# Patient Record
Sex: MALE | Race: WHITE
[De-identification: names, ages, dates, MRNs, and addresses within clinical notes are randomized per-mention and may not be internally consistent; named-entity substitution may affect disease eponyms.]

---

## 2017-09-16 ENCOUNTER — HOSPITAL ENCOUNTER (INPATIENT)
Dept: HOSPITAL 61 - ER | Age: 82
LOS: 5 days | Discharge: NURSING, REHAB OR LONG-TERM CARE | DRG: 380 | End: 2017-09-21
Attending: INTERNAL MEDICINE | Admitting: INTERNAL MEDICINE
Payer: MEDICARE

## 2017-09-16 VITALS — DIASTOLIC BLOOD PRESSURE: 72 MMHG | SYSTOLIC BLOOD PRESSURE: 110 MMHG

## 2017-09-16 VITALS — SYSTOLIC BLOOD PRESSURE: 116 MMHG | DIASTOLIC BLOOD PRESSURE: 66 MMHG

## 2017-09-16 VITALS — BODY MASS INDEX: 17.7 KG/M2 | HEIGHT: 73 IN | WEIGHT: 133.56 LBS

## 2017-09-16 VITALS — DIASTOLIC BLOOD PRESSURE: 62 MMHG | SYSTOLIC BLOOD PRESSURE: 114 MMHG

## 2017-09-16 DIAGNOSIS — I25.10: ICD-10-CM

## 2017-09-16 DIAGNOSIS — Z82.49: ICD-10-CM

## 2017-09-16 DIAGNOSIS — K21.0: ICD-10-CM

## 2017-09-16 DIAGNOSIS — Z95.1: ICD-10-CM

## 2017-09-16 DIAGNOSIS — N18.3: ICD-10-CM

## 2017-09-16 DIAGNOSIS — K22.4: ICD-10-CM

## 2017-09-16 DIAGNOSIS — Z87.891: ICD-10-CM

## 2017-09-16 DIAGNOSIS — E78.5: ICD-10-CM

## 2017-09-16 DIAGNOSIS — Z87.11: ICD-10-CM

## 2017-09-16 DIAGNOSIS — N17.0: ICD-10-CM

## 2017-09-16 DIAGNOSIS — Z90.49: ICD-10-CM

## 2017-09-16 DIAGNOSIS — I12.9: ICD-10-CM

## 2017-09-16 DIAGNOSIS — K31.5: ICD-10-CM

## 2017-09-16 DIAGNOSIS — K26.9: ICD-10-CM

## 2017-09-16 DIAGNOSIS — E43: ICD-10-CM

## 2017-09-16 DIAGNOSIS — K22.10: Primary | ICD-10-CM

## 2017-09-16 DIAGNOSIS — E11.22: ICD-10-CM

## 2017-09-16 DIAGNOSIS — I25.2: ICD-10-CM

## 2017-09-16 DIAGNOSIS — Z87.19: ICD-10-CM

## 2017-09-16 LAB
ALBUMIN SERPL-MCNC: 2.8 G/DL (ref 3.4–5)
ALBUMIN/GLOB SERPL: 0.8 {RATIO} (ref 1–1.7)
ALP SERPL-CCNC: 59 U/L (ref 46–116)
ALT SERPL-CCNC: 37 U/L (ref 16–63)
ANION GAP SERPL CALC-SCNC: 7 MMOL/L (ref 6–14)
AST SERPL-CCNC: 31 U/L (ref 15–37)
BASOPHILS # BLD AUTO: 0 X10^3/UL (ref 0–0.2)
BASOPHILS NFR BLD: 1 % (ref 0–3)
BILIRUB SERPL-MCNC: 0.3 MG/DL (ref 0.2–1)
BUN SERPL-MCNC: 42 MG/DL (ref 8–26)
BUN/CREAT SERPL: 18 (ref 6–20)
CALCIUM SERPL-MCNC: 8.8 MG/DL (ref 8.5–10.1)
CHLORIDE SERPL-SCNC: 101 MMOL/L (ref 98–107)
CO2 SERPL-SCNC: 29 MMOL/L (ref 21–32)
CREAT SERPL-MCNC: 2.3 MG/DL (ref 0.7–1.3)
EOSINOPHIL NFR BLD: 1 % (ref 0–3)
ERYTHROCYTE [DISTWIDTH] IN BLOOD BY AUTOMATED COUNT: 13.9 % (ref 11.5–14.5)
GFR SERPLBLD BASED ON 1.73 SQ M-ARVRAT: 27.2 ML/MIN
GLOBULIN SER-MCNC: 3.7 G/DL (ref 2.2–3.8)
GLUCOSE SERPL-MCNC: 145 MG/DL (ref 70–99)
HCT VFR BLD CALC: 27.7 % (ref 39–53)
HGB BLD-MCNC: 9.3 G/DL (ref 13–17.5)
LYMPHOCYTES # BLD: 0.8 X10^3/UL (ref 1–4.8)
LYMPHOCYTES NFR BLD AUTO: 21 % (ref 24–48)
MCH RBC QN AUTO: 31 PG (ref 25–35)
MCHC RBC AUTO-ENTMCNC: 34 G/DL (ref 31–37)
MCV RBC AUTO: 92 FL (ref 79–100)
MONOCYTES NFR BLD: 8 % (ref 0–9)
NEUTROPHILS NFR BLD AUTO: 69 % (ref 31–73)
PLATELET # BLD AUTO: 159 X10^3/UL (ref 140–400)
POTASSIUM SERPL-SCNC: 4.3 MMOL/L (ref 3.5–5.1)
PROT SERPL-MCNC: 6.5 G/DL (ref 6.4–8.2)
RBC # BLD AUTO: 3.01 X10^6/UL (ref 4.3–5.7)
SODIUM SERPL-SCNC: 137 MMOL/L (ref 136–145)
WBC # BLD AUTO: 4 X10^3/UL (ref 4–11)

## 2017-09-16 PROCEDURE — 83690 ASSAY OF LIPASE: CPT

## 2017-09-16 PROCEDURE — 83735 ASSAY OF MAGNESIUM: CPT

## 2017-09-16 PROCEDURE — 02HV33Z INSERTION OF INFUSION DEVICE INTO SUPERIOR VENA CAVA, PERCUTANEOUS APPROACH: ICD-10-PCS | Performed by: INTERNAL MEDICINE

## 2017-09-16 PROCEDURE — 85025 COMPLETE CBC W/AUTO DIFF WBC: CPT

## 2017-09-16 PROCEDURE — 85610 PROTHROMBIN TIME: CPT

## 2017-09-16 PROCEDURE — 36415 COLL VENOUS BLD VENIPUNCTURE: CPT

## 2017-09-16 PROCEDURE — 80048 BASIC METABOLIC PNL TOTAL CA: CPT

## 2017-09-16 PROCEDURE — 36569 INSJ PICC 5 YR+ W/O IMAGING: CPT

## 2017-09-16 PROCEDURE — 71010: CPT

## 2017-09-16 PROCEDURE — S0028 INJECTION, FAMOTIDINE, 20 MG: HCPCS

## 2017-09-16 PROCEDURE — 80053 COMPREHEN METABOLIC PANEL: CPT

## 2017-09-16 PROCEDURE — 82962 GLUCOSE BLOOD TEST: CPT

## 2017-09-16 PROCEDURE — 84100 ASSAY OF PHOSPHORUS: CPT

## 2017-09-16 NOTE — PHYS DOC
Past Medical History


Past Medical History:  Diabetes-Type II, MI, Other


Additional Past Medical Histor:  ULCERS


Past Surgical History:  Appendectomy, Cholecystectomy, Coronary Bypass Surgery, 

Other


Additional Past Surgical Histo:  STOMACH MESH


Alcohol Use:  None


Drug Use:  None





Adult General


Chief Complaint


Chief Complaint:  DIFFICULTY SWALLOWING





HPI


HPI





Patient is a 85 year old male with history of dysphagia with recent 

hospitalization for abdominal pain nausea vomiting. Patient was underwent EGD 

and offered PEG tube placement during the hospital stay. Patient reportedly 

declined PEG tube placement in rehabilitation. Since returning home, patient 

has had progressive difficulty swallowing per patient family members. He has 

not been able to take his pills for the past 2 days has had very little L 

distal oral intake. Patient reports feeling weak dizzy and lightheaded and 

regurgitation of food. Family and patient said they're willing to consider all 

therapeutic options available cluing PEG tube placement. []





Review of Systems


Review of Systems


Review symptoms as per history of present illness.





Current Medications


Current Medications





Current Medications








 Medications


  (Trade)  Dose


 Ordered  Sig/Leonidas  Start Time


 Stop Time Status Last Admin


Dose Admin


 


 Dextrose/Sodium


 Chloride  1,000 ml @ 


 125 mls/hr  1X  ONCE  9/16/17 14:00


 9/16/17 21:59  9/16/17 14:32


125 MLS/HR


 


 Famotidine


  (Pepcid)  20 mg  1X  ONCE  9/16/17 14:00


 9/16/17 14:02 DC 9/16/17 14:31


20 MG











Allergies


Allergies





Allergies








Coded Allergies Type Severity Reaction Last Updated Verified


 


  No Known Drug Allergies    9/8/17 No











Physical Exam


Physical Exam





Constitutional: Well developed, well nourished, no acute distress, non-toxic 

appearance. []


HENT: Normocephalic, atraumatic, bilateral external ears normal, oropharynx 

moist, no oral exudates, nose normal. []


Eyes: PERRLA, EOMI, conjunctiva normal, no discharge. [] 


Neck: Normal range of motion, no tenderness, supple, no stridor. [] 


Cardiovascular:Heart rate regular rhythm, no murmur []


Lungs & Thorax:  Bilateral breath sounds clear to auscultation []


Abdomen: Bowel sounds normal, soft, gastric pain, tenderness, no masses, no 

pulsatile masses. [] 


Skin: Warm, dry, no erythema, no rash. [] 


Back: No tenderness, no CVA tenderness. [] 


Extremities: No tenderness, no cyanosis, no clubbing, ROM intact, no edema. [] 


Neurologic: Alert and oriented X 3, normal motor function, normal sensory 

function, no focal deficits noted. []


Psychologic: Affect normal, judgement normal, mood normal. []





Current Patient Data


Vital Signs





 Vital Signs








  Date Time  Temp Pulse Resp B/P (MAP) Pulse Ox O2 Delivery O2 Flow Rate FiO2


 


9/16/17 14:00  64 18 110/55 (73) 100 Room Air  


 


9/16/17 12:32 98.4       





 98.4       








Lab Values





 Laboratory Tests








Test


  9/16/17


14:00


 


White Blood Count


  4.0 x10^3/uL


(4.0-11.0)


 


Red Blood Count


  3.01 x10^6/uL


(4.30-5.70)  L


 


Hemoglobin


  9.3 g/dL


(13.0-17.5)  L


 


Hematocrit


  27.7 %


(39.0-53.0)  L


 


Mean Corpuscular Volume


  92 fL ()


 


 


Mean Corpuscular Hemoglobin 31 pg (25-35)  


 


Mean Corpuscular Hemoglobin


Concent 34 g/dL


(31-37)


 


Red Cell Distribution Width


  13.9 %


(11.5-14.5)


 


Platelet Count


  159 x10^3/uL


(140-400)


 


Neutrophils (%) (Auto) 69 % (31-73)  


 


Lymphocytes (%) (Auto) 21 % (24-48)  L


 


Monocytes (%) (Auto) 8 % (0-9)  


 


Eosinophils (%) (Auto) 1 % (0-3)  


 


Basophils (%) (Auto) 1 % (0-3)  


 


Neutrophils # (Auto)


  2.8 x10^3uL


(1.8-7.7)


 


Lymphocytes # (Auto)


  0.8 x10^3/uL


(1.0-4.8)  L


 


Monocytes # (Auto)


  0.3 x10^3/uL


(0.0-1.1)


 


Eosinophils # (Auto)


  0.0 x10^3/uL


(0.0-0.7)


 


Basophils # (Auto)


  0.0 x10^3/uL


(0.0-0.2)


 


Sodium Level


  137 mmol/L


(136-145)


 


Potassium Level


  4.3 mmol/L


(3.5-5.1)


 


Chloride Level


  101 mmol/L


()


 


Carbon Dioxide Level


  29 mmol/L


(21-32)


 


Anion Gap 7 (6-14)  


 


Blood Urea Nitrogen


  42 mg/dL


(8-26)  H


 


Creatinine


  2.3 mg/dL


(0.7-1.3)  H


 


Estimated GFR


(Cockcroft-Gault) 27.2  


 


 


BUN/Creatinine Ratio 18 (6-20)  


 


Glucose Level


  145 mg/dL


(70-99)  H


 


Calcium Level


  8.8 mg/dL


(8.5-10.1)


 


Total Bilirubin


  0.3 mg/dL


(0.2-1.0)


 


Aspartate Amino Transferase


(AST) 31 U/L (15-37)


 


 


Alanine Aminotransferase (ALT)


  37 U/L (16-63)


 


 


Alkaline Phosphatase


  59 U/L


()


 


Total Protein


  6.5 g/dL


(6.4-8.2)


 


Albumin


  2.8 g/dL


(3.4-5.0)  L


 


Albumin/Globulin Ratio


  0.8 (1.0-1.7)


L


 


Lipase


  42 U/L


()  L





 Laboratory Tests


9/16/17 14:00








 Laboratory Tests


9/16/17 14:00














EKG


EKG


[]





Radiology/Procedures


Radiology/Procedures


[]





Course & Med Decision Making


Course & Med Decision Making


Pertinent Labs and Imaging studies reviewed. (See chart for details)





[Patient unable to tolerate fluid or food intake. IV fluids started. Dr. Foreman 

to admit]





Dragon Disclaimer


Dragon Disclaimer


This electronic medical record was generated, in whole or in part, using a 

voice recognition dictation system.





Departure


Departure


Disposition:  09 ADMITTED AS INPATIENT


Admitting Physician:  Chava Foreman


Condition:  STABLE


Referrals:  


DANITA BARNHART MD (PCP)











YEVGENIY FLOYD DO Sep 16, 2017 15:05

## 2017-09-17 VITALS — SYSTOLIC BLOOD PRESSURE: 131 MMHG | DIASTOLIC BLOOD PRESSURE: 71 MMHG

## 2017-09-17 VITALS — SYSTOLIC BLOOD PRESSURE: 126 MMHG | DIASTOLIC BLOOD PRESSURE: 68 MMHG

## 2017-09-17 VITALS — SYSTOLIC BLOOD PRESSURE: 116 MMHG | DIASTOLIC BLOOD PRESSURE: 55 MMHG

## 2017-09-17 VITALS — SYSTOLIC BLOOD PRESSURE: 125 MMHG | DIASTOLIC BLOOD PRESSURE: 64 MMHG

## 2017-09-17 VITALS — SYSTOLIC BLOOD PRESSURE: 133 MMHG | DIASTOLIC BLOOD PRESSURE: 74 MMHG

## 2017-09-17 LAB
ANION GAP SERPL CALC-SCNC: 4 MMOL/L (ref 6–14)
BASOPHILS # BLD AUTO: 0 X10^3/UL (ref 0–0.2)
BASOPHILS NFR BLD: 1 % (ref 0–3)
BUN SERPL-MCNC: 29 MG/DL (ref 8–26)
CALCIUM SERPL-MCNC: 8.2 MG/DL (ref 8.5–10.1)
CHLORIDE SERPL-SCNC: 104 MMOL/L (ref 98–107)
CO2 SERPL-SCNC: 30 MMOL/L (ref 21–32)
CREAT SERPL-MCNC: 1.7 MG/DL (ref 0.7–1.3)
EOSINOPHIL NFR BLD: 1 % (ref 0–3)
ERYTHROCYTE [DISTWIDTH] IN BLOOD BY AUTOMATED COUNT: 13.8 % (ref 11.5–14.5)
GFR SERPLBLD BASED ON 1.73 SQ M-ARVRAT: 38.5 ML/MIN
GLUCOSE SERPL-MCNC: 149 MG/DL (ref 70–99)
HCT VFR BLD CALC: 24.6 % (ref 39–53)
HGB BLD-MCNC: 8.6 G/DL (ref 13–17.5)
LYMPHOCYTES # BLD: 0.7 X10^3/UL (ref 1–4.8)
LYMPHOCYTES NFR BLD AUTO: 27 % (ref 24–48)
MCH RBC QN AUTO: 31 PG (ref 25–35)
MCHC RBC AUTO-ENTMCNC: 35 G/DL (ref 31–37)
MCV RBC AUTO: 90 FL (ref 79–100)
MONOCYTES NFR BLD: 9 % (ref 0–9)
NEUTROPHILS NFR BLD AUTO: 63 % (ref 31–73)
PLATELET # BLD AUTO: 147 X10^3/UL (ref 140–400)
POTASSIUM SERPL-SCNC: 3.8 MMOL/L (ref 3.5–5.1)
RBC # BLD AUTO: 2.75 X10^6/UL (ref 4.3–5.7)
SODIUM SERPL-SCNC: 138 MMOL/L (ref 136–145)
WBC # BLD AUTO: 2.7 X10^3/UL (ref 4–11)

## 2017-09-17 RX ADMIN — LIDOCAINE HYDROCHLORIDE PRN ML: 20 SOLUTION ORAL; TOPICAL at 13:26

## 2017-09-17 RX ADMIN — SUCRALFATE SCH GM: 1 SUSPENSION ORAL at 17:36

## 2017-09-17 RX ADMIN — GLYCERIN, ISOLEUCINE, LEUCINE, LYSINE, METHIONINE, PHENYLALANINE, THREONINE, TRYPTOPHAN, VALINE, ALANINE, GLYCINE, ARGININE, HISTIDINE, PROLINE, SERINE, CYSTEINE, SODIUM ACETATE, MAGNESIUM ACETATE, CALCIUM ACETATE, SODIUM CHLORIDE, POTASSIUM CHLORIDE, PHOSPHORIC ACID, AND POTASSIUM METABISULFITE SCH MLS/HR
3; .21; .27; .22; .16; .17; .12; .046; .2; .21; .42; .29; .085; .34; .18; .014; .2; .054; .026; .12; .15; .041 INJECTION INTRAVENOUS at 13:26

## 2017-09-17 RX ADMIN — LIDOCAINE HYDROCHLORIDE PRN ML: 20 SOLUTION ORAL; TOPICAL at 21:31

## 2017-09-17 RX ADMIN — SUCRALFATE SCH GM: 1 SUSPENSION ORAL at 21:31

## 2017-09-17 RX ADMIN — INSULIN ASPART SCH UNITS: 100 INJECTION, SOLUTION INTRAVENOUS; SUBCUTANEOUS at 17:00

## 2017-09-17 RX ADMIN — FAMOTIDINE SCH MG: 10 INJECTION, SOLUTION INTRAVENOUS at 13:25

## 2017-09-17 NOTE — HP
ADMIT DATE:  09/16/2017



DATE OF SERVICE:  09/16/2017



CHIEF COMPLAINT:  Odynophagia.



HISTORY OF PRESENT ILLNESS:  The patient is an 85-year-old  gentleman

with recent hospitalization for the same who presents with persistent symptoms. 

During his last hospitalization just a few days ago, he actually had undergone

extensive GI workup with swallow study as well as EGD showing an esophageal

ulcer, probably the culprit of his symptoms.  The swallow study suggested he

should do well with full liquids.  However, the patient had deteriorated and

could not tolerate fluids, but had declined a PEG tube during previous

hospitalization.  As he continued to deteriorate at home over the past couple of

days, he decided to come in the hospital and is now amenable to PEG placement.



PAST MEDICAL HISTORY:  Esophageal ulcer as above.  Distant history of perforated

viscus status post repair in 2011, history of CAD status post CABG, hypertension
,

hyperlipidemia, diabetes mellitus.



FAMILY HISTORY:  Positive for CAD.



SOCIAL HISTORY:  Currently lives with his daughter and son-in-law.  No toxic

habits.



ALLERGIES:  No known drug allergies.



HOME MEDICATIONS:  Reviewed.



REVIEW OF SYSTEMS:  Positive as per HPI.  He denies any nausea, any vomiting,

any abdominal pain, per se.  Denies any cough or palpitations.  Rest of organ

system review was negative.



PHYSICAL EXAMINATION:

VITAL SIGNS:  From today show blood pressure of 110/72, heart rate of 69,

respiratory rate at 18.  He is afebrile.

GENERAL:  This is a malnourished appearing 85-year-old gentleman, alert and

oriented, in no acute distress.

HEENT:  Shows no scleral icterus.  Oral mucosa is dry.

NECK:  Supple, without any lymphadenopathy.

HEART:  Regular rate and rhythm.

LUNGS:  Clear to auscultation bilaterally.

ABDOMEN:  Has positive bowel sounds, soft.  There is thickening of the midline

incision due to scar formation.

EXTREMITIES:  Show no edema.

SKIN:  Warm, soft and dry without any rash.



LABORATORY DATA:  CBC with a WBC of 4.0, hemoglobin 9.3, platelets of 159. 

Chemistries with a BUN and creatinine of 42 and 2.3, above his previous levels

of 28 and 1.5 just 4 days ago.  Electrolytes within normal limits.  Glucose at

145, albumin at 2.8.  LFTs within normal.



ASSESSMENT AND PLAN:  The patient is an 85-year-old  gentleman with

severe odynophagia secondary to esophageal ulcer.  He is now amenable to PEG

placement.  We will obtain GI consult.  Dr. Chiu saw him during his previous

hospitalization.



In the meantime, we will start him on TPN for nutrition.  He has received IV

fluids in the ER already.  For his symptoms, he will receive IV PPI/H2 blockers,

Carafate and viscous lidocaine p.r.n.



As most of his medications are p.o. and will have to be held at least for the

time being, I will start him on insulin sliding scale for his diabetes mellitus

as well as hydralazine for hypertension.  Once enteral access is regained, we

will restart all his home meds.



Prophylaxis will be mechanical until decision about surgical interventions is

made.

 



______________________________

YANNA JUAREZ MD



DR:  UR/nts  JOB#:  [id number] / [id number]

DD:  09/17/2017 13:11  DT:  09/17/2017 14:48



DANITA Cuevas MD

MTDD

## 2017-09-17 NOTE — PDOC
PROGRESS NOTES


Chief Complaint


Chief Complaint


Dysphagia








ASSESSMENT AND PLAN:


1.  Dysphagia:  recent admit for same with GI W/U, incl swallow study, EGD.  pt/

family had declined PEG at the time, now willing to go ahead.  GI consult


2.  GERD/PUD:  hx perf.ed ulcer in 2011;  gastritis, esophageal ulcer on recent 

EGD;  H.pylori neg, malignancy neg.  PPI IV for now, carafate, trial viscous 

lidocaine


3.  Nutrition:  start PPN for now


4.  DM2:  well controlled.  add ISS


5.  CAD:  no acute issues.  oral meds currently on hold.  


6.  HTN:  hydralazine PRN


7.  CKD3:  stable.  monitor


8.  Prophylaxis: mechanical for now in anticipation of intervention





History of Present Illness


History of Present Illness


feels ok, but can't even swallow Boost, odynophagia is that bad.





Vitals


Vitals





Vital Signs








  Date Time  Temp Pulse Resp B/P (MAP) Pulse Ox O2 Delivery O2 Flow Rate FiO2


 


9/17/17 07:00 97.9 89 22 116/55 (75) 98 Room Air  





 97.9       











Physical Exam


General:  Alert, Oriented X3, Cooperative, No acute distress


Heart:  Regular rate


Lungs:  Clear


Abdomen:  Normal bowel sounds, No tenderness, Other (thick scar gron gastrectomy

)


Extremities:  No edema


Skin:  No rashes





Labs


LABS





Laboratory Tests








Test


  9/16/17


14:00


 


White Blood Count


  4.0 x10^3/uL


(4.0-11.0)


 


Red Blood Count


  3.01 x10^6/uL


(4.30-5.70)


 


Hemoglobin


  9.3 g/dL


(13.0-17.5)


 


Hematocrit


  27.7 %


(39.0-53.0)


 


Mean Corpuscular Volume 92 fL () 


 


Mean Corpuscular Hemoglobin 31 pg (25-35) 


 


Mean Corpuscular Hemoglobin


Concent 34 g/dL


(31-37)


 


Red Cell Distribution Width


  13.9 %


(11.5-14.5)


 


Platelet Count


  159 x10^3/uL


(140-400)


 


Neutrophils (%) (Auto) 69 % (31-73) 


 


Lymphocytes (%) (Auto) 21 % (24-48) 


 


Monocytes (%) (Auto) 8 % (0-9) 


 


Eosinophils (%) (Auto) 1 % (0-3) 


 


Basophils (%) (Auto) 1 % (0-3) 


 


Neutrophils # (Auto)


  2.8 x10^3uL


(1.8-7.7)


 


Lymphocytes # (Auto)


  0.8 x10^3/uL


(1.0-4.8)


 


Monocytes # (Auto)


  0.3 x10^3/uL


(0.0-1.1)


 


Eosinophils # (Auto)


  0.0 x10^3/uL


(0.0-0.7)


 


Basophils # (Auto)


  0.0 x10^3/uL


(0.0-0.2)


 


Sodium Level


  137 mmol/L


(136-145)


 


Potassium Level


  4.3 mmol/L


(3.5-5.1)


 


Chloride Level


  101 mmol/L


()


 


Carbon Dioxide Level


  29 mmol/L


(21-32)


 


Anion Gap 7 (6-14) 


 


Blood Urea Nitrogen


  42 mg/dL


(8-26)


 


Creatinine


  2.3 mg/dL


(0.7-1.3)


 


Estimated GFR


(Cockcroft-Gault) 27.2 


 


 


BUN/Creatinine Ratio 18 (6-20) 


 


Glucose Level


  145 mg/dL


(70-99)


 


Calcium Level


  8.8 mg/dL


(8.5-10.1)


 


Total Bilirubin


  0.3 mg/dL


(0.2-1.0)


 


Aspartate Amino Transf


(AST/SGOT) 31 U/L (15-37) 


 


 


Alanine Aminotransferase


(ALT/SGPT) 37 U/L (16-63) 


 


 


Alkaline Phosphatase


  59 U/L


()


 


Total Protein


  6.5 g/dL


(6.4-8.2)


 


Albumin


  2.8 g/dL


(3.4-5.0)


 


Albumin/Globulin Ratio 0.8 (1.0-1.7) 


 


Lipase


  42 U/L


()

















YANNA JUAREZ MD Sep 17, 2017 11:48

## 2017-09-17 NOTE — PDOC2
GI CONSULT


Reason For Consult:


"Dysphagia"





HPI:


HPI:


84 y/o male just discharged 9/12 after admission for difficulty with 

deglutition.  Then and now does not describe true dysphagia, but feels like 

food stops in the hypopharynx.  Evaluation last week included SLP evaluation 

including videoswallow exam; no demonstrable problems with swallow.  Had barium 

swallow suggesting possible esophageal stricture.  At EGD, severe esophagitis 

throughout the esophagus felt from reflux and penetrating duodenal ulcer, 

H.pylori negative, but no structure.  Seemed to improve with PPI, though never 

liked eating much above pudding consistency.  Says taking his PPI daily except 

yesterday.  





Apparently had operative repair of perforated ulcer in the past.  S/p regine.  

No liver or pancreatic history.  Former smoker.  No alcohol use.  No clear 

NSAID use.  No D, C, overt bleeding or melena.  No documented colonoscopy 

though believes he had one sometime.  When seen in one of our offices years ago

, one was recommended along with an EGD, but not scheduled.  No true nausea or 

vomiting, though spits out what he feels he can't swallow.  Has had progressive 

weight loss.





PMH:


PMH:


ASHD, HTN, CKD, DM.  S/p CABG, regine, appy, repair ulcer perf.





FH:


Family History:  No pertinent hx





Social History:


Smoke:  Quit


ALCOHOL:  none


Drugs:  None





ROS:





GEN: Denies fevers, chills, sweats


HEENT: Denies blurred vision, sore throat


CV: Denies chest pain


RESP: Denies shortness of air, cough


GI: Per HPI


: Denies hematuria, dysuria


ENDO: Denies weight changes


NEURO: Denies confusion, dizziness


MSK: Denies weakness, joint pain/swelling


SKIN: Denies jaundice, pruritus





Vitals:


Vitals:





 Vital Signs








  Date Time  Temp Pulse Resp B/P (MAP) Pulse Ox O2 Delivery O2 Flow Rate FiO2


 


9/17/17 11:00 97.8 69 22 116/55 (75) 96 Room Air  





 97.8       











Labs:


Labs:





Laboratory Tests








Test


  9/16/17


14:00


 


White Blood Count


  4.0 x10^3/uL


(4.0-11.0)


 


Red Blood Count


  3.01 x10^6/uL


(4.30-5.70)


 


Hemoglobin


  9.3 g/dL


(13.0-17.5)


 


Hematocrit


  27.7 %


(39.0-53.0)


 


Mean Corpuscular Volume 92 fL () 


 


Mean Corpuscular Hemoglobin 31 pg (25-35) 


 


Mean Corpuscular Hemoglobin


Concent 34 g/dL


(31-37)


 


Red Cell Distribution Width


  13.9 %


(11.5-14.5)


 


Platelet Count


  159 x10^3/uL


(140-400)


 


Neutrophils (%) (Auto) 69 % (31-73) 


 


Lymphocytes (%) (Auto) 21 % (24-48) 


 


Monocytes (%) (Auto) 8 % (0-9) 


 


Eosinophils (%) (Auto) 1 % (0-3) 


 


Basophils (%) (Auto) 1 % (0-3) 


 


Neutrophils # (Auto)


  2.8 x10^3uL


(1.8-7.7)


 


Lymphocytes # (Auto)


  0.8 x10^3/uL


(1.0-4.8)


 


Monocytes # (Auto)


  0.3 x10^3/uL


(0.0-1.1)


 


Eosinophils # (Auto)


  0.0 x10^3/uL


(0.0-0.7)


 


Basophils # (Auto)


  0.0 x10^3/uL


(0.0-0.2)


 


Sodium Level


  137 mmol/L


(136-145)


 


Potassium Level


  4.3 mmol/L


(3.5-5.1)


 


Chloride Level


  101 mmol/L


()


 


Carbon Dioxide Level


  29 mmol/L


(21-32)


 


Anion Gap 7 (6-14) 


 


Blood Urea Nitrogen


  42 mg/dL


(8-26)


 


Creatinine


  2.3 mg/dL


(0.7-1.3)


 


Estimated GFR


(Cockcroft-Gault) 27.2 


 


 


BUN/Creatinine Ratio 18 (6-20) 


 


Glucose Level


  145 mg/dL


(70-99)


 


Calcium Level


  8.8 mg/dL


(8.5-10.1)


 


Total Bilirubin


  0.3 mg/dL


(0.2-1.0)


 


Aspartate Amino Transf


(AST/SGOT) 31 U/L (15-37) 


 


 


Alanine Aminotransferase


(ALT/SGPT) 37 U/L (16-63) 


 


 


Alkaline Phosphatase


  59 U/L


()


 


Total Protein


  6.5 g/dL


(6.4-8.2)


 


Albumin


  2.8 g/dL


(3.4-5.0)


 


Albumin/Globulin Ratio 0.8 (1.0-1.7) 


 


Lipase


  42 U/L


()





No major changes since discharge.





Allergies:


Coded Allergies:  


     No Known Drug Allergies (Unverified , 9/8/17)





Medications:





Current Medications








 Medications


  (Trade)  Dose


 Ordered  Sig/Leonidas


 Route


 PRN Reason  Start Time


 Stop Time Status Last Admin


Dose Admin


 


 Famotidine


  (Pepcid)  20 mg  1X  ONCE


 IVP


   9/16/17 14:00


 9/16/17 14:02 DC 9/16/17 14:31


 


 


 Dextrose/Sodium


 Chloride  1,000 ml @ 


 125 mls/hr  1X  ONCE


 IV


   9/16/17 14:00


 9/16/17 21:59 DC 9/16/17 14:32


 











PE:





GEN: NAD, thin


HEENT: Atraumatic, PERRLA, multiple missing teeth.


LUNGS: CTAB


HEART: RRR, no murmurs


ABD: NABS, S/ND/NT, no masses


EXTREMITY: No edema


SKIN: No rashes, no jaundice


NEURO/PSYCH: A & O 3





A/P:


A/P:


IMP: Abnormal deglutition.  Reasons for this unclear--fear of swallowing?  psych

?  By all measurements, no physical issue at cause.


        Severe reflux esophagitis.


         DU


         s/p regine.


          Unclear prior colonoscopy.





REC: At some point last admission, PEG may have been discussed.  I cannot find 

note to say so, though covering physician mentions GJ tube.  I think that may 

have been due to concerns re: possible duodenal stricture from his ulcer.  Will 

leave decision re: this to Dr. Chiu upon his return this evening.


         Agree with continuing anti-secretory.


         If he wishes to try eating, I think this is acceptable; last admission 

liked BOOST pudding a lot.





--other pending.  Thanks.











TAE BUSTOS MD Sep 17, 2017 13:25

## 2017-09-18 VITALS — DIASTOLIC BLOOD PRESSURE: 69 MMHG | SYSTOLIC BLOOD PRESSURE: 105 MMHG

## 2017-09-18 VITALS — SYSTOLIC BLOOD PRESSURE: 118 MMHG | DIASTOLIC BLOOD PRESSURE: 65 MMHG

## 2017-09-18 VITALS — DIASTOLIC BLOOD PRESSURE: 57 MMHG | SYSTOLIC BLOOD PRESSURE: 120 MMHG

## 2017-09-18 VITALS — SYSTOLIC BLOOD PRESSURE: 125 MMHG | DIASTOLIC BLOOD PRESSURE: 60 MMHG

## 2017-09-18 VITALS — DIASTOLIC BLOOD PRESSURE: 38 MMHG | SYSTOLIC BLOOD PRESSURE: 124 MMHG

## 2017-09-18 RX ADMIN — GLYCERIN, ISOLEUCINE, LEUCINE, LYSINE, METHIONINE, PHENYLALANINE, THREONINE, TRYPTOPHAN, VALINE, ALANINE, GLYCINE, ARGININE, HISTIDINE, PROLINE, SERINE, CYSTEINE, SODIUM ACETATE, MAGNESIUM ACETATE, CALCIUM ACETATE, SODIUM CHLORIDE, POTASSIUM CHLORIDE, PHOSPHORIC ACID, AND POTASSIUM METABISULFITE SCH MLS/HR
3; .21; .27; .22; .16; .17; .12; .046; .2; .21; .42; .29; .085; .34; .18; .014; .2; .054; .026; .12; .15; .041 INJECTION INTRAVENOUS at 13:31

## 2017-09-18 RX ADMIN — INSULIN ASPART SCH UNITS: 100 INJECTION, SOLUTION INTRAVENOUS; SUBCUTANEOUS at 08:00

## 2017-09-18 RX ADMIN — SUCRALFATE SCH GM: 1 SUSPENSION ORAL at 08:45

## 2017-09-18 RX ADMIN — SUCRALFATE SCH GM: 1 SUSPENSION ORAL at 16:30

## 2017-09-18 RX ADMIN — INSULIN ASPART SCH UNITS: 100 INJECTION, SOLUTION INTRAVENOUS; SUBCUTANEOUS at 16:41

## 2017-09-18 RX ADMIN — FAMOTIDINE SCH MG: 10 INJECTION, SOLUTION INTRAVENOUS at 08:46

## 2017-09-18 RX ADMIN — SUCRALFATE SCH GM: 1 SUSPENSION ORAL at 21:00

## 2017-09-18 RX ADMIN — INSULIN ASPART SCH UNITS: 100 INJECTION, SOLUTION INTRAVENOUS; SUBCUTANEOUS at 12:00

## 2017-09-18 RX ADMIN — FAMOTIDINE SCH MG: 10 INJECTION, SOLUTION INTRAVENOUS at 13:00

## 2017-09-18 RX ADMIN — SUCRALFATE SCH GM: 1 SUSPENSION ORAL at 11:30

## 2017-09-18 RX ADMIN — GLYCERIN, ISOLEUCINE, LEUCINE, LYSINE, METHIONINE, PHENYLALANINE, THREONINE, TRYPTOPHAN, VALINE, ALANINE, GLYCINE, ARGININE, HISTIDINE, PROLINE, SERINE, CYSTEINE, SODIUM ACETATE, MAGNESIUM ACETATE, CALCIUM ACETATE, SODIUM CHLORIDE, POTASSIUM CHLORIDE, PHOSPHORIC ACID, AND POTASSIUM METABISULFITE SCH MLS/HR
3; .21; .27; .22; .16; .17; .12; .046; .2; .21; .42; .29; .085; .34; .18; .014; .2; .054; .026; .12; .15; .041 INJECTION INTRAVENOUS at 01:29

## 2017-09-18 NOTE — PDOC
Subjective:


Subjective:


Wants to try eating.


Can't really tell me why he can't/won't swallow.  Denies pain w/ swallowing.  

"Just can't swallow."


Wife present - also unable to contribute much to history.


Son describes coughing w/ eating.  Also says "lidocaine helped."





Objective:


Objective:


D/w RN - has been NPO.


Swallowed Carafate susp in my presence w/o issue - no coughing.


Vital Signs:





 Vital Signs








  Date Time  Temp Pulse Resp B/P (MAP) Pulse Ox O2 Delivery O2 Flow Rate FiO2


 


9/18/17 07:00 98.2 57 18 124/38 (66) 98 Room Air  





 98.2       








Labs:





Laboratory Tests








Test


  9/17/17


13:15 9/17/17


13:25 9/17/17


17:35 9/18/17


07:23


 


White Blood Count 2.7 x10^3/uL    


 


Red Blood Count 2.75 x10^6/uL    


 


Hemoglobin 8.6 g/dL    


 


Hematocrit 24.6 %    


 


Mean Corpuscular Volume 90 fL    


 


Mean Corpuscular Hemoglobin 31 pg    


 


Mean Corpuscular Hemoglobin


Concent 35 g/dL 


  


  


  


 


 


Red Cell Distribution Width 13.8 %    


 


Platelet Count 147 x10^3/uL    


 


Neutrophils (%) (Auto) 63 %    


 


Lymphocytes (%) (Auto) 27 %    


 


Monocytes (%) (Auto) 9 %    


 


Eosinophils (%) (Auto) 1 %    


 


Basophils (%) (Auto) 1 %    


 


Neutrophils # (Auto) 1.7 x10^3uL    


 


Lymphocytes # (Auto) 0.7 x10^3/uL    


 


Monocytes # (Auto) 0.2 x10^3/uL    


 


Eosinophils # (Auto) 0.0 x10^3/uL    


 


Basophils # (Auto) 0.0 x10^3/uL    


 


Sodium Level 138 mmol/L    


 


Potassium Level 3.8 mmol/L    


 


Chloride Level 104 mmol/L    


 


Carbon Dioxide Level 30 mmol/L    


 


Anion Gap 4    


 


Blood Urea Nitrogen 29 mg/dL    


 


Creatinine 1.7 mg/dL    


 


Estimated GFR


(Cockcroft-Gault) 38.5 


  


  


  


 


 


Glucose Level 149 mg/dL    


 


Calcium Level 8.2 mg/dL    


 


Glucose (Fingerstick)  135 mg/dL  101 mg/dL  125 mg/dL 











PE:





GEN: NAD


LUNGS: clear


HEART: distant


ABD: BS+, vague mild discomfort


NEURO/PSYCH: A & O 3, [place]





A/P:


Abnormal deglutition


-recent workup:


   SLP eval and videoswallow w/o problems


   barium swallow w/ possible stricture


   EGD w/ severe esophagitis and penetrating duodenal ulcer (w/o stricture)





--


History difficult, as was last admission.


No issues with swallowing this morning (at least w/ liquid Carafate).


Will try diet, ask for nutrition consult - he likes Boost pudding.  Has PPN.


We did talk about a feeding tube briefly but pt/family want to try PO intake 

now.


Restart PPI BID.


Note palliative care has been asked to see.





***


UPDATE - since I have seen this morning, RN called and said unable to tolerate 

liquids, "coughed back up," apparently changed back to NPO and now requesting 

feeding tube.  Will review this w/ Dr. Chiu.











PREM HERNANDEZ Sep 18, 2017 09:15

## 2017-09-18 NOTE — PDOC
PROGRESS NOTES


Chief Complaint


Chief Complaint


Dysphagia








ASSESSMENT AND PLAN:


1.  Dysphagia:  recent admit for same with GI W/U, incl swallow study, EGD.  pt/

family had declined PEG at the time, now willing to go ahead.  Dr Chiu aware


2.  GERD/PUD:  hx perf.ed ulcer in 2011;  gastritis, esophageal ulcer on recent 

EGD;  H.pylori neg, malignancy neg.  PPI IV for now, carafate, trial viscous 

lidocaine


3.  Nutrition:  start PPN for now


4.  DM2:  well controlled.  add ISS


5.  CAD:  no acute issues.  oral meds currently on hold.  


6.  HTN:  hydralazine PRN


7.  CKD3:  stable.  monitor


8.  Prophylaxis: mechanical for now in anticipation of intervention





History of Present Illness


History of Present Illness


"not good".  convinced himself that the ulcer is blocking his esophagus almost 

completely. hence cant even swallow his own spit





Vitals


Vitals





Vital Signs








  Date Time  Temp Pulse Resp B/P (MAP) Pulse Ox O2 Delivery O2 Flow Rate FiO2


 


9/18/17 11:00 98.3 60 18 120/57 (78) 97 Room Air  





 98.3       











Physical Exam


General:  Alert, Oriented X3, Cooperative, No acute distress


Heart:  Regular rate


Lungs:  Clear


Abdomen:  Normal bowel sounds, No tenderness, Other (thick scar gron gastrectomy

)


Extremities:  No edema


Skin:  No rashes





Labs


LABS





Laboratory Tests








Test


  9/17/17


17:35 9/18/17


07:23 9/18/17


13:03


 


Glucose (Fingerstick)


  101 mg/dL


(70-99) 125 mg/dL


(70-99) 120 mg/dL


(70-99)











Nutrition Consultation


Dietary Evaluation:


Recommendations by RD:  Increase Calorie Intake, Protein supplementation, PPN/

TPN


Comments:  


continue ppn at this time





pt to reveive a PEG placement





REC: Diabetisource AC @ 20 ml/hr increase by 10 ml q 8 hr to a 





goal rate of 50 ml/hr with 120 cc q 6 hr flush





Bolua: Diabetisource AC or equiv 250 ml ( 1 carton) 5 x day, 100 cc water  


fluse after each bolus.


Expected Outcomes/Goals:  


Tolerate TF @ goal rate





Malnutrition Findings:


Food and Nutrition Intake (Sev:  <50% est energy req 5days


Body Fat Depletion (Non Severe:  Mild Depletion


Weight Status:  Underweight











YANNA JUAREZ MD Sep 18, 2017 14:38

## 2017-09-19 VITALS — DIASTOLIC BLOOD PRESSURE: 66 MMHG | SYSTOLIC BLOOD PRESSURE: 131 MMHG

## 2017-09-19 VITALS — DIASTOLIC BLOOD PRESSURE: 59 MMHG | SYSTOLIC BLOOD PRESSURE: 96 MMHG

## 2017-09-19 VITALS — SYSTOLIC BLOOD PRESSURE: 125 MMHG | DIASTOLIC BLOOD PRESSURE: 66 MMHG

## 2017-09-19 VITALS — SYSTOLIC BLOOD PRESSURE: 94 MMHG | DIASTOLIC BLOOD PRESSURE: 60 MMHG

## 2017-09-19 VITALS — DIASTOLIC BLOOD PRESSURE: 76 MMHG | SYSTOLIC BLOOD PRESSURE: 121 MMHG

## 2017-09-19 VITALS — SYSTOLIC BLOOD PRESSURE: 118 MMHG | DIASTOLIC BLOOD PRESSURE: 73 MMHG

## 2017-09-19 VITALS — SYSTOLIC BLOOD PRESSURE: 110 MMHG | DIASTOLIC BLOOD PRESSURE: 66 MMHG

## 2017-09-19 VITALS — SYSTOLIC BLOOD PRESSURE: 136 MMHG | DIASTOLIC BLOOD PRESSURE: 71 MMHG

## 2017-09-19 VITALS — SYSTOLIC BLOOD PRESSURE: 126 MMHG | DIASTOLIC BLOOD PRESSURE: 78 MMHG

## 2017-09-19 VITALS — DIASTOLIC BLOOD PRESSURE: 67 MMHG | SYSTOLIC BLOOD PRESSURE: 126 MMHG

## 2017-09-19 VITALS — DIASTOLIC BLOOD PRESSURE: 61 MMHG | SYSTOLIC BLOOD PRESSURE: 115 MMHG

## 2017-09-19 LAB
ANION GAP SERPL CALC-SCNC: 5 MMOL/L (ref 6–14)
BASOPHILS # BLD AUTO: 0 X10^3/UL (ref 0–0.2)
BASOPHILS NFR BLD: 1 % (ref 0–3)
BUN SERPL-MCNC: 26 MG/DL (ref 8–26)
CALCIUM SERPL-MCNC: 8.3 MG/DL (ref 8.5–10.1)
CHLORIDE SERPL-SCNC: 101 MMOL/L (ref 98–107)
CO2 SERPL-SCNC: 30 MMOL/L (ref 21–32)
CREAT SERPL-MCNC: 1.4 MG/DL (ref 0.7–1.3)
EOSINOPHIL NFR BLD: 2 % (ref 0–3)
ERYTHROCYTE [DISTWIDTH] IN BLOOD BY AUTOMATED COUNT: 13.9 % (ref 11.5–14.5)
GFR SERPLBLD BASED ON 1.73 SQ M-ARVRAT: 48.2 ML/MIN
GLUCOSE SERPL-MCNC: 129 MG/DL (ref 70–99)
HCT VFR BLD CALC: 25.4 % (ref 39–53)
HGB BLD-MCNC: 8.7 G/DL (ref 13–17.5)
INR PPP: 1.1 (ref 0.8–1.1)
LYMPHOCYTES # BLD: 0.7 X10^3/UL (ref 1–4.8)
LYMPHOCYTES NFR BLD AUTO: 28 % (ref 24–48)
MCH RBC QN AUTO: 31 PG (ref 25–35)
MCHC RBC AUTO-ENTMCNC: 34 G/DL (ref 31–37)
MCV RBC AUTO: 90 FL (ref 79–100)
MONOCYTES NFR BLD: 10 % (ref 0–9)
NEUTROPHILS NFR BLD AUTO: 60 % (ref 31–73)
PLATELET # BLD AUTO: 129 X10^3/UL (ref 140–400)
POTASSIUM SERPL-SCNC: 4.4 MMOL/L (ref 3.5–5.1)
PROTHROMBIN TIME: 13.4 SEC (ref 11.7–14)
RBC # BLD AUTO: 2.83 X10^6/UL (ref 4.3–5.7)
SODIUM SERPL-SCNC: 136 MMOL/L (ref 136–145)
WBC # BLD AUTO: 2.6 X10^3/UL (ref 4–11)

## 2017-09-19 PROCEDURE — 0DJ08ZZ INSPECTION OF UPPER INTESTINAL TRACT, VIA NATURAL OR ARTIFICIAL OPENING ENDOSCOPIC: ICD-10-PCS | Performed by: INTERNAL MEDICINE

## 2017-09-19 RX ADMIN — INSULIN ASPART SCH UNITS: 100 INJECTION, SOLUTION INTRAVENOUS; SUBCUTANEOUS at 08:00

## 2017-09-19 RX ADMIN — SUCRALFATE SCH GM: 1 SUSPENSION ORAL at 07:30

## 2017-09-19 RX ADMIN — LIDOCAINE HYDROCHLORIDE PRN ML: 20 SOLUTION ORAL; TOPICAL at 10:44

## 2017-09-19 RX ADMIN — SUCRALFATE SCH GM: 1 SUSPENSION ORAL at 16:30

## 2017-09-19 RX ADMIN — GLYCERIN, ISOLEUCINE, LEUCINE, LYSINE, METHIONINE, PHENYLALANINE, THREONINE, TRYPTOPHAN, VALINE, ALANINE, GLYCINE, ARGININE, HISTIDINE, PROLINE, SERINE, CYSTEINE, SODIUM ACETATE, MAGNESIUM ACETATE, CALCIUM ACETATE, SODIUM CHLORIDE, POTASSIUM CHLORIDE, PHOSPHORIC ACID, AND POTASSIUM METABISULFITE SCH MLS/HR
3; .21; .27; .22; .16; .17; .12; .046; .2; .21; .42; .29; .085; .34; .18; .014; .2; .054; .026; .12; .15; .041 INJECTION INTRAVENOUS at 01:53

## 2017-09-19 RX ADMIN — INSULIN ASPART SCH UNITS: 100 INJECTION, SOLUTION INTRAVENOUS; SUBCUTANEOUS at 16:36

## 2017-09-19 RX ADMIN — SUCRALFATE SCH GM: 1 SUSPENSION ORAL at 10:44

## 2017-09-19 RX ADMIN — SUCRALFATE SCH GM: 1 SUSPENSION ORAL at 21:04

## 2017-09-19 RX ADMIN — GLYCERIN, ISOLEUCINE, LEUCINE, LYSINE, METHIONINE, PHENYLALANINE, THREONINE, TRYPTOPHAN, VALINE, ALANINE, GLYCINE, ARGININE, HISTIDINE, PROLINE, SERINE, CYSTEINE, SODIUM ACETATE, MAGNESIUM ACETATE, CALCIUM ACETATE, SODIUM CHLORIDE, POTASSIUM CHLORIDE, PHOSPHORIC ACID, AND POTASSIUM METABISULFITE SCH MLS/HR
3; .21; .27; .22; .16; .17; .12; .046; .2; .21; .42; .29; .085; .34; .18; .014; .2; .054; .026; .12; .15; .041 INJECTION INTRAVENOUS at 14:48

## 2017-09-19 RX ADMIN — FAMOTIDINE SCH MG: 10 INJECTION, SOLUTION INTRAVENOUS at 08:50

## 2017-09-19 RX ADMIN — INSULIN ASPART SCH UNITS: 100 INJECTION, SOLUTION INTRAVENOUS; SUBCUTANEOUS at 12:00

## 2017-09-19 RX ADMIN — SODIUM CHLORIDE, SODIUM LACTATE, POTASSIUM CHLORIDE, AND CALCIUM CHLORIDE SCH MLS/HR: .6; .31; .03; .02 INJECTION, SOLUTION INTRAVENOUS at 15:29

## 2017-09-19 NOTE — PDOC
Subjective:


Subjective:


Denies odynophagia.


Maintain own secretions.


Doesn't want to try eating.


Wants PEG.





Objective:


Vital Signs:





 Vital Signs








  Date Time  Temp Pulse Resp B/P (MAP) Pulse Ox O2 Delivery O2 Flow Rate FiO2


 


9/19/17 08:00      Room Air  


 


9/19/17 07:00 98.2 59 22 110/66 (81) 97   





 98.2       








Labs:





Laboratory Tests








Test


  9/18/17


13:03 9/18/17


19:02 9/19/17


06:07


 


Glucose (Fingerstick)


  120 mg/dL


(70-99) 125 mg/dL


(70-99) 133 mg/dL


(70-99)











PE:





GEN: NAD


LUNGS: CTAB


HEART: RRR


ABD: NABS, S/ND/NT


NEURO/PSYCH: A & O 3





A/P:


Abnormal deglutition, esophageal dysmotility


-recent workup:


   SLP eval and videoswallow w/o problems


   barium swallow w/ possible stricture, + dysmotility ("mildly disordered 

swallowing mechanism")


   EGD w/ severe esophagitis and penetrating duodenal ulcer (w/o stricture)





--


PC has not seen.


D/w Dr. Jones.


D/w pt and wife - PEG procedure/risks, also that PEG placement might not 

resolved all swallow issues long-term considering dysmotility.  They would like 

to proceed.  No children present this morning.


D/w Dr. Chiu - remain NPO for PEG tonight. D/w RN.  Will also check INR.


On PPN and IV H2 blocker - continue for now.











PREM HERNANDEZ Sep 19, 2017 10:47

## 2017-09-19 NOTE — PDOC
PROGRESS NOTES


Chief Complaint


Chief Complaint


Dysphagia


 


1.  Dysphagia:  recent admit for same with GI W/U, incl swallow study, EGD.  pt/

family had declined PEG at the time .  Dr Chiu aware, pt NPO today


2.  GERD/PUD:  hx perf.ed ulcer in 2011;  gastritis, esophageal ulcer on recent 

EGD;  H.pylori neg, malignancy neg.  PPI IV for now, carafate, trial viscous 

lidocaine


3.  Nutrition:  start PPN for now


4.  DM2:  well controlled.  add ISS


5.  CAD:  no acute issues.  oral meds currently on hold.  


6.  HTN:  hydralazine PRN


7.  CKD3:  stable.  monitor


8.  Prophylaxis: mechanical for now in anticipation of intervention


9,. full code status,  from prior discussions





History of Present Illness


History of Present Illness


could not eat yesterday]


is NPO today with hopes of getting PEG


has lost 10 lbs past few weeks


pain, ulcer is blocking his esophagus





Vitals


Vitals





Vital Signs








  Date Time  Temp Pulse Resp B/P (MAP) Pulse Ox O2 Delivery O2 Flow Rate FiO2


 


9/19/17 08:00      Room Air  


 


9/19/17 07:00 98.2 59 22 110/66 (81) 97   





 98.2       











Physical Exam


General:  Alert, Oriented X3, Cooperative, No acute distress


Heart:  Regular rate


Lungs:  Clear


Abdomen:  Normal bowel sounds, No tenderness, Other (thick scar gron gastrectomy

)


Extremities:  No edema


Skin:  No rashes





Labs


LABS





Laboratory Tests








Test


  9/18/17


13:03 9/18/17


19:02 9/19/17


03:32 9/19/17


03:52


 


Glucose (Fingerstick)


  120 mg/dL


(70-99) 125 mg/dL


(70-99) 


  


 


 


Sodium Level


  


  


  136 mmol/L


(136-145) 


 


 


Potassium Level


  


  


  4.4 mmol/L


(3.5-5.1) 


 


 


Chloride Level


  


  


  101 mmol/L


() 


 


 


Carbon Dioxide Level


  


  


  30 mmol/L


(21-32) 


 


 


Anion Gap   5 (6-14)  


 


Blood Urea Nitrogen


  


  


  26 mg/dL


(8-26) 


 


 


Creatinine


  


  


  1.4 mg/dL


(0.7-1.3) 


 


 


Estimated GFR


(Cockcroft-Gault) 


  


  48.2 


  


 


 


Glucose Level


  


  


  129 mg/dL


(70-99) 


 


 


Calcium Level


  


  


  8.3 mg/dL


(8.5-10.1) 


 


 


White Blood Count


  


  


  


  2.6 x10^3/uL


(4.0-11.0)


 


Red Blood Count


  


  


  


  2.83 x10^6/uL


(4.30-5.70)


 


Hemoglobin


  


  


  


  8.7 g/dL


(13.0-17.5)


 


Hematocrit


  


  


  


  25.4 %


(39.0-53.0)


 


Mean Corpuscular Volume    90 fL () 


 


Mean Corpuscular Hemoglobin    31 pg (25-35) 


 


Mean Corpuscular Hemoglobin


Concent 


  


  


  34 g/dL


(31-37)


 


Red Cell Distribution Width


  


  


  


  13.9 %


(11.5-14.5)


 


Platelet Count


  


  


  


  129 x10^3/uL


(140-400)


 


Neutrophils (%) (Auto)    60 % (31-73) 


 


Lymphocytes (%) (Auto)    28 % (24-48) 


 


Monocytes (%) (Auto)    10 % (0-9) 


 


Eosinophils (%) (Auto)    2 % (0-3) 


 


Basophils (%) (Auto)    1 % (0-3) 


 


Neutrophils # (Auto)


  


  


  


  1.5 x10^3uL


(1.8-7.7)


 


Lymphocytes # (Auto)


  


  


  


  0.7 x10^3/uL


(1.0-4.8)


 


Monocytes # (Auto)


  


  


  


  0.3 x10^3/uL


(0.0-1.1)


 


Eosinophils # (Auto)


  


  


  


  0.0 x10^3/uL


(0.0-0.7)


 


Basophils # (Auto)


  


  


  


  0.0 x10^3/uL


(0.0-0.2)


 


Test


  9/19/17


06:07 


  


  


 


 


Glucose (Fingerstick)


  133 mg/dL


(70-99) 


  


  


 











Review of Systems


Review of Systems


unable to swallow


no n/v.d





Comment


Review of Relevant


I have reviewed the following items milana (where applicable) has been applied.


Labs





Laboratory Tests








Test


  9/17/17


13:15 9/17/17


13:25 9/17/17


17:35 9/18/17


07:23


 


White Blood Count


  2.7 x10^3/uL


(4.0-11.0) 


  


  


 


 


Red Blood Count


  2.75 x10^6/uL


(4.30-5.70) 


  


  


 


 


Hemoglobin


  8.6 g/dL


(13.0-17.5) 


  


  


 


 


Hematocrit


  24.6 %


(39.0-53.0) 


  


  


 


 


Mean Corpuscular Volume 90 fL ()    


 


Mean Corpuscular Hemoglobin 31 pg (25-35)    


 


Mean Corpuscular Hemoglobin


Concent 35 g/dL


(31-37) 


  


  


 


 


Red Cell Distribution Width


  13.8 %


(11.5-14.5) 


  


  


 


 


Platelet Count


  147 x10^3/uL


(140-400) 


  


  


 


 


Neutrophils (%) (Auto) 63 % (31-73)    


 


Lymphocytes (%) (Auto) 27 % (24-48)    


 


Monocytes (%) (Auto) 9 % (0-9)    


 


Eosinophils (%) (Auto) 1 % (0-3)    


 


Basophils (%) (Auto) 1 % (0-3)    


 


Neutrophils # (Auto)


  1.7 x10^3uL


(1.8-7.7) 


  


  


 


 


Lymphocytes # (Auto)


  0.7 x10^3/uL


(1.0-4.8) 


  


  


 


 


Monocytes # (Auto)


  0.2 x10^3/uL


(0.0-1.1) 


  


  


 


 


Eosinophils # (Auto)


  0.0 x10^3/uL


(0.0-0.7) 


  


  


 


 


Basophils # (Auto)


  0.0 x10^3/uL


(0.0-0.2) 


  


  


 


 


Sodium Level


  138 mmol/L


(136-145) 


  


  


 


 


Potassium Level


  3.8 mmol/L


(3.5-5.1) 


  


  


 


 


Chloride Level


  104 mmol/L


() 


  


  


 


 


Carbon Dioxide Level


  30 mmol/L


(21-32) 


  


  


 


 


Anion Gap 4 (6-14)    


 


Blood Urea Nitrogen


  29 mg/dL


(8-26) 


  


  


 


 


Creatinine


  1.7 mg/dL


(0.7-1.3) 


  


  


 


 


Estimated GFR


(Cockcroft-Gault) 38.5 


  


  


  


 


 


Glucose Level


  149 mg/dL


(70-99) 


  


  


 


 


Calcium Level


  8.2 mg/dL


(8.5-10.1) 


  


  


 


 


Glucose (Fingerstick)


  


  135 mg/dL


(70-99) 101 mg/dL


(70-99) 125 mg/dL


(70-99)


 


Test


  9/18/17


13:03 9/18/17


19:02 9/19/17


03:32 9/19/17


03:52


 


Glucose (Fingerstick)


  120 mg/dL


(70-99) 125 mg/dL


(70-99) 


  


 


 


Sodium Level


  


  


  136 mmol/L


(136-145) 


 


 


Potassium Level


  


  


  4.4 mmol/L


(3.5-5.1) 


 


 


Chloride Level


  


  


  101 mmol/L


() 


 


 


Carbon Dioxide Level


  


  


  30 mmol/L


(21-32) 


 


 


Anion Gap   5 (6-14)  


 


Blood Urea Nitrogen


  


  


  26 mg/dL


(8-26) 


 


 


Creatinine


  


  


  1.4 mg/dL


(0.7-1.3) 


 


 


Estimated GFR


(Cockcroft-Gault) 


  


  48.2 


  


 


 


Glucose Level


  


  


  129 mg/dL


(70-99) 


 


 


Calcium Level


  


  


  8.3 mg/dL


(8.5-10.1) 


 


 


White Blood Count


  


  


  


  2.6 x10^3/uL


(4.0-11.0)


 


Red Blood Count


  


  


  


  2.83 x10^6/uL


(4.30-5.70)


 


Hemoglobin


  


  


  


  8.7 g/dL


(13.0-17.5)


 


Hematocrit


  


  


  


  25.4 %


(39.0-53.0)


 


Mean Corpuscular Volume    90 fL () 


 


Mean Corpuscular Hemoglobin    31 pg (25-35) 


 


Mean Corpuscular Hemoglobin


Concent 


  


  


  34 g/dL


(31-37)


 


Red Cell Distribution Width


  


  


  


  13.9 %


(11.5-14.5)


 


Platelet Count


  


  


  


  129 x10^3/uL


(140-400)


 


Neutrophils (%) (Auto)    60 % (31-73) 


 


Lymphocytes (%) (Auto)    28 % (24-48) 


 


Monocytes (%) (Auto)    10 % (0-9) 


 


Eosinophils (%) (Auto)    2 % (0-3) 


 


Basophils (%) (Auto)    1 % (0-3) 


 


Neutrophils # (Auto)


  


  


  


  1.5 x10^3uL


(1.8-7.7)


 


Lymphocytes # (Auto)


  


  


  


  0.7 x10^3/uL


(1.0-4.8)


 


Monocytes # (Auto)


  


  


  


  0.3 x10^3/uL


(0.0-1.1)


 


Eosinophils # (Auto)


  


  


  


  0.0 x10^3/uL


(0.0-0.7)


 


Basophils # (Auto)


  


  


  


  0.0 x10^3/uL


(0.0-0.2)


 


Test


  9/19/17


06:07 


  


  


 


 


Glucose (Fingerstick)


  133 mg/dL


(70-99) 


  


  


 








Laboratory Tests








Test


  9/18/17


13:03 9/18/17


19:02 9/19/17


03:32 9/19/17


03:52


 


Glucose (Fingerstick)


  120 mg/dL


(70-99) 125 mg/dL


(70-99) 


  


 


 


Sodium Level


  


  


  136 mmol/L


(136-145) 


 


 


Potassium Level


  


  


  4.4 mmol/L


(3.5-5.1) 


 


 


Chloride Level


  


  


  101 mmol/L


() 


 


 


Carbon Dioxide Level


  


  


  30 mmol/L


(21-32) 


 


 


Anion Gap   5 (6-14)  


 


Blood Urea Nitrogen


  


  


  26 mg/dL


(8-26) 


 


 


Creatinine


  


  


  1.4 mg/dL


(0.7-1.3) 


 


 


Estimated GFR


(Cockcroft-Gault) 


  


  48.2 


  


 


 


Glucose Level


  


  


  129 mg/dL


(70-99) 


 


 


Calcium Level


  


  


  8.3 mg/dL


(8.5-10.1) 


 


 


White Blood Count


  


  


  


  2.6 x10^3/uL


(4.0-11.0)


 


Red Blood Count


  


  


  


  2.83 x10^6/uL


(4.30-5.70)


 


Hemoglobin


  


  


  


  8.7 g/dL


(13.0-17.5)


 


Hematocrit


  


  


  


  25.4 %


(39.0-53.0)


 


Mean Corpuscular Volume    90 fL () 


 


Mean Corpuscular Hemoglobin    31 pg (25-35) 


 


Mean Corpuscular Hemoglobin


Concent 


  


  


  34 g/dL


(31-37)


 


Red Cell Distribution Width


  


  


  


  13.9 %


(11.5-14.5)


 


Platelet Count


  


  


  


  129 x10^3/uL


(140-400)


 


Neutrophils (%) (Auto)    60 % (31-73) 


 


Lymphocytes (%) (Auto)    28 % (24-48) 


 


Monocytes (%) (Auto)    10 % (0-9) 


 


Eosinophils (%) (Auto)    2 % (0-3) 


 


Basophils (%) (Auto)    1 % (0-3) 


 


Neutrophils # (Auto)


  


  


  


  1.5 x10^3uL


(1.8-7.7)


 


Lymphocytes # (Auto)


  


  


  


  0.7 x10^3/uL


(1.0-4.8)


 


Monocytes # (Auto)


  


  


  


  0.3 x10^3/uL


(0.0-1.1)


 


Eosinophils # (Auto)


  


  


  


  0.0 x10^3/uL


(0.0-0.7)


 


Basophils # (Auto)


  


  


  


  0.0 x10^3/uL


(0.0-0.2)


 


Test


  9/19/17


06:07 


  


  


 


 


Glucose (Fingerstick)


  133 mg/dL


(70-99) 


  


  


 








Medications





Current Medications


Famotidine (Pepcid) 20 mg 1X  ONCE IVP  Last administered on 9/16/17at 14:31;  

Start 9/16/17 at 14:00;  Stop 9/16/17 at 14:02;  Status DC


Dextrose/Sodium Chloride 1,000 ml @  125 mls/hr 1X  ONCE IV  Last administered 

on 9/16/17at 14:32;  Start 9/16/17 at 14:00;  Stop 9/16/17 at 21:59;  Status DC


Dextrose (Dextrose 50%-Water Syringe) 12.5 gm PRN Q15MIN  PRN IV SEE COMMENTS;  

Start 9/17/17 at 13:00


Sucralfate (Carafate) 1 gm QIDACHS PEG  Last administered on 9/18/17at 08:45;  

Start 9/17/17 at 16:30


Famotidine (Pepcid) 20 mg DAILY IVP  Last administered on 9/18/17at 08:46;  

Start 9/17/17 at 13:00;  Stop 9/18/17 at 09:16;  Status DC


Insulin Aspart (NovoLOG) 0-7 UNITS TIDWMEALS SQ ;  Start 9/17/17 at 17:00


Amino Acids/ Glycerin/ Electrolytes 1,000 ml @  80 mls/hr [id number] IV  Last 

administered on 9/19/17at 01:53;  Start 9/17/17 at 13:00


Lidocaine HCl (Viscous Lidocaine) 15 ml Q6HRS  PRN SWSW  esophageal PAIN Last 

administered on 9/17/17at 21:31;  Start 9/17/17 at 13:00


Hydralazine HCl (Apresoline) 10 mg PRN Q4HRS  PRN IVP ELEVATED BP, SEE COMMENTS

;  Start 9/17/17 at 13:00


Lansoprazole (Prevacid) 30 mg BIDBFRMEAL FT ;  Start 9/18/17 at 16:30;  Stop 9/ 18/17 at 16:30;  Status DC


Multi-Ingredient Mouthwash/Gargle (Gi Cocktail Single Dose) 15 ml TID PRN  PRN 

SWSW dysphagia;  Start 9/18/17 at 09:15;  Stop 9/18/17 at 12:31;  Status DC


Famotidine (Pepcid) 20 mg DAILY IVP  Last administered on 9/19/17at 08:50;  

Start 9/18/17 at 13:00





Active Scripts


Active


Reported


[Vapoinhaler]    ROMEO 


Cipro (Ciprofloxacin Hcl) 250 Mg Tablet 1 Tab PO BID


Zofran (Ondansetron Hcl) 4 Mg Tablet 1 Tab PO Q4HRS


Pantoprazole Sodium 40 Mg Tablet.dr 1 Tab PO DAILY


Metformin Hcl 1,000 Mg Tablet 1,000 Mg PO [id number]


Rolaids Chewable Tablet (Calcium Carb/Magnesium Hydrox) 1 Each Tab.chew 1 Each 

PO 


Famotidine 20 Mg Tablet 10 Mg PO HS


Glimepiride 4 Mg Tablet 1 Tab PO DAILY


Vitamin B-12 (Cyanocobalamin (Vitamin B-12)) 1,000 Mcg Tablet 1 Tab PO DAILY


Vitamin D3 (Cholecalciferol (Vitamin D3)) 5,000 Unit Tablet 1 Tab PO DAILY


Vitals/I & O





Vital Sign - Last 24 Hours








 9/18/17 9/18/17 9/18/17 9/18/17





 09:33 11:00 15:00 19:00


 


Temp  98.3 98.3 98.4





  98.3 98.3 98.4


 


Pulse  60 60 63


 


Resp  18 18 16


 


B/P (MAP)  120/57 (78) 125/60 (81) 118/65 (82)


 


Pulse Ox  97 97 98


 


O2 Delivery Room Air Room Air Room Air 


 


    





    





 9/18/17 9/18/17 9/19/17 9/19/17





 20:00 23:00 03:00 07:00


 


Temp  98.0 98.2 98.2





  98.0 98.2 98.2


 


Pulse  69 53 59


 


Resp  13 16 22


 


B/P (MAP)  105/69 (81) 115/61 (79) 110/66 (81)


 


Pulse Ox  97 97 97


 


O2 Delivery Room Air   Room Air


 


    





    





 9/19/17   





 08:00   


 


O2 Delivery Room Air   











Nutrition Consultation


Dietary Evaluation:


Recommendations by RD:  Increase Calorie Intake, Protein supplementation, PPN/

TPN


Comments:  


continue ppn at this time





pt to reveive a PEG placement





REC: Diabetisource AC @ 20 ml/hr increase by 10 ml q 8 hr to a 





goal rate of 50 ml/hr with 120 cc q 6 hr flush





Bolua: Diabetisource AC or equiv 250 ml ( 1 carton) 5 x day, 100 cc water  


fluse after each bolus.


Expected Outcomes/Goals:  


Tolerate TF @ goal rate





Malnutrition Findings:


Food and Nutrition Intake (Sev:  <50% est energy req 5days


Body Fat Depletion (Non Severe:  Mild Depletion


Weight Status:  Underweight











MAL HAMILTON MD Sep 19, 2017 09:02

## 2017-09-19 NOTE — PDOC4
Operative Note


Operative Note


EGD 


Meds propofol per anesthesia


Pre-op dx dysphagia/hx du


post-op dx erosive esophagitis with stricture s/p scope dialtion


                obstructing duodneal ulcer bulb with stricture of duodenum.


Plan possible tpn


        surgical consult for possible gastro-jejunostomy











DEBBY HOWARD MD Sep 19, 2017 16:42

## 2017-09-20 VITALS — DIASTOLIC BLOOD PRESSURE: 54 MMHG | SYSTOLIC BLOOD PRESSURE: 96 MMHG

## 2017-09-20 VITALS — DIASTOLIC BLOOD PRESSURE: 55 MMHG | SYSTOLIC BLOOD PRESSURE: 93 MMHG

## 2017-09-20 VITALS — SYSTOLIC BLOOD PRESSURE: 110 MMHG | DIASTOLIC BLOOD PRESSURE: 63 MMHG

## 2017-09-20 VITALS — SYSTOLIC BLOOD PRESSURE: 103 MMHG | DIASTOLIC BLOOD PRESSURE: 55 MMHG

## 2017-09-20 VITALS — DIASTOLIC BLOOD PRESSURE: 60 MMHG | SYSTOLIC BLOOD PRESSURE: 111 MMHG

## 2017-09-20 RX ADMIN — SUCRALFATE SCH GM: 1 SUSPENSION ORAL at 07:30

## 2017-09-20 RX ADMIN — SUCRALFATE SCH GM: 1 SUSPENSION ORAL at 11:30

## 2017-09-20 RX ADMIN — INSULIN ASPART SCH UNITS: 100 INJECTION, SOLUTION INTRAVENOUS; SUBCUTANEOUS at 12:00

## 2017-09-20 RX ADMIN — SUCRALFATE SCH GM: 1 SUSPENSION ORAL at 19:54

## 2017-09-20 RX ADMIN — FAMOTIDINE SCH MG: 10 INJECTION, SOLUTION INTRAVENOUS at 09:30

## 2017-09-20 RX ADMIN — INSULIN ASPART SCH UNITS: 100 INJECTION, SOLUTION INTRAVENOUS; SUBCUTANEOUS at 08:00

## 2017-09-20 RX ADMIN — GLYCERIN, ISOLEUCINE, LEUCINE, LYSINE, METHIONINE, PHENYLALANINE, THREONINE, TRYPTOPHAN, VALINE, ALANINE, GLYCINE, ARGININE, HISTIDINE, PROLINE, SERINE, CYSTEINE, SODIUM ACETATE, MAGNESIUM ACETATE, CALCIUM ACETATE, SODIUM CHLORIDE, POTASSIUM CHLORIDE, PHOSPHORIC ACID, AND POTASSIUM METABISULFITE SCH MLS/HR
3; .21; .27; .22; .16; .17; .12; .046; .2; .21; .42; .29; .085; .34; .18; .014; .2; .054; .026; .12; .15; .041 INJECTION INTRAVENOUS at 16:00

## 2017-09-20 RX ADMIN — SODIUM CHLORIDE, SODIUM LACTATE, POTASSIUM CHLORIDE, AND CALCIUM CHLORIDE SCH MLS/HR: .6; .31; .03; .02 INJECTION, SOLUTION INTRAVENOUS at 21:30

## 2017-09-20 RX ADMIN — SODIUM CHLORIDE, SODIUM LACTATE, POTASSIUM CHLORIDE, AND CALCIUM CHLORIDE SCH MLS/HR: .6; .31; .03; .02 INJECTION, SOLUTION INTRAVENOUS at 01:30

## 2017-09-20 RX ADMIN — INSULIN ASPART SCH UNITS: 100 INJECTION, SOLUTION INTRAVENOUS; SUBCUTANEOUS at 17:00

## 2017-09-20 RX ADMIN — GLYCERIN, ISOLEUCINE, LEUCINE, LYSINE, METHIONINE, PHENYLALANINE, THREONINE, TRYPTOPHAN, VALINE, ALANINE, GLYCINE, ARGININE, HISTIDINE, PROLINE, SERINE, CYSTEINE, SODIUM ACETATE, MAGNESIUM ACETATE, CALCIUM ACETATE, SODIUM CHLORIDE, POTASSIUM CHLORIDE, PHOSPHORIC ACID, AND POTASSIUM METABISULFITE SCH MLS/HR
3; .21; .27; .22; .16; .17; .12; .046; .2; .21; .42; .29; .085; .34; .18; .014; .2; .054; .026; .12; .15; .041 INJECTION INTRAVENOUS at 05:48

## 2017-09-20 RX ADMIN — SUCRALFATE SCH GM: 1 SUSPENSION ORAL at 16:30

## 2017-09-20 RX ADMIN — SODIUM CHLORIDE, SODIUM LACTATE, POTASSIUM CHLORIDE, AND CALCIUM CHLORIDE SCH MLS/HR: .6; .31; .03; .02 INJECTION, SOLUTION INTRAVENOUS at 11:30

## 2017-09-20 NOTE — PDOC
Subjective:


Subjective:


Feeling good this morning, slept well.





Objective:


Objective:


D/w RN.


Son wonders about PICC and TPN.


Vital Signs:





 Vital Signs








  Date Time  Temp Pulse Resp B/P (MAP) Pulse Ox O2 Delivery O2 Flow Rate FiO2


 


9/20/17 07:00 98.1 70 16 103/55 (71) 96 Room Air  





 98.1       








Labs:





Laboratory Tests








Test


  9/19/17


11:15 9/19/17


12:41 9/20/17


06:17


 


Prothrombin Time 13.4 SEC   


 


Prothromb Time International


Ratio 1.1 


  


  


 


 


Glucose (Fingerstick)  122 mg/dL  128 mg/dL 








Imaging:


EGD: erosive esophagitis with stricture s/p scope dilation, obstructing 

duodenal ulcer bulb with stricture of duodenum.





PE:





GEN: NAD


LUNGS: clear


HEART: RRR


ABD: soft


NEURO/PSYCH: A & O 3





A/P:


Abnormal deglutition, esophageal dysmotility


Obstructing duodenal ulcer/stricture


Erosive esophagitis w/ stricture s/p dilation





--


Currently has PPN.  Will ask for PICC and TPN.


Await surgical consult re: possible GJ tube.


Continue IV H2 blocker.











PREM HERNANDEZ Sep 20, 2017 10:47

## 2017-09-20 NOTE — PDOC
PROGRESS NOTES


Chief Complaint


Chief Complaint


Dysphagia


 


1.  Dysphagia:  recent admit for same with GI W/U, incl swallow study, EGD.  pt/

family had declined PEG at the time .  Dr Chiu aware, pt NPO today


2.  GERD/PUD:  hx perf.ed ulcer in 2011;  gastritis, esophageal ulcer on recent 

EGD;  H.pylori neg, malignancy neg.  PPI IV for now, carafate, trial viscous 

lidocaine


3.  Nutrition:  start PPN for now


4.  DM2:  well controlled.  add ISS


5.  CAD:  no acute issues.  oral meds currently on hold.  


6.  HTN:  hydralazine PRN


7.  CKD3:  stable.  monitor


8.  Prophylaxis: mechanical for now in anticipation of intervention


9,. full code status,  from prior discussions





History of Present Illness


History of Present Illness


still not eating


BP lower, will give one liter additional D5 half


Gen surg consult,     per GI a PEG tube would not be sufficient


 G


has lost 10 lbs past few weeks


pain, ulcer is blocking his esophagus and duodenum





Vitals


Vitals





Vital Signs








  Date Time  Temp Pulse Resp B/P (MAP) Pulse Ox O2 Delivery O2 Flow Rate FiO2


 


9/20/17 11:00 97.9 61 18 93/55 (68) 97 Room Air  





 97.9       











Physical Exam


General:  Alert, Oriented X3, Cooperative, No acute distress


Heart:  Regular rate


Lungs:  Clear


Abdomen:  Normal bowel sounds, No tenderness, Other (thick scar gron gastrectomy

)


Extremities:  No edema


Skin:  No rashes





Labs


LABS





Laboratory Tests








Test


  9/19/17


12:41 9/20/17


06:17 9/20/17


11:01


 


Glucose (Fingerstick)


  122 mg/dL


(70-99) 128 mg/dL


(70-99) 138 mg/dL


(70-99)











Comment


Review of Relevant


I have reviewed the following items milana (where applicable) has been applied.


Labs





Laboratory Tests








Test


  9/18/17


13:03 9/18/17


19:02 9/19/17


03:32 9/19/17


03:52


 


Glucose (Fingerstick)


  120 mg/dL


(70-99) 125 mg/dL


(70-99) 


  


 


 


Sodium Level


  


  


  136 mmol/L


(136-145) 


 


 


Potassium Level


  


  


  4.4 mmol/L


(3.5-5.1) 


 


 


Chloride Level


  


  


  101 mmol/L


() 


 


 


Carbon Dioxide Level


  


  


  30 mmol/L


(21-32) 


 


 


Anion Gap   5 (6-14)  


 


Blood Urea Nitrogen


  


  


  26 mg/dL


(8-26) 


 


 


Creatinine


  


  


  1.4 mg/dL


(0.7-1.3) 


 


 


Estimated GFR


(Cockcroft-Gault) 


  


  48.2 


  


 


 


Glucose Level


  


  


  129 mg/dL


(70-99) 


 


 


Calcium Level


  


  


  8.3 mg/dL


(8.5-10.1) 


 


 


White Blood Count


  


  


  


  2.6 x10^3/uL


(4.0-11.0)


 


Red Blood Count


  


  


  


  2.83 x10^6/uL


(4.30-5.70)


 


Hemoglobin


  


  


  


  8.7 g/dL


(13.0-17.5)


 


Hematocrit


  


  


  


  25.4 %


(39.0-53.0)


 


Mean Corpuscular Volume    90 fL () 


 


Mean Corpuscular Hemoglobin    31 pg (25-35) 


 


Mean Corpuscular Hemoglobin


Concent 


  


  


  34 g/dL


(31-37)


 


Red Cell Distribution Width


  


  


  


  13.9 %


(11.5-14.5)


 


Platelet Count


  


  


  


  129 x10^3/uL


(140-400)


 


Neutrophils (%) (Auto)    60 % (31-73) 


 


Lymphocytes (%) (Auto)    28 % (24-48) 


 


Monocytes (%) (Auto)    10 % (0-9) 


 


Eosinophils (%) (Auto)    2 % (0-3) 


 


Basophils (%) (Auto)    1 % (0-3) 


 


Neutrophils # (Auto)


  


  


  


  1.5 x10^3uL


(1.8-7.7)


 


Lymphocytes # (Auto)


  


  


  


  0.7 x10^3/uL


(1.0-4.8)


 


Monocytes # (Auto)


  


  


  


  0.3 x10^3/uL


(0.0-1.1)


 


Eosinophils # (Auto)


  


  


  


  0.0 x10^3/uL


(0.0-0.7)


 


Basophils # (Auto)


  


  


  


  0.0 x10^3/uL


(0.0-0.2)


 


Test


  9/19/17


06:07 9/19/17


11:15 9/19/17


12:41 9/20/17


06:17


 


Glucose (Fingerstick)


  133 mg/dL


(70-99) 


  122 mg/dL


(70-99) 128 mg/dL


(70-99)


 


Prothrombin Time


  


  13.4 SEC


(11.7-14.0) 


  


 


 


Prothromb Time International


Ratio 


  1.1 (0.8-1.1) 


  


  


 


 


Test


  9/20/17


11:01 


  


  


 


 


Glucose (Fingerstick)


  138 mg/dL


(70-99) 


  


  


 








Laboratory Tests








Test


  9/19/17


12:41 9/20/17


06:17 9/20/17


11:01


 


Glucose (Fingerstick)


  122 mg/dL


(70-99) 128 mg/dL


(70-99) 138 mg/dL


(70-99)








Medications





Current Medications


Famotidine (Pepcid) 20 mg 1X  ONCE IVP  Last administered on 9/16/17at 14:31;  

Start 9/16/17 at 14:00;  Stop 9/16/17 at 14:02;  Status DC


Dextrose/Sodium Chloride 1,000 ml @  125 mls/hr 1X  ONCE IV  Last administered 

on 9/16/17at 14:32;  Start 9/16/17 at 14:00;  Stop 9/16/17 at 21:59;  Status DC


Dextrose (Dextrose 50%-Water Syringe) 12.5 gm PRN Q15MIN  PRN IV SEE COMMENTS;  

Start 9/17/17 at 13:00


Sucralfate (Carafate) 1 gm QIDACHS PEG  Last administered on 9/19/17at 21:04;  

Start 9/17/17 at 16:30


Famotidine (Pepcid) 20 mg DAILY IVP  Last administered on 9/18/17at 08:46;  

Start 9/17/17 at 13:00;  Stop 9/18/17 at 09:16;  Status DC


Insulin Aspart (NovoLOG) 0-7 UNITS TIDWMEALS SQ ;  Start 9/17/17 at 17:00


Amino Acids/ Glycerin/ Electrolytes 1,000 ml @  80 mls/hr [id number] IV  Last 

administered on 9/20/17at 05:48;  Start 9/17/17 at 13:00


Lidocaine HCl (Viscous Lidocaine) 15 ml Q6HRS  PRN SWSW  esophageal PAIN Last 

administered on 9/19/17at 10:44;  Start 9/17/17 at 13:00


Hydralazine HCl (Apresoline) 10 mg PRN Q4HRS  PRN IVP ELEVATED BP, SEE COMMENTS

;  Start 9/17/17 at 13:00


Lansoprazole (Prevacid) 30 mg BIDBFRMEAL FT ;  Start 9/18/17 at 16:30;  Stop 9/ 18/17 at 16:30;  Status DC


Multi-Ingredient Mouthwash/Gargle (Gi Cocktail Single Dose) 15 ml TID PRN  PRN 

SWSW dysphagia;  Start 9/18/17 at 09:15;  Stop 9/18/17 at 12:31;  Status DC


Famotidine (Pepcid) 20 mg DAILY IVP  Last administered on 9/20/17at 09:30;  

Start 9/18/17 at 13:00


Cefazolin Sodium 1 gm/Sodium Chloride 50 ml @  100 mls/hr 1X  ONCE IV  Last 

administered on 9/19/17at 16:26;  Start 9/19/17 at 15:00;  Stop 9/19/17 at 15:29

;  Status DC


Ringer's Solution 1,000 ml @  100 mls/hr Q10H IV  Last administered on 9/19/ 17at 15:29;  Start 9/19/17 at 15:30


Propofol 20 ml @ As Directed STK-MED ONCE IV ;  Start 9/19/17 at 16:20;  Stop 9/ 19/17 at 16:21;  Status DC


Dextrose/Sodium Chloride 1,000 ml @  75 mls/hr 1X  ONCE IV ;  Start 9/20/17 at 

11:00;  Stop 9/21/17 at 00:19





Active Scripts


Active


Reported


[Vapoinhaler]    ROMEO 


Cipro (Ciprofloxacin Hcl) 250 Mg Tablet 1 Tab PO BID


Zofran (Ondansetron Hcl) 4 Mg Tablet 1 Tab PO Q4HRS


Pantoprazole Sodium 40 Mg Tablet.dr 1 Tab PO DAILY


Metformin Hcl 1,000 Mg Tablet 1,000 Mg PO [id number]


Rolaids Chewable Tablet (Calcium Carb/Magnesium Hydrox) 1 Each Tab.chew 1 Each 

PO 


Famotidine 20 Mg Tablet 10 Mg PO HS


Glimepiride 4 Mg Tablet 1 Tab PO DAILY


Vitamin B-12 (Cyanocobalamin (Vitamin B-12)) 1,000 Mcg Tablet 1 Tab PO DAILY


Vitamin D3 (Cholecalciferol (Vitamin D3)) 5,000 Unit Tablet 1 Tab PO DAILY


Vitals/I & O





Vital Sign - Last 24 Hours








 9/19/17 9/19/17 9/19/17 9/19/17





 15:00 15:14 16:37 16:45


 


Temp 98.3 97.9 98.5 





 98.3 97.9 98.5 


 


Pulse 62 70 73 75


 


Resp 18 18 16 16


 


B/P (MAP) 94/60 (71)  160/74 122/68


 


Pulse Ox 96 92 98 99


 


O2 Delivery Room Air  Room Air Room Air


 


    





    





 9/19/17 9/19/17 9/19/17 9/19/17





 17:00 17:15 17:30 17:45


 


Temp  98.0  





  98.0  


 


Pulse 67 69 68 67


 


Resp 16 18 20 18


 


B/P (MAP) 127/69 136/71 (92) 126/67 (86) 131/66 (87)


 


Pulse Ox 96 97 96 97


 


O2 Delivery Room Air   Room Air


 


    





    





 9/19/17 9/19/17 9/19/17 9/19/17





 18:00 18:30 19:00 20:00


 


Temp  97.2 97.7 





  97.2 97.7 


 


Pulse 69 69 72 


 


Resp 18 22 19 


 


B/P (MAP) 126/78 (94) 125/66 (85) 118/73 (88) 


 


Pulse Ox 97 96 97 


 


O2 Delivery Room Air Room Air Room Air Room Air


 


    





    





 9/19/17 9/20/17 9/20/17 





 23:00 07:00 11:00 


 


Temp 98.4 98.1 97.9 





 98.4 98.1 97.9 


 


Pulse 85 70 61 


 


Resp 18 16 18 


 


B/P (MAP) 121/76 (91) 103/55 (71) 93/55 (68) 


 


Pulse Ox 95 96 97 


 


O2 Delivery Room Air Room Air Room Air 











Nutrition Consultation


Dietary Evaluation:


Recommendations by RD:  Increase Calorie Intake, Protein supplementation, PPN/

TPN


Comments:  


continue ppn at this time





pt to reveive a PEG placement





REC: Diabetisource AC @ 20 ml/hr increase by 10 ml q 8 hr to a 





goal rate of 50 ml/hr with 120 cc q 6 hr flush





Bolua: Diabetisource AC or equiv 250 ml ( 1 carton) 5 x day, 100 cc water  


fluse after each bolus.


Expected Outcomes/Goals:  


Tolerate TF @ goal rate





Malnutrition Findings:


Food and Nutrition Intake (Sev:  <50% est energy req 5days


Body Fat Depletion (Non Severe:  Mild Depletion


Weight Status:  Underweight











MAL HAMILTON MD Sep 20, 2017 11:31
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
## 2017-09-21 VITALS — SYSTOLIC BLOOD PRESSURE: 114 MMHG | DIASTOLIC BLOOD PRESSURE: 61 MMHG

## 2017-09-21 VITALS — DIASTOLIC BLOOD PRESSURE: 51 MMHG | SYSTOLIC BLOOD PRESSURE: 109 MMHG

## 2017-09-21 VITALS — DIASTOLIC BLOOD PRESSURE: 62 MMHG | SYSTOLIC BLOOD PRESSURE: 119 MMHG

## 2017-09-21 LAB
ALBUMIN SERPL-MCNC: 2.4 G/DL (ref 3.4–5)
ALBUMIN/GLOB SERPL: 0.8 {RATIO} (ref 1–1.7)
ALP SERPL-CCNC: 53 U/L (ref 46–116)
ALT SERPL-CCNC: 21 U/L (ref 16–63)
ANION GAP SERPL CALC-SCNC: 5 MMOL/L (ref 6–14)
AST SERPL-CCNC: 18 U/L (ref 15–37)
BASOPHILS # BLD AUTO: 0 X10^3/UL (ref 0–0.2)
BASOPHILS NFR BLD: 1 % (ref 0–3)
BILIRUB SERPL-MCNC: 0.5 MG/DL (ref 0.2–1)
BUN SERPL-MCNC: 26 MG/DL (ref 8–26)
BUN/CREAT SERPL: 17 (ref 6–20)
CALCIUM SERPL-MCNC: 8.1 MG/DL (ref 8.5–10.1)
CHLORIDE SERPL-SCNC: 102 MMOL/L (ref 98–107)
CO2 SERPL-SCNC: 29 MMOL/L (ref 21–32)
CREAT SERPL-MCNC: 1.5 MG/DL (ref 0.7–1.3)
EOSINOPHIL NFR BLD: 2 % (ref 0–3)
ERYTHROCYTE [DISTWIDTH] IN BLOOD BY AUTOMATED COUNT: 13.9 % (ref 11.5–14.5)
GFR SERPLBLD BASED ON 1.73 SQ M-ARVRAT: 44.5 ML/MIN
GLOBULIN SER-MCNC: 3 G/DL (ref 2.2–3.8)
GLUCOSE SERPL-MCNC: 201 MG/DL (ref 70–99)
HCT VFR BLD CALC: 24.9 % (ref 39–53)
HGB BLD-MCNC: 8.6 G/DL (ref 13–17.5)
LYMPHOCYTES # BLD: 0.6 X10^3/UL (ref 1–4.8)
LYMPHOCYTES NFR BLD AUTO: 26 % (ref 24–48)
MAGNESIUM SERPL-MCNC: 1.7 MG/DL (ref 1.8–2.4)
MCH RBC QN AUTO: 31 PG (ref 25–35)
MCHC RBC AUTO-ENTMCNC: 35 G/DL (ref 31–37)
MCV RBC AUTO: 90 FL (ref 79–100)
MONOCYTES NFR BLD: 8 % (ref 0–9)
NEUTROPHILS NFR BLD AUTO: 63 % (ref 31–73)
PHOSPHATE SERPL-MCNC: 3.2 MG/DL (ref 2.6–4.7)
PLATELET # BLD AUTO: 114 X10^3/UL (ref 140–400)
POTASSIUM SERPL-SCNC: 4.3 MMOL/L (ref 3.5–5.1)
PROT SERPL-MCNC: 5.4 G/DL (ref 6.4–8.2)
RBC # BLD AUTO: 2.77 X10^6/UL (ref 4.3–5.7)
SODIUM SERPL-SCNC: 136 MMOL/L (ref 136–145)
WBC # BLD AUTO: 2.4 X10^3/UL (ref 4–11)

## 2017-09-21 RX ADMIN — FAMOTIDINE SCH MG: 10 INJECTION, SOLUTION INTRAVENOUS at 09:04

## 2017-09-21 RX ADMIN — INSULIN ASPART SCH UNITS: 100 INJECTION, SOLUTION INTRAVENOUS; SUBCUTANEOUS at 12:00

## 2017-09-21 RX ADMIN — SUCRALFATE SCH GM: 1 SUSPENSION ORAL at 07:30

## 2017-09-21 RX ADMIN — INSULIN ASPART SCH UNITS: 100 INJECTION, SOLUTION INTRAVENOUS; SUBCUTANEOUS at 08:14

## 2017-09-21 RX ADMIN — SUCRALFATE SCH GM: 1 SUSPENSION ORAL at 11:30

## 2017-09-21 NOTE — RAD
Indication assess PICC line placement.



A single view of the chest was obtained. Comparison is made to an examination

9/4/2017.



Postoperative changes are noted. There are scattered calcified granulomas. The

lungs are clear of acute infiltrates.



There is a right PICC line. The tip is appropriately positioned in the mid to

distal SVC.



IMPRESSION: No acute finding in the chest.

                           Appropriately positioned right PICC line

## 2017-09-21 NOTE — PDOC
SURGICAL PROGRESS NOTE


Subjective


Pt has been sleeping a lot, denies pain, denies N/V


Vital Signs





Vital Signs








  Date Time  Temp Pulse Resp B/P (MAP) Pulse Ox O2 Delivery O2 Flow Rate FiO2


 


9/21/17 07:00 98.4 62 18 109/51 (70) 92 Room Air  





 98.4       








General:  Alert, Oriented X3, Cooperative, No acute distress


Abdomen:  Soft, No tenderness


Labs





Laboratory Tests








Test


  9/19/17


11:15 9/19/17


12:41 9/20/17


06:17 9/20/17


11:01


 


Prothrombin Time


  13.4 SEC


(11.7-14.0) 


  


  


 


 


Prothromb Time International


Ratio 1.1 (0.8-1.1) 


  


  


  


 


 


Glucose (Fingerstick)


  


  122 mg/dL


(70-99) 128 mg/dL


(70-99) 138 mg/dL


(70-99)


 


Test


  9/20/17


17:48 9/20/17


21:30 9/21/17


03:43 9/21/17


05:12


 


Glucose (Fingerstick)


  139 mg/dL


(70-99) 134 mg/dL


(70-99) 


  207 mg/dL


(70-99)


 


White Blood Count


  


  


  2.4 x10^3/uL


(4.0-11.0) 


 


 


Red Blood Count


  


  


  2.77 x10^6/uL


(4.30-5.70) 


 


 


Hemoglobin


  


  


  8.6 g/dL


(13.0-17.5) 


 


 


Hematocrit


  


  


  24.9 %


(39.0-53.0) 


 


 


Mean Corpuscular Volume   90 fL ()  


 


Mean Corpuscular Hemoglobin   31 pg (25-35)  


 


Mean Corpuscular Hemoglobin


Concent 


  


  35 g/dL


(31-37) 


 


 


Red Cell Distribution Width


  


  


  13.9 %


(11.5-14.5) 


 


 


Platelet Count


  


  


  114 x10^3/uL


(140-400) 


 


 


Neutrophils (%) (Auto)   63 % (31-73)  


 


Lymphocytes (%) (Auto)   26 % (24-48)  


 


Monocytes (%) (Auto)   8 % (0-9)  


 


Eosinophils (%) (Auto)   2 % (0-3)  


 


Basophils (%) (Auto)   1 % (0-3)  


 


Neutrophils # (Auto)


  


  


  1.5 x10^3uL


(1.8-7.7) 


 


 


Lymphocytes # (Auto)


  


  


  0.6 x10^3/uL


(1.0-4.8) 


 


 


Monocytes # (Auto)


  


  


  0.2 x10^3/uL


(0.0-1.1) 


 


 


Eosinophils # (Auto)


  


  


  0.0 x10^3/uL


(0.0-0.7) 


 


 


Basophils # (Auto)


  


  


  0.0 x10^3/uL


(0.0-0.2) 


 


 


Sodium Level


  


  


  136 mmol/L


(136-145) 


 


 


Potassium Level


  


  


  4.3 mmol/L


(3.5-5.1) 


 


 


Chloride Level


  


  


  102 mmol/L


() 


 


 


Carbon Dioxide Level


  


  


  29 mmol/L


(21-32) 


 


 


Anion Gap   5 (6-14)  


 


Blood Urea Nitrogen


  


  


  26 mg/dL


(8-26) 


 


 


Creatinine


  


  


  1.5 mg/dL


(0.7-1.3) 


 


 


Estimated GFR


(Cockcroft-Gault) 


  


  44.5 


  


 


 


BUN/Creatinine Ratio   17 (6-20)  


 


Glucose Level


  


  


  201 mg/dL


(70-99) 


 


 


Calcium Level


  


  


  8.1 mg/dL


(8.5-10.1) 


 


 


Phosphorus Level


  


  


  3.2 mg/dL


(2.6-4.7) 


 


 


Magnesium Level


  


  


  1.7 mg/dL


(1.8-2.4) 


 


 


Total Bilirubin


  


  


  0.5 mg/dL


(0.2-1.0) 


 


 


Aspartate Amino Transf


(AST/SGOT) 


  


  18 U/L (15-37) 


  


 


 


Alanine Aminotransferase


(ALT/SGPT) 


  


  21 U/L (16-63) 


  


 


 


Alkaline Phosphatase


  


  


  53 U/L


() 


 


 


Total Protein


  


  


  5.4 g/dL


(6.4-8.2) 


 


 


Albumin


  


  


  2.4 g/dL


(3.4-5.0) 


 


 


Albumin/Globulin Ratio   0.8 (1.0-1.7)  








Laboratory Tests








Test


  9/20/17


11:01 9/20/17


17:48 9/20/17


21:30 9/21/17


03:43


 


Glucose (Fingerstick)


  138 mg/dL


(70-99) 139 mg/dL


(70-99) 134 mg/dL


(70-99) 


 


 


White Blood Count


  


  


  


  2.4 x10^3/uL


(4.0-11.0)


 


Red Blood Count


  


  


  


  2.77 x10^6/uL


(4.30-5.70)


 


Hemoglobin


  


  


  


  8.6 g/dL


(13.0-17.5)


 


Hematocrit


  


  


  


  24.9 %


(39.0-53.0)


 


Mean Corpuscular Volume    90 fL () 


 


Mean Corpuscular Hemoglobin    31 pg (25-35) 


 


Mean Corpuscular Hemoglobin


Concent 


  


  


  35 g/dL


(31-37)


 


Red Cell Distribution Width


  


  


  


  13.9 %


(11.5-14.5)


 


Platelet Count


  


  


  


  114 x10^3/uL


(140-400)


 


Neutrophils (%) (Auto)    63 % (31-73) 


 


Lymphocytes (%) (Auto)    26 % (24-48) 


 


Monocytes (%) (Auto)    8 % (0-9) 


 


Eosinophils (%) (Auto)    2 % (0-3) 


 


Basophils (%) (Auto)    1 % (0-3) 


 


Neutrophils # (Auto)


  


  


  


  1.5 x10^3uL


(1.8-7.7)


 


Lymphocytes # (Auto)


  


  


  


  0.6 x10^3/uL


(1.0-4.8)


 


Monocytes # (Auto)


  


  


  


  0.2 x10^3/uL


(0.0-1.1)


 


Eosinophils # (Auto)


  


  


  


  0.0 x10^3/uL


(0.0-0.7)


 


Basophils # (Auto)


  


  


  


  0.0 x10^3/uL


(0.0-0.2)


 


Sodium Level


  


  


  


  136 mmol/L


(136-145)


 


Potassium Level


  


  


  


  4.3 mmol/L


(3.5-5.1)


 


Chloride Level


  


  


  


  102 mmol/L


()


 


Carbon Dioxide Level


  


  


  


  29 mmol/L


(21-32)


 


Anion Gap    5 (6-14) 


 


Blood Urea Nitrogen


  


  


  


  26 mg/dL


(8-26)


 


Creatinine


  


  


  


  1.5 mg/dL


(0.7-1.3)


 


Estimated GFR


(Cockcroft-Gault) 


  


  


  44.5 


 


 


BUN/Creatinine Ratio    17 (6-20) 


 


Glucose Level


  


  


  


  201 mg/dL


(70-99)


 


Calcium Level


  


  


  


  8.1 mg/dL


(8.5-10.1)


 


Phosphorus Level


  


  


  


  3.2 mg/dL


(2.6-4.7)


 


Magnesium Level


  


  


  


  1.7 mg/dL


(1.8-2.4)


 


Total Bilirubin


  


  


  


  0.5 mg/dL


(0.2-1.0)


 


Aspartate Amino Transf


(AST/SGOT) 


  


  


  18 U/L (15-37) 


 


 


Alanine Aminotransferase


(ALT/SGPT) 


  


  


  21 U/L (16-63) 


 


 


Alkaline Phosphatase


  


  


  


  53 U/L


()


 


Total Protein


  


  


  


  5.4 g/dL


(6.4-8.2)


 


Albumin


  


  


  


  2.4 g/dL


(3.4-5.0)


 


Albumin/Globulin Ratio    0.8 (1.0-1.7) 


 


Test


  9/21/17


05:12 


  


  


 


 


Glucose (Fingerstick)


  207 mg/dL


(70-99) 


  


  


 








Problem List


GOO


on TPN now


need to correct malnutrition prior to surgery


agree with plans per hospitalist


Problems:  











NIRALI BECKER MD Sep 21, 2017 10:56

## 2017-09-21 NOTE — PDOC
Subjective:


Subjective:


Doing okay.





Objective:


Objective:


Per RN - possible DC today.


Vital Signs:





 Vital Signs








  Date Time  Temp Pulse Resp B/P (MAP) Pulse Ox O2 Delivery O2 Flow Rate FiO2


 


9/21/17 11:00 97.7 57 18 119/62 (81) 94 Room Air  





 97.7       








Labs:





Laboratory Tests








Test


  9/20/17


17:48 9/20/17


21:30 9/21/17


03:43 9/21/17


05:12


 


Glucose (Fingerstick) 139 mg/dL  134 mg/dL   207 mg/dL 


 


White Blood Count   2.4 x10^3/uL  


 


Red Blood Count   2.77 x10^6/uL  


 


Hemoglobin   8.6 g/dL  


 


Hematocrit   24.9 %  


 


Mean Corpuscular Volume   90 fL  


 


Mean Corpuscular Hemoglobin   31 pg  


 


Mean Corpuscular Hemoglobin


Concent 


  


  35 g/dL 


  


 


 


Red Cell Distribution Width   13.9 %  


 


Platelet Count   114 x10^3/uL  


 


Neutrophils (%) (Auto)   63 %  


 


Lymphocytes (%) (Auto)   26 %  


 


Monocytes (%) (Auto)   8 %  


 


Eosinophils (%) (Auto)   2 %  


 


Basophils (%) (Auto)   1 %  


 


Neutrophils # (Auto)   1.5 x10^3uL  


 


Lymphocytes # (Auto)   0.6 x10^3/uL  


 


Monocytes # (Auto)   0.2 x10^3/uL  


 


Eosinophils # (Auto)   0.0 x10^3/uL  


 


Basophils # (Auto)   0.0 x10^3/uL  


 


Sodium Level   136 mmol/L  


 


Potassium Level   4.3 mmol/L  


 


Chloride Level   102 mmol/L  


 


Carbon Dioxide Level   29 mmol/L  


 


Anion Gap   5  


 


Blood Urea Nitrogen   26 mg/dL  


 


Creatinine   1.5 mg/dL  


 


Estimated GFR


(Cockcroft-Gault) 


  


  44.5 


  


 


 


BUN/Creatinine Ratio   17  


 


Glucose Level   201 mg/dL  


 


Calcium Level   8.1 mg/dL  


 


Phosphorus Level   3.2 mg/dL  


 


Magnesium Level   1.7 mg/dL  


 


Total Bilirubin   0.5 mg/dL  


 


Aspartate Amino Transf


(AST/SGOT) 


  


  18 U/L 


  


 


 


Alanine Aminotransferase


(ALT/SGPT) 


  


  21 U/L 


  


 


 


Alkaline Phosphatase   53 U/L  


 


Total Protein   5.4 g/dL  


 


Albumin   2.4 g/dL  


 


Albumin/Globulin Ratio   0.8  











PE:





GEN: NAD, thin


LUNGS: CTAB


HEART: RRR


ABD: S/ND/NT


NEURO/PSYCH: A & O 3





A/P:


Esophageal dysmotility


Erosive esophagitis w/ stricture s/p dilation


Obstructing duodenal ulcer/stricture





--


Now w/ PICC/TPN.


DC per primary, follow-up w/ surgery.











PREM HERNANDEZ Sep 21, 2017 11:21

## 2017-09-21 NOTE — PDOC
PROGRESS NOTES


Chief Complaint


Chief Complaint


Dysphagia and malnutrition


 


1.  Dysphagia:  recent admit for same with GI W/U, incl swallow study, EGD.  pt/

family had declined PEG at the time .  Dr Chiu aware, pt NPO today


2.  GERD/PUD:  hx perf.ed ulcer in 2011;  gastritis, esophageal ulcer on recent 

EGD;  H.pylori neg, malignancy neg.  PPI IV for now, carafate, trial viscous 

lidocaine


3.  Nutrition:  start PPN for now


4.  DM2:  well controlled.  add ISS


5.  CAD:  no acute issues.  oral meds currently on hold.  


6.  HTN:  hydralazine PRN


7.  CKD3:  stable.  monitor


8.  Prophylaxis: mechanical for now in anticipation of intervention


9,. full code status,





History of Present Illness


History of Present Illness


WILL NEED TPN for a week,  then re-eval for surg,


plan SNU,   get PT and OT eval to clear


will need daily labs to start TPN








still not eatingf


Gen surg consult,   malnutrition is now limiting





 


has lost 10 lbs past few weeks


pain, ulcer is blocking his esophagus and duodenum





Vitals


Vitals





Vital Signs








  Date Time  Temp Pulse Resp B/P (MAP) Pulse Ox O2 Delivery O2 Flow Rate FiO2


 


9/21/17 07:00 98.4 62 18 109/51 (70) 92 Room Air  





 98.4       











Physical Exam


General:  Alert, Oriented X3, Cooperative, No acute distress, Other (thin, 

cachetic)


Heart:  Regular rate


Lungs:  Clear


Abdomen:  Soft, No tenderness, Other (well healed surgical scar)


Extremities:  No clubbing, No cyanosis


Skin:  No rashes, No breakdown





Labs


LABS





Laboratory Tests








Test


  9/20/17


11:01 9/20/17


17:48 9/20/17


21:30 9/21/17


03:43


 


Glucose (Fingerstick)


  138 mg/dL


(70-99) 139 mg/dL


(70-99) 134 mg/dL


(70-99) 


 


 


White Blood Count


  


  


  


  2.4 x10^3/uL


(4.0-11.0)


 


Red Blood Count


  


  


  


  2.77 x10^6/uL


(4.30-5.70)


 


Hemoglobin


  


  


  


  8.6 g/dL


(13.0-17.5)


 


Hematocrit


  


  


  


  24.9 %


(39.0-53.0)


 


Mean Corpuscular Volume    90 fL () 


 


Mean Corpuscular Hemoglobin    31 pg (25-35) 


 


Mean Corpuscular Hemoglobin


Concent 


  


  


  35 g/dL


(31-37)


 


Red Cell Distribution Width


  


  


  


  13.9 %


(11.5-14.5)


 


Platelet Count


  


  


  


  114 x10^3/uL


(140-400)


 


Neutrophils (%) (Auto)    63 % (31-73) 


 


Lymphocytes (%) (Auto)    26 % (24-48) 


 


Monocytes (%) (Auto)    8 % (0-9) 


 


Eosinophils (%) (Auto)    2 % (0-3) 


 


Basophils (%) (Auto)    1 % (0-3) 


 


Neutrophils # (Auto)


  


  


  


  1.5 x10^3uL


(1.8-7.7)


 


Lymphocytes # (Auto)


  


  


  


  0.6 x10^3/uL


(1.0-4.8)


 


Monocytes # (Auto)


  


  


  


  0.2 x10^3/uL


(0.0-1.1)


 


Eosinophils # (Auto)


  


  


  


  0.0 x10^3/uL


(0.0-0.7)


 


Basophils # (Auto)


  


  


  


  0.0 x10^3/uL


(0.0-0.2)


 


Sodium Level


  


  


  


  136 mmol/L


(136-145)


 


Potassium Level


  


  


  


  4.3 mmol/L


(3.5-5.1)


 


Chloride Level


  


  


  


  102 mmol/L


()


 


Carbon Dioxide Level


  


  


  


  29 mmol/L


(21-32)


 


Anion Gap    5 (6-14) 


 


Blood Urea Nitrogen


  


  


  


  26 mg/dL


(8-26)


 


Creatinine


  


  


  


  1.5 mg/dL


(0.7-1.3)


 


Estimated GFR


(Cockcroft-Gault) 


  


  


  44.5 


 


 


BUN/Creatinine Ratio    17 (6-20) 


 


Glucose Level


  


  


  


  201 mg/dL


(70-99)


 


Calcium Level


  


  


  


  8.1 mg/dL


(8.5-10.1)


 


Phosphorus Level


  


  


  


  3.2 mg/dL


(2.6-4.7)


 


Magnesium Level


  


  


  


  1.7 mg/dL


(1.8-2.4)


 


Total Bilirubin


  


  


  


  0.5 mg/dL


(0.2-1.0)


 


Aspartate Amino Transf


(AST/SGOT) 


  


  


  18 U/L (15-37) 


 


 


Alanine Aminotransferase


(ALT/SGPT) 


  


  


  21 U/L (16-63) 


 


 


Alkaline Phosphatase


  


  


  


  53 U/L


()


 


Total Protein


  


  


  


  5.4 g/dL


(6.4-8.2)


 


Albumin


  


  


  


  2.4 g/dL


(3.4-5.0)


 


Albumin/Globulin Ratio    0.8 (1.0-1.7) 


 


Test


  9/21/17


05:12 


  


  


 


 


Glucose (Fingerstick)


  207 mg/dL


(70-99) 


  


  


 











Review of Systems


Review of Systems


dysphagia,  globus, 


no diarrhea,  limited stool





Comment


Review of Relevant


I have reviewed the following items milana (where applicable) has been applied.


Labs





Laboratory Tests








Test


  9/19/17


11:15 9/19/17


12:41 9/20/17


06:17 9/20/17


11:01


 


Prothrombin Time


  13.4 SEC


(11.7-14.0) 


  


  


 


 


Prothromb Time International


Ratio 1.1 (0.8-1.1) 


  


  


  


 


 


Glucose (Fingerstick)


  


  122 mg/dL


(70-99) 128 mg/dL


(70-99) 138 mg/dL


(70-99)


 


Test


  9/20/17


17:48 9/20/17


21:30 9/21/17


03:43 9/21/17


05:12


 


Glucose (Fingerstick)


  139 mg/dL


(70-99) 134 mg/dL


(70-99) 


  207 mg/dL


(70-99)


 


White Blood Count


  


  


  2.4 x10^3/uL


(4.0-11.0) 


 


 


Red Blood Count


  


  


  2.77 x10^6/uL


(4.30-5.70) 


 


 


Hemoglobin


  


  


  8.6 g/dL


(13.0-17.5) 


 


 


Hematocrit


  


  


  24.9 %


(39.0-53.0) 


 


 


Mean Corpuscular Volume   90 fL ()  


 


Mean Corpuscular Hemoglobin   31 pg (25-35)  


 


Mean Corpuscular Hemoglobin


Concent 


  


  35 g/dL


(31-37) 


 


 


Red Cell Distribution Width


  


  


  13.9 %


(11.5-14.5) 


 


 


Platelet Count


  


  


  114 x10^3/uL


(140-400) 


 


 


Neutrophils (%) (Auto)   63 % (31-73)  


 


Lymphocytes (%) (Auto)   26 % (24-48)  


 


Monocytes (%) (Auto)   8 % (0-9)  


 


Eosinophils (%) (Auto)   2 % (0-3)  


 


Basophils (%) (Auto)   1 % (0-3)  


 


Neutrophils # (Auto)


  


  


  1.5 x10^3uL


(1.8-7.7) 


 


 


Lymphocytes # (Auto)


  


  


  0.6 x10^3/uL


(1.0-4.8) 


 


 


Monocytes # (Auto)


  


  


  0.2 x10^3/uL


(0.0-1.1) 


 


 


Eosinophils # (Auto)


  


  


  0.0 x10^3/uL


(0.0-0.7) 


 


 


Basophils # (Auto)


  


  


  0.0 x10^3/uL


(0.0-0.2) 


 


 


Sodium Level


  


  


  136 mmol/L


(136-145) 


 


 


Potassium Level


  


  


  4.3 mmol/L


(3.5-5.1) 


 


 


Chloride Level


  


  


  102 mmol/L


() 


 


 


Carbon Dioxide Level


  


  


  29 mmol/L


(21-32) 


 


 


Anion Gap   5 (6-14)  


 


Blood Urea Nitrogen


  


  


  26 mg/dL


(8-26) 


 


 


Creatinine


  


  


  1.5 mg/dL


(0.7-1.3) 


 


 


Estimated GFR


(Cockcroft-Gault) 


  


  44.5 


  


 


 


BUN/Creatinine Ratio   17 (6-20)  


 


Glucose Level


  


  


  201 mg/dL


(70-99) 


 


 


Calcium Level


  


  


  8.1 mg/dL


(8.5-10.1) 


 


 


Phosphorus Level


  


  


  3.2 mg/dL


(2.6-4.7) 


 


 


Magnesium Level


  


  


  1.7 mg/dL


(1.8-2.4) 


 


 


Total Bilirubin


  


  


  0.5 mg/dL


(0.2-1.0) 


 


 


Aspartate Amino Transf


(AST/SGOT) 


  


  18 U/L (15-37) 


  


 


 


Alanine Aminotransferase


(ALT/SGPT) 


  


  21 U/L (16-63) 


  


 


 


Alkaline Phosphatase


  


  


  53 U/L


() 


 


 


Total Protein


  


  


  5.4 g/dL


(6.4-8.2) 


 


 


Albumin


  


  


  2.4 g/dL


(3.4-5.0) 


 


 


Albumin/Globulin Ratio   0.8 (1.0-1.7)  








Laboratory Tests








Test


  9/20/17


11:01 9/20/17


17:48 9/20/17


21:30 9/21/17


03:43


 


Glucose (Fingerstick)


  138 mg/dL


(70-99) 139 mg/dL


(70-99) 134 mg/dL


(70-99) 


 


 


White Blood Count


  


  


  


  2.4 x10^3/uL


(4.0-11.0)


 


Red Blood Count


  


  


  


  2.77 x10^6/uL


(4.30-5.70)


 


Hemoglobin


  


  


  


  8.6 g/dL


(13.0-17.5)


 


Hematocrit


  


  


  


  24.9 %


(39.0-53.0)


 


Mean Corpuscular Volume    90 fL () 


 


Mean Corpuscular Hemoglobin    31 pg (25-35) 


 


Mean Corpuscular Hemoglobin


Concent 


  


  


  35 g/dL


(31-37)


 


Red Cell Distribution Width


  


  


  


  13.9 %


(11.5-14.5)


 


Platelet Count


  


  


  


  114 x10^3/uL


(140-400)


 


Neutrophils (%) (Auto)    63 % (31-73) 


 


Lymphocytes (%) (Auto)    26 % (24-48) 


 


Monocytes (%) (Auto)    8 % (0-9) 


 


Eosinophils (%) (Auto)    2 % (0-3) 


 


Basophils (%) (Auto)    1 % (0-3) 


 


Neutrophils # (Auto)


  


  


  


  1.5 x10^3uL


(1.8-7.7)


 


Lymphocytes # (Auto)


  


  


  


  0.6 x10^3/uL


(1.0-4.8)


 


Monocytes # (Auto)


  


  


  


  0.2 x10^3/uL


(0.0-1.1)


 


Eosinophils # (Auto)


  


  


  


  0.0 x10^3/uL


(0.0-0.7)


 


Basophils # (Auto)


  


  


  


  0.0 x10^3/uL


(0.0-0.2)


 


Sodium Level


  


  


  


  136 mmol/L


(136-145)


 


Potassium Level


  


  


  


  4.3 mmol/L


(3.5-5.1)


 


Chloride Level


  


  


  


  102 mmol/L


()


 


Carbon Dioxide Level


  


  


  


  29 mmol/L


(21-32)


 


Anion Gap    5 (6-14) 


 


Blood Urea Nitrogen


  


  


  


  26 mg/dL


(8-26)


 


Creatinine


  


  


  


  1.5 mg/dL


(0.7-1.3)


 


Estimated GFR


(Cockcroft-Gault) 


  


  


  44.5 


 


 


BUN/Creatinine Ratio    17 (6-20) 


 


Glucose Level


  


  


  


  201 mg/dL


(70-99)


 


Calcium Level


  


  


  


  8.1 mg/dL


(8.5-10.1)


 


Phosphorus Level


  


  


  


  3.2 mg/dL


(2.6-4.7)


 


Magnesium Level


  


  


  


  1.7 mg/dL


(1.8-2.4)


 


Total Bilirubin


  


  


  


  0.5 mg/dL


(0.2-1.0)


 


Aspartate Amino Transf


(AST/SGOT) 


  


  


  18 U/L (15-37) 


 


 


Alanine Aminotransferase


(ALT/SGPT) 


  


  


  21 U/L (16-63) 


 


 


Alkaline Phosphatase


  


  


  


  53 U/L


()


 


Total Protein


  


  


  


  5.4 g/dL


(6.4-8.2)


 


Albumin


  


  


  


  2.4 g/dL


(3.4-5.0)


 


Albumin/Globulin Ratio    0.8 (1.0-1.7) 


 


Test


  9/21/17


05:12 


  


  


 


 


Glucose (Fingerstick)


  207 mg/dL


(70-99) 


  


  


 








Medications





Current Medications


Famotidine (Pepcid) 20 mg 1X  ONCE IVP  Last administered on 9/16/17at 14:31;  

Start 9/16/17 at 14:00;  Stop 9/16/17 at 14:02;  Status DC


Dextrose/Sodium Chloride 1,000 ml @  125 mls/hr 1X  ONCE IV  Last administered 

on 9/16/17at 14:32;  Start 9/16/17 at 14:00;  Stop 9/16/17 at 21:59;  Status DC


Dextrose (Dextrose 50%-Water Syringe) 12.5 gm PRN Q15MIN  PRN IV SEE COMMENTS;  

Start 9/17/17 at 13:00


Sucralfate (Carafate) 1 gm QIDACHS PEG  Last administered on 9/19/17at 21:04;  

Start 9/17/17 at 16:30


Famotidine (Pepcid) 20 mg DAILY IVP  Last administered on 9/18/17at 08:46;  

Start 9/17/17 at 13:00;  Stop 9/18/17 at 09:16;  Status DC


Insulin Aspart (NovoLOG) 0-7 UNITS TIDWMEALS SQ  Last administered on 9/21/17at 

08:14;  Start 9/17/17 at 17:00


Amino Acids/ Glycerin/ Electrolytes 1,000 ml @  80 mls/hr [id number] IV  Last 

administered on 9/20/17at 05:48;  Start 9/17/17 at 13:00;  Stop 9/20/17 at 21:59

;  Status DC


Lidocaine HCl (Viscous Lidocaine) 15 ml Q6HRS  PRN SWSW  esophageal PAIN Last 

administered on 9/19/17at 10:44;  Start 9/17/17 at 13:00


Hydralazine HCl (Apresoline) 10 mg PRN Q4HRS  PRN IVP ELEVATED BP, SEE COMMENTS

;  Start 9/17/17 at 13:00


Lansoprazole (Prevacid) 30 mg BIDBFRMEAL FT ;  Start 9/18/17 at 16:30;  Stop 9/ 18/17 at 16:30;  Status DC


Multi-Ingredient Mouthwash/Gargle (Gi Cocktail Single Dose) 15 ml TID PRN  PRN 

SWSW dysphagia;  Start 9/18/17 at 09:15;  Stop 9/18/17 at 12:31;  Status DC


Famotidine (Pepcid) 20 mg DAILY IVP  Last administered on 9/20/17at 09:30;  

Start 9/18/17 at 13:00


Cefazolin Sodium 1 gm/Sodium Chloride 50 ml @  100 mls/hr 1X  ONCE IV  Last 

administered on 9/19/17at 16:26;  Start 9/19/17 at 15:00;  Stop 9/19/17 at 15:29

;  Status DC


Ringer's Solution 1,000 ml @  100 mls/hr Q10H IV  Last administered on 9/19/ 17at 15:29;  Start 9/19/17 at 15:30


Propofol 20 ml @ As Directed STK-MED ONCE IV ;  Start 9/19/17 at 16:20;  Stop 9/ 19/17 at 16:21;  Status DC


Dextrose/Sodium Chloride 1,000 ml @  75 mls/hr 1X  ONCE IV  Last administered 

on 9/20/17at 13:27;  Start 9/20/17 at 11:00;  Stop 9/21/17 at 00:19;  Status DC


Info 1 each PRN DAILY  PRN MC SEE COMMENTS Last administered on 9/20/17at 14:05

;  Start 9/20/17 at 14:00


Info 1 each PRN DAILY  PRN MC SEE COMMENTS;  Start 9/20/17 at 14:30;  Status UNV


Sodium Chloride 90 meq/Potassium Chloride 50 meq/ Potassium Phosphate 13.6 mmol/

Magnesium Sulfate 10 meq/ Calcium Gluconate 10 meq/ Multivitamins 10 ml/Chromium

/ Copper/Manganese/ Seleni/Zn 1 ml/ Total Parenteral Nutrition/Amino Acids/

Dextrose/ Fat Emulsion Intravenous 1,512 ml @  63 mls/hr TPN  CONT IV  Last 

administered on 9/21/17at 01:41;  Start 9/20/17 at 22:00;  Stop 9/21/17 at 21:59


Magnesium Sulfate/ Dextrose 50 ml @ 25 mls/hr 1X  ONCE IV ;  Start 9/21/17 at 09

:30;  Stop 9/21/17 at 11:29





Active Scripts


Active


Reported


[Vapoinhaler]    ROMEO 


Cipro (Ciprofloxacin Hcl) 250 Mg Tablet 1 Tab PO BID


Zofran (Ondansetron Hcl) 4 Mg Tablet 1 Tab PO Q4HRS


Pantoprazole Sodium 40 Mg Tablet.dr 1 Tab PO DAILY


Metformin Hcl 1,000 Mg Tablet 1,000 Mg PO DAILYWBKFT


Rolaids Chewable Tablet (Calcium Carb/Magnesium Hydrox) 1 Each Tab.chew 1 Each 

PO 


Famotidine 20 Mg Tablet 10 Mg PO HS


Glimepiride 4 Mg Tablet 1 Tab PO DAILY


Vitamin B-12 (Cyanocobalamin (Vitamin B-12)) 1,000 Mcg Tablet 1 Tab PO DAILY


Vitamin D3 (Cholecalciferol (Vitamin D3)) 5,000 Unit Tablet 1 Tab PO DAILY


Vitals/I & O





Vital Sign - Last 24 Hours








 9/20/17 9/20/17 9/20/17 9/20/17





 11:00 15:12 19:00 23:00


 


Temp 97.9 98.2 98.2 97.6





 97.9 98.2 98.2 97.6


 


Pulse 61 61 65 70


 


Resp 18 18 18 18


 


B/P (MAP) 93/55 (68) 96/54 (68) 111/60 (77) 110/63 (79)


 


Pulse Ox 97 97 94 98


 


O2 Delivery Room Air Room Air Room Air Room Air


 


    





    





 9/21/17 9/21/17  





 03:00 07:00  


 


Temp 98.3 98.4  





 98.3 98.4  


 


Pulse 71 62  


 


Resp 18 18  


 


B/P (MAP) 114/61 (78) 109/51 (70)  


 


Pulse Ox 98 92  


 


O2 Delivery Room Air Room Air  











Nutrition Consultation


Dietary Evaluation:


Recommendations by RD:  Increase Calorie Intake, Protein supplementation, PPN/

TPN


Comments:  


continue ppn at this time





pt to reveive a PEG placement





REC: Diabetisource AC @ 20 ml/hr increase by 10 ml q 8 hr to a 





goal rate of 50 ml/hr with 120 cc q 6 hr flush





Bolua: Diabetisource AC or equiv 250 ml ( 1 carton) 5 x day, 100 cc water  


fluse after each bolus.


Expected Outcomes/Goals:  


Tolerate TF @ goal rate





Malnutrition Findings:


Food and Nutrition Intake (Sev:  <50% est energy req 5days


Body Fat Depletion (Non Severe:  Mild Depletion


Weight Status:  Underweight











MAL HAMILTON MD Sep 21, 2017 08:57

## 2017-09-22 NOTE — PDOC3
Discharge Summary


Visit Information


Date of Admission:  Sep 16, 2017


Date of Discharge:  Sep 21, 2017


Admitting Diagnosis:  dysphagia, pain


Final Diagnosis


 


1.  Dysphagia:  recent admit for same with GI W/U, incl swallow study, EGD.  pt/

family had declined PEG at the time .  Dr Chiu aware, pt NPO today


2.  GERD/PUD:  hx perf.ed ulcer in 2011;  gastritis, esophageal ulcer on recent 

EGD;  H.pylori neg, malignancy neg.  PPI IV for now, carafate, trial viscous 

lidocaine


3.  Nutrition:  start PPN for now


4.  DM2:  well controlled.  add ISS


5.  CAD:  no acute issues.  oral meds currently on hold.  


6.  HTN:  hydralazine PRN


7.  CKD3:  stable.  monitor


8.  Prophylaxis: mechanical for now in anticipation of intervention


9,. full code status,











Brief Hospital Course


Allergies





 Allergies








Coded Allergies Type Severity Reaction Last Updated Verified


 


  No Known Drug Allergies    9/19/17 No








Vital Signs





Vital Signs








  Date Time  Temp Pulse Resp B/P (MAP) Pulse Ox O2 Delivery O2 Flow Rate FiO2


 


9/21/17 11:00 97.7 57 18 119/62 (81) 94 Room Air  





 97.7       








Lab Results





Laboratory Tests








Test


  9/20/17


17:48 9/20/17


21:30 9/21/17


03:43 9/21/17


05:12


 


Glucose (Fingerstick)


  139 mg/dL


(70-99) 134 mg/dL


(70-99) 


  207 mg/dL


(70-99)


 


White Blood Count


  


  


  2.4 x10^3/uL


(4.0-11.0) 


 


 


Red Blood Count


  


  


  2.77 x10^6/uL


(4.30-5.70) 


 


 


Hemoglobin


  


  


  8.6 g/dL


(13.0-17.5) 


 


 


Hematocrit


  


  


  24.9 %


(39.0-53.0) 


 


 


Mean Corpuscular Volume   90 fL ()  


 


Mean Corpuscular Hemoglobin   31 pg (25-35)  


 


Mean Corpuscular Hemoglobin


Concent 


  


  35 g/dL


(31-37) 


 


 


Red Cell Distribution Width


  


  


  13.9 %


(11.5-14.5) 


 


 


Platelet Count


  


  


  114 x10^3/uL


(140-400) 


 


 


Neutrophils (%) (Auto)   63 % (31-73)  


 


Lymphocytes (%) (Auto)   26 % (24-48)  


 


Monocytes (%) (Auto)   8 % (0-9)  


 


Eosinophils (%) (Auto)   2 % (0-3)  


 


Basophils (%) (Auto)   1 % (0-3)  


 


Neutrophils # (Auto)


  


  


  1.5 x10^3uL


(1.8-7.7) 


 


 


Lymphocytes # (Auto)


  


  


  0.6 x10^3/uL


(1.0-4.8) 


 


 


Monocytes # (Auto)


  


  


  0.2 x10^3/uL


(0.0-1.1) 


 


 


Eosinophils # (Auto)


  


  


  0.0 x10^3/uL


(0.0-0.7) 


 


 


Basophils # (Auto)


  


  


  0.0 x10^3/uL


(0.0-0.2) 


 


 


Sodium Level


  


  


  136 mmol/L


(136-145) 


 


 


Potassium Level


  


  


  4.3 mmol/L


(3.5-5.1) 


 


 


Chloride Level


  


  


  102 mmol/L


() 


 


 


Carbon Dioxide Level


  


  


  29 mmol/L


(21-32) 


 


 


Anion Gap   5 (6-14)  


 


Blood Urea Nitrogen


  


  


  26 mg/dL


(8-26) 


 


 


Creatinine


  


  


  1.5 mg/dL


(0.7-1.3) 


 


 


Estimated GFR


(Cockcroft-Gault) 


  


  44.5 


  


 


 


BUN/Creatinine Ratio   17 (6-20)  


 


Glucose Level


  


  


  201 mg/dL


(70-99) 


 


 


Calcium Level


  


  


  8.1 mg/dL


(8.5-10.1) 


 


 


Phosphorus Level


  


  


  3.2 mg/dL


(2.6-4.7) 


 


 


Magnesium Level


  


  


  1.7 mg/dL


(1.8-2.4) 


 


 


Total Bilirubin


  


  


  0.5 mg/dL


(0.2-1.0) 


 


 


Aspartate Amino Transf


(AST/SGOT) 


  


  18 U/L (15-37) 


  


 


 


Alanine Aminotransferase


(ALT/SGPT) 


  


  21 U/L (16-63) 


  


 


 


Alkaline Phosphatase


  


  


  53 U/L


() 


 


 


Total Protein


  


  


  5.4 g/dL


(6.4-8.2) 


 


 


Albumin


  


  


  2.4 g/dL


(3.4-5.0) 


 


 


Albumin/Globulin Ratio   0.8 (1.0-1.7)  


 


Test


  9/21/17


11:42 


  


  


 


 


Glucose (Fingerstick)


  128 mg/dL


(70-99) 


  


  


 








Brief Hospital Course


Mr. Li  is a 85 old re-admit,  sent home after GERD and open ulcer of 

esophagus diagnosis,  PEG considered,  he declined at that time, re-admit with 

unabel to eat, pain


EGD done again, could not pass well,  and now duodenal stricture and ulcer,  on 

PPI as able, but unable to take PO and IV not avail,   H2 blocker given


then malnutrition worsened,   Gen surg deger JG tube,   need support


WILL NEED TPN for a week,  then re-eval for surg,


plan SNU,    PT and OT





Discharge Information


Condition at Discharge:  Improved


Follow Up:  Weeks


Disposition/Orders:  D/C to Another Facility (SNU)


Scheduled


Cholecalciferol (Vitamin D3) (Vitamin D3), 1 TAB PO DAILY, (Reported)


Ciprofloxacin Hcl (Cipro), 1 TAB PO BID, (Reported)


Cyanocobalamin (Vitamin B-12) (Vitamin B-12), 1 TAB PO DAILY, (Reported)


Famotidine (Famotidine), 10 MG PO HS, (Reported)


Glimepiride (Glimepiride), 1 TAB PO DAILY, (Reported)


Metformin Hcl (Metformin Hcl), 1,000 MG PO DAILYWBKFT, (Reported)


Ondansetron Hcl (Zofran), 1 TAB PO Q4HRS, (Reported)


Pantoprazole Sodium (Pantoprazole Sodium), 1 TAB PO DAILY, (Reported)





Miscellaneous Medications


Calcium Carb/Magnesium Hydrox (Rolaids Chewable Tablet), 1 EACH PO, (Reported)


[Vapoinhaler], ROMEO, (Reported)





Patient Instructions


Patient Instructions


> 30min











MAL HAMILTON MD Sep 22, 2017 14:52

## 2017-10-10 ENCOUNTER — HOSPITAL ENCOUNTER (INPATIENT)
Dept: HOSPITAL 61 - OPSVCIP | Age: 82
LOS: 8 days | Discharge: NURSING, REHAB OR LONG-TERM CARE | DRG: 326 | End: 2017-10-18
Attending: SURGERY | Admitting: SURGERY
Payer: MEDICARE

## 2017-10-10 VITALS — SYSTOLIC BLOOD PRESSURE: 112 MMHG | DIASTOLIC BLOOD PRESSURE: 60 MMHG

## 2017-10-10 VITALS — SYSTOLIC BLOOD PRESSURE: 131 MMHG | DIASTOLIC BLOOD PRESSURE: 69 MMHG

## 2017-10-10 VITALS — DIASTOLIC BLOOD PRESSURE: 63 MMHG | SYSTOLIC BLOOD PRESSURE: 126 MMHG

## 2017-10-10 VITALS — DIASTOLIC BLOOD PRESSURE: 72 MMHG | SYSTOLIC BLOOD PRESSURE: 139 MMHG

## 2017-10-10 VITALS — WEIGHT: 131.31 LBS | BODY MASS INDEX: 17.78 KG/M2 | HEIGHT: 72 IN

## 2017-10-10 VITALS — DIASTOLIC BLOOD PRESSURE: 68 MMHG | SYSTOLIC BLOOD PRESSURE: 146 MMHG

## 2017-10-10 VITALS — DIASTOLIC BLOOD PRESSURE: 66 MMHG | SYSTOLIC BLOOD PRESSURE: 123 MMHG

## 2017-10-10 VITALS — SYSTOLIC BLOOD PRESSURE: 144 MMHG | DIASTOLIC BLOOD PRESSURE: 64 MMHG

## 2017-10-10 VITALS — SYSTOLIC BLOOD PRESSURE: 144 MMHG | DIASTOLIC BLOOD PRESSURE: 57 MMHG

## 2017-10-10 VITALS — SYSTOLIC BLOOD PRESSURE: 134 MMHG | DIASTOLIC BLOOD PRESSURE: 69 MMHG

## 2017-10-10 DIAGNOSIS — K66.0: ICD-10-CM

## 2017-10-10 DIAGNOSIS — Z95.1: ICD-10-CM

## 2017-10-10 DIAGNOSIS — I25.10: ICD-10-CM

## 2017-10-10 DIAGNOSIS — N18.3: ICD-10-CM

## 2017-10-10 DIAGNOSIS — I50.22: ICD-10-CM

## 2017-10-10 DIAGNOSIS — E11.22: ICD-10-CM

## 2017-10-10 DIAGNOSIS — Z87.11: ICD-10-CM

## 2017-10-10 DIAGNOSIS — D62: ICD-10-CM

## 2017-10-10 DIAGNOSIS — K43.2: ICD-10-CM

## 2017-10-10 DIAGNOSIS — K31.1: Primary | ICD-10-CM

## 2017-10-10 DIAGNOSIS — A41.9: ICD-10-CM

## 2017-10-10 DIAGNOSIS — H91.90: ICD-10-CM

## 2017-10-10 DIAGNOSIS — Z83.3: ICD-10-CM

## 2017-10-10 DIAGNOSIS — I80.8: ICD-10-CM

## 2017-10-10 DIAGNOSIS — I13.0: ICD-10-CM

## 2017-10-10 DIAGNOSIS — E87.5: ICD-10-CM

## 2017-10-10 DIAGNOSIS — E43: ICD-10-CM

## 2017-10-10 DIAGNOSIS — Z82.49: ICD-10-CM

## 2017-10-10 DIAGNOSIS — K22.2: ICD-10-CM

## 2017-10-10 DIAGNOSIS — Z51.5: ICD-10-CM

## 2017-10-10 DIAGNOSIS — I82.621: ICD-10-CM

## 2017-10-10 DIAGNOSIS — E78.5: ICD-10-CM

## 2017-10-10 DIAGNOSIS — Z90.49: ICD-10-CM

## 2017-10-10 LAB
ALBUMIN SERPL-MCNC: 2.7 G/DL (ref 3.4–5)
ALBUMIN/GLOB SERPL: 0.6 {RATIO} (ref 1–1.7)
ALP SERPL-CCNC: 98 U/L (ref 46–116)
ALT SERPL-CCNC: 86 U/L (ref 16–63)
ANION GAP SERPL CALC-SCNC: 10 MMOL/L (ref 6–14)
APTT BLD: 36 SEC (ref 24–38)
AST SERPL-CCNC: 46 U/L (ref 15–37)
BASE EXCESS STD BLDA CALC-SCNC: -5 MMOL/L (ref 0–3)
BASOPHILS # BLD AUTO: 0 X10^3/UL (ref 0–0.2)
BASOPHILS NFR BLD: 0 % (ref 0–3)
BILIRUB SERPL-MCNC: 0.4 MG/DL (ref 0.2–1)
BUN SERPL-MCNC: 48 MG/DL (ref 8–26)
BUN/CREAT SERPL: 34 (ref 6–20)
CA-I BLDA-SCNC: 1.1 MMOL/L (ref 1.13–1.32)
CALCIUM SERPL-MCNC: 9.1 MG/DL (ref 8.5–10.1)
CHLORIDE SERPL-SCNC: 104 MMOL/L (ref 98–107)
CO2 BLDA-SCNC: 21 MMOL/L (ref 21–32)
CO2 SERPL-SCNC: 26 MMOL/L (ref 21–32)
CREAT SERPL-MCNC: 1.4 MG/DL (ref 0.7–1.3)
EOSINOPHIL NFR BLD: 0 % (ref 0–3)
ERYTHROCYTE [DISTWIDTH] IN BLOOD BY AUTOMATED COUNT: 13.8 % (ref 11.5–14.5)
GFR SERPLBLD BASED ON 1.73 SQ M-ARVRAT: 48.2 ML/MIN
GLOBULIN SER-MCNC: 4.5 G/DL (ref 2.2–3.8)
GLUCOSE BLDA-MCNC: 231 MG/DL (ref 70–99)
GLUCOSE SERPL-MCNC: 206 MG/DL (ref 70–99)
HCO3 BLDA-SCNC: 20 MMOL/L (ref 21–28)
HCT VFR BLD CALC: 28 % (ref 39–53)
HCT VFR BLDA CALC: 29 % (ref 37–52)
HGB BLD-MCNC: 9.5 G/DL (ref 13–17.5)
HGB BLDA-MCNC: 9.9 G/DL (ref 14–18)
INR PPP: 1.2 (ref 0.8–1.1)
INSPIRATION SETTING TIME VENT: 78
LYMPHOCYTES # BLD: 1.2 X10^3/UL (ref 1–4.8)
LYMPHOCYTES NFR BLD AUTO: 15 % (ref 24–48)
MCH RBC QN AUTO: 30 PG (ref 25–35)
MCHC RBC AUTO-ENTMCNC: 34 G/DL (ref 31–37)
MCV RBC AUTO: 87 FL (ref 79–100)
MONOCYTES NFR BLD: 7 % (ref 0–9)
NEUTROPHILS NFR BLD AUTO: 77 % (ref 31–73)
PCO2 BLDA: 36 MMHG (ref 35–45)
PH TEMP ADJ BLDA: 7.37 [PH] (ref 7.35–7.45)
PLATELET # BLD AUTO: 206 X10^3/UL (ref 140–400)
PO2 BLDA: 305 MMHG (ref 75–100)
POTASSIUM BLDA-SCNC: 5 MMOL/L (ref 3.5–5)
POTASSIUM SERPL-SCNC: 4.6 MMOL/L (ref 3.5–5.1)
PROT SERPL-MCNC: 7.2 G/DL (ref 6.4–8.2)
PROTHROMBIN TIME: 14.2 SEC (ref 11.7–14)
RBC # BLD AUTO: 3.2 X10^6/UL (ref 4.3–5.7)
SAO2 % BLDA FROM PO2: 100 % (ref 95–99)
SODIUM BLDA-SCNC: 140 MMOL/L (ref 135–145)
SODIUM SERPL-SCNC: 140 MMOL/L (ref 136–145)
TOSPEC: (no result)
WBC # BLD AUTO: 8.1 X10^3/UL (ref 4–11)

## 2017-10-10 PROCEDURE — 94250: CPT

## 2017-10-10 PROCEDURE — 0WBF0ZZ EXCISION OF ABDOMINAL WALL, OPEN APPROACH: ICD-10-PCS | Performed by: SURGERY

## 2017-10-10 PROCEDURE — 0DN90ZZ RELEASE DUODENUM, OPEN APPROACH: ICD-10-PCS | Performed by: SURGERY

## 2017-10-10 PROCEDURE — 36415 COLL VENOUS BLD VENIPUNCTURE: CPT

## 2017-10-10 PROCEDURE — 0WQF0ZZ REPAIR ABDOMINAL WALL, OPEN APPROACH: ICD-10-PCS | Performed by: SURGERY

## 2017-10-10 PROCEDURE — 93971 EXTREMITY STUDY: CPT

## 2017-10-10 PROCEDURE — 88309 TISSUE EXAM BY PATHOLOGIST: CPT

## 2017-10-10 PROCEDURE — 80048 BASIC METABOLIC PNL TOTAL CA: CPT

## 2017-10-10 PROCEDURE — 0D160ZA BYPASS STOMACH TO JEJUNUM, OPEN APPROACH: ICD-10-PCS | Performed by: SURGERY

## 2017-10-10 PROCEDURE — 71010: CPT

## 2017-10-10 PROCEDURE — 81001 URINALYSIS AUTO W/SCOPE: CPT

## 2017-10-10 PROCEDURE — 82803 BLOOD GASES ANY COMBINATION: CPT

## 2017-10-10 PROCEDURE — 88307 TISSUE EXAM BY PATHOLOGIST: CPT

## 2017-10-10 PROCEDURE — 0D1A0ZA BYPASS JEJUNUM TO JEJUNUM, OPEN APPROACH: ICD-10-PCS | Performed by: SURGERY

## 2017-10-10 PROCEDURE — 85025 COMPLETE CBC W/AUTO DIFF WBC: CPT

## 2017-10-10 PROCEDURE — P9045 ALBUMIN (HUMAN), 5%, 250 ML: HCPCS

## 2017-10-10 PROCEDURE — 83605 ASSAY OF LACTIC ACID: CPT

## 2017-10-10 PROCEDURE — P9016 RBC LEUKOCYTES REDUCED: HCPCS

## 2017-10-10 PROCEDURE — 80202 ASSAY OF VANCOMYCIN: CPT

## 2017-10-10 PROCEDURE — 94760 N-INVAS EAR/PLS OXIMETRY 1: CPT

## 2017-10-10 PROCEDURE — 85610 PROTHROMBIN TIME: CPT

## 2017-10-10 PROCEDURE — 0DH673Z INSERTION OF INFUSION DEVICE INTO STOMACH, VIA NATURAL OR ARTIFICIAL OPENING: ICD-10-PCS | Performed by: SURGERY

## 2017-10-10 PROCEDURE — 74000: CPT

## 2017-10-10 PROCEDURE — 85730 THROMBOPLASTIN TIME PARTIAL: CPT

## 2017-10-10 PROCEDURE — 88304 TISSUE EXAM BY PATHOLOGIST: CPT

## 2017-10-10 PROCEDURE — 85007 BL SMEAR W/DIFF WBC COUNT: CPT

## 2017-10-10 PROCEDURE — 94660 CPAP INITIATION&MGMT: CPT

## 2017-10-10 PROCEDURE — S0028 INJECTION, FAMOTIDINE, 20 MG: HCPCS

## 2017-10-10 PROCEDURE — 88311 DECALCIFY TISSUE: CPT

## 2017-10-10 PROCEDURE — 87040 BLOOD CULTURE FOR BACTERIA: CPT

## 2017-10-10 PROCEDURE — 88331 PATH CONSLTJ SURG 1 BLK 1SPC: CPT

## 2017-10-10 PROCEDURE — 86900 BLOOD TYPING SEROLOGIC ABO: CPT

## 2017-10-10 PROCEDURE — 80053 COMPREHEN METABOLIC PANEL: CPT

## 2017-10-10 PROCEDURE — 87641 MR-STAPH DNA AMP PROBE: CPT

## 2017-10-10 PROCEDURE — 86850 RBC ANTIBODY SCREEN: CPT

## 2017-10-10 PROCEDURE — 82962 GLUCOSE BLOOD TEST: CPT

## 2017-10-10 PROCEDURE — 86901 BLOOD TYPING SEROLOGIC RH(D): CPT

## 2017-10-10 PROCEDURE — 86920 COMPATIBILITY TEST SPIN: CPT

## 2017-10-10 RX ADMIN — SODIUM CHLORIDE, SODIUM LACTATE, POTASSIUM CHLORIDE, AND CALCIUM CHLORIDE SCH MLS/HR: .6; .31; .03; .02 INJECTION, SOLUTION INTRAVENOUS at 13:45

## 2017-10-10 RX ADMIN — SODIUM CHLORIDE, SODIUM LACTATE, POTASSIUM CHLORIDE, AND CALCIUM CHLORIDE SCH MLS/HR: .6; .31; .03; .02 INJECTION, SOLUTION INTRAVENOUS at 20:32

## 2017-10-10 RX ADMIN — BACITRACIN SCH MLS/HR: 5000 INJECTION, POWDER, FOR SOLUTION INTRAMUSCULAR at 11:49

## 2017-10-10 RX ADMIN — FAMOTIDINE SCH MG: 10 INJECTION, SOLUTION INTRAVENOUS at 20:32

## 2017-10-10 RX ADMIN — SODIUM CHLORIDE, SODIUM LACTATE, POTASSIUM CHLORIDE, AND CALCIUM CHLORIDE SCH MLS/HR: .6; .31; .03; .02 INJECTION, SOLUTION INTRAVENOUS at 07:00

## 2017-10-10 RX ADMIN — SODIUM CHLORIDE, SODIUM LACTATE, POTASSIUM CHLORIDE, AND CALCIUM CHLORIDE SCH MLS/HR: .6; .31; .03; .02 INJECTION, SOLUTION INTRAVENOUS at 15:18

## 2017-10-10 NOTE — PDOC4
OPERATIVE NOTE


Date:


Date:  Oct 10, 2017





Pre-Op Diagnosis:


Gastric outlet obstruction





Post-Op Diagnosis:


same





Procedure Performed:


Exploratory laparotomy, Extensive lysis of adhesions, removal of abdominal wall 

mass (favor dystrophic bone formation), removal of old mesh, repair of 

incisional hernia, Antrectomy with Stalin en Y reconstruction in an antecolic 

position, J-tube placement





Surgeon:


Boris Becker





Anesthesia Type:


GETA





Blood Loss:


100





Specimans Obtained:


Abd wall mass, mesh, distal stomach





Findings:


Dystrophic bone abdominal mass mass, mesh, extensive adhesions, chronic 

scarring of the gastric outlet, no obvious evidence of malignancy





Complications:


none





Operative Note:


After obtaining informed consent, patient was taken to the OR, induced under 

GETA and prepped in the usual fashion.  Previous midline incision was reopened 

using cautery.  A large prominent mass was noted to extended the majority of 

the wound, c/w dystrophic bone with evidence of previous suture within it.  

This was resected and sent to pathology.  In addition, a large piece of mesh 

was involved in this process and also removed.  Ultimately, the previous 

incisional hernia was closed at the end of the procedure.  The abdominal cavity 

was entered and an extensive, essentially locked in abdomen was discovered.  An 

extensive lysis of adhesions was performed which was the majority of the 

procedure.  The stomach was dissected out.  There was extensive scarring at the 

pylorus/duodenum and difficult to delineate structures.  Some fatty tissue was 

noted in the area, which may be a previous damon patch.  The stomach was 

divided at the suspected pylorus using a contour stapler.  More distal 

dissection was not appropriate secondary to scarring a risk of injury to other 

structures.  Lesser curve and greater curve were dissected using ligasure 

device to approximately level of the incisura, although remains difficult to 

distinguish structures.  Liver edge raw from dissection, but no obvious 

bleeding.  Piece of Surgicel placed.  Stomach resected at incisura with BESSY.  

Distal stomach sent to pathology.  No evidence of malignancy.  End to side 

stapled gastrojejunostomy created with BESSY and the end of this sealed off using 

3 0 PDS and 3 0 vicryl to a Stalin limb created 30 cm distal to ligament of 

Treitz.  Jejunojejunostomy created using a BESSY stapled anastomosis and the end 

of this sealed using 3 0 PDS and 3 0 vicryl.  Both anastomosis under no tension

, completely viable, patent and without evidence of leakage.  NGT placed across 

the stomach anastomosis.  A J-tube placed distal to the jejunojejunostomy 

through 3 0 vicryl pursestring x 2.  This was tacked to the abdominal wall 

using multiple interrupted 3 0 vicryl.  Abdominal cavity copiously irrigated.  

No evidence of bleeding or other pathology.  19 KARTHIK drain placed in left upper 

quadrant.  Fascia repaired with 0 PDS looped x 2.  Skin repaired with penrose 

drain, 3 0 vicryl and 4 0 monocryl.  Patient tolerated procedure well and sent 

to PACU in stable condition.  All counts were correct.  There were no immediate 

complications.











BORIS BECKER MD Oct 10, 2017 12:16

## 2017-10-10 NOTE — RAD
Indication postop. Protocol study.



A single view of the abdomen was obtained. Note is made of a preoperative exam

9/4/2017.



The abdominal gas pattern is unremarkable. Postoperative changes are noted in

the left abdomen. Nasogastric tube is in the body of the stomach. Drains and

Penrose drains are noted. An unexpected finding is not seen.



IMPRESSION: No unexpected finding seen in a postop abdomen

## 2017-10-10 NOTE — PDOC
SURGICAL PROGRESS NOTE


Subjective


86 yo M with gastric outlet obstruction, secondary to benign ulcer disease.


Pt requests "to be fixed where I can eat and drink"


Office note H&P reviewed and unchanged.  Pt examined.


R/B/A d/w pt and pt's family using drawing.  Risks, including, but not limited 

to:  bleeding, infection, damage to surrounding structures, risk of anesthesia, 

risk of leak.  Pt is at increased risk for perioperative complications 

secondary to severe malnutrition.  However, labs reassuring with normal WBC.  

Nutrition status should be somewhat improved with the use of TPN.  Suspect mild 

elevation of LFTs and glu secondary to TPN


TO OR for antrectomy and vagotomy with Stalin en Y reconstruction, J-tube 

placement.


Vital Signs





Vital Signs








  Date Time  Temp Pulse Resp B/P (MAP) Pulse Ox O2 Delivery O2 Flow Rate FiO2


 


10/10/17 06:36 98.1 115 20  93   





 98.1       


 


10/10/17 06:22    115/68  Room Air  








Labs





Laboratory Tests








Test


  10/10/17


06:30


 


White Blood Count


  8.1 x10^3/uL


(4.0-11.0)


 


Red Blood Count


  3.20 x10^6/uL


(4.30-5.70)


 


Hemoglobin


  9.5 g/dL


(13.0-17.5)


 


Hematocrit


  28.0 %


(39.0-53.0)


 


Mean Corpuscular Volume 87 fL () 


 


Mean Corpuscular Hemoglobin 30 pg (25-35) 


 


Mean Corpuscular Hemoglobin


Concent 34 g/dL


(31-37)


 


Red Cell Distribution Width


  13.8 %


(11.5-14.5)


 


Platelet Count


  206 x10^3/uL


(140-400)


 


Neutrophils (%) (Auto) 77 % (31-73) 


 


Lymphocytes (%) (Auto) 15 % (24-48) 


 


Monocytes (%) (Auto) 7 % (0-9) 


 


Eosinophils (%) (Auto) 0 % (0-3) 


 


Basophils (%) (Auto) 0 % (0-3) 


 


Neutrophils # (Auto)


  6.3 x10^3uL


(1.8-7.7)


 


Lymphocytes # (Auto)


  1.2 x10^3/uL


(1.0-4.8)


 


Monocytes # (Auto)


  0.6 x10^3/uL


(0.0-1.1)


 


Eosinophils # (Auto)


  0.0 x10^3/uL


(0.0-0.7)


 


Basophils # (Auto)


  0.0 x10^3/uL


(0.0-0.2)


 


Prothrombin Time


  14.2 SEC


(11.7-14.0)


 


Prothromb Time International


Ratio 1.2 (0.8-1.1) 


 


 


Activated Partial


Thromboplast Time 36 SEC (24-38) 


 


 


Sodium Level


  140 mmol/L


(136-145)


 


Potassium Level


  4.6 mmol/L


(3.5-5.1)


 


Chloride Level


  104 mmol/L


()


 


Carbon Dioxide Level


  26 mmol/L


(21-32)


 


Anion Gap 10 (6-14) 


 


Blood Urea Nitrogen


  48 mg/dL


(8-26)


 


Creatinine


  1.4 mg/dL


(0.7-1.3)


 


Estimated GFR


(Cockcroft-Gault) 48.2 


 


 


BUN/Creatinine Ratio 34 (6-20) 


 


Glucose Level


  206 mg/dL


(70-99)


 


Calcium Level


  9.1 mg/dL


(8.5-10.1)


 


Total Bilirubin


  0.4 mg/dL


(0.2-1.0)


 


Aspartate Amino Transf


(AST/SGOT) 46 U/L (15-37) 


 


 


Alanine Aminotransferase


(ALT/SGPT) 86 U/L (16-63) 


 


 


Alkaline Phosphatase


  98 U/L


()


 


Total Protein


  7.2 g/dL


(6.4-8.2)


 


Albumin


  2.7 g/dL


(3.4-5.0)


 


Albumin/Globulin Ratio 0.6 (1.0-1.7) 








Laboratory Tests








Test


  10/10/17


06:30


 


White Blood Count


  8.1 x10^3/uL


(4.0-11.0)


 


Red Blood Count


  3.20 x10^6/uL


(4.30-5.70)


 


Hemoglobin


  9.5 g/dL


(13.0-17.5)


 


Hematocrit


  28.0 %


(39.0-53.0)


 


Mean Corpuscular Volume 87 fL () 


 


Mean Corpuscular Hemoglobin 30 pg (25-35) 


 


Mean Corpuscular Hemoglobin


Concent 34 g/dL


(31-37)


 


Red Cell Distribution Width


  13.8 %


(11.5-14.5)


 


Platelet Count


  206 x10^3/uL


(140-400)


 


Neutrophils (%) (Auto) 77 % (31-73) 


 


Lymphocytes (%) (Auto) 15 % (24-48) 


 


Monocytes (%) (Auto) 7 % (0-9) 


 


Eosinophils (%) (Auto) 0 % (0-3) 


 


Basophils (%) (Auto) 0 % (0-3) 


 


Neutrophils # (Auto)


  6.3 x10^3uL


(1.8-7.7)


 


Lymphocytes # (Auto)


  1.2 x10^3/uL


(1.0-4.8)


 


Monocytes # (Auto)


  0.6 x10^3/uL


(0.0-1.1)


 


Eosinophils # (Auto)


  0.0 x10^3/uL


(0.0-0.7)


 


Basophils # (Auto)


  0.0 x10^3/uL


(0.0-0.2)


 


Prothrombin Time


  14.2 SEC


(11.7-14.0)


 


Prothromb Time International


Ratio 1.2 (0.8-1.1) 


 


 


Activated Partial


Thromboplast Time 36 SEC (24-38) 


 


 


Sodium Level


  140 mmol/L


(136-145)


 


Potassium Level


  4.6 mmol/L


(3.5-5.1)


 


Chloride Level


  104 mmol/L


()


 


Carbon Dioxide Level


  26 mmol/L


(21-32)


 


Anion Gap 10 (6-14) 


 


Blood Urea Nitrogen


  48 mg/dL


(8-26)


 


Creatinine


  1.4 mg/dL


(0.7-1.3)


 


Estimated GFR


(Cockcroft-Gault) 48.2 


 


 


BUN/Creatinine Ratio 34 (6-20) 


 


Glucose Level


  206 mg/dL


(70-99)


 


Calcium Level


  9.1 mg/dL


(8.5-10.1)


 


Total Bilirubin


  0.4 mg/dL


(0.2-1.0)


 


Aspartate Amino Transf


(AST/SGOT) 46 U/L (15-37) 


 


 


Alanine Aminotransferase


(ALT/SGPT) 86 U/L (16-63) 


 


 


Alkaline Phosphatase


  98 U/L


()


 


Total Protein


  7.2 g/dL


(6.4-8.2)


 


Albumin


  2.7 g/dL


(3.4-5.0)


 


Albumin/Globulin Ratio 0.6 (1.0-1.7) 

















NIRALI BECKER MD Oct 10, 2017 07:53

## 2017-10-11 VITALS — SYSTOLIC BLOOD PRESSURE: 151 MMHG | DIASTOLIC BLOOD PRESSURE: 77 MMHG

## 2017-10-11 VITALS — SYSTOLIC BLOOD PRESSURE: 148 MMHG | DIASTOLIC BLOOD PRESSURE: 68 MMHG

## 2017-10-11 VITALS — SYSTOLIC BLOOD PRESSURE: 151 MMHG | DIASTOLIC BLOOD PRESSURE: 84 MMHG

## 2017-10-11 VITALS — DIASTOLIC BLOOD PRESSURE: 68 MMHG | SYSTOLIC BLOOD PRESSURE: 149 MMHG

## 2017-10-11 VITALS — SYSTOLIC BLOOD PRESSURE: 155 MMHG | DIASTOLIC BLOOD PRESSURE: 86 MMHG

## 2017-10-11 VITALS — SYSTOLIC BLOOD PRESSURE: 152 MMHG | DIASTOLIC BLOOD PRESSURE: 74 MMHG

## 2017-10-11 VITALS — SYSTOLIC BLOOD PRESSURE: 138 MMHG | DIASTOLIC BLOOD PRESSURE: 75 MMHG

## 2017-10-11 VITALS — DIASTOLIC BLOOD PRESSURE: 70 MMHG | SYSTOLIC BLOOD PRESSURE: 149 MMHG

## 2017-10-11 VITALS — DIASTOLIC BLOOD PRESSURE: 72 MMHG | SYSTOLIC BLOOD PRESSURE: 157 MMHG

## 2017-10-11 VITALS — SYSTOLIC BLOOD PRESSURE: 150 MMHG | DIASTOLIC BLOOD PRESSURE: 74 MMHG

## 2017-10-11 VITALS — SYSTOLIC BLOOD PRESSURE: 148 MMHG | DIASTOLIC BLOOD PRESSURE: 69 MMHG

## 2017-10-11 VITALS — SYSTOLIC BLOOD PRESSURE: 130 MMHG | DIASTOLIC BLOOD PRESSURE: 64 MMHG

## 2017-10-11 VITALS — DIASTOLIC BLOOD PRESSURE: 70 MMHG | SYSTOLIC BLOOD PRESSURE: 150 MMHG

## 2017-10-11 VITALS — DIASTOLIC BLOOD PRESSURE: 87 MMHG | SYSTOLIC BLOOD PRESSURE: 156 MMHG

## 2017-10-11 VITALS — SYSTOLIC BLOOD PRESSURE: 159 MMHG | DIASTOLIC BLOOD PRESSURE: 63 MMHG

## 2017-10-11 VITALS — SYSTOLIC BLOOD PRESSURE: 149 MMHG | DIASTOLIC BLOOD PRESSURE: 72 MMHG

## 2017-10-11 VITALS — DIASTOLIC BLOOD PRESSURE: 72 MMHG | SYSTOLIC BLOOD PRESSURE: 154 MMHG

## 2017-10-11 VITALS — SYSTOLIC BLOOD PRESSURE: 164 MMHG | DIASTOLIC BLOOD PRESSURE: 76 MMHG

## 2017-10-11 VITALS — SYSTOLIC BLOOD PRESSURE: 153 MMHG | DIASTOLIC BLOOD PRESSURE: 71 MMHG

## 2017-10-11 VITALS — SYSTOLIC BLOOD PRESSURE: 154 MMHG | DIASTOLIC BLOOD PRESSURE: 67 MMHG

## 2017-10-11 VITALS — DIASTOLIC BLOOD PRESSURE: 67 MMHG | SYSTOLIC BLOOD PRESSURE: 139 MMHG

## 2017-10-11 VITALS — SYSTOLIC BLOOD PRESSURE: 146 MMHG | DIASTOLIC BLOOD PRESSURE: 77 MMHG

## 2017-10-11 VITALS — SYSTOLIC BLOOD PRESSURE: 156 MMHG | DIASTOLIC BLOOD PRESSURE: 76 MMHG

## 2017-10-11 VITALS — SYSTOLIC BLOOD PRESSURE: 154 MMHG | DIASTOLIC BLOOD PRESSURE: 69 MMHG

## 2017-10-11 VITALS — DIASTOLIC BLOOD PRESSURE: 72 MMHG | SYSTOLIC BLOOD PRESSURE: 148 MMHG

## 2017-10-11 VITALS — SYSTOLIC BLOOD PRESSURE: 167 MMHG | DIASTOLIC BLOOD PRESSURE: 84 MMHG

## 2017-10-11 VITALS — DIASTOLIC BLOOD PRESSURE: 72 MMHG | SYSTOLIC BLOOD PRESSURE: 138 MMHG

## 2017-10-11 LAB
ANION GAP SERPL CALC-SCNC: 10 MMOL/L (ref 6–14)
BASOPHILS # BLD AUTO: 0 X10^3/UL (ref 0–0.2)
BASOPHILS NFR BLD: 0 % (ref 0–3)
BUN SERPL-MCNC: 42 MG/DL (ref 8–26)
CALCIUM SERPL-MCNC: 7.6 MG/DL (ref 8.5–10.1)
CHLORIDE SERPL-SCNC: 108 MMOL/L (ref 98–107)
CO2 SERPL-SCNC: 22 MMOL/L (ref 21–32)
CREAT SERPL-MCNC: 1.4 MG/DL (ref 0.7–1.3)
EOSINOPHIL NFR BLD: 0 % (ref 0–3)
ERYTHROCYTE [DISTWIDTH] IN BLOOD BY AUTOMATED COUNT: 14.1 % (ref 11.5–14.5)
GFR SERPLBLD BASED ON 1.73 SQ M-ARVRAT: 48.2 ML/MIN
GLUCOSE SERPL-MCNC: 219 MG/DL (ref 70–99)
HCT VFR BLD CALC: 21 % (ref 39–53)
HGB BLD-MCNC: 7 G/DL (ref 13–17.5)
LYMPHOCYTES # BLD: 0.7 X10^3/UL (ref 1–4.8)
LYMPHOCYTES NFR BLD AUTO: 7 % (ref 24–48)
MCH RBC QN AUTO: 29 PG (ref 25–35)
MCHC RBC AUTO-ENTMCNC: 33 G/DL (ref 31–37)
MCV RBC AUTO: 88 FL (ref 79–100)
MONOCYTES NFR BLD: 5 % (ref 0–9)
NEUTROPHILS NFR BLD AUTO: 87 % (ref 31–73)
PLATELET # BLD AUTO: 150 X10^3/UL (ref 140–400)
PLATELET # BLD EST: ADEQUATE 10*3/UL
POTASSIUM SERPL-SCNC: 5.4 MMOL/L (ref 3.5–5.1)
RBC # BLD AUTO: 2.38 X10^6/UL (ref 4.3–5.7)
SODIUM SERPL-SCNC: 140 MMOL/L (ref 136–145)
WBC # BLD AUTO: 9.5 X10^3/UL (ref 4–11)

## 2017-10-11 PROCEDURE — 30233N1 TRANSFUSION OF NONAUTOLOGOUS RED BLOOD CELLS INTO PERIPHERAL VEIN, PERCUTANEOUS APPROACH: ICD-10-PCS | Performed by: SURGERY

## 2017-10-11 RX ADMIN — INSULIN ASPART SCH UNITS: 100 INJECTION, SOLUTION INTRAVENOUS; SUBCUTANEOUS at 17:00

## 2017-10-11 RX ADMIN — FAMOTIDINE SCH MG: 10 INJECTION, SOLUTION INTRAVENOUS at 21:07

## 2017-10-11 RX ADMIN — INSULIN ASPART SCH UNITS: 100 INJECTION, SOLUTION INTRAVENOUS; SUBCUTANEOUS at 12:30

## 2017-10-11 RX ADMIN — SODIUM CHLORIDE, SODIUM LACTATE, POTASSIUM CHLORIDE, AND CALCIUM CHLORIDE SCH MLS/HR: .6; .31; .03; .02 INJECTION, SOLUTION INTRAVENOUS at 17:16

## 2017-10-11 RX ADMIN — SODIUM CHLORIDE, SODIUM LACTATE, POTASSIUM CHLORIDE, AND CALCIUM CHLORIDE SCH MLS/HR: .6; .31; .03; .02 INJECTION, SOLUTION INTRAVENOUS at 17:49

## 2017-10-11 RX ADMIN — ENOXAPARIN SODIUM SCH MG: 100 INJECTION SUBCUTANEOUS at 10:03

## 2017-10-11 RX ADMIN — BACITRACIN SCH MLS/HR: 5000 INJECTION, POWDER, FOR SOLUTION INTRAMUSCULAR at 11:49

## 2017-10-11 NOTE — PDOC
SURGICAL PROGRESS NOTE


Subjective


Pt with mild confusion, notes throat pain and abd pain


Vital Signs





Vital Signs








  Date Time  Temp Pulse Resp B/P (MAP) Pulse Ox O2 Delivery O2 Flow Rate FiO2


 


10/11/17 12:00  96  157/75 (102) 100 Room Air  


 


10/11/17 11:00 97.6       





 97.6       


 


10/11/17 07:00   22     


 


10/10/17 18:00       1.0 








I&O











Intake and Output 


 


 10/12/17





 07:00


 


Intake Total 375 ml


 


Output Total 740 ml


 


Balance -365 ml


 


 


 


Blood Product IV Normal Saline Flush 375 ml


 


Output Urine Total 740 ml








PATIENT HAS A MURPHY:  Yes (accurate i and os)


General:  Alert, No acute distress


Abdomen:  Soft, Other (mild appropriate TTP, dressing c/d/i, KARTHIK serosang)


Labs





Laboratory Tests








Test


  10/10/17


06:30 10/10/17


10:53 10/10/17


12:26 10/10/17


13:27


 


White Blood Count


  8.1 x10^3/uL


(4.0-11.0) 


  


  


 


 


Red Blood Count


  3.20 x10^6/uL


(4.30-5.70) 


  


  


 


 


Hemoglobin


  9.5 g/dL


(13.0-17.5) 


  


  


 


 


Hematocrit


  28.0 %


(39.0-53.0) 


  


  


 


 


Mean Corpuscular Volume 87 fL ()    


 


Mean Corpuscular Hemoglobin 30 pg (25-35)    


 


Mean Corpuscular Hemoglobin


Concent 34 g/dL


(31-37) 


  


  


 


 


Red Cell Distribution Width


  13.8 %


(11.5-14.5) 


  


  


 


 


Platelet Count


  206 x10^3/uL


(140-400) 


  


  


 


 


Neutrophils (%) (Auto) 77 % (31-73)    


 


Lymphocytes (%) (Auto) 15 % (24-48)    


 


Monocytes (%) (Auto) 7 % (0-9)    


 


Eosinophils (%) (Auto) 0 % (0-3)    


 


Basophils (%) (Auto) 0 % (0-3)    


 


Neutrophils # (Auto)


  6.3 x10^3uL


(1.8-7.7) 


  


  


 


 


Lymphocytes # (Auto)


  1.2 x10^3/uL


(1.0-4.8) 


  


  


 


 


Monocytes # (Auto)


  0.6 x10^3/uL


(0.0-1.1) 


  


  


 


 


Eosinophils # (Auto)


  0.0 x10^3/uL


(0.0-0.7) 


  


  


 


 


Basophils # (Auto)


  0.0 x10^3/uL


(0.0-0.2) 


  


  


 


 


Prothrombin Time


  14.2 SEC


(11.7-14.0) 


  


  


 


 


Prothromb Time International


Ratio 1.2 (0.8-1.1) 


  


  


  


 


 


Activated Partial


Thromboplast Time 36 SEC (24-38) 


  


  


  


 


 


Sodium Level


  140 mmol/L


(136-145) 


  


  


 


 


Potassium Level


  4.6 mmol/L


(3.5-5.1) 


  


  


 


 


Chloride Level


  104 mmol/L


() 


  


  


 


 


Carbon Dioxide Level


  26 mmol/L


(21-32) 


  


  


 


 


Anion Gap 10 (6-14)    


 


Blood Urea Nitrogen


  48 mg/dL


(8-26) 


  


  


 


 


Creatinine


  1.4 mg/dL


(0.7-1.3) 


  


  


 


 


Estimated GFR


(Cockcroft-Gault) 48.2 


  


  


  


 


 


BUN/Creatinine Ratio 34 (6-20)    


 


Glucose Level


  206 mg/dL


(70-99) 231 mg/dL


(70-99) 


  


 


 


Calcium Level


  9.1 mg/dL


(8.5-10.1) 


  


  


 


 


Total Bilirubin


  0.4 mg/dL


(0.2-1.0) 


  


  


 


 


Aspartate Amino Transf


(AST/SGOT) 46 U/L (15-37) 


  


  


  


 


 


Alanine Aminotransferase


(ALT/SGPT) 86 U/L (16-63) 


  


  


  


 


 


Alkaline Phosphatase


  98 U/L


() 


  


  


 


 


Total Protein


  7.2 g/dL


(6.4-8.2) 


  


  


 


 


Albumin


  2.7 g/dL


(3.4-5.0) 


  


  


 


 


Albumin/Globulin Ratio 0.6 (1.0-1.7)    


 


Bedside Hemoglobin


(Calculated) 


  9.9 g/dL


(14-18) 


  


 


 


Bedside Hematocrit  29 % (37-52)   


 


Bedside Arterial pH


  


  7.37


(7.35-7.45) 


  


 


 


Arterial Blood pH (Temp


corrected) 


  7.38 


  


  


 


 


Bedside Arterial pCO2


  


  36 mmHg


(35-45) 


  


 


 


Arterial Blood pCO2 (Temp


correct) 


  35 mmHg 


  


  


 


 


Bedside Arterial pO2


  


  305 mmHg


() 


  


 


 


Arterial Blood pO2 (Temp


corrected) 


  302 mmHg 


  


  


 


 


Bedside Arterial HCO3


  


  20 mmol/L


(21-28) 


  


 


 


Bedside Arterial Total CO2


  


  21 mmol/L


(21-32) 


  


 


 


Arterial Bld O2 Saturation


(Measur) 


  100 % (95-99) 


  


  


 


 


Bedside Arterial Blood Base


Excess 


  -5 mmol/L


(0-3) 


  


 


 


Bedside FiO2  78.0   


 


Bedside Sodium


  


  140 mmol/L


(135-145) 


  


 


 


Bedside Potassium


  


  5.0 mmol/L


(3.5-5.0) 


  


 


 


Bedside Ionized Calcium (Jerri)


  


  1.10 mmol/L


(1.13-1.32) 


  


 


 


Glucose (Fingerstick)


  


  


  215 mg/dL


(70-99) 239 mg/dL


(70-99)


 


Test


  10/11/17


05:30 


  


  


 


 


White Blood Count


  9.5 x10^3/uL


(4.0-11.0) 


  


  


 


 


Red Blood Count


  2.38 x10^6/uL


(4.30-5.70) 


  


  


 


 


Hemoglobin


  7.0 g/dL


(13.0-17.5) 


  


  


 


 


Hematocrit


  21.0 %


(39.0-53.0) 


  


  


 


 


Mean Corpuscular Volume 88 fL ()    


 


Mean Corpuscular Hemoglobin 29 pg (25-35)    


 


Mean Corpuscular Hemoglobin


Concent 33 g/dL


(31-37) 


  


  


 


 


Red Cell Distribution Width


  14.1 %


(11.5-14.5) 


  


  


 


 


Platelet Count


  150 x10^3/uL


(140-400) 


  


  


 


 


Neutrophils (%) (Auto) 87 % (31-73)    


 


Lymphocytes (%) (Auto) 7 % (24-48)    


 


Monocytes (%) (Auto) 5 % (0-9)    


 


Eosinophils (%) (Auto) 0 % (0-3)    


 


Basophils (%) (Auto) 0 % (0-3)    


 


Neutrophils # (Auto)


  8.3 x10^3uL


(1.8-7.7) 


  


  


 


 


Lymphocytes # (Auto)


  0.7 x10^3/uL


(1.0-4.8) 


  


  


 


 


Monocytes # (Auto)


  0.5 x10^3/uL


(0.0-1.1) 


  


  


 


 


Eosinophils # (Auto)


  0.0 x10^3/uL


(0.0-0.7) 


  


  


 


 


Basophils # (Auto)


  0.0 x10^3/uL


(0.0-0.2) 


  


  


 


 


Segmented Neutrophils % 81 % (35-66)    


 


Band Neutrophils % 5 % (0-9)    


 


Lymphocytes % 9 % (24-48)    


 


Monocytes % 5 % (0-10)    


 


Platelet Estimate


  Adequate


(ADEQUATE) 


  


  


 


 


Sodium Level


  140 mmol/L


(136-145) 


  


  


 


 


Potassium Level


  5.4 mmol/L


(3.5-5.1) 


  


  


 


 


Chloride Level


  108 mmol/L


() 


  


  


 


 


Carbon Dioxide Level


  22 mmol/L


(21-32) 


  


  


 


 


Anion Gap 10 (6-14)    


 


Blood Urea Nitrogen


  42 mg/dL


(8-26) 


  


  


 


 


Creatinine


  1.4 mg/dL


(0.7-1.3) 


  


  


 


 


Estimated GFR


(Cockcroft-Gault) 48.2 


  


  


  


 


 


Glucose Level


  219 mg/dL


(70-99) 


  


  


 


 


Calcium Level


  7.6 mg/dL


(8.5-10.1) 


  


  


 








Laboratory Tests








Test


  10/10/17


13:27 10/11/17


05:30


 


Glucose (Fingerstick)


  239 mg/dL


(70-99) 


 


 


White Blood Count


  


  9.5 x10^3/uL


(4.0-11.0)


 


Red Blood Count


  


  2.38 x10^6/uL


(4.30-5.70)


 


Hemoglobin


  


  7.0 g/dL


(13.0-17.5)


 


Hematocrit


  


  21.0 %


(39.0-53.0)


 


Mean Corpuscular Volume  88 fL () 


 


Mean Corpuscular Hemoglobin  29 pg (25-35) 


 


Mean Corpuscular Hemoglobin


Concent 


  33 g/dL


(31-37)


 


Red Cell Distribution Width


  


  14.1 %


(11.5-14.5)


 


Platelet Count


  


  150 x10^3/uL


(140-400)


 


Neutrophils (%) (Auto)  87 % (31-73) 


 


Lymphocytes (%) (Auto)  7 % (24-48) 


 


Monocytes (%) (Auto)  5 % (0-9) 


 


Eosinophils (%) (Auto)  0 % (0-3) 


 


Basophils (%) (Auto)  0 % (0-3) 


 


Neutrophils # (Auto)


  


  8.3 x10^3uL


(1.8-7.7)


 


Lymphocytes # (Auto)


  


  0.7 x10^3/uL


(1.0-4.8)


 


Monocytes # (Auto)


  


  0.5 x10^3/uL


(0.0-1.1)


 


Eosinophils # (Auto)


  


  0.0 x10^3/uL


(0.0-0.7)


 


Basophils # (Auto)


  


  0.0 x10^3/uL


(0.0-0.2)


 


Segmented Neutrophils %  81 % (35-66) 


 


Band Neutrophils %  5 % (0-9) 


 


Lymphocytes %  9 % (24-48) 


 


Monocytes %  5 % (0-10) 


 


Platelet Estimate


  


  Adequate


(ADEQUATE)


 


Sodium Level


  


  140 mmol/L


(136-145)


 


Potassium Level


  


  5.4 mmol/L


(3.5-5.1)


 


Chloride Level


  


  108 mmol/L


()


 


Carbon Dioxide Level


  


  22 mmol/L


(21-32)


 


Anion Gap  10 (6-14) 


 


Blood Urea Nitrogen


  


  42 mg/dL


(8-26)


 


Creatinine


  


  1.4 mg/dL


(0.7-1.3)


 


Estimated GFR


(Cockcroft-Gault) 


  48.2 


 


 


Glucose Level


  


  219 mg/dL


(70-99)


 


Calcium Level


  


  7.6 mg/dL


(8.5-10.1)








Assessment/Plan


s/p gastrectomy


one unit PRBCs transfused


start J-tube feeds


d/w pt and pt's wife


Problems:  











NIRALI BECKER MD Oct 11, 2017 12:31

## 2017-10-11 NOTE — PDOC2
CONSULT


Date of Consult


Date of Consult


DATE: 10/11/17 


TIME: 13:31





Reason for Consult


Reason for Consult:


IM management





Referring Physician


Referring Physician:


dr. Sotelo





Identification/Chief Complaint


Chief Complaint


post gastrectomy


Problems:  





Source


Source:  Chart review





History of Present Illness


Reason for Visit:


 





84yo M, recently diagnosed with gastric outlet obstruction, was transferred 

from  for sx.


pt got Exploratory laparotomy, Extensive lysis of adhesions, removal of 

abdominal wall mass (favor dystrophic bone formation), removal of old mesh, 

repair of incisional hernia, Antrectomy with Stalin en Y reconstruction in an 

antecolic position, J-tube placement by dr. Delatorre overnight.


now npo, on morphine PCA, c/p some abd pain. 


hb 7, 1u PRBC running.


we are called for IM management.





Past Medical History


Cardiovascular:  CAD, HTN


GI:  Peptic Ulcer disease


Endocrine:  Diabetes





Past Surgical History


Past Surgical History:  CABG, Other





Family History


Family History:  No Significant





Social History


No


ALCOHOL:  none


Drugs:  None


Lives:  with Family





Current Medications


Current Medications





Current Medications


Ondansetron HCl (Zofran) 4 mg PRN Q6HRS  PRN IV NAUSEA/VOMITING;  Start 10/10/

17 at 07:00;  Stop 10/11/17 at 06:59;  Status DC


Fentanyl Citrate (Fentanyl 2ml Vial) 25 mcg PRN Q5MIN  PRN IV MILD PAIN Last 

administered on 10/10/17at 14:08;  Start 10/10/17 at 07:00;  Stop 10/11/17 at 06

:59;  Status DC


Fentanyl Citrate (Fentanyl 2ml Vial) 50 mcg PRN Q5MIN  PRN IV MODERATE PAIN;  

Start 10/10/17 at 07:00;  Stop 10/11/17 at 06:59;  Status DC


Morphine Sulfate 1 mg PRN Q10MIN  PRN IV SEVERE PAIN;  Start 10/10/17 at 07:00;

  Stop 10/11/17 at 06:59;  Status DC


Ringer's Solution 1,000 ml @  0 mls/hr Q0M IV  Last administered on 10/10/17at 

13:45;  Start 10/10/17 at 07:00;  Stop 10/10/17 at 18:59;  Status DC


Lidocaine HCl (Xylocaine-Mpf 1% Vial) 2 ml PRN 1X  PRN ID PRIOR TO IV START;  

Start 10/10/17 at 07:00;  Stop 10/11/17 at 06:59;  Status DC


Hydromorphone HCl (Dilaudid) 0.5 mg PRN Q10MIN  PRN IV SEV PAIN, Second choice;

  Start 10/10/17 at 07:00;  Stop 10/11/17 at 06:59;  Status DC


Prochlorperazine Edisylate (Compazine) 5 mg PACU PRN  PRN IV NAUSEA, MRX1;  

Start 10/10/17 at 07:00;  Stop 10/11/17 at 06:59;  Status DC


Cefazolin Sodium/ Dextrose 50 ml @  100 mls/hr 1X PREOP  PRN IV PRIOR TO 

PROCEDURE;  Start 10/10/17 at 06:00;  Stop 10/10/17 at 18:00;  Status DC


Cefoxitin Sodium 2 gm/Sodium Chloride 100 ml @  200 mls/hr 1X  ONCE IV ;  Start 

10/10/17 at 07:00;  Stop 10/10/17 at 07:29;  Status UNV


Cefoxitin Sodium 100 ml @  200 mls/hr ONCE  ONCE IV  Last administered on 10/10/

17at 08:16;  Start 10/10/17 at 07:15;  Stop 10/10/17 at 07:44;  Status DC


Lidocaine HCl (Lidocaine Pf 2% Vial) 5 ml STK-MED ONCE .ROUTE ;  Start 10/10/17 

at 07:29;  Stop 10/10/17 at 07:30;  Status DC


Etomidate (Amidate) 20 mg STK-MED ONCE IV ;  Start 10/10/17 at 07:29;  Stop 10/

10/17 at 07:30;  Status DC


Fentanyl Citrate (Fentanyl 2ml Vial) 100 mcg STK-MED ONCE .ROUTE ;  Start 10/10/

17 at 07:29;  Stop 10/10/17 at 07:30;  Status DC


Rocuronium Bromide (Zemuron) 100 mg STK-MED ONCE .ROUTE ;  Start 10/10/17 at 07:

29;  Stop 10/10/17 at 07:30;  Status DC


Dexamethasone Sodium Phosphate (Decadron) 20 mg STK-MED ONCE .ROUTE ;  Start 10/

10/17 at 08:16;  Stop 10/10/17 at 08:17;  Status DC


Ondansetron HCl (Zofran) 4 mg STK-MED ONCE .ROUTE ;  Start 10/10/17 at 08:16;  

Stop 10/10/17 at 08:17;  Status DC


Esmolol HCl (Brevibloc) 100 mg STK-MED ONCE IV ;  Start 10/10/17 at 08:16;  

Stop 10/10/17 at 08:17;  Status DC


Phenylephrine HCl (Tanvir-Synephrine Inj) 10 mg STK-MED ONCE .ROUTE ;  Start 10/10/

17 at 08:16;  Stop 10/10/17 at 08:17;  Status DC


Fentanyl Citrate (Fentanyl 2ml Vial) 100 mcg STK-MED ONCE .ROUTE ;  Start 10/10/

17 at 08:17;  Stop 10/10/17 at 08:18;  Status DC


Labetalol HCl (Normodyne) 20 mg STK-MED ONCE .ROUTE ;  Start 10/10/17 at 08:50;

  Stop 10/10/17 at 08:51;  Status DC


Fentanyl Citrate (Fentanyl 2ml Vial) 100 mcg STK-MED ONCE .ROUTE ;  Start 10/10/

17 at 09:21;  Stop 10/10/17 at 09:22;  Status DC


Rocuronium Bromide (Zemuron) 100 mg STK-MED ONCE .ROUTE ;  Start 10/10/17 at 09:

30;  Stop 10/10/17 at 09:31;  Status DC


Cellulose 1 each STK-MED ONCE .ROUTE  Last administered on 10/10/17at 10:57;  

Start 10/10/17 at 09:57;  Stop 10/10/17 at 10:57;  Status DC


Albumin Human 500 ml @  As Directed STK-MED ONCE IV ;  Start 10/10/17 at 11:06;

  Stop 10/10/17 at 11:07;  Status DC


Neostigmine Methylsulfate (Bloxiverz) 10 mg STK-MED ONCE .ROUTE ;  Start 10/10/

17 at 11:20;  Stop 10/10/17 at 11:21;  Status DC


Glycopyrrolate (Robinul) 1 mg STK-MED ONCE .ROUTE ;  Start 10/10/17 at 11:20;  

Stop 10/10/17 at 11:21;  Status DC


Famotidine (Pepcid) 20 mg QHS IVP  Last administered on 10/10/17at 20:32;  

Start 10/10/17 at 21:00


Enoxaparin Sodium (Lovenox 30mg Syringe) 30 mg Q24H SQ  Last administered on 10/

11/17at 10:03;  Start 10/11/17 at 08:00


Sodium Chloride (Normal Saline Flush) 3 ml QSHIFT  PRN IV AFTER MEDS AND BLOOD 

DRAWS;  Start 10/10/17 at 12:00


Ringer's Solution 1,000 ml @  100 mls/hr Q10H IV  Last administered on 10/10/

17at 20:32;  Start 10/10/17 at 11:49


Naloxone HCl (Narcan) 0.4 mg PRN Q2MIN  PRN IV SEE INSTRUCTIONS;  Start 10/10/

17 at 12:00


Sodium Chloride 1,000 ml @  25 mls/hr Q24H IV  Last administered on 10/10/17at 

11:49;  Start 10/10/17 at 11:49


Morphine Sulfate 30 ml @ 0 mls/hr CONT PRN  PRN IV PROTOCOL Last administered 

on 10/10/17at 15:13;  Start 10/10/17 at 12:00


Ondansetron HCl (Zofran) 4 mg PRN Q6HRS  PRN IV NAUESA, 1ST CHOICE;  Start 10/10

/17 at 12:00


Insulin Aspart (NovoLOG VIAL) 100 unit STK-MED ONCE SQ ;  Start 10/10/17 at 14:

01;  Stop 10/10/17 at 14:02;  Status DC


Insulin Aspart (NovoLOG VIAL) 3 unit 1X  ONCE SQ  Last administered on 10/10/

17at 13:58;  Start 10/10/17 at 14:15;  Stop 10/10/17 at 14:16;  Status DC


Pneumococcal Polyvalent Vaccine (Do NOT chart on this placeholder)  1X  ONCE MC 

;  Start 10/10/17 at 16:15;  Stop 10/10/17 at 16:16;  Status UNV


Albuterol/ Ipratropium (Duoneb) 3 ml PRN Q6HRS  PRN NEB SHORTNESS OF BREATH;  

Start 10/11/17 at 09:30;  Stop 10/11/17 at 09:35;  Status DC


Albuterol Sulfate (Ventolin Neb Soln) 2.5 mg PRN Q4HRS  PRN NEB SHORTNESS OF 

BREATH;  Start 10/11/17 at 09:30


Insulin Aspart (NovoLOG) 0-9 UNITS TIDWMEALS SQ  Last administered on 10/11/

17at 12:30;  Start 10/11/17 at 12:00


Dextrose (Dextrose 50%-Water Syringe) 12.5 gm PRN Q15MIN  PRN IV SEE COMMENTS;  

Start 10/11/17 at 09:30





Active Scripts


Active


Reported


[Lidocaine]   15 Ml PO Q6HRS PRN


[Ondansetron]   4 Mg IV PRN Q4HRS PRN


[Pepcid Suspension]   20 IV BID


Duoneb 0.5-3(2.5) Mg/3 Ml (Albuterol/Ipratropium) 3 Ml Ampul.neb 3 Ml NEB PRN 

Q6HRS PRN


Novolog (Insulin Aspart) 100 Unit/1 Ml Cartridge 0 SQ Q6HRS





Allergies


Allergies:  


Coded Allergies:  


     No Known Drug Allergies (Unverified , 10/10/17)





Physical Exam


Physical Exam


SEVERE hard hearing, hard to communicate


General:  Alert, Oriented X3


HEENT:  Atraumatic, PERRLA


Lungs:  Clear to auscultation


Heart:  Regular rate, Normal S1, Normal S2


Abdomen:  Soft, Other (decreased BS, surgical wound has penrose drainage, 1 J 

tube, 1 KARTHIK has sangueous drainage, + tenderness)


Extremities:  No clubbing, No cyanosis


Skin:  No rashes, No breakdown


Neuro:  Normal speech


MUSCULOSKELETAL:  No joint tenderness, No deformity





Vitals


VITALS





Vital Signs








  Date Time  Temp Pulse Resp B/P (MAP) Pulse Ox O2 Delivery O2 Flow Rate FiO2


 


10/11/17 12:00  96  157/75 (102) 100 Room Air  


 


10/11/17 11:00 97.6       





 97.6       


 


10/11/17 07:00   22     


 


10/10/17 18:00       1.0 











Labs


Labs





Laboratory Tests








Test


  10/10/17


06:30 10/10/17


10:53 10/10/17


12:26 10/10/17


13:27


 


White Blood Count


  8.1 x10^3/uL


(4.0-11.0) 


  


  


 


 


Red Blood Count


  3.20 x10^6/uL


(4.30-5.70) 


  


  


 


 


Hemoglobin


  9.5 g/dL


(13.0-17.5) 


  


  


 


 


Hematocrit


  28.0 %


(39.0-53.0) 


  


  


 


 


Mean Corpuscular Volume 87 fL ()    


 


Mean Corpuscular Hemoglobin 30 pg (25-35)    


 


Mean Corpuscular Hemoglobin


Concent 34 g/dL


(31-37) 


  


  


 


 


Red Cell Distribution Width


  13.8 %


(11.5-14.5) 


  


  


 


 


Platelet Count


  206 x10^3/uL


(140-400) 


  


  


 


 


Neutrophils (%) (Auto) 77 % (31-73)    


 


Lymphocytes (%) (Auto) 15 % (24-48)    


 


Monocytes (%) (Auto) 7 % (0-9)    


 


Eosinophils (%) (Auto) 0 % (0-3)    


 


Basophils (%) (Auto) 0 % (0-3)    


 


Neutrophils # (Auto)


  6.3 x10^3uL


(1.8-7.7) 


  


  


 


 


Lymphocytes # (Auto)


  1.2 x10^3/uL


(1.0-4.8) 


  


  


 


 


Monocytes # (Auto)


  0.6 x10^3/uL


(0.0-1.1) 


  


  


 


 


Eosinophils # (Auto)


  0.0 x10^3/uL


(0.0-0.7) 


  


  


 


 


Basophils # (Auto)


  0.0 x10^3/uL


(0.0-0.2) 


  


  


 


 


Prothrombin Time


  14.2 SEC


(11.7-14.0) 


  


  


 


 


Prothromb Time International


Ratio 1.2 (0.8-1.1) 


  


  


  


 


 


Activated Partial


Thromboplast Time 36 SEC (24-38) 


  


  


  


 


 


Sodium Level


  140 mmol/L


(136-145) 


  


  


 


 


Potassium Level


  4.6 mmol/L


(3.5-5.1) 


  


  


 


 


Chloride Level


  104 mmol/L


() 


  


  


 


 


Carbon Dioxide Level


  26 mmol/L


(21-32) 


  


  


 


 


Anion Gap 10 (6-14)    


 


Blood Urea Nitrogen


  48 mg/dL


(8-26) 


  


  


 


 


Creatinine


  1.4 mg/dL


(0.7-1.3) 


  


  


 


 


Estimated GFR


(Cockcroft-Gault) 48.2 


  


  


  


 


 


BUN/Creatinine Ratio 34 (6-20)    


 


Glucose Level


  206 mg/dL


(70-99) 231 mg/dL


(70-99) 


  


 


 


Calcium Level


  9.1 mg/dL


(8.5-10.1) 


  


  


 


 


Total Bilirubin


  0.4 mg/dL


(0.2-1.0) 


  


  


 


 


Aspartate Amino Transf


(AST/SGOT) 46 U/L (15-37) 


  


  


  


 


 


Alanine Aminotransferase


(ALT/SGPT) 86 U/L (16-63) 


  


  


  


 


 


Alkaline Phosphatase


  98 U/L


() 


  


  


 


 


Total Protein


  7.2 g/dL


(6.4-8.2) 


  


  


 


 


Albumin


  2.7 g/dL


(3.4-5.0) 


  


  


 


 


Albumin/Globulin Ratio 0.6 (1.0-1.7)    


 


Bedside Hemoglobin


(Calculated) 


  9.9 g/dL


(14-18) 


  


 


 


Bedside Hematocrit  29 % (37-52)   


 


Bedside Arterial pH


  


  7.37


(7.35-7.45) 


  


 


 


Arterial Blood pH (Temp


corrected) 


  7.38 


  


  


 


 


Bedside Arterial pCO2


  


  36 mmHg


(35-45) 


  


 


 


Arterial Blood pCO2 (Temp


correct) 


  35 mmHg 


  


  


 


 


Bedside Arterial pO2


  


  305 mmHg


() 


  


 


 


Arterial Blood pO2 (Temp


corrected) 


  302 mmHg 


  


  


 


 


Bedside Arterial HCO3


  


  20 mmol/L


(21-28) 


  


 


 


Bedside Arterial Total CO2


  


  21 mmol/L


(21-32) 


  


 


 


Arterial Bld O2 Saturation


(Measur) 


  100 % (95-99) 


  


  


 


 


Bedside Arterial Blood Base


Excess 


  -5 mmol/L


(0-3) 


  


 


 


Bedside FiO2  78.0   


 


Bedside Sodium


  


  140 mmol/L


(135-145) 


  


 


 


Bedside Potassium


  


  5.0 mmol/L


(3.5-5.0) 


  


 


 


Bedside Ionized Calcium (Jerri)


  


  1.10 mmol/L


(1.13-1.32) 


  


 


 


Glucose (Fingerstick)


  


  


  215 mg/dL


(70-99) 239 mg/dL


(70-99)


 


Test


  10/11/17


05:30 10/11/17


11:51 


  


 


 


White Blood Count


  9.5 x10^3/uL


(4.0-11.0) 


  


  


 


 


Red Blood Count


  2.38 x10^6/uL


(4.30-5.70) 


  


  


 


 


Hemoglobin


  7.0 g/dL


(13.0-17.5) 


  


  


 


 


Hematocrit


  21.0 %


(39.0-53.0) 


  


  


 


 


Mean Corpuscular Volume 88 fL ()    


 


Mean Corpuscular Hemoglobin 29 pg (25-35)    


 


Mean Corpuscular Hemoglobin


Concent 33 g/dL


(31-37) 


  


  


 


 


Red Cell Distribution Width


  14.1 %


(11.5-14.5) 


  


  


 


 


Platelet Count


  150 x10^3/uL


(140-400) 


  


  


 


 


Neutrophils (%) (Auto) 87 % (31-73)    


 


Lymphocytes (%) (Auto) 7 % (24-48)    


 


Monocytes (%) (Auto) 5 % (0-9)    


 


Eosinophils (%) (Auto) 0 % (0-3)    


 


Basophils (%) (Auto) 0 % (0-3)    


 


Neutrophils # (Auto)


  8.3 x10^3uL


(1.8-7.7) 


  


  


 


 


Lymphocytes # (Auto)


  0.7 x10^3/uL


(1.0-4.8) 


  


  


 


 


Monocytes # (Auto)


  0.5 x10^3/uL


(0.0-1.1) 


  


  


 


 


Eosinophils # (Auto)


  0.0 x10^3/uL


(0.0-0.7) 


  


  


 


 


Basophils # (Auto)


  0.0 x10^3/uL


(0.0-0.2) 


  


  


 


 


Segmented Neutrophils % 81 % (35-66)    


 


Band Neutrophils % 5 % (0-9)    


 


Lymphocytes % 9 % (24-48)    


 


Monocytes % 5 % (0-10)    


 


Platelet Estimate


  Adequate


(ADEQUATE) 


  


  


 


 


Sodium Level


  140 mmol/L


(136-145) 


  


  


 


 


Potassium Level


  5.4 mmol/L


(3.5-5.1) 


  


  


 


 


Chloride Level


  108 mmol/L


() 


  


  


 


 


Carbon Dioxide Level


  22 mmol/L


(21-32) 


  


  


 


 


Anion Gap 10 (6-14)    


 


Blood Urea Nitrogen


  42 mg/dL


(8-26) 


  


  


 


 


Creatinine


  1.4 mg/dL


(0.7-1.3) 


  


  


 


 


Estimated GFR


(Cockcroft-Gault) 48.2 


  


  


  


 


 


Glucose Level


  219 mg/dL


(70-99) 


  


  


 


 


Calcium Level


  7.6 mg/dL


(8.5-10.1) 


  


  


 


 


Glucose (Fingerstick)


  


  210 mg/dL


(70-99) 


  


 








Laboratory Tests








Test


  10/11/17


05:30 10/11/17


11:51


 


White Blood Count


  9.5 x10^3/uL


(4.0-11.0) 


 


 


Red Blood Count


  2.38 x10^6/uL


(4.30-5.70) 


 


 


Hemoglobin


  7.0 g/dL


(13.0-17.5) 


 


 


Hematocrit


  21.0 %


(39.0-53.0) 


 


 


Mean Corpuscular Volume 88 fL ()  


 


Mean Corpuscular Hemoglobin 29 pg (25-35)  


 


Mean Corpuscular Hemoglobin


Concent 33 g/dL


(31-37) 


 


 


Red Cell Distribution Width


  14.1 %


(11.5-14.5) 


 


 


Platelet Count


  150 x10^3/uL


(140-400) 


 


 


Neutrophils (%) (Auto) 87 % (31-73)  


 


Lymphocytes (%) (Auto) 7 % (24-48)  


 


Monocytes (%) (Auto) 5 % (0-9)  


 


Eosinophils (%) (Auto) 0 % (0-3)  


 


Basophils (%) (Auto) 0 % (0-3)  


 


Neutrophils # (Auto)


  8.3 x10^3uL


(1.8-7.7) 


 


 


Lymphocytes # (Auto)


  0.7 x10^3/uL


(1.0-4.8) 


 


 


Monocytes # (Auto)


  0.5 x10^3/uL


(0.0-1.1) 


 


 


Eosinophils # (Auto)


  0.0 x10^3/uL


(0.0-0.7) 


 


 


Basophils # (Auto)


  0.0 x10^3/uL


(0.0-0.2) 


 


 


Segmented Neutrophils % 81 % (35-66)  


 


Band Neutrophils % 5 % (0-9)  


 


Lymphocytes % 9 % (24-48)  


 


Monocytes % 5 % (0-10)  


 


Platelet Estimate


  Adequate


(ADEQUATE) 


 


 


Sodium Level


  140 mmol/L


(136-145) 


 


 


Potassium Level


  5.4 mmol/L


(3.5-5.1) 


 


 


Chloride Level


  108 mmol/L


() 


 


 


Carbon Dioxide Level


  22 mmol/L


(21-32) 


 


 


Anion Gap 10 (6-14)  


 


Blood Urea Nitrogen


  42 mg/dL


(8-26) 


 


 


Creatinine


  1.4 mg/dL


(0.7-1.3) 


 


 


Estimated GFR


(Cockcroft-Gault) 48.2 


  


 


 


Glucose Level


  219 mg/dL


(70-99) 


 


 


Calcium Level


  7.6 mg/dL


(8.5-10.1) 


 


 


Glucose (Fingerstick)


  


  210 mg/dL


(70-99)











Assessment/Plan


Assessment/Plan





gastric outlet obstruction s/p Exploratory laparotomy, Extensive lysis of 

adhesions,  repair of incisional hernia, gastrectomy and Antrectomy with Stalin 

en Y reconstruction in an antecolic position, J-tube placement 10/10


H/O cad


HTN


POST CABG


DM2


hyperkalemia


ckd3


acute on chronic anemia post sx


stable chronic systolic CHF EF 40%





plan:


1u PRBC 10/11


fu with sx, ok to start J tube feeding


IVF


sx wound care


SSI


dvt, gi ppx


labs tmr


PTOT


ok to transfer out of ICU if ok with sx











DIANA FENTON MD Oct 11, 2017 13:38

## 2017-10-12 VITALS — SYSTOLIC BLOOD PRESSURE: 148 MMHG | DIASTOLIC BLOOD PRESSURE: 72 MMHG

## 2017-10-12 VITALS — SYSTOLIC BLOOD PRESSURE: 146 MMHG | DIASTOLIC BLOOD PRESSURE: 78 MMHG

## 2017-10-12 VITALS — SYSTOLIC BLOOD PRESSURE: 128 MMHG | DIASTOLIC BLOOD PRESSURE: 77 MMHG

## 2017-10-12 VITALS — SYSTOLIC BLOOD PRESSURE: 123 MMHG | DIASTOLIC BLOOD PRESSURE: 78 MMHG

## 2017-10-12 VITALS — DIASTOLIC BLOOD PRESSURE: 63 MMHG | SYSTOLIC BLOOD PRESSURE: 122 MMHG

## 2017-10-12 VITALS — DIASTOLIC BLOOD PRESSURE: 73 MMHG | SYSTOLIC BLOOD PRESSURE: 146 MMHG

## 2017-10-12 VITALS — DIASTOLIC BLOOD PRESSURE: 73 MMHG | SYSTOLIC BLOOD PRESSURE: 138 MMHG

## 2017-10-12 VITALS — SYSTOLIC BLOOD PRESSURE: 151 MMHG | DIASTOLIC BLOOD PRESSURE: 77 MMHG

## 2017-10-12 VITALS — DIASTOLIC BLOOD PRESSURE: 68 MMHG | SYSTOLIC BLOOD PRESSURE: 137 MMHG

## 2017-10-12 VITALS — SYSTOLIC BLOOD PRESSURE: 126 MMHG | DIASTOLIC BLOOD PRESSURE: 76 MMHG

## 2017-10-12 VITALS — DIASTOLIC BLOOD PRESSURE: 79 MMHG | SYSTOLIC BLOOD PRESSURE: 135 MMHG

## 2017-10-12 VITALS — DIASTOLIC BLOOD PRESSURE: 70 MMHG | SYSTOLIC BLOOD PRESSURE: 137 MMHG

## 2017-10-12 VITALS — DIASTOLIC BLOOD PRESSURE: 68 MMHG | SYSTOLIC BLOOD PRESSURE: 145 MMHG

## 2017-10-12 VITALS — SYSTOLIC BLOOD PRESSURE: 139 MMHG | DIASTOLIC BLOOD PRESSURE: 79 MMHG

## 2017-10-12 VITALS — SYSTOLIC BLOOD PRESSURE: 150 MMHG | DIASTOLIC BLOOD PRESSURE: 77 MMHG

## 2017-10-12 VITALS — DIASTOLIC BLOOD PRESSURE: 61 MMHG | SYSTOLIC BLOOD PRESSURE: 121 MMHG

## 2017-10-12 VITALS — DIASTOLIC BLOOD PRESSURE: 63 MMHG | SYSTOLIC BLOOD PRESSURE: 149 MMHG

## 2017-10-12 LAB
ANION GAP SERPL CALC-SCNC: 8 MMOL/L (ref 6–14)
BASOPHILS # BLD AUTO: 0 X10^3/UL (ref 0–0.2)
BASOPHILS NFR BLD: 0 % (ref 0–3)
BUN SERPL-MCNC: 35 MG/DL (ref 8–26)
CALCIUM SERPL-MCNC: 8.2 MG/DL (ref 8.5–10.1)
CHLORIDE SERPL-SCNC: 106 MMOL/L (ref 98–107)
CO2 SERPL-SCNC: 25 MMOL/L (ref 21–32)
CREAT SERPL-MCNC: 1.2 MG/DL (ref 0.7–1.3)
EOSINOPHIL NFR BLD: 0 % (ref 0–3)
ERYTHROCYTE [DISTWIDTH] IN BLOOD BY AUTOMATED COUNT: 13.8 % (ref 11.5–14.5)
GFR SERPLBLD BASED ON 1.73 SQ M-ARVRAT: 57.5 ML/MIN
GLUCOSE SERPL-MCNC: 189 MG/DL (ref 70–99)
HCT VFR BLD CALC: 25.4 % (ref 39–53)
HGB BLD-MCNC: 8.7 G/DL (ref 13–17.5)
LYMPHOCYTES # BLD: 0.7 X10^3/UL (ref 1–4.8)
LYMPHOCYTES NFR BLD AUTO: 8 % (ref 24–48)
MCH RBC QN AUTO: 30 PG (ref 25–35)
MCHC RBC AUTO-ENTMCNC: 34 G/DL (ref 31–37)
MCV RBC AUTO: 88 FL (ref 79–100)
MONOCYTES NFR BLD: 6 % (ref 0–9)
NEUTROPHILS NFR BLD AUTO: 86 % (ref 31–73)
PLATELET # BLD AUTO: 168 X10^3/UL (ref 140–400)
POTASSIUM SERPL-SCNC: 4.9 MMOL/L (ref 3.5–5.1)
RBC # BLD AUTO: 2.9 X10^6/UL (ref 4.3–5.7)
SODIUM SERPL-SCNC: 139 MMOL/L (ref 136–145)
WBC # BLD AUTO: 9.7 X10^3/UL (ref 4–11)

## 2017-10-12 RX ADMIN — BACITRACIN SCH MLS/HR: 5000 INJECTION, POWDER, FOR SOLUTION INTRAMUSCULAR at 11:49

## 2017-10-12 RX ADMIN — SODIUM CHLORIDE, SODIUM LACTATE, POTASSIUM CHLORIDE, AND CALCIUM CHLORIDE SCH MLS/HR: .6; .31; .03; .02 INJECTION, SOLUTION INTRAVENOUS at 03:10

## 2017-10-12 RX ADMIN — INSULIN ASPART SCH UNITS: 100 INJECTION, SOLUTION INTRAVENOUS; SUBCUTANEOUS at 08:18

## 2017-10-12 RX ADMIN — ENOXAPARIN SODIUM SCH MG: 100 INJECTION SUBCUTANEOUS at 21:32

## 2017-10-12 RX ADMIN — FAMOTIDINE SCH MG: 10 INJECTION, SOLUTION INTRAVENOUS at 21:28

## 2017-10-12 RX ADMIN — SODIUM CHLORIDE, SODIUM LACTATE, POTASSIUM CHLORIDE, AND CALCIUM CHLORIDE SCH MLS/HR: .6; .31; .03; .02 INJECTION, SOLUTION INTRAVENOUS at 12:29

## 2017-10-12 RX ADMIN — INSULIN ASPART SCH UNITS: 100 INJECTION, SOLUTION INTRAVENOUS; SUBCUTANEOUS at 12:26

## 2017-10-12 RX ADMIN — ENOXAPARIN SODIUM SCH MG: 100 INJECTION SUBCUTANEOUS at 08:13

## 2017-10-12 RX ADMIN — INSULIN ASPART SCH UNITS: 100 INJECTION, SOLUTION INTRAVENOUS; SUBCUTANEOUS at 17:00

## 2017-10-12 NOTE — PDOC
GIN NINO APRN 10/12/17 1400:


SURGICAL PROGRESS NOTE


Subjective


NG is bothersome


no complaints of abdominal pain at this time


Vital Signs





Vital Signs








  Date Time  Temp Pulse Resp B/P (MAP) Pulse Ox O2 Delivery O2 Flow Rate FiO2


 


10/12/17 13:00  112 16 135/79 (97) 100 Room Air  


 


10/12/17 12:00 99.0       





 99.0       








I&O











Intake and Output 


 


 10/13/17





 07:00


 


Intake Total 240 ml


 


Output Total 905 ml


 


Balance -665 ml


 


 


 


Tube Feeding 240 ml


 


Output Urine Total 880 ml


 


Drainage Total 25 ml








PATIENT HAS A MURPHY:  Yes


General:  Alert, Oriented X3, Cooperative, No acute distress


HEENT:  Other (NG present)


Abdomen:  Soft, Other (dressing dry, patricio serosang)


Labs





Laboratory Tests








Test


  10/11/17


05:30 10/11/17


11:51 10/11/17


17:20 10/12/17


05:45


 


White Blood Count


  9.5 x10^3/uL


(4.0-11.0) 


  


  9.7 x10^3/uL


(4.0-11.0)


 


Red Blood Count


  2.38 x10^6/uL


(4.30-5.70) 


  


  2.90 x10^6/uL


(4.30-5.70)


 


Hemoglobin


  7.0 g/dL


(13.0-17.5) 


  


  8.7 g/dL


(13.0-17.5)


 


Hematocrit


  21.0 %


(39.0-53.0) 


  


  25.4 %


(39.0-53.0)


 


Mean Corpuscular Volume 88 fL ()    88 fL () 


 


Mean Corpuscular Hemoglobin 29 pg (25-35)    30 pg (25-35) 


 


Mean Corpuscular Hemoglobin


Concent 33 g/dL


(31-37) 


  


  34 g/dL


(31-37)


 


Red Cell Distribution Width


  14.1 %


(11.5-14.5) 


  


  13.8 %


(11.5-14.5)


 


Platelet Count


  150 x10^3/uL


(140-400) 


  


  168 x10^3/uL


(140-400)


 


Neutrophils (%) (Auto) 87 % (31-73)    86 % (31-73) 


 


Lymphocytes (%) (Auto) 7 % (24-48)    8 % (24-48) 


 


Monocytes (%) (Auto) 5 % (0-9)    6 % (0-9) 


 


Eosinophils (%) (Auto) 0 % (0-3)    0 % (0-3) 


 


Basophils (%) (Auto) 0 % (0-3)    0 % (0-3) 


 


Neutrophils # (Auto)


  8.3 x10^3uL


(1.8-7.7) 


  


  8.3 x10^3uL


(1.8-7.7)


 


Lymphocytes # (Auto)


  0.7 x10^3/uL


(1.0-4.8) 


  


  0.7 x10^3/uL


(1.0-4.8)


 


Monocytes # (Auto)


  0.5 x10^3/uL


(0.0-1.1) 


  


  0.5 x10^3/uL


(0.0-1.1)


 


Eosinophils # (Auto)


  0.0 x10^3/uL


(0.0-0.7) 


  


  0.0 x10^3/uL


(0.0-0.7)


 


Basophils # (Auto)


  0.0 x10^3/uL


(0.0-0.2) 


  


  0.0 x10^3/uL


(0.0-0.2)


 


Segmented Neutrophils % 81 % (35-66)    


 


Band Neutrophils % 5 % (0-9)    


 


Lymphocytes % 9 % (24-48)    


 


Monocytes % 5 % (0-10)    


 


Platelet Estimate


  Adequate


(ADEQUATE) 


  


  


 


 


Sodium Level


  140 mmol/L


(136-145) 


  


  139 mmol/L


(136-145)


 


Potassium Level


  5.4 mmol/L


(3.5-5.1) 


  


  4.9 mmol/L


(3.5-5.1)


 


Chloride Level


  108 mmol/L


() 


  


  106 mmol/L


()


 


Carbon Dioxide Level


  22 mmol/L


(21-32) 


  


  25 mmol/L


(21-32)


 


Anion Gap 10 (6-14)    8 (6-14) 


 


Blood Urea Nitrogen


  42 mg/dL


(8-26) 


  


  35 mg/dL


(8-26)


 


Creatinine


  1.4 mg/dL


(0.7-1.3) 


  


  1.2 mg/dL


(0.7-1.3)


 


Estimated GFR


(Cockcroft-Gault) 48.2 


  


  


  57.5 


 


 


Glucose Level


  219 mg/dL


(70-99) 


  


  189 mg/dL


(70-99)


 


Calcium Level


  7.6 mg/dL


(8.5-10.1) 


  


  8.2 mg/dL


(8.5-10.1)


 


Glucose (Fingerstick)


  


  210 mg/dL


(70-99) 158 mg/dL


(70-99) 


 


 


Test


  10/12/17


08:16 10/12/17


12:23 


  


 


 


Glucose (Fingerstick)


  196 mg/dL


(70-99) 154 mg/dL


(70-99) 


  


 








Laboratory Tests








Test


  10/11/17


17:20 10/12/17


05:45 10/12/17


08:16 10/12/17


12:23


 


Glucose (Fingerstick)


  158 mg/dL


(70-99) 


  196 mg/dL


(70-99) 154 mg/dL


(70-99)


 


White Blood Count


  


  9.7 x10^3/uL


(4.0-11.0) 


  


 


 


Red Blood Count


  


  2.90 x10^6/uL


(4.30-5.70) 


  


 


 


Hemoglobin


  


  8.7 g/dL


(13.0-17.5) 


  


 


 


Hematocrit


  


  25.4 %


(39.0-53.0) 


  


 


 


Mean Corpuscular Volume  88 fL ()   


 


Mean Corpuscular Hemoglobin  30 pg (25-35)   


 


Mean Corpuscular Hemoglobin


Concent 


  34 g/dL


(31-37) 


  


 


 


Red Cell Distribution Width


  


  13.8 %


(11.5-14.5) 


  


 


 


Platelet Count


  


  168 x10^3/uL


(140-400) 


  


 


 


Neutrophils (%) (Auto)  86 % (31-73)   


 


Lymphocytes (%) (Auto)  8 % (24-48)   


 


Monocytes (%) (Auto)  6 % (0-9)   


 


Eosinophils (%) (Auto)  0 % (0-3)   


 


Basophils (%) (Auto)  0 % (0-3)   


 


Neutrophils # (Auto)


  


  8.3 x10^3uL


(1.8-7.7) 


  


 


 


Lymphocytes # (Auto)


  


  0.7 x10^3/uL


(1.0-4.8) 


  


 


 


Monocytes # (Auto)


  


  0.5 x10^3/uL


(0.0-1.1) 


  


 


 


Eosinophils # (Auto)


  


  0.0 x10^3/uL


(0.0-0.7) 


  


 


 


Basophils # (Auto)


  


  0.0 x10^3/uL


(0.0-0.2) 


  


 


 


Sodium Level


  


  139 mmol/L


(136-145) 


  


 


 


Potassium Level


  


  4.9 mmol/L


(3.5-5.1) 


  


 


 


Chloride Level


  


  106 mmol/L


() 


  


 


 


Carbon Dioxide Level


  


  25 mmol/L


(21-32) 


  


 


 


Anion Gap  8 (6-14)   


 


Blood Urea Nitrogen


  


  35 mg/dL


(8-26) 


  


 


 


Creatinine


  


  1.2 mg/dL


(0.7-1.3) 


  


 


 


Estimated GFR


(Cockcroft-Gault) 


  57.5 


  


  


 


 


Glucose Level


  


  189 mg/dL


(70-99) 


  


 


 


Calcium Level


  


  8.2 mg/dL


(8.5-10.1) 


  


 








Problem List


s/p antrectomy with elaine en y reconstruction


Tube feeds per J tube 


continue NG tube


Ok to tx from ICU


Problems:  





MAINE DALTON MD 10/12/17 1547:


SURGICAL PROGRESS NOTE


Assessment/Plan


pt seen and examined


visited with his family at the bedside


to floor


hold TF, resume TPN





keep NG for now


Problems:  











GIN NINO APRN Oct 12, 2017 14:00


MAINE DALTON MD Oct 12, 2017 15:47

## 2017-10-12 NOTE — RAD
Indication right arm swelling.



Grayscale color Doppler and spectral imaging was performed. The examination

was targeted to the veins of the right upper extremity.



The internal jugular is patent. There is extensive thrombus along the

subclavian vein. There appears to be a line within the subclavian vein.

Clinical correlation advised. Thrombus is seen extending into the axillary

vein. Thrombus is also seen in the upper portion of the brachial vein. Some

thrombus is seen in the cephalic and basilic vein. Unremarkable flow is seen

in the radial and ulnar veins.



Critical results were communicated to Tara, the nurse on the floor caring for

the patient at the time of dictation



IMPRESSION: Extensive DVT in the right upper extremity. Some thrombus is also

noted in portions of the cephalic and basilic vein compatible with superficial

thrombophlebitis

## 2017-10-12 NOTE — PDOC
PROGRESS NOTES


Chief Complaint


Chief Complaint


Gastric outlet obstruction





PMH:


DM


HTN


CAD


PUD


CABG





History of Present Illness


History of Present Illness


Pt in ICU


Pt was laying in bed but was not talkative.


Wife was also in the room and she was conversant.


Pt had an NG tube in but no drainage was noted. 


Tube feeds were also hung.


Discussed plan of care with pt, wife, and RN including tube feeds and 

transferring him to medical floor and out of ICU.


Pt received 1 unit of PRBCs due to low Hgb





10/10  - gastrectomy





Vitals


Vitals





Vital Signs








  Date Time  Temp Pulse Resp B/P (MAP) Pulse Ox O2 Delivery O2 Flow Rate FiO2


 


10/12/17 11:00  108 15 137/68 (91) 100 Room Air  


 


10/12/17 08:00 98.4       





 98.4       











Physical Exam


General:  Alert, Oriented X3, No acute distress


Heart:  Regular rate, Normal S1, Normal S2


Lungs:  Clear


Abdomen:  Soft, Other (decreased BS, surgical wound has penrose drainage, 1 J 

tube, 1 KARTHIK has sangueous drainage, + tenderness)


Extremities:  No clubbing, No cyanosis


Skin:  No rashes, No breakdown





Labs


LABS





Laboratory Tests








Test


  10/11/17


11:51 10/11/17


17:20 10/12/17


05:45 10/12/17


08:16


 


Glucose (Fingerstick)


  210 mg/dL


(70-99) 158 mg/dL


(70-99) 


  196 mg/dL


(70-99)


 


White Blood Count


  


  


  9.7 x10^3/uL


(4.0-11.0) 


 


 


Red Blood Count


  


  


  2.90 x10^6/uL


(4.30-5.70) 


 


 


Hemoglobin


  


  


  8.7 g/dL


(13.0-17.5) 


 


 


Hematocrit


  


  


  25.4 %


(39.0-53.0) 


 


 


Mean Corpuscular Volume   88 fL ()  


 


Mean Corpuscular Hemoglobin   30 pg (25-35)  


 


Mean Corpuscular Hemoglobin


Concent 


  


  34 g/dL


(31-37) 


 


 


Red Cell Distribution Width


  


  


  13.8 %


(11.5-14.5) 


 


 


Platelet Count


  


  


  168 x10^3/uL


(140-400) 


 


 


Neutrophils (%) (Auto)   86 % (31-73)  


 


Lymphocytes (%) (Auto)   8 % (24-48)  


 


Monocytes (%) (Auto)   6 % (0-9)  


 


Eosinophils (%) (Auto)   0 % (0-3)  


 


Basophils (%) (Auto)   0 % (0-3)  


 


Neutrophils # (Auto)


  


  


  8.3 x10^3uL


(1.8-7.7) 


 


 


Lymphocytes # (Auto)


  


  


  0.7 x10^3/uL


(1.0-4.8) 


 


 


Monocytes # (Auto)


  


  


  0.5 x10^3/uL


(0.0-1.1) 


 


 


Eosinophils # (Auto)


  


  


  0.0 x10^3/uL


(0.0-0.7) 


 


 


Basophils # (Auto)


  


  


  0.0 x10^3/uL


(0.0-0.2) 


 


 


Sodium Level


  


  


  139 mmol/L


(136-145) 


 


 


Potassium Level


  


  


  4.9 mmol/L


(3.5-5.1) 


 


 


Chloride Level


  


  


  106 mmol/L


() 


 


 


Carbon Dioxide Level


  


  


  25 mmol/L


(21-32) 


 


 


Anion Gap   8 (6-14)  


 


Blood Urea Nitrogen


  


  


  35 mg/dL


(8-26) 


 


 


Creatinine


  


  


  1.2 mg/dL


(0.7-1.3) 


 


 


Estimated GFR


(Cockcroft-Gault) 


  


  57.5 


  


 


 


Glucose Level


  


  


  189 mg/dL


(70-99) 


 


 


Calcium Level


  


  


  8.2 mg/dL


(8.5-10.1) 


 











Review of Systems


Review of Systems


Pt was not conversant


Pt appeared to be fatigued and frail


Pt had wincing when stomach palpated





Assessment and Plan


Assessmemt and Plan


Gastric outlet obstruction





PMH:


DM


HTN


CAD


PUD


CABG





Plan:





Cont. insulin


cont, tube feeds


recheck labs


cont. pain management


cont. IVF


discussed plan with RN


OK to transfer to [facility] floor from IM standpoint 


Appreciate subspecialist input


Problems:  





Comment


Review of Relevant


I have reviewed the following items milana (where applicable) has been applied.


Labs





Laboratory Tests








Test


  10/10/17


12:26 10/10/17


13:27 10/11/17


05:30 10/11/17


11:51


 


Glucose (Fingerstick)


  215 mg/dL


(70-99) 239 mg/dL


(70-99) 


  210 mg/dL


(70-99)


 


White Blood Count


  


  


  9.5 x10^3/uL


(4.0-11.0) 


 


 


Red Blood Count


  


  


  2.38 x10^6/uL


(4.30-5.70) 


 


 


Hemoglobin


  


  


  7.0 g/dL


(13.0-17.5) 


 


 


Hematocrit


  


  


  21.0 %


(39.0-53.0) 


 


 


Mean Corpuscular Volume   88 fL ()  


 


Mean Corpuscular Hemoglobin   29 pg (25-35)  


 


Mean Corpuscular Hemoglobin


Concent 


  


  33 g/dL


(31-37) 


 


 


Red Cell Distribution Width


  


  


  14.1 %


(11.5-14.5) 


 


 


Platelet Count


  


  


  150 x10^3/uL


(140-400) 


 


 


Neutrophils (%) (Auto)   87 % (31-73)  


 


Lymphocytes (%) (Auto)   7 % (24-48)  


 


Monocytes (%) (Auto)   5 % (0-9)  


 


Eosinophils (%) (Auto)   0 % (0-3)  


 


Basophils (%) (Auto)   0 % (0-3)  


 


Neutrophils # (Auto)


  


  


  8.3 x10^3uL


(1.8-7.7) 


 


 


Lymphocytes # (Auto)


  


  


  0.7 x10^3/uL


(1.0-4.8) 


 


 


Monocytes # (Auto)


  


  


  0.5 x10^3/uL


(0.0-1.1) 


 


 


Eosinophils # (Auto)


  


  


  0.0 x10^3/uL


(0.0-0.7) 


 


 


Basophils # (Auto)


  


  


  0.0 x10^3/uL


(0.0-0.2) 


 


 


Segmented Neutrophils %   81 % (35-66)  


 


Band Neutrophils %   5 % (0-9)  


 


Lymphocytes %   9 % (24-48)  


 


Monocytes %   5 % (0-10)  


 


Platelet Estimate


  


  


  Adequate


(ADEQUATE) 


 


 


Sodium Level


  


  


  140 mmol/L


(136-145) 


 


 


Potassium Level


  


  


  5.4 mmol/L


(3.5-5.1) 


 


 


Chloride Level


  


  


  108 mmol/L


() 


 


 


Carbon Dioxide Level


  


  


  22 mmol/L


(21-32) 


 


 


Anion Gap   10 (6-14)  


 


Blood Urea Nitrogen


  


  


  42 mg/dL


(8-26) 


 


 


Creatinine


  


  


  1.4 mg/dL


(0.7-1.3) 


 


 


Estimated GFR


(Cockcroft-Gault) 


  


  48.2 


  


 


 


Glucose Level


  


  


  219 mg/dL


(70-99) 


 


 


Calcium Level


  


  


  7.6 mg/dL


(8.5-10.1) 


 


 


Test


  10/11/17


17:20 10/12/17


05:45 10/12/17


08:16 


 


 


Glucose (Fingerstick)


  158 mg/dL


(70-99) 


  196 mg/dL


(70-99) 


 


 


White Blood Count


  


  9.7 x10^3/uL


(4.0-11.0) 


  


 


 


Red Blood Count


  


  2.90 x10^6/uL


(4.30-5.70) 


  


 


 


Hemoglobin


  


  8.7 g/dL


(13.0-17.5) 


  


 


 


Hematocrit


  


  25.4 %


(39.0-53.0) 


  


 


 


Mean Corpuscular Volume  88 fL ()   


 


Mean Corpuscular Hemoglobin  30 pg (25-35)   


 


Mean Corpuscular Hemoglobin


Concent 


  34 g/dL


(31-37) 


  


 


 


Red Cell Distribution Width


  


  13.8 %


(11.5-14.5) 


  


 


 


Platelet Count


  


  168 x10^3/uL


(140-400) 


  


 


 


Neutrophils (%) (Auto)  86 % (31-73)   


 


Lymphocytes (%) (Auto)  8 % (24-48)   


 


Monocytes (%) (Auto)  6 % (0-9)   


 


Eosinophils (%) (Auto)  0 % (0-3)   


 


Basophils (%) (Auto)  0 % (0-3)   


 


Neutrophils # (Auto)


  


  8.3 x10^3uL


(1.8-7.7) 


  


 


 


Lymphocytes # (Auto)


  


  0.7 x10^3/uL


(1.0-4.8) 


  


 


 


Monocytes # (Auto)


  


  0.5 x10^3/uL


(0.0-1.1) 


  


 


 


Eosinophils # (Auto)


  


  0.0 x10^3/uL


(0.0-0.7) 


  


 


 


Basophils # (Auto)


  


  0.0 x10^3/uL


(0.0-0.2) 


  


 


 


Sodium Level


  


  139 mmol/L


(136-145) 


  


 


 


Potassium Level


  


  4.9 mmol/L


(3.5-5.1) 


  


 


 


Chloride Level


  


  106 mmol/L


() 


  


 


 


Carbon Dioxide Level


  


  25 mmol/L


(21-32) 


  


 


 


Anion Gap  8 (6-14)   


 


Blood Urea Nitrogen


  


  35 mg/dL


(8-26) 


  


 


 


Creatinine


  


  1.2 mg/dL


(0.7-1.3) 


  


 


 


Estimated GFR


(Cockcroft-Gault) 


  57.5 


  


  


 


 


Glucose Level


  


  189 mg/dL


(70-99) 


  


 


 


Calcium Level


  


  8.2 mg/dL


(8.5-10.1) 


  


 








Laboratory Tests








Test


  10/11/17


11:51 10/11/17


17:20 10/12/17


05:45 10/12/17


08:16


 


Glucose (Fingerstick)


  210 mg/dL


(70-99) 158 mg/dL


(70-99) 


  196 mg/dL


(70-99)


 


White Blood Count


  


  


  9.7 x10^3/uL


(4.0-11.0) 


 


 


Red Blood Count


  


  


  2.90 x10^6/uL


(4.30-5.70) 


 


 


Hemoglobin


  


  


  8.7 g/dL


(13.0-17.5) 


 


 


Hematocrit


  


  


  25.4 %


(39.0-53.0) 


 


 


Mean Corpuscular Volume   88 fL ()  


 


Mean Corpuscular Hemoglobin   30 pg (25-35)  


 


Mean Corpuscular Hemoglobin


Concent 


  


  34 g/dL


(31-37) 


 


 


Red Cell Distribution Width


  


  


  13.8 %


(11.5-14.5) 


 


 


Platelet Count


  


  


  168 x10^3/uL


(140-400) 


 


 


Neutrophils (%) (Auto)   86 % (31-73)  


 


Lymphocytes (%) (Auto)   8 % (24-48)  


 


Monocytes (%) (Auto)   6 % (0-9)  


 


Eosinophils (%) (Auto)   0 % (0-3)  


 


Basophils (%) (Auto)   0 % (0-3)  


 


Neutrophils # (Auto)


  


  


  8.3 x10^3uL


(1.8-7.7) 


 


 


Lymphocytes # (Auto)


  


  


  0.7 x10^3/uL


(1.0-4.8) 


 


 


Monocytes # (Auto)


  


  


  0.5 x10^3/uL


(0.0-1.1) 


 


 


Eosinophils # (Auto)


  


  


  0.0 x10^3/uL


(0.0-0.7) 


 


 


Basophils # (Auto)


  


  


  0.0 x10^3/uL


(0.0-0.2) 


 


 


Sodium Level


  


  


  139 mmol/L


(136-145) 


 


 


Potassium Level


  


  


  4.9 mmol/L


(3.5-5.1) 


 


 


Chloride Level


  


  


  106 mmol/L


() 


 


 


Carbon Dioxide Level


  


  


  25 mmol/L


(21-32) 


 


 


Anion Gap   8 (6-14)  


 


Blood Urea Nitrogen


  


  


  35 mg/dL


(8-26) 


 


 


Creatinine


  


  


  1.2 mg/dL


(0.7-1.3) 


 


 


Estimated GFR


(Cockcroft-Gault) 


  


  57.5 


  


 


 


Glucose Level


  


  


  189 mg/dL


(70-99) 


 


 


Calcium Level


  


  


  8.2 mg/dL


(8.5-10.1) 


 








Medications





Current Medications


Ondansetron HCl (Zofran) 4 mg PRN Q6HRS  PRN IV NAUSEA/VOMITING;  Start 10/10/

17 at 07:00;  Stop 10/11/17 at 06:59;  Status DC


Fentanyl Citrate (Fentanyl 2ml Vial) 25 mcg PRN Q5MIN  PRN IV MILD PAIN Last 

administered on 10/10/17at 14:08;  Start 10/10/17 at 07:00;  Stop 10/11/17 at 06

:59;  Status DC


Fentanyl Citrate (Fentanyl 2ml Vial) 50 mcg PRN Q5MIN  PRN IV MODERATE PAIN;  

Start 10/10/17 at 07:00;  Stop 10/11/17 at 06:59;  Status DC


Morphine Sulfate 1 mg PRN Q10MIN  PRN IV SEVERE PAIN;  Start 10/10/17 at 07:00;

  Stop 10/11/17 at 06:59;  Status DC


Ringer's Solution 1,000 ml @  0 mls/hr Q0M IV  Last administered on 10/10/17at 

13:45;  Start 10/10/17 at 07:00;  Stop 10/10/17 at 18:59;  Status DC


Lidocaine HCl (Xylocaine-Mpf 1% Vial) 2 ml PRN 1X  PRN ID PRIOR TO IV START;  

Start 10/10/17 at 07:00;  Stop 10/11/17 at 06:59;  Status DC


Hydromorphone HCl (Dilaudid) 0.5 mg PRN Q10MIN  PRN IV SEV PAIN, Second choice;

  Start 10/10/17 at 07:00;  Stop 10/11/17 at 06:59;  Status DC


Prochlorperazine Edisylate (Compazine) 5 mg PACU PRN  PRN IV NAUSEA, MRX1;  

Start 10/10/17 at 07:00;  Stop 10/11/17 at 06:59;  Status DC


Cefazolin Sodium/ Dextrose 50 ml @  100 mls/hr 1X PREOP  PRN IV PRIOR TO 

PROCEDURE;  Start 10/10/17 at 06:00;  Stop 10/10/17 at 18:00;  Status DC


Cefoxitin Sodium 2 gm/Sodium Chloride 100 ml @  200 mls/hr 1X  ONCE IV ;  Start 

10/10/17 at 07:00;  Stop 10/10/17 at 07:29;  Status UNV


Cefoxitin Sodium 100 ml @  200 mls/hr ONCE  ONCE IV  Last administered on 10/10/

17at 08:16;  Start 10/10/17 at 07:15;  Stop 10/10/17 at 07:44;  Status DC


Lidocaine HCl (Lidocaine Pf 2% Vial) 5 ml STK-MED ONCE .ROUTE ;  Start 10/10/17 

at 07:29;  Stop 10/10/17 at 07:30;  Status DC


Etomidate (Amidate) 20 mg STK-MED ONCE IV ;  Start 10/10/17 at 07:29;  Stop 10/

10/17 at 07:30;  Status DC


Fentanyl Citrate (Fentanyl 2ml Vial) 100 mcg STK-MED ONCE .ROUTE ;  Start 10/10/

17 at 07:29;  Stop 10/10/17 at 07:30;  Status DC


Rocuronium Bromide (Zemuron) 100 mg STK-MED ONCE .ROUTE ;  Start 10/10/17 at 07:

29;  Stop 10/10/17 at 07:30;  Status DC


Dexamethasone Sodium Phosphate (Decadron) 20 mg STK-MED ONCE .ROUTE ;  Start 10/

10/17 at 08:16;  Stop 10/10/17 at 08:17;  Status DC


Ondansetron HCl (Zofran) 4 mg STK-MED ONCE .ROUTE ;  Start 10/10/17 at 08:16;  

Stop 10/10/17 at 08:17;  Status DC


Esmolol HCl (Brevibloc) 100 mg STK-MED ONCE IV ;  Start 10/10/17 at 08:16;  

Stop 10/10/17 at 08:17;  Status DC


Phenylephrine HCl (Tanvir-Synephrine Inj) 10 mg STK-MED ONCE .ROUTE ;  Start 10/10/

17 at 08:16;  Stop 10/10/17 at 08:17;  Status DC


Fentanyl Citrate (Fentanyl 2ml Vial) 100 mcg STK-MED ONCE .ROUTE ;  Start 10/10/

17 at 08:17;  Stop 10/10/17 at 08:18;  Status DC


Labetalol HCl (Normodyne) 20 mg STK-MED ONCE .ROUTE ;  Start 10/10/17 at 08:50;

  Stop 10/10/17 at 08:51;  Status DC


Fentanyl Citrate (Fentanyl 2ml Vial) 100 mcg STK-MED ONCE .ROUTE ;  Start 10/10/

17 at 09:21;  Stop 10/10/17 at 09:22;  Status DC


Rocuronium Bromide (Zemuron) 100 mg STK-MED ONCE .ROUTE ;  Start 10/10/17 at 09:

30;  Stop 10/10/17 at 09:31;  Status DC


Cellulose 1 each STK-MED ONCE .ROUTE  Last administered on 10/10/17at 10:57;  

Start 10/10/17 at 09:57;  Stop 10/10/17 at 10:57;  Status DC


Albumin Human 500 ml @  As Directed STK-MED ONCE IV ;  Start 10/10/17 at 11:06;

  Stop 10/10/17 at 11:07;  Status DC


Neostigmine Methylsulfate (Bloxiverz) 10 mg STK-MED ONCE .ROUTE ;  Start 10/10/

17 at 11:20;  Stop 10/10/17 at 11:21;  Status DC


Glycopyrrolate (Robinul) 1 mg STK-MED ONCE .ROUTE ;  Start 10/10/17 at 11:20;  

Stop 10/10/17 at 11:21;  Status DC


Famotidine (Pepcid) 20 mg QHS IVP  Last administered on 10/11/17at 21:07;  

Start 10/10/17 at 21:00


Enoxaparin Sodium (Lovenox 30mg Syringe) 30 mg Q24H SQ  Last administered on 10/

12/17at 08:13;  Start 10/11/17 at 08:00;  Stop 10/12/17 at 10:41;  Status DC


Sodium Chloride (Normal Saline Flush) 3 ml QSHIFT  PRN IV AFTER MEDS AND BLOOD 

DRAWS;  Start 10/10/17 at 12:00


Ringer's Solution 1,000 ml @  100 mls/hr Q10H IV  Last administered on 10/12/

17at 03:10;  Start 10/10/17 at 11:49


Naloxone HCl (Narcan) 0.4 mg PRN Q2MIN  PRN IV SEE INSTRUCTIONS;  Start 10/10/

17 at 12:00


Sodium Chloride 1,000 ml @  25 mls/hr Q24H IV  Last administered on 10/10/17at 

11:49;  Start 10/10/17 at 11:49


Morphine Sulfate 30 ml @ 0 mls/hr CONT PRN  PRN IV PROTOCOL Last administered 

on 10/10/17at 15:13;  Start 10/10/17 at 12:00


Ondansetron HCl (Zofran) 4 mg PRN Q6HRS  PRN IV NAUESA, 1ST CHOICE;  Start 10/10

/17 at 12:00


Insulin Aspart (NovoLOG VIAL) 100 unit STK-MED ONCE SQ ;  Start 10/10/17 at 14:

01;  Stop 10/10/17 at 14:02;  Status DC


Insulin Aspart (NovoLOG VIAL) 3 unit 1X  ONCE SQ  Last administered on 10/10/

17at 13:58;  Start 10/10/17 at 14:15;  Stop 10/10/17 at 14:16;  Status DC


Pneumococcal Polyvalent Vaccine (Do NOT chart on this placeholder)  1X  ONCE MC 

;  Start 10/10/17 at 16:15;  Stop 10/10/17 at 16:16;  Status UNV


Albuterol/ Ipratropium (Duoneb) 3 ml PRN Q6HRS  PRN NEB SHORTNESS OF BREATH;  

Start 10/11/17 at 09:30;  Stop 10/11/17 at 09:35;  Status DC


Albuterol Sulfate (Ventolin Neb Soln) 2.5 mg PRN Q4HRS  PRN NEB SHORTNESS OF 

BREATH;  Start 10/11/17 at 09:30


Insulin Aspart (NovoLOG) 0-9 UNITS TIDWMEALS SQ  Last administered on 10/12/

17at 08:18;  Start 10/11/17 at 12:00


Dextrose (Dextrose 50%-Water Syringe) 12.5 gm PRN Q15MIN  PRN IV SEE COMMENTS;  

Start 10/11/17 at 09:30


Enoxaparin Sodium (Lovenox 40mg Syringe) 40 mg Q24H SQ ;  Start 10/13/17 at 08:

00





Active Scripts


Active


Reported


[Lidocaine]   15 Ml PO Q6HRS PRN


[Ondansetron]   4 Mg IV PRN Q4HRS PRN


[Pepcid Suspension]   20 IV BID


Duoneb 0.5-3(2.5) Mg/3 Ml (Albuterol/Ipratropium) 3 Ml Ampul.neb 3 Ml NEB PRN 

Q6HRS PRN


Novolog (Insulin Aspart) 100 Unit/1 Ml Cartridge 0 SQ Q6HRS


Vitals/I & O





Vital Sign - Last 24 Hours








 10/11/17 10/11/17 10/11/17 10/11/17





 12:00 12:00 12:00 13:00


 


Pulse  94 96 95


 


B/P (MAP)  154/72 (99) 157/75 (102) 138/75 (96)


 


Pulse Ox   100 100


 


O2 Delivery Room Air  Room Air Room Air





 10/11/17 10/11/17 10/11/17 10/11/17





 13:30 14:00 15:00 16:00


 


Pulse  94 92 


 


B/P (MAP)  156/87 (110) 158/84 (108) 


 


Pulse Ox 96 100 100 


 


O2 Delivery Room Air Room Air Room Air Room Air





 10/11/17 10/11/17 10/11/17 10/11/17





 16:00 16:00 17:00 18:00


 


Temp  97.8  





  97.8  


 


Pulse 92 98 101 


 


B/P (MAP) 155/86 (109) 146/70 (95) 148/69 (95) 167/84 (111)


 


Pulse Ox  100 100 100


 


O2 Delivery  Room Air Room Air Room Air


 


    





    





 10/11/17 10/11/17 10/11/17 10/11/17





 19:00 20:00 20:00 20:00


 


Temp    98.2





    98.2


 


Pulse 102 102  102


 


Resp 16   12


 


B/P (MAP) 149/72 (97) 152/74 (100)  152/74 (100)


 


Pulse Ox 100   100


 


O2 Delivery Room Air  Room Air Room Air


 


    





    





 10/11/17 10/11/17 10/11/17 10/11/17





 21:00 22:00 23:00 23:59


 


Pulse 100 98 97 98


 


Resp 18 16 20 


 


B/P (MAP) 148/72 (97) 150/74 (99) 156/76 (102) 151/77 (101)


 


Pulse Ox 100 100 100 


 


O2 Delivery Room Air Room Air Room Air 





 10/11/17 10/12/17 10/12/17 10/12/17





 23:59 00:00 01:00 02:00


 


Temp  98.6  





  98.6  


 


Pulse  98 98 102


 


Resp  14 16 12


 


B/P (MAP)  151/77 (101) 148/72 (97) 138/73 (94)


 


Pulse Ox  100 100 100


 


O2 Delivery Room Air Room Air Room Air Room Air


 


    





    





 10/12/17 10/12/17 10/12/17 10/12/17





 03:00 03:51 04:00 04:00


 


Temp    98.6





    98.6


 


Pulse 98  99 99


 


Resp 16   14


 


B/P (MAP) 146/73 (97)  150/77 (101) 150/77 (101)


 


Pulse Ox 100   100


 


O2 Delivery Room Air Room Air  Room Air


 


    





    





 10/12/17 10/12/17 10/12/17 10/12/17





 05:00 06:00 07:00 08:00


 


Temp    98.4





    98.4


 


Pulse 102 101 102 101


 


Resp 18 16 14 14


 


B/P (MAP) 149/63 (91) 128/77 (94) 137/70 (92) 122/63 (82)


 


Pulse Ox 100 100 100 100


 


O2 Delivery Room Air Room Air Room Air Room Air


 


    





    





 10/12/17 10/12/17 10/12/17 10/12/17





 08:00 08:00 09:00 10:00


 


Pulse 101  103 104


 


Resp   16 14


 


B/P (MAP) 122/63 (82)  121/61 (81) 145/68 (93)


 


Pulse Ox   100 99


 


O2 Delivery  Room Air Room Air Room Air





 10/12/17   





 11:00   


 


Pulse 108   


 


Resp 15   


 


B/P (MAP) 137/68 (91)   


 


Pulse Ox 100   


 


O2 Delivery Room Air   














Intake and Output   


 


 10/12/17 10/12/17 10/13/17





 15:00 23:00 07:00


 


Intake Total 120 ml  


 


Output Total 685 ml  


 


Balance -565 ml  











Nutrition Consultation


Dietary Evaluation:


Recommendations by RD:  Increase Calorie Intake, Add supplement feedings


Comments:  


Diabetisource AC@10 ml/hr, increase 10 ml/hr q8 hrs to goal of 50 ml/hr  


w/100 ml flushes q6 hrs


Expected Outcomes/Goals:  


Initiation of nutrition within 24 hrs





Malnutrition Findings:


Food and Nutrition Intake (Mod:  <75% est energy req 7days


Body Fat Depletion (Non Severe:  Mild Depletion


Weight Status:  Underweight











CASTLE,NIAL K III DO Oct 12, 2017 11:52

## 2017-10-13 VITALS — DIASTOLIC BLOOD PRESSURE: 94 MMHG | SYSTOLIC BLOOD PRESSURE: 107 MMHG

## 2017-10-13 VITALS — SYSTOLIC BLOOD PRESSURE: 143 MMHG | DIASTOLIC BLOOD PRESSURE: 82 MMHG

## 2017-10-13 VITALS — DIASTOLIC BLOOD PRESSURE: 78 MMHG | SYSTOLIC BLOOD PRESSURE: 130 MMHG

## 2017-10-13 VITALS — DIASTOLIC BLOOD PRESSURE: 84 MMHG | SYSTOLIC BLOOD PRESSURE: 137 MMHG

## 2017-10-13 VITALS — DIASTOLIC BLOOD PRESSURE: 81 MMHG | SYSTOLIC BLOOD PRESSURE: 148 MMHG

## 2017-10-13 VITALS — DIASTOLIC BLOOD PRESSURE: 76 MMHG | SYSTOLIC BLOOD PRESSURE: 135 MMHG

## 2017-10-13 LAB
ANION GAP SERPL CALC-SCNC: 9 MMOL/L (ref 6–14)
BASOPHILS # BLD AUTO: 0 X10^3/UL (ref 0–0.2)
BASOPHILS NFR BLD: 0 % (ref 0–3)
BUN SERPL-MCNC: 32 MG/DL (ref 8–26)
CALCIUM SERPL-MCNC: 8.2 MG/DL (ref 8.5–10.1)
CHLORIDE SERPL-SCNC: 107 MMOL/L (ref 98–107)
CO2 SERPL-SCNC: 26 MMOL/L (ref 21–32)
CREAT SERPL-MCNC: 1.4 MG/DL (ref 0.7–1.3)
EOSINOPHIL NFR BLD: 0 % (ref 0–3)
ERYTHROCYTE [DISTWIDTH] IN BLOOD BY AUTOMATED COUNT: 14 % (ref 11.5–14.5)
GFR SERPLBLD BASED ON 1.73 SQ M-ARVRAT: 48.2 ML/MIN
GLUCOSE SERPL-MCNC: 170 MG/DL (ref 70–99)
HCT VFR BLD CALC: 27.5 % (ref 39–53)
HGB BLD-MCNC: 9.3 G/DL (ref 13–17.5)
LYMPHOCYTES # BLD: 0.7 X10^3/UL (ref 1–4.8)
LYMPHOCYTES NFR BLD AUTO: 8 % (ref 24–48)
MCH RBC QN AUTO: 30 PG (ref 25–35)
MCHC RBC AUTO-ENTMCNC: 34 G/DL (ref 31–37)
MCV RBC AUTO: 88 FL (ref 79–100)
MONOCYTES NFR BLD: 5 % (ref 0–9)
NEUTROPHILS NFR BLD AUTO: 87 % (ref 31–73)
PLATELET # BLD AUTO: 175 X10^3/UL (ref 140–400)
POTASSIUM SERPL-SCNC: 4.3 MMOL/L (ref 3.5–5.1)
RBC # BLD AUTO: 3.12 X10^6/UL (ref 4.3–5.7)
SODIUM SERPL-SCNC: 142 MMOL/L (ref 136–145)
WBC # BLD AUTO: 9.4 X10^3/UL (ref 4–11)

## 2017-10-13 RX ADMIN — SODIUM CHLORIDE, SODIUM LACTATE, POTASSIUM CHLORIDE, AND CALCIUM CHLORIDE SCH MLS/HR: .6; .31; .03; .02 INJECTION, SOLUTION INTRAVENOUS at 05:53

## 2017-10-13 RX ADMIN — MORPHINE SULFATE PRN MG: 2 INJECTION, SOLUTION INTRAMUSCULAR; INTRAVENOUS at 16:39

## 2017-10-13 RX ADMIN — INSULIN ASPART SCH UNITS: 100 INJECTION, SOLUTION INTRAVENOUS; SUBCUTANEOUS at 07:57

## 2017-10-13 RX ADMIN — SODIUM CHLORIDE, SODIUM LACTATE, POTASSIUM CHLORIDE, AND CALCIUM CHLORIDE SCH MLS/HR: .6; .31; .03; .02 INJECTION, SOLUTION INTRAVENOUS at 09:49

## 2017-10-13 RX ADMIN — FAMOTIDINE SCH MG: 10 INJECTION, SOLUTION INTRAVENOUS at 21:04

## 2017-10-13 RX ADMIN — ENOXAPARIN SODIUM SCH MG: 100 INJECTION SUBCUTANEOUS at 21:07

## 2017-10-13 RX ADMIN — INSULIN ASPART SCH UNITS: 100 INJECTION, SOLUTION INTRAVENOUS; SUBCUTANEOUS at 18:00

## 2017-10-13 RX ADMIN — INSULIN ASPART SCH UNITS: 100 INJECTION, SOLUTION INTRAVENOUS; SUBCUTANEOUS at 12:00

## 2017-10-13 RX ADMIN — BACITRACIN SCH MLS/HR: 5000 INJECTION, POWDER, FOR SOLUTION INTRAMUSCULAR at 11:49

## 2017-10-13 RX ADMIN — GLYCERIN, ISOLEUCINE, LEUCINE, LYSINE, METHIONINE, PHENYLALANINE, THREONINE, TRYPTOPHAN, VALINE, ALANINE, GLYCINE, ARGININE, HISTIDINE, PROLINE, SERINE, CYSTEINE, SODIUM ACETATE, MAGNESIUM ACETATE, CALCIUM ACETATE, SODIUM CHLORIDE, POTASSIUM CHLORIDE, PHOSPHORIC ACID, AND POTASSIUM METABISULFITE SCH MLS/HR
3; .21; .27; .22; .16; .17; .12; .046; .2; .21; .42; .29; .085; .34; .18; .014; .2; .054; .026; .12; .15; .041 INJECTION INTRAVENOUS at 13:46

## 2017-10-13 RX ADMIN — ENOXAPARIN SODIUM SCH MG: 100 INJECTION SUBCUTANEOUS at 08:14

## 2017-10-13 NOTE — CONS
DATE OF CONSULTATION:  10/13/2017



DIAGNOSIS:  Extensive deep venous thrombosis, right upper arm.



HISTORY OF PRESENT ILLNESS:  This is an 85-year-old male who was recently

undergone extensive abdominal surgery.  He underwent exploratory laparotomy,

lysis of adhesions and removal of abdominal wall masses, antrectomy and

Stalin-en-Y reconstruction on the 10th by Dr. Sotelo.



He had a PICC line in and when that arm developed swelling, the PICC line was

removed and ultrasound showed extensive DVT in the right upper arm in the deep

veins and also in the superficial veins.



No prior history of DVT.



PAST MEDICAL HISTORY:  He has no known allergies.  He does not take aspirin. 

Nonsmoker.  He has had a coronary artery bypass, but no other major vascular

problems.



PHYSICAL EXAMINATION:

GENERAL:  Pleasant, cachectic male, who is alert, in no severe distress, trying

to urinate.  2+ radial pulses.

CARDIOVASCULAR:  Heart rate is regular.  Well healed median sternotomy. 

Nonlabored respirations.

EXTREMITIES:  Swelling in the right arm compared to the left, but not severe. 

No discoloration of the hand and pulses easily palpable.

ABDOMEN:  With dressings, 2+ popliteal pulses.



IMPRESSION:  Extensive deep venous thrombosis, right arm.  He is already on

Lovenox b.i.d.  I recommend elevation, no other treatments.



Thanks for allowing us to see him.

 



______________________________

TAE PALOMINO MD



DR:  JANIE/elaina  JOB#:  [id number] / [id number]

DD:  10/13/2017 19:26  DT:  10/13/2017 22:44

## 2017-10-13 NOTE — PDOC
Provider Note


Provider Note


Vascular Consult dictated


IMP: Extensive DVT rt. UE , recent removal of PIC


Plan: already on Lovenox, add elevation, will sign off











TAE PALOMINO MD Oct 13, 2017 19:29

## 2017-10-13 NOTE — PDOC
PROGRESS NOTES


Chief Complaint


Chief Complaint


Gastric outlet obstruction


New DVT Rt arm - PICC in site


Hx perforated ulcer DISTANT PAST


Frailty, gen weakness


FULL code





PMH:


DM


HTN


CAD


PUD


CABG





History of Present Illness


History of Present Illness


US done last night bec of RT arm swelling, shows 


IMPRESSION: Extensive DVT in the right upper extremity. Some thrombus is also


noted in portions of the cephalic and basilic vein compatible with superficial


thrombophlebitis


Pt has RT picc on that site


Wife at bedside


Relays to me no hx of bleeding but did mention perforated ulcer DISTANT PAST


Hemodynamically stable


Gardner in





MOrphine pCA Pump running, but rarely pressing





PLAN:


I did discuss with IR tech - LISA ONEAL to call me back if this needs thrombolysis 

etc


Lovenox BID has been started overnight


Dc MOprhine PCA


DO morphine pushes - ángel RN


dc RT arm PICC, peripheral IV preferable this time, anesthesia stick if needed


If no thrombolysis , then will initiate xarelto 15 BID x 7 days then 20 P 

qdaily for 3 mos, INterval sono that time to see size


Dw MICHAEL Man and wife at bedside


NGT per gS





Vitals


Vitals





Vital Signs








  Date Time  Temp Pulse Resp B/P (MAP) Pulse Ox O2 Delivery O2 Flow Rate FiO2


 


10/13/17 07:15      Room Air  


 


10/13/17 07:00 98.1 101 18 148/81 (103) 98   





 98.1       











Physical Exam


General:  Alert, Oriented X3, Cooperative, No acute distress


Heart:  Regular rate, Normal S1, Normal S2


Lungs:  Clear


Abdomen:  Soft, Other (dressing dry, patricio serosang)


Extremities:  No clubbing, No cyanosis


Skin:  No rashes, No breakdown





Labs


LABS





Laboratory Tests








Test


  10/12/17


12:23 10/12/17


17:18 10/13/17


07:16


 


Glucose (Fingerstick)


  154 mg/dL


(70-99) 182 mg/dL


(70-99) 


 


 


White Blood Count


  


  


  9.4 x10^3/uL


(4.0-11.0)


 


Red Blood Count


  


  


  3.12 x10^6/uL


(4.30-5.70)


 


Hemoglobin


  


  


  9.3 g/dL


(13.0-17.5)


 


Hematocrit


  


  


  27.5 %


(39.0-53.0)


 


Mean Corpuscular Volume   88 fL () 


 


Mean Corpuscular Hemoglobin   30 pg (25-35) 


 


Mean Corpuscular Hemoglobin


Concent 


  


  34 g/dL


(31-37)


 


Red Cell Distribution Width


  


  


  14.0 %


(11.5-14.5)


 


Platelet Count


  


  


  175 x10^3/uL


(140-400)


 


Neutrophils (%) (Auto)   87 % (31-73) 


 


Lymphocytes (%) (Auto)   8 % (24-48) 


 


Monocytes (%) (Auto)   5 % (0-9) 


 


Eosinophils (%) (Auto)   0 % (0-3) 


 


Basophils (%) (Auto)   0 % (0-3) 


 


Neutrophils # (Auto)


  


  


  8.2 x10^3uL


(1.8-7.7)


 


Lymphocytes # (Auto)


  


  


  0.7 x10^3/uL


(1.0-4.8)


 


Monocytes # (Auto)


  


  


  0.5 x10^3/uL


(0.0-1.1)


 


Eosinophils # (Auto)


  


  


  0.0 x10^3/uL


(0.0-0.7)


 


Basophils # (Auto)


  


  


  0.0 x10^3/uL


(0.0-0.2)


 


Sodium Level


  


  


  142 mmol/L


(136-145)


 


Potassium Level


  


  


  4.3 mmol/L


(3.5-5.1)


 


Chloride Level


  


  


  107 mmol/L


()


 


Carbon Dioxide Level


  


  


  26 mmol/L


(21-32)


 


Anion Gap   9 (6-14) 


 


Blood Urea Nitrogen


  


  


  32 mg/dL


(8-26)


 


Creatinine


  


  


  1.4 mg/dL


(0.7-1.3)


 


Estimated GFR


(Cockcroft-Gault) 


  


  48.2 


 


 


Glucose Level


  


  


  170 mg/dL


(70-99)


 


Calcium Level


  


  


  8.2 mg/dL


(8.5-10.1)











Review of Systems


Review of Systems


thirsty, some post op abd soreness, no CP





Comment


Review of Relevant


I have reviewed the following items milana (where applicable) has been applied.


Labs





Laboratory Tests








Test


  10/11/17


11:51 10/11/17


17:20 10/11/17


22:45 10/12/17


05:45


 


Glucose (Fingerstick)


  210 mg/dL


(70-99) 158 mg/dL


(70-99) 


  


 


 


Nasal Screen MRSA (PCR)


  


  


  Negative


(Negative) 


 


 


White Blood Count


  


  


  


  9.7 x10^3/uL


(4.0-11.0)


 


Red Blood Count


  


  


  


  2.90 x10^6/uL


(4.30-5.70)


 


Hemoglobin


  


  


  


  8.7 g/dL


(13.0-17.5)


 


Hematocrit


  


  


  


  25.4 %


(39.0-53.0)


 


Mean Corpuscular Volume    88 fL () 


 


Mean Corpuscular Hemoglobin    30 pg (25-35) 


 


Mean Corpuscular Hemoglobin


Concent 


  


  


  34 g/dL


(31-37)


 


Red Cell Distribution Width


  


  


  


  13.8 %


(11.5-14.5)


 


Platelet Count


  


  


  


  168 x10^3/uL


(140-400)


 


Neutrophils (%) (Auto)    86 % (31-73) 


 


Lymphocytes (%) (Auto)    8 % (24-48) 


 


Monocytes (%) (Auto)    6 % (0-9) 


 


Eosinophils (%) (Auto)    0 % (0-3) 


 


Basophils (%) (Auto)    0 % (0-3) 


 


Neutrophils # (Auto)


  


  


  


  8.3 x10^3uL


(1.8-7.7)


 


Lymphocytes # (Auto)


  


  


  


  0.7 x10^3/uL


(1.0-4.8)


 


Monocytes # (Auto)


  


  


  


  0.5 x10^3/uL


(0.0-1.1)


 


Eosinophils # (Auto)


  


  


  


  0.0 x10^3/uL


(0.0-0.7)


 


Basophils # (Auto)


  


  


  


  0.0 x10^3/uL


(0.0-0.2)


 


Sodium Level


  


  


  


  139 mmol/L


(136-145)


 


Potassium Level


  


  


  


  4.9 mmol/L


(3.5-5.1)


 


Chloride Level


  


  


  


  106 mmol/L


()


 


Carbon Dioxide Level


  


  


  


  25 mmol/L


(21-32)


 


Anion Gap    8 (6-14) 


 


Blood Urea Nitrogen


  


  


  


  35 mg/dL


(8-26)


 


Creatinine


  


  


  


  1.2 mg/dL


(0.7-1.3)


 


Estimated GFR


(Cockcroft-Gault) 


  


  


  57.5 


 


 


Glucose Level


  


  


  


  189 mg/dL


(70-99)


 


Calcium Level


  


  


  


  8.2 mg/dL


(8.5-10.1)


 


Test


  10/12/17


08:16 10/12/17


12:23 10/12/17


17:18 10/13/17


07:16


 


Glucose (Fingerstick)


  196 mg/dL


(70-99) 154 mg/dL


(70-99) 182 mg/dL


(70-99) 


 


 


White Blood Count


  


  


  


  9.4 x10^3/uL


(4.0-11.0)


 


Red Blood Count


  


  


  


  3.12 x10^6/uL


(4.30-5.70)


 


Hemoglobin


  


  


  


  9.3 g/dL


(13.0-17.5)


 


Hematocrit


  


  


  


  27.5 %


(39.0-53.0)


 


Mean Corpuscular Volume    88 fL () 


 


Mean Corpuscular Hemoglobin    30 pg (25-35) 


 


Mean Corpuscular Hemoglobin


Concent 


  


  


  34 g/dL


(31-37)


 


Red Cell Distribution Width


  


  


  


  14.0 %


(11.5-14.5)


 


Platelet Count


  


  


  


  175 x10^3/uL


(140-400)


 


Neutrophils (%) (Auto)    87 % (31-73) 


 


Lymphocytes (%) (Auto)    8 % (24-48) 


 


Monocytes (%) (Auto)    5 % (0-9) 


 


Eosinophils (%) (Auto)    0 % (0-3) 


 


Basophils (%) (Auto)    0 % (0-3) 


 


Neutrophils # (Auto)


  


  


  


  8.2 x10^3uL


(1.8-7.7)


 


Lymphocytes # (Auto)


  


  


  


  0.7 x10^3/uL


(1.0-4.8)


 


Monocytes # (Auto)


  


  


  


  0.5 x10^3/uL


(0.0-1.1)


 


Eosinophils # (Auto)


  


  


  


  0.0 x10^3/uL


(0.0-0.7)


 


Basophils # (Auto)


  


  


  


  0.0 x10^3/uL


(0.0-0.2)


 


Sodium Level


  


  


  


  142 mmol/L


(136-145)


 


Potassium Level


  


  


  


  4.3 mmol/L


(3.5-5.1)


 


Chloride Level


  


  


  


  107 mmol/L


()


 


Carbon Dioxide Level


  


  


  


  26 mmol/L


(21-32)


 


Anion Gap    9 (6-14) 


 


Blood Urea Nitrogen


  


  


  


  32 mg/dL


(8-26)


 


Creatinine


  


  


  


  1.4 mg/dL


(0.7-1.3)


 


Estimated GFR


(Cockcroft-Gault) 


  


  


  48.2 


 


 


Glucose Level


  


  


  


  170 mg/dL


(70-99)


 


Calcium Level


  


  


  


  8.2 mg/dL


(8.5-10.1)








Laboratory Tests








Test


  10/12/17


12:23 10/12/17


17:18 10/13/17


07:16


 


Glucose (Fingerstick)


  154 mg/dL


(70-99) 182 mg/dL


(70-99) 


 


 


White Blood Count


  


  


  9.4 x10^3/uL


(4.0-11.0)


 


Red Blood Count


  


  


  3.12 x10^6/uL


(4.30-5.70)


 


Hemoglobin


  


  


  9.3 g/dL


(13.0-17.5)


 


Hematocrit


  


  


  27.5 %


(39.0-53.0)


 


Mean Corpuscular Volume   88 fL () 


 


Mean Corpuscular Hemoglobin   30 pg (25-35) 


 


Mean Corpuscular Hemoglobin


Concent 


  


  34 g/dL


(31-37)


 


Red Cell Distribution Width


  


  


  14.0 %


(11.5-14.5)


 


Platelet Count


  


  


  175 x10^3/uL


(140-400)


 


Neutrophils (%) (Auto)   87 % (31-73) 


 


Lymphocytes (%) (Auto)   8 % (24-48) 


 


Monocytes (%) (Auto)   5 % (0-9) 


 


Eosinophils (%) (Auto)   0 % (0-3) 


 


Basophils (%) (Auto)   0 % (0-3) 


 


Neutrophils # (Auto)


  


  


  8.2 x10^3uL


(1.8-7.7)


 


Lymphocytes # (Auto)


  


  


  0.7 x10^3/uL


(1.0-4.8)


 


Monocytes # (Auto)


  


  


  0.5 x10^3/uL


(0.0-1.1)


 


Eosinophils # (Auto)


  


  


  0.0 x10^3/uL


(0.0-0.7)


 


Basophils # (Auto)


  


  


  0.0 x10^3/uL


(0.0-0.2)


 


Sodium Level


  


  


  142 mmol/L


(136-145)


 


Potassium Level


  


  


  4.3 mmol/L


(3.5-5.1)


 


Chloride Level


  


  


  107 mmol/L


()


 


Carbon Dioxide Level


  


  


  26 mmol/L


(21-32)


 


Anion Gap   9 (6-14) 


 


Blood Urea Nitrogen


  


  


  32 mg/dL


(8-26)


 


Creatinine


  


  


  1.4 mg/dL


(0.7-1.3)


 


Estimated GFR


(Cockcroft-Gault) 


  


  48.2 


 


 


Glucose Level


  


  


  170 mg/dL


(70-99)


 


Calcium Level


  


  


  8.2 mg/dL


(8.5-10.1)








Medications





Current Medications


Ondansetron HCl (Zofran) 4 mg PRN Q6HRS  PRN IV NAUSEA/VOMITING;  Start 10/10/

17 at 07:00;  Stop 10/11/17 at 06:59;  Status DC


Fentanyl Citrate (Fentanyl 2ml Vial) 25 mcg PRN Q5MIN  PRN IV MILD PAIN Last 

administered on 10/10/17at 14:08;  Start 10/10/17 at 07:00;  Stop 10/11/17 at 06

:59;  Status DC


Fentanyl Citrate (Fentanyl 2ml Vial) 50 mcg PRN Q5MIN  PRN IV MODERATE PAIN;  

Start 10/10/17 at 07:00;  Stop 10/11/17 at 06:59;  Status DC


Morphine Sulfate 1 mg PRN Q10MIN  PRN IV SEVERE PAIN;  Start 10/10/17 at 07:00;

  Stop 10/11/17 at 06:59;  Status DC


Ringer's Solution 1,000 ml @  0 mls/hr Q0M IV  Last administered on 10/10/17at 

13:45;  Start 10/10/17 at 07:00;  Stop 10/10/17 at 18:59;  Status DC


Lidocaine HCl (Xylocaine-Mpf 1% Vial) 2 ml PRN 1X  PRN ID PRIOR TO IV START;  

Start 10/10/17 at 07:00;  Stop 10/11/17 at 06:59;  Status DC


Hydromorphone HCl (Dilaudid) 0.5 mg PRN Q10MIN  PRN IV SEV PAIN, Second choice;

  Start 10/10/17 at 07:00;  Stop 10/11/17 at 06:59;  Status DC


Prochlorperazine Edisylate (Compazine) 5 mg PACU PRN  PRN IV NAUSEA, MRX1;  

Start 10/10/17 at 07:00;  Stop 10/11/17 at 06:59;  Status DC


Cefazolin Sodium/ Dextrose 50 ml @  100 mls/hr 1X PREOP  PRN IV PRIOR TO 

PROCEDURE;  Start 10/10/17 at 06:00;  Stop 10/10/17 at 18:00;  Status DC


Cefoxitin Sodium 2 gm/Sodium Chloride 100 ml @  200 mls/hr 1X  ONCE IV ;  Start 

10/10/17 at 07:00;  Stop 10/10/17 at 07:29;  Status UNV


Cefoxitin Sodium 100 ml @  200 mls/hr ONCE  ONCE IV  Last administered on 10/10/

17at 08:16;  Start 10/10/17 at 07:15;  Stop 10/10/17 at 07:44;  Status DC


Lidocaine HCl (Lidocaine Pf 2% Vial) 5 ml STK-MED ONCE .ROUTE ;  Start 10/10/17 

at 07:29;  Stop 10/10/17 at 07:30;  Status DC


Etomidate (Amidate) 20 mg STK-MED ONCE IV ;  Start 10/10/17 at 07:29;  Stop 10/

10/17 at 07:30;  Status DC


Fentanyl Citrate (Fentanyl 2ml Vial) 100 mcg STK-MED ONCE .ROUTE ;  Start 10/10/

17 at 07:29;  Stop 10/10/17 at 07:30;  Status DC


Rocuronium Bromide (Zemuron) 100 mg STK-MED ONCE .ROUTE ;  Start 10/10/17 at 07:

29;  Stop 10/10/17 at 07:30;  Status DC


Dexamethasone Sodium Phosphate (Decadron) 20 mg STK-MED ONCE .ROUTE ;  Start 10/

10/17 at 08:16;  Stop 10/10/17 at 08:17;  Status DC


Ondansetron HCl (Zofran) 4 mg STK-MED ONCE .ROUTE ;  Start 10/10/17 at 08:16;  

Stop 10/10/17 at 08:17;  Status DC


Esmolol HCl (Brevibloc) 100 mg STK-MED ONCE IV ;  Start 10/10/17 at 08:16;  

Stop 10/10/17 at 08:17;  Status DC


Phenylephrine HCl (Tanvir-Synephrine Inj) 10 mg STK-MED ONCE .ROUTE ;  Start 10/10/

17 at 08:16;  Stop 10/10/17 at 08:17;  Status DC


Fentanyl Citrate (Fentanyl 2ml Vial) 100 mcg STK-MED ONCE .ROUTE ;  Start 10/10/

17 at 08:17;  Stop 10/10/17 at 08:18;  Status DC


Labetalol HCl (Normodyne) 20 mg STK-MED ONCE .ROUTE ;  Start 10/10/17 at 08:50;

  Stop 10/10/17 at 08:51;  Status DC


Fentanyl Citrate (Fentanyl 2ml Vial) 100 mcg STK-MED ONCE .ROUTE ;  Start 10/10/

17 at 09:21;  Stop 10/10/17 at 09:22;  Status DC


Rocuronium Bromide (Zemuron) 100 mg STK-MED ONCE .ROUTE ;  Start 10/10/17 at 09:

30;  Stop 10/10/17 at 09:31;  Status DC


Cellulose 1 each STK-MED ONCE .ROUTE  Last administered on 10/10/17at 10:57;  

Start 10/10/17 at 09:57;  Stop 10/10/17 at 10:57;  Status DC


Albumin Human 500 ml @  As Directed STK-MED ONCE IV ;  Start 10/10/17 at 11:06;

  Stop 10/10/17 at 11:07;  Status DC


Neostigmine Methylsulfate (Bloxiverz) 10 mg STK-MED ONCE .ROUTE ;  Start 10/10/

17 at 11:20;  Stop 10/10/17 at 11:21;  Status DC


Glycopyrrolate (Robinul) 1 mg STK-MED ONCE .ROUTE ;  Start 10/10/17 at 11:20;  

Stop 10/10/17 at 11:21;  Status DC


Famotidine (Pepcid) 20 mg QHS IVP  Last administered on 10/12/17at 21:28;  

Start 10/10/17 at 21:00


Enoxaparin Sodium (Lovenox 30mg Syringe) 30 mg Q24H SQ  Last administered on 10/

12/17at 08:13;  Start 10/11/17 at 08:00;  Stop 10/12/17 at 10:41;  Status DC


Sodium Chloride (Normal Saline Flush) 3 ml QSHIFT  PRN IV AFTER MEDS AND BLOOD 

DRAWS;  Start 10/10/17 at 12:00


Ringer's Solution 1,000 ml @  100 mls/hr Q10H IV  Last administered on 10/13/

17at 05:53;  Start 10/10/17 at 11:49


Naloxone HCl (Narcan) 0.4 mg PRN Q2MIN  PRN IV SEE INSTRUCTIONS;  Start 10/10/

17 at 12:00


Sodium Chloride 1,000 ml @  25 mls/hr Q24H IV  Last administered on 10/10/17at 

11:49;  Start 10/10/17 at 11:49


Morphine Sulfate 30 ml @ 0 mls/hr CONT PRN  PRN IV PROTOCOL Last administered 

on 10/10/17at 15:13;  Start 10/10/17 at 12:00


Ondansetron HCl (Zofran) 4 mg PRN Q6HRS  PRN IV LORA, 1ST CHOICE;  Start 10/10

/17 at 12:00


Insulin Aspart (NovoLOG VIAL) 100 unit STK-MED ONCE SQ ;  Start 10/10/17 at 14:

01;  Stop 10/10/17 at 14:02;  Status DC


Insulin Aspart (NovoLOG VIAL) 3 unit 1X  ONCE SQ  Last administered on 10/10/

17at 13:58;  Start 10/10/17 at 14:15;  Stop 10/10/17 at 14:16;  Status DC


Pneumococcal Polyvalent Vaccine (Do NOT chart on this placeholder)  1X  ONCE MC 

;  Start 10/10/17 at 16:15;  Stop 10/10/17 at 16:16;  Status UNV


Albuterol/ Ipratropium (Duoneb) 3 ml PRN Q6HRS  PRN NEB SHORTNESS OF BREATH;  

Start 10/11/17 at 09:30;  Stop 10/11/17 at 09:35;  Status DC


Albuterol Sulfate (Ventolin Neb Soln) 2.5 mg PRN Q4HRS  PRN NEB SHORTNESS OF 

BREATH;  Start 10/11/17 at 09:30


Insulin Aspart (NovoLOG) 0-9 UNITS TIDWMEALS SQ  Last administered on 10/12/

17at 12:26;  Start 10/11/17 at 12:00


Dextrose (Dextrose 50%-Water Syringe) 12.5 gm PRN Q15MIN  PRN IV SEE COMMENTS;  

Start 10/11/17 at 09:30


Enoxaparin Sodium (Lovenox 40mg Syringe) 40 mg Q24H SQ ;  Start 10/13/17 at 08:

00;  Stop 10/13/17 at 08:00;  Status DC


Enoxaparin Sodium (Lovenox 60mg Syringe) 60 mg Q12HR SQ  Last administered on 10

/13/17at 08:14;  Start 10/12/17 at 20:00


Info 1 each PRN DAILY  PRN MC SEE COMMENTS;  Start 10/12/17 at 22:30





Active Scripts


Active


Reported


[Lidocaine]   15 Ml PO Q6HRS PRN


[Ondansetron]   4 Mg IV PRN Q4HRS PRN


[Pepcid Suspension]   20 IV BID


Duoneb 0.5-3(2.5) Mg/3 Ml (Albuterol/Ipratropium) 3 Ml Ampul.neb 3 Ml NEB PRN 

Q6HRS PRN


Novolog (Insulin Aspart) 100 Unit/1 Ml Cartridge 0 SQ Q6HRS


Vitals/I & O





Vital Sign - Last 24 Hours








 10/12/17 10/12/17 10/12/17 10/12/17





 12:00 12:00 12:00 13:00


 


Temp  99.0  





  99.0  


 


Pulse  114 117 112


 


Resp  16  16


 


B/P (MAP)  134/62 (86) 146/78 (100) 135/79 (97)


 


Pulse Ox  100  100


 


O2 Delivery Room Air Room Air  Room Air


 


    





    





 10/12/17 10/12/17 10/12/17 10/12/17





 14:00 16:30 19:20 20:00


 


Temp   98.3 





   98.3 


 


Pulse 123  117 


 


Resp 14  18 


 


B/P (MAP) 139/79 (99)  123/78 (93) 


 


Pulse Ox 99  96 


 


O2 Delivery Room Air Room Air Room Air Room Air


 


    





    





 10/12/17 10/13/17 10/13/17 10/13/17





 23:18 03:21 07:00 07:15


 


Temp 98.5 98.5 98.1 





 98.5 98.5 98.1 


 


Pulse 109 104 101 


 


Resp 18 18 18 


 


B/P (MAP) 126/76 (93) 130/78 (95) 148/81 (103) 


 


Pulse Ox 97 96 98 


 


O2 Delivery Room Air Room Air Room Air Room Air











Nutrition Consultation


Dietary Evaluation:


Recommendations by RD:  PPN/TPN


Comments:  


REC resume when able TF via J tube:





Diabetisource AC @10 ml/hr, increase 10 ml/hr q8 hrs to goal of 50 ml/hr 


w/100 ml flushes q6 hrs





STD TPN at this time per MD


Expected Outcomes/Goals:  


tolerate TF at goal





Malnutrition Findings:


Food and Nutrition Intake (Mod:  <75% est energy req 7days


Body Fat Depletion (Non Severe:  Mild Depletion


Weight Status:  Underweight











CAROLINA CLARK MD Oct 13, 2017 11:08

## 2017-10-13 NOTE — PDOC
SURGICAL PROGRESS NOTE


Subjective


no nausea today


Noted DVT from PICC


Vital Signs





Vital Signs








  Date Time  Temp Pulse Resp B/P (MAP) Pulse Ox O2 Delivery O2 Flow Rate FiO2


 


10/13/17 11:00 99.2 114 18 107/94 (98) 94 Room Air  





 99.2       








General:  Alert, Oriented X3, Cooperative, No acute distress


HEENT:  Other (NG in place)


Abdomen:  Soft, Other (drain serosang, dressing dry)


Labs





Laboratory Tests








Test


  10/11/17


17:20 10/11/17


22:45 10/12/17


05:45 10/12/17


08:16


 


Glucose (Fingerstick)


  158 mg/dL


(70-99) 


  


  196 mg/dL


(70-99)


 


Nasal Screen MRSA (PCR)


  


  Negative


(Negative) 


  


 


 


White Blood Count


  


  


  9.7 x10^3/uL


(4.0-11.0) 


 


 


Red Blood Count


  


  


  2.90 x10^6/uL


(4.30-5.70) 


 


 


Hemoglobin


  


  


  8.7 g/dL


(13.0-17.5) 


 


 


Hematocrit


  


  


  25.4 %


(39.0-53.0) 


 


 


Mean Corpuscular Volume   88 fL ()  


 


Mean Corpuscular Hemoglobin   30 pg (25-35)  


 


Mean Corpuscular Hemoglobin


Concent 


  


  34 g/dL


(31-37) 


 


 


Red Cell Distribution Width


  


  


  13.8 %


(11.5-14.5) 


 


 


Platelet Count


  


  


  168 x10^3/uL


(140-400) 


 


 


Neutrophils (%) (Auto)   86 % (31-73)  


 


Lymphocytes (%) (Auto)   8 % (24-48)  


 


Monocytes (%) (Auto)   6 % (0-9)  


 


Eosinophils (%) (Auto)   0 % (0-3)  


 


Basophils (%) (Auto)   0 % (0-3)  


 


Neutrophils # (Auto)


  


  


  8.3 x10^3uL


(1.8-7.7) 


 


 


Lymphocytes # (Auto)


  


  


  0.7 x10^3/uL


(1.0-4.8) 


 


 


Monocytes # (Auto)


  


  


  0.5 x10^3/uL


(0.0-1.1) 


 


 


Eosinophils # (Auto)


  


  


  0.0 x10^3/uL


(0.0-0.7) 


 


 


Basophils # (Auto)


  


  


  0.0 x10^3/uL


(0.0-0.2) 


 


 


Sodium Level


  


  


  139 mmol/L


(136-145) 


 


 


Potassium Level


  


  


  4.9 mmol/L


(3.5-5.1) 


 


 


Chloride Level


  


  


  106 mmol/L


() 


 


 


Carbon Dioxide Level


  


  


  25 mmol/L


(21-32) 


 


 


Anion Gap   8 (6-14)  


 


Blood Urea Nitrogen


  


  


  35 mg/dL


(8-26) 


 


 


Creatinine


  


  


  1.2 mg/dL


(0.7-1.3) 


 


 


Estimated GFR


(Cockcroft-Gault) 


  


  57.5 


  


 


 


Glucose Level


  


  


  189 mg/dL


(70-99) 


 


 


Calcium Level


  


  


  8.2 mg/dL


(8.5-10.1) 


 


 


Test


  10/12/17


12:23 10/12/17


17:18 10/13/17


07:16 


 


 


Glucose (Fingerstick)


  154 mg/dL


(70-99) 182 mg/dL


(70-99) 


  


 


 


White Blood Count


  


  


  9.4 x10^3/uL


(4.0-11.0) 


 


 


Red Blood Count


  


  


  3.12 x10^6/uL


(4.30-5.70) 


 


 


Hemoglobin


  


  


  9.3 g/dL


(13.0-17.5) 


 


 


Hematocrit


  


  


  27.5 %


(39.0-53.0) 


 


 


Mean Corpuscular Volume   88 fL ()  


 


Mean Corpuscular Hemoglobin   30 pg (25-35)  


 


Mean Corpuscular Hemoglobin


Concent 


  


  34 g/dL


(31-37) 


 


 


Red Cell Distribution Width


  


  


  14.0 %


(11.5-14.5) 


 


 


Platelet Count


  


  


  175 x10^3/uL


(140-400) 


 


 


Neutrophils (%) (Auto)   87 % (31-73)  


 


Lymphocytes (%) (Auto)   8 % (24-48)  


 


Monocytes (%) (Auto)   5 % (0-9)  


 


Eosinophils (%) (Auto)   0 % (0-3)  


 


Basophils (%) (Auto)   0 % (0-3)  


 


Neutrophils # (Auto)


  


  


  8.2 x10^3uL


(1.8-7.7) 


 


 


Lymphocytes # (Auto)


  


  


  0.7 x10^3/uL


(1.0-4.8) 


 


 


Monocytes # (Auto)


  


  


  0.5 x10^3/uL


(0.0-1.1) 


 


 


Eosinophils # (Auto)


  


  


  0.0 x10^3/uL


(0.0-0.7) 


 


 


Basophils # (Auto)


  


  


  0.0 x10^3/uL


(0.0-0.2) 


 


 


Sodium Level


  


  


  142 mmol/L


(136-145) 


 


 


Potassium Level


  


  


  4.3 mmol/L


(3.5-5.1) 


 


 


Chloride Level


  


  


  107 mmol/L


() 


 


 


Carbon Dioxide Level


  


  


  26 mmol/L


(21-32) 


 


 


Anion Gap   9 (6-14)  


 


Blood Urea Nitrogen


  


  


  32 mg/dL


(8-26) 


 


 


Creatinine


  


  


  1.4 mg/dL


(0.7-1.3) 


 


 


Estimated GFR


(Cockcroft-Gault) 


  


  48.2 


  


 


 


Glucose Level


  


  


  170 mg/dL


(70-99) 


 


 


Calcium Level


  


  


  8.2 mg/dL


(8.5-10.1) 


 








Laboratory Tests








Test


  10/12/17


17:18 10/13/17


07:16


 


Glucose (Fingerstick)


  182 mg/dL


(70-99) 


 


 


White Blood Count


  


  9.4 x10^3/uL


(4.0-11.0)


 


Red Blood Count


  


  3.12 x10^6/uL


(4.30-5.70)


 


Hemoglobin


  


  9.3 g/dL


(13.0-17.5)


 


Hematocrit


  


  27.5 %


(39.0-53.0)


 


Mean Corpuscular Volume  88 fL () 


 


Mean Corpuscular Hemoglobin  30 pg (25-35) 


 


Mean Corpuscular Hemoglobin


Concent 


  34 g/dL


(31-37)


 


Red Cell Distribution Width


  


  14.0 %


(11.5-14.5)


 


Platelet Count


  


  175 x10^3/uL


(140-400)


 


Neutrophils (%) (Auto)  87 % (31-73) 


 


Lymphocytes (%) (Auto)  8 % (24-48) 


 


Monocytes (%) (Auto)  5 % (0-9) 


 


Eosinophils (%) (Auto)  0 % (0-3) 


 


Basophils (%) (Auto)  0 % (0-3) 


 


Neutrophils # (Auto)


  


  8.2 x10^3uL


(1.8-7.7)


 


Lymphocytes # (Auto)


  


  0.7 x10^3/uL


(1.0-4.8)


 


Monocytes # (Auto)


  


  0.5 x10^3/uL


(0.0-1.1)


 


Eosinophils # (Auto)


  


  0.0 x10^3/uL


(0.0-0.7)


 


Basophils # (Auto)


  


  0.0 x10^3/uL


(0.0-0.2)


 


Sodium Level


  


  142 mmol/L


(136-145)


 


Potassium Level


  


  4.3 mmol/L


(3.5-5.1)


 


Chloride Level


  


  107 mmol/L


()


 


Carbon Dioxide Level


  


  26 mmol/L


(21-32)


 


Anion Gap  9 (6-14) 


 


Blood Urea Nitrogen


  


  32 mg/dL


(8-26)


 


Creatinine


  


  1.4 mg/dL


(0.7-1.3)


 


Estimated GFR


(Cockcroft-Gault) 


  48.2 


 


 


Glucose Level


  


  170 mg/dL


(70-99)


 


Calcium Level


  


  8.2 mg/dL


(8.5-10.1)








Assessment/Plan


Trail TF again


Hold on PICC with new DVT


PPN


leave ng in place


Problems:  











GIN NINO APRN Oct 13, 2017 13:14

## 2017-10-14 VITALS — SYSTOLIC BLOOD PRESSURE: 139 MMHG | DIASTOLIC BLOOD PRESSURE: 69 MMHG

## 2017-10-14 VITALS — SYSTOLIC BLOOD PRESSURE: 122 MMHG | DIASTOLIC BLOOD PRESSURE: 66 MMHG

## 2017-10-14 VITALS — SYSTOLIC BLOOD PRESSURE: 140 MMHG | DIASTOLIC BLOOD PRESSURE: 77 MMHG

## 2017-10-14 VITALS — SYSTOLIC BLOOD PRESSURE: 149 MMHG | DIASTOLIC BLOOD PRESSURE: 87 MMHG

## 2017-10-14 VITALS — DIASTOLIC BLOOD PRESSURE: 96 MMHG | SYSTOLIC BLOOD PRESSURE: 135 MMHG

## 2017-10-14 VITALS — SYSTOLIC BLOOD PRESSURE: 143 MMHG | DIASTOLIC BLOOD PRESSURE: 83 MMHG

## 2017-10-14 LAB
ANION GAP SERPL CALC-SCNC: 10 MMOL/L (ref 6–14)
BACTERIA #/AREA URNS HPF: (no result) /HPF
BASOPHILS # BLD AUTO: 0 X10^3/UL (ref 0–0.2)
BASOPHILS NFR BLD: 0 % (ref 0–3)
BILIRUB UR QL STRIP: NEGATIVE
BUN SERPL-MCNC: 35 MG/DL (ref 8–26)
CALCIUM SERPL-MCNC: 8.3 MG/DL (ref 8.5–10.1)
CHLORIDE SERPL-SCNC: 105 MMOL/L (ref 98–107)
CO2 SERPL-SCNC: 26 MMOL/L (ref 21–32)
CREAT SERPL-MCNC: 1.3 MG/DL (ref 0.7–1.3)
EOSINOPHIL NFR BLD: 0 % (ref 0–3)
ERYTHROCYTE [DISTWIDTH] IN BLOOD BY AUTOMATED COUNT: 14 % (ref 11.5–14.5)
GFR SERPLBLD BASED ON 1.73 SQ M-ARVRAT: 52.5 ML/MIN
GLUCOSE SERPL-MCNC: 260 MG/DL (ref 70–99)
GLUCOSE UR STRIP-MCNC: 250 MG/DL
HCT VFR BLD CALC: 25.5 % (ref 39–53)
HGB BLD-MCNC: 8.7 G/DL (ref 13–17.5)
LYMPHOCYTES # BLD: 0.9 X10^3/UL (ref 1–4.8)
LYMPHOCYTES NFR BLD AUTO: 8 % (ref 24–48)
MCH RBC QN AUTO: 30 PG (ref 25–35)
MCHC RBC AUTO-ENTMCNC: 34 G/DL (ref 31–37)
MCV RBC AUTO: 86 FL (ref 79–100)
MONOCYTES NFR BLD: 5 % (ref 0–9)
NEUTROPHILS NFR BLD AUTO: 87 % (ref 31–73)
NITRITE UR QL STRIP: NEGATIVE
PH UR STRIP: 5 [PH]
PLATELET # BLD AUTO: 177 X10^3/UL (ref 140–400)
POTASSIUM SERPL-SCNC: 4.4 MMOL/L (ref 3.5–5.1)
PROT UR STRIP-MCNC: NEGATIVE MG/DL
RBC # BLD AUTO: 2.95 X10^6/UL (ref 4.3–5.7)
RBC #/AREA URNS HPF: (no result) /HPF (ref 0–2)
SODIUM SERPL-SCNC: 141 MMOL/L (ref 136–145)
SP GR UR STRIP: 1.01
SQUAMOUS #/AREA URNS LPF: (no result) /LPF
UROBILINOGEN UR-MCNC: 0.2 MG/DL
WBC # BLD AUTO: 11.3 X10^3/UL (ref 4–11)
WBC #/AREA URNS HPF: (no result) /HPF (ref 0–4)
YEAST #/AREA URNS HPF: PRESENT /HPF

## 2017-10-14 PROCEDURE — 05PYX3Z REMOVAL OF INFUSION DEVICE FROM UPPER VEIN, EXTERNAL APPROACH: ICD-10-PCS | Performed by: SURGERY

## 2017-10-14 RX ADMIN — INSULIN ASPART SCH UNITS: 100 INJECTION, SOLUTION INTRAVENOUS; SUBCUTANEOUS at 09:40

## 2017-10-14 RX ADMIN — ENOXAPARIN SODIUM SCH MG: 100 INJECTION SUBCUTANEOUS at 09:08

## 2017-10-14 RX ADMIN — MORPHINE SULFATE PRN MG: 2 INJECTION, SOLUTION INTRAMUSCULAR; INTRAVENOUS at 09:11

## 2017-10-14 RX ADMIN — ENOXAPARIN SODIUM SCH MG: 100 INJECTION SUBCUTANEOUS at 21:23

## 2017-10-14 RX ADMIN — INSULIN ASPART SCH UNITS: 100 INJECTION, SOLUTION INTRAVENOUS; SUBCUTANEOUS at 17:18

## 2017-10-14 RX ADMIN — GLYCERIN, ISOLEUCINE, LEUCINE, LYSINE, METHIONINE, PHENYLALANINE, THREONINE, TRYPTOPHAN, VALINE, ALANINE, GLYCINE, ARGININE, HISTIDINE, PROLINE, SERINE, CYSTEINE, SODIUM ACETATE, MAGNESIUM ACETATE, CALCIUM ACETATE, SODIUM CHLORIDE, POTASSIUM CHLORIDE, PHOSPHORIC ACID, AND POTASSIUM METABISULFITE SCH MLS/HR
3; .21; .27; .22; .16; .17; .12; .046; .2; .21; .42; .29; .085; .34; .18; .014; .2; .054; .026; .12; .15; .041 INJECTION INTRAVENOUS at 01:33

## 2017-10-14 RX ADMIN — FAMOTIDINE SCH MG: 10 INJECTION, SOLUTION INTRAVENOUS at 21:22

## 2017-10-14 RX ADMIN — Medication PRN EACH: at 14:38

## 2017-10-14 RX ADMIN — GLYCERIN, ISOLEUCINE, LEUCINE, LYSINE, METHIONINE, PHENYLALANINE, THREONINE, TRYPTOPHAN, VALINE, ALANINE, GLYCINE, ARGININE, HISTIDINE, PROLINE, SERINE, CYSTEINE, SODIUM ACETATE, MAGNESIUM ACETATE, CALCIUM ACETATE, SODIUM CHLORIDE, POTASSIUM CHLORIDE, PHOSPHORIC ACID, AND POTASSIUM METABISULFITE SCH MLS/HR
3; .21; .27; .22; .16; .17; .12; .046; .2; .21; .42; .29; .085; .34; .18; .014; .2; .054; .026; .12; .15; .041 INJECTION INTRAVENOUS at 14:45

## 2017-10-14 RX ADMIN — PIPERACILLIN SODIUM AND TAZOBACTAM SODIUM SCH MLS/HR: 2; .25 INJECTION, POWDER, LYOPHILIZED, FOR SOLUTION INTRAVENOUS at 21:22

## 2017-10-14 RX ADMIN — METOPROLOL TARTRATE SCH MG: 5 INJECTION INTRAVENOUS at 09:10

## 2017-10-14 RX ADMIN — VANCOMYCIN HYDROCHLORIDE PRN EACH: 1 INJECTION, POWDER, LYOPHILIZED, FOR SOLUTION INTRAVENOUS at 19:24

## 2017-10-14 RX ADMIN — SODIUM CHLORIDE, SODIUM LACTATE, POTASSIUM CHLORIDE, AND CALCIUM CHLORIDE SCH MLS/HR: .6; .31; .03; .02 INJECTION, SOLUTION INTRAVENOUS at 05:49

## 2017-10-14 RX ADMIN — SODIUM CHLORIDE, SODIUM LACTATE, POTASSIUM CHLORIDE, AND CALCIUM CHLORIDE SCH MLS/HR: .6; .31; .03; .02 INJECTION, SOLUTION INTRAVENOUS at 15:49

## 2017-10-14 RX ADMIN — BACITRACIN SCH MLS/HR: 5000 INJECTION, POWDER, FOR SOLUTION INTRAMUSCULAR at 09:10

## 2017-10-14 RX ADMIN — METOPROLOL TARTRATE SCH MG: 5 INJECTION INTRAVENOUS at 17:23

## 2017-10-14 RX ADMIN — INSULIN ASPART SCH UNITS: 100 INJECTION, SOLUTION INTRAVENOUS; SUBCUTANEOUS at 12:00

## 2017-10-14 RX ADMIN — METOPROLOL TARTRATE SCH MG: 5 INJECTION INTRAVENOUS at 12:00

## 2017-10-14 RX ADMIN — SODIUM CHLORIDE, SODIUM LACTATE, POTASSIUM CHLORIDE, AND CALCIUM CHLORIDE SCH MLS/HR: .6; .31; .03; .02 INJECTION, SOLUTION INTRAVENOUS at 01:33

## 2017-10-14 NOTE — PDOC
PROGRESS NOTES


Chief Complaint


Chief Complaint


Gastric outlet obstruction


New DVT Rt arm - PICC in site


Hx perforated ulcer DISTANT PAST


Frailty, gen weakness


FULL code


Fevers





PMH:


DM


HTN


CAD


PUD


CABG





History of Present Illness


History of Present Illness


Some low grade temps last night - could be from DVT?


RT arm seems less swollen


PICC out, just has peripheral IV now left hand


Tolerating TF via J tube fine


ALso on PPN now





No UA, not taking deep breaths


WBC 11, not on abx





ON lovenox BID - OK WITH GS FOR ME TO START NOVEL ORAL AC?





HAd some sepsis/Sirs criteria last night with temp, tachypnea, tachycardia





RN reports no sleep last night





PLAN:


Check UA and CXR re fevers


Encourage IS


ambulate with PT


CBC again shashank


LIkely needs SNU - SW consult


If higher temps then consider BC





If any desats consider r.o PE but unlikely arm DVT migrates to lungs





Start lopressor 5 IV q6 - tachy and high elyssa BP


Benadryl IV for sleep prn





Dw RN





Vitals


Vitals





Vital Signs








  Date Time  Temp Pulse Resp B/P (MAP) Pulse Ox O2 Delivery O2 Flow Rate FiO2


 


10/14/17 07:00 99.0 116 20 149/87 (107) 93 Room Air  





 99.0       











Physical Exam


General:  Alert, Oriented X3, Cooperative, No acute distress


Heart:  Regular rate, Normal S1, Normal S2


Lungs:  Clear


Abdomen:  Soft, Other (drain serosang, dressing dry)


Extremities:  No clubbing, No cyanosis


Skin:  No rashes, No breakdown





Labs


LABS





Laboratory Tests








Test


  10/13/17


17:24 10/14/17


04:15 10/14/17


07:58


 


Glucose (Fingerstick)


  188 mg/dL


(70-99) 


  262 mg/dL


(70-99)


 


White Blood Count


  


  11.3 x10^3/uL


(4.0-11.0) 


 


 


Red Blood Count


  


  2.95 x10^6/uL


(4.30-5.70) 


 


 


Hemoglobin


  


  8.7 g/dL


(13.0-17.5) 


 


 


Hematocrit


  


  25.5 %


(39.0-53.0) 


 


 


Mean Corpuscular Volume  86 fL ()  


 


Mean Corpuscular Hemoglobin  30 pg (25-35)  


 


Mean Corpuscular Hemoglobin


Concent 


  34 g/dL


(31-37) 


 


 


Red Cell Distribution Width


  


  14.0 %


(11.5-14.5) 


 


 


Platelet Count


  


  177 x10^3/uL


(140-400) 


 


 


Neutrophils (%) (Auto)  87 % (31-73)  


 


Lymphocytes (%) (Auto)  8 % (24-48)  


 


Monocytes (%) (Auto)  5 % (0-9)  


 


Eosinophils (%) (Auto)  0 % (0-3)  


 


Basophils (%) (Auto)  0 % (0-3)  


 


Neutrophils # (Auto)


  


  9.8 x10^3uL


(1.8-7.7) 


 


 


Lymphocytes # (Auto)


  


  0.9 x10^3/uL


(1.0-4.8) 


 


 


Monocytes # (Auto)


  


  0.5 x10^3/uL


(0.0-1.1) 


 


 


Eosinophils # (Auto)


  


  0.0 x10^3/uL


(0.0-0.7) 


 


 


Basophils # (Auto)


  


  0.0 x10^3/uL


(0.0-0.2) 


 


 


Sodium Level


  


  141 mmol/L


(136-145) 


 


 


Potassium Level


  


  4.4 mmol/L


(3.5-5.1) 


 


 


Chloride Level


  


  105 mmol/L


() 


 


 


Carbon Dioxide Level


  


  26 mmol/L


(21-32) 


 


 


Anion Gap  10 (6-14)  


 


Blood Urea Nitrogen


  


  35 mg/dL


(8-26) 


 


 


Creatinine


  


  1.3 mg/dL


(0.7-1.3) 


 


 


Estimated GFR


(Cockcroft-Gault) 


  52.5 


  


 


 


Glucose Level


  


  260 mg/dL


(70-99) 


 


 


Calcium Level


  


  8.3 mg/dL


(8.5-10.1) 


 











Review of Systems


Review of Systems


no sleep, no CP, no nausea





Comment


Review of Relevant


I have reviewed the following items milana (where applicable) has been applied.


Labs





Laboratory Tests








Test


  10/12/17


12:23 10/12/17


17:18 10/13/17


07:16 10/13/17


17:24


 


Glucose (Fingerstick)


  154 mg/dL


(70-99) 182 mg/dL


(70-99) 


  188 mg/dL


(70-99)


 


White Blood Count


  


  


  9.4 x10^3/uL


(4.0-11.0) 


 


 


Red Blood Count


  


  


  3.12 x10^6/uL


(4.30-5.70) 


 


 


Hemoglobin


  


  


  9.3 g/dL


(13.0-17.5) 


 


 


Hematocrit


  


  


  27.5 %


(39.0-53.0) 


 


 


Mean Corpuscular Volume   88 fL ()  


 


Mean Corpuscular Hemoglobin   30 pg (25-35)  


 


Mean Corpuscular Hemoglobin


Concent 


  


  34 g/dL


(31-37) 


 


 


Red Cell Distribution Width


  


  


  14.0 %


(11.5-14.5) 


 


 


Platelet Count


  


  


  175 x10^3/uL


(140-400) 


 


 


Neutrophils (%) (Auto)   87 % (31-73)  


 


Lymphocytes (%) (Auto)   8 % (24-48)  


 


Monocytes (%) (Auto)   5 % (0-9)  


 


Eosinophils (%) (Auto)   0 % (0-3)  


 


Basophils (%) (Auto)   0 % (0-3)  


 


Neutrophils # (Auto)


  


  


  8.2 x10^3uL


(1.8-7.7) 


 


 


Lymphocytes # (Auto)


  


  


  0.7 x10^3/uL


(1.0-4.8) 


 


 


Monocytes # (Auto)


  


  


  0.5 x10^3/uL


(0.0-1.1) 


 


 


Eosinophils # (Auto)


  


  


  0.0 x10^3/uL


(0.0-0.7) 


 


 


Basophils # (Auto)


  


  


  0.0 x10^3/uL


(0.0-0.2) 


 


 


Sodium Level


  


  


  142 mmol/L


(136-145) 


 


 


Potassium Level


  


  


  4.3 mmol/L


(3.5-5.1) 


 


 


Chloride Level


  


  


  107 mmol/L


() 


 


 


Carbon Dioxide Level


  


  


  26 mmol/L


(21-32) 


 


 


Anion Gap   9 (6-14)  


 


Blood Urea Nitrogen


  


  


  32 mg/dL


(8-26) 


 


 


Creatinine


  


  


  1.4 mg/dL


(0.7-1.3) 


 


 


Estimated GFR


(Cockcroft-Gault) 


  


  48.2 


  


 


 


Glucose Level


  


  


  170 mg/dL


(70-99) 


 


 


Calcium Level


  


  


  8.2 mg/dL


(8.5-10.1) 


 


 


Test


  10/14/17


04:15 10/14/17


07:58 


  


 


 


White Blood Count


  11.3 x10^3/uL


(4.0-11.0) 


  


  


 


 


Red Blood Count


  2.95 x10^6/uL


(4.30-5.70) 


  


  


 


 


Hemoglobin


  8.7 g/dL


(13.0-17.5) 


  


  


 


 


Hematocrit


  25.5 %


(39.0-53.0) 


  


  


 


 


Mean Corpuscular Volume 86 fL ()    


 


Mean Corpuscular Hemoglobin 30 pg (25-35)    


 


Mean Corpuscular Hemoglobin


Concent 34 g/dL


(31-37) 


  


  


 


 


Red Cell Distribution Width


  14.0 %


(11.5-14.5) 


  


  


 


 


Platelet Count


  177 x10^3/uL


(140-400) 


  


  


 


 


Neutrophils (%) (Auto) 87 % (31-73)    


 


Lymphocytes (%) (Auto) 8 % (24-48)    


 


Monocytes (%) (Auto) 5 % (0-9)    


 


Eosinophils (%) (Auto) 0 % (0-3)    


 


Basophils (%) (Auto) 0 % (0-3)    


 


Neutrophils # (Auto)


  9.8 x10^3uL


(1.8-7.7) 


  


  


 


 


Lymphocytes # (Auto)


  0.9 x10^3/uL


(1.0-4.8) 


  


  


 


 


Monocytes # (Auto)


  0.5 x10^3/uL


(0.0-1.1) 


  


  


 


 


Eosinophils # (Auto)


  0.0 x10^3/uL


(0.0-0.7) 


  


  


 


 


Basophils # (Auto)


  0.0 x10^3/uL


(0.0-0.2) 


  


  


 


 


Sodium Level


  141 mmol/L


(136-145) 


  


  


 


 


Potassium Level


  4.4 mmol/L


(3.5-5.1) 


  


  


 


 


Chloride Level


  105 mmol/L


() 


  


  


 


 


Carbon Dioxide Level


  26 mmol/L


(21-32) 


  


  


 


 


Anion Gap 10 (6-14)    


 


Blood Urea Nitrogen


  35 mg/dL


(8-26) 


  


  


 


 


Creatinine


  1.3 mg/dL


(0.7-1.3) 


  


  


 


 


Estimated GFR


(Cockcroft-Gault) 52.5 


  


  


  


 


 


Glucose Level


  260 mg/dL


(70-99) 


  


  


 


 


Calcium Level


  8.3 mg/dL


(8.5-10.1) 


  


  


 


 


Glucose (Fingerstick)


  


  262 mg/dL


(70-99) 


  


 








Laboratory Tests








Test


  10/13/17


17:24 10/14/17


04:15 10/14/17


07:58


 


Glucose (Fingerstick)


  188 mg/dL


(70-99) 


  262 mg/dL


(70-99)


 


White Blood Count


  


  11.3 x10^3/uL


(4.0-11.0) 


 


 


Red Blood Count


  


  2.95 x10^6/uL


(4.30-5.70) 


 


 


Hemoglobin


  


  8.7 g/dL


(13.0-17.5) 


 


 


Hematocrit


  


  25.5 %


(39.0-53.0) 


 


 


Mean Corpuscular Volume  86 fL ()  


 


Mean Corpuscular Hemoglobin  30 pg (25-35)  


 


Mean Corpuscular Hemoglobin


Concent 


  34 g/dL


(31-37) 


 


 


Red Cell Distribution Width


  


  14.0 %


(11.5-14.5) 


 


 


Platelet Count


  


  177 x10^3/uL


(140-400) 


 


 


Neutrophils (%) (Auto)  87 % (31-73)  


 


Lymphocytes (%) (Auto)  8 % (24-48)  


 


Monocytes (%) (Auto)  5 % (0-9)  


 


Eosinophils (%) (Auto)  0 % (0-3)  


 


Basophils (%) (Auto)  0 % (0-3)  


 


Neutrophils # (Auto)


  


  9.8 x10^3uL


(1.8-7.7) 


 


 


Lymphocytes # (Auto)


  


  0.9 x10^3/uL


(1.0-4.8) 


 


 


Monocytes # (Auto)


  


  0.5 x10^3/uL


(0.0-1.1) 


 


 


Eosinophils # (Auto)


  


  0.0 x10^3/uL


(0.0-0.7) 


 


 


Basophils # (Auto)


  


  0.0 x10^3/uL


(0.0-0.2) 


 


 


Sodium Level


  


  141 mmol/L


(136-145) 


 


 


Potassium Level


  


  4.4 mmol/L


(3.5-5.1) 


 


 


Chloride Level


  


  105 mmol/L


() 


 


 


Carbon Dioxide Level


  


  26 mmol/L


(21-32) 


 


 


Anion Gap  10 (6-14)  


 


Blood Urea Nitrogen


  


  35 mg/dL


(8-26) 


 


 


Creatinine


  


  1.3 mg/dL


(0.7-1.3) 


 


 


Estimated GFR


(Cockcroft-Gault) 


  52.5 


  


 


 


Glucose Level


  


  260 mg/dL


(70-99) 


 


 


Calcium Level


  


  8.3 mg/dL


(8.5-10.1) 


 








Medications





Current Medications


Ondansetron HCl (Zofran) 4 mg PRN Q6HRS  PRN IV NAUSEA/VOMITING;  Start 10/10/

17 at 07:00;  Stop 10/11/17 at 06:59;  Status DC


Fentanyl Citrate (Fentanyl 2ml Vial) 25 mcg PRN Q5MIN  PRN IV MILD PAIN Last 

administered on 10/10/17at 14:08;  Start 10/10/17 at 07:00;  Stop 10/11/17 at 06

:59;  Status DC


Fentanyl Citrate (Fentanyl 2ml Vial) 50 mcg PRN Q5MIN  PRN IV MODERATE PAIN;  

Start 10/10/17 at 07:00;  Stop 10/11/17 at 06:59;  Status DC


Morphine Sulfate 1 mg PRN Q10MIN  PRN IV SEVERE PAIN;  Start 10/10/17 at 07:00;

  Stop 10/11/17 at 06:59;  Status DC


Ringer's Solution 1,000 ml @  0 mls/hr Q0M IV  Last administered on 10/10/17at 

13:45;  Start 10/10/17 at 07:00;  Stop 10/10/17 at 18:59;  Status DC


Lidocaine HCl (Xylocaine-Mpf 1% Vial) 2 ml PRN 1X  PRN ID PRIOR TO IV START;  

Start 10/10/17 at 07:00;  Stop 10/11/17 at 06:59;  Status DC


Hydromorphone HCl (Dilaudid) 0.5 mg PRN Q10MIN  PRN IV SEV PAIN, Second choice;

  Start 10/10/17 at 07:00;  Stop 10/11/17 at 06:59;  Status DC


Prochlorperazine Edisylate (Compazine) 5 mg PACU PRN  PRN IV NAUSEA, MRX1;  

Start 10/10/17 at 07:00;  Stop 10/11/17 at 06:59;  Status DC


Cefazolin Sodium/ Dextrose 50 ml @  100 mls/hr 1X PREOP  PRN IV PRIOR TO 

PROCEDURE;  Start 10/10/17 at 06:00;  Stop 10/10/17 at 18:00;  Status DC


Cefoxitin Sodium 2 gm/Sodium Chloride 100 ml @  200 mls/hr 1X  ONCE IV ;  Start 

10/10/17 at 07:00;  Stop 10/10/17 at 07:29;  Status UNV


Cefoxitin Sodium 100 ml @  200 mls/hr ONCE  ONCE IV  Last administered on 10/10/

17at 08:16;  Start 10/10/17 at 07:15;  Stop 10/10/17 at 07:44;  Status DC


Lidocaine HCl (Lidocaine Pf 2% Vial) 5 ml STK-MED ONCE .ROUTE ;  Start 10/10/17 

at 07:29;  Stop 10/10/17 at 07:30;  Status DC


Etomidate (Amidate) 20 mg STK-MED ONCE IV ;  Start 10/10/17 at 07:29;  Stop 10/

10/17 at 07:30;  Status DC


Fentanyl Citrate (Fentanyl 2ml Vial) 100 mcg STK-MED ONCE .ROUTE ;  Start 10/10/

17 at 07:29;  Stop 10/10/17 at 07:30;  Status DC


Rocuronium Bromide (Zemuron) 100 mg STK-MED ONCE .ROUTE ;  Start 10/10/17 at 07:

29;  Stop 10/10/17 at 07:30;  Status DC


Dexamethasone Sodium Phosphate (Decadron) 20 mg STK-MED ONCE .ROUTE ;  Start 10/

10/17 at 08:16;  Stop 10/10/17 at 08:17;  Status DC


Ondansetron HCl (Zofran) 4 mg STK-MED ONCE .ROUTE ;  Start 10/10/17 at 08:16;  

Stop 10/10/17 at 08:17;  Status DC


Esmolol HCl (Brevibloc) 100 mg STK-MED ONCE IV ;  Start 10/10/17 at 08:16;  

Stop 10/10/17 at 08:17;  Status DC


Phenylephrine HCl (Tanvir-Synephrine Inj) 10 mg STK-MED ONCE .ROUTE ;  Start 10/10/

17 at 08:16;  Stop 10/10/17 at 08:17;  Status DC


Fentanyl Citrate (Fentanyl 2ml Vial) 100 mcg STK-MED ONCE .ROUTE ;  Start 10/10/

17 at 08:17;  Stop 10/10/17 at 08:18;  Status DC


Labetalol HCl (Normodyne) 20 mg STK-MED ONCE .ROUTE ;  Start 10/10/17 at 08:50;

  Stop 10/10/17 at 08:51;  Status DC


Fentanyl Citrate (Fentanyl 2ml Vial) 100 mcg STK-MED ONCE .ROUTE ;  Start 10/10/

17 at 09:21;  Stop 10/10/17 at 09:22;  Status DC


Rocuronium Bromide (Zemuron) 100 mg STK-MED ONCE .ROUTE ;  Start 10/10/17 at 09:

30;  Stop 10/10/17 at 09:31;  Status DC


Cellulose 1 each STK-MED ONCE .ROUTE  Last administered on 10/10/17at 10:57;  

Start 10/10/17 at 09:57;  Stop 10/10/17 at 10:57;  Status DC


Albumin Human 500 ml @  As Directed STK-MED ONCE IV ;  Start 10/10/17 at 11:06;

  Stop 10/10/17 at 11:07;  Status DC


Neostigmine Methylsulfate (Bloxiverz) 10 mg STK-MED ONCE .ROUTE ;  Start 10/10/

17 at 11:20;  Stop 10/10/17 at 11:21;  Status DC


Glycopyrrolate (Robinul) 1 mg STK-MED ONCE .ROUTE ;  Start 10/10/17 at 11:20;  

Stop 10/10/17 at 11:21;  Status DC


Famotidine (Pepcid) 20 mg QHS IVP  Last administered on 10/13/17at 21:04;  

Start 10/10/17 at 21:00


Enoxaparin Sodium (Lovenox 30mg Syringe) 30 mg Q24H SQ  Last administered on 10/

12/17at 08:13;  Start 10/11/17 at 08:00;  Stop 10/12/17 at 10:41;  Status DC


Sodium Chloride (Normal Saline Flush) 3 ml QSHIFT  PRN IV AFTER MEDS AND BLOOD 

DRAWS;  Start 10/10/17 at 12:00


Ringer's Solution 1,000 ml @  100 mls/hr Q10H IV  Last administered on 10/14/

17at 01:33;  Start 10/10/17 at 11:49


Naloxone HCl (Narcan) 0.4 mg PRN Q2MIN  PRN IV SEE INSTRUCTIONS;  Start 10/10/

17 at 12:00


Sodium Chloride 1,000 ml @  25 mls/hr Q24H IV  Last administered on 10/10/17at 

11:49;  Start 10/10/17 at 11:49


Morphine Sulfate 30 ml @ 0 mls/hr CONT PRN  PRN IV PROTOCOL Last administered 

on 10/10/17at 15:13;  Start 10/10/17 at 12:00;  Stop 10/13/17 at 11:04;  Status 

DC


Ondansetron HCl (Zofran) 4 mg PRN Q6HRS  PRN IV NAUESA, 1ST CHOICE;  Start 10/10

/17 at 12:00


Insulin Aspart (NovoLOG VIAL) 100 unit STK-MED ONCE SQ ;  Start 10/10/17 at 14:

01;  Stop 10/10/17 at 14:02;  Status DC


Insulin Aspart (NovoLOG VIAL) 3 unit 1X  ONCE SQ  Last administered on 10/10/

17at 13:58;  Start 10/10/17 at 14:15;  Stop 10/10/17 at 14:16;  Status DC


Pneumococcal Polyvalent Vaccine (Do NOT chart on this placeholder)  1X  ONCE MC 

;  Start 10/10/17 at 16:15;  Stop 10/10/17 at 16:16;  Status UNV


Albuterol/ Ipratropium (Duoneb) 3 ml PRN Q6HRS  PRN NEB SHORTNESS OF BREATH;  

Start 10/11/17 at 09:30;  Stop 10/11/17 at 09:35;  Status DC


Albuterol Sulfate (Ventolin Neb Soln) 2.5 mg PRN Q4HRS  PRN NEB SHORTNESS OF 

BREATH;  Start 10/11/17 at 09:30


Insulin Aspart (NovoLOG) 0-9 UNITS TIDWMEALS SQ  Last administered on 10/13/

17at 18:00;  Start 10/11/17 at 12:00;  Stop 10/13/17 at 18:58;  Status DC


Dextrose (Dextrose 50%-Water Syringe) 12.5 gm PRN Q15MIN  PRN IV SEE COMMENTS;  

Start 10/11/17 at 09:30;  Stop 10/13/17 at 18:58;  Status DC


Enoxaparin Sodium (Lovenox 40mg Syringe) 40 mg Q24H SQ ;  Start 10/13/17 at 08:

00;  Stop 10/13/17 at 08:00;  Status DC


Enoxaparin Sodium (Lovenox 60mg Syringe) 60 mg Q12HR SQ  Last administered on 10

/13/17at 21:07;  Start 10/12/17 at 20:00


Info 1 each PRN DAILY  PRN MC SEE COMMENTS;  Start 10/12/17 at 22:30;  Status 

Cancel


Morphine Sulfate 2 mg PRN Q2HR  PRN IV PAIN Last administered on 10/13/17at 16:

39;  Start 10/13/17 at 11:00


Amino Acids/ Glycerin/ Electrolytes 1,000 ml @  80 mls/hr [id number] IV  Last 

administered on 10/14/17at 01:33;  Start 10/13/17 at 13:45





Active Scripts


Active


Reported


[Lidocaine]   15 Ml PO Q6HRS PRN


[Ondansetron]   4 Mg IV PRN Q4HRS PRN


[Pepcid Suspension]   20 IV BID


Duoneb 0.5-3(2.5) Mg/3 Ml (Albuterol/Ipratropium) 3 Ml Ampul.neb 3 Ml NEB PRN 

Q6HRS PRN


Novolog (Insulin Aspart) 100 Unit/1 Ml Cartridge 0 SQ Q6HRS


Vitals/I & O





Vital Sign - Last 24 Hours








 10/13/17 10/13/17 10/13/17 10/13/17





 11:00 15:00 16:39 17:19


 


Temp 99.2 98.5  





 99.2 98.5  


 


Pulse 114 112  


 


Resp 18 18  


 


B/P (MAP) 107/94 (98) 137/84 (101)  


 


Pulse Ox 94 95  


 


O2 Delivery Room Air Room Air Room Air Room Air


 


    





    





 10/13/17 10/13/17 10/13/17 10/14/17





 19:00 19:20 23:00 03:00


 


Temp 98.8  98.2 98.1





 98.8  98.2 98.1


 


Pulse 105  103 109


 


Resp 18  18 18


 


B/P (MAP) 135/76 (95)  143/82 (102) 143/83 (103)


 


Pulse Ox 95  96 93


 


O2 Delivery Room Air Room Air Room Air Room Air


 


    





    





 10/14/17   





 07:00   


 


Temp 99.0   





 99.0   


 


Pulse 116   


 


Resp 20   


 


B/P (MAP) 149/87 (107)   


 


Pulse Ox 93   


 


O2 Delivery Room Air   











Nutrition Consultation


Dietary Evaluation:


Recommendations by RD:  PPN/TPN


Comments:  


REC resume when able TF via J tube:





Diabetisource AC @10 ml/hr, increase 10 ml/hr q8 hrs to goal of 50 ml/hr 


w/100 ml flushes q6 hrs





STD TPN at this time per MD


Expected Outcomes/Goals:  


tolerate TF at goal





Malnutrition Findings:


Food and Nutrition Intake (Mod:  <75% est energy req 7days


Body Fat Depletion (Non Severe:  Mild Depletion


Weight Status:  Underweight











CAROLINA CLARK MD Oct 14, 2017 08:52

## 2017-10-14 NOTE — PDOC
GIN INNO APRN 10/14/17 1212:


SURGICAL PROGRESS NOTE


Subjective


tolerating NG tube clamping


tolerating tube feeds


Vital Signs





Vital Signs








  Date Time  Temp Pulse Resp B/P (MAP) Pulse Ox O2 Delivery O2 Flow Rate FiO2


 


10/14/17 11:00 98.8 103 18 140/77 (98) 97 Room Air  





 98.8       


 


10/14/17 10:37       3.0 








I&O











Intake and Output 


 


 10/15/17





 07:00


 


Output Total 100 ml


 


Balance -100 ml


 


 


 


Output Urine Total 100 ml








General:  Alert, Cooperative


HEENT:  Other (NG clamped )


Abdomen:  Soft, Other (dressing dry, KARTHIK serosang )


Labs





Laboratory Tests








Test


  10/12/17


12:23 10/12/17


17:18 10/13/17


07:16 10/13/17


17:24


 


Glucose (Fingerstick)


  154 mg/dL


(70-99) 182 mg/dL


(70-99) 


  188 mg/dL


(70-99)


 


White Blood Count


  


  


  9.4 x10^3/uL


(4.0-11.0) 


 


 


Red Blood Count


  


  


  3.12 x10^6/uL


(4.30-5.70) 


 


 


Hemoglobin


  


  


  9.3 g/dL


(13.0-17.5) 


 


 


Hematocrit


  


  


  27.5 %


(39.0-53.0) 


 


 


Mean Corpuscular Volume   88 fL ()  


 


Mean Corpuscular Hemoglobin   30 pg (25-35)  


 


Mean Corpuscular Hemoglobin


Concent 


  


  34 g/dL


(31-37) 


 


 


Red Cell Distribution Width


  


  


  14.0 %


(11.5-14.5) 


 


 


Platelet Count


  


  


  175 x10^3/uL


(140-400) 


 


 


Neutrophils (%) (Auto)   87 % (31-73)  


 


Lymphocytes (%) (Auto)   8 % (24-48)  


 


Monocytes (%) (Auto)   5 % (0-9)  


 


Eosinophils (%) (Auto)   0 % (0-3)  


 


Basophils (%) (Auto)   0 % (0-3)  


 


Neutrophils # (Auto)


  


  


  8.2 x10^3uL


(1.8-7.7) 


 


 


Lymphocytes # (Auto)


  


  


  0.7 x10^3/uL


(1.0-4.8) 


 


 


Monocytes # (Auto)


  


  


  0.5 x10^3/uL


(0.0-1.1) 


 


 


Eosinophils # (Auto)


  


  


  0.0 x10^3/uL


(0.0-0.7) 


 


 


Basophils # (Auto)


  


  


  0.0 x10^3/uL


(0.0-0.2) 


 


 


Sodium Level


  


  


  142 mmol/L


(136-145) 


 


 


Potassium Level


  


  


  4.3 mmol/L


(3.5-5.1) 


 


 


Chloride Level


  


  


  107 mmol/L


() 


 


 


Carbon Dioxide Level


  


  


  26 mmol/L


(21-32) 


 


 


Anion Gap   9 (6-14)  


 


Blood Urea Nitrogen


  


  


  32 mg/dL


(8-26) 


 


 


Creatinine


  


  


  1.4 mg/dL


(0.7-1.3) 


 


 


Estimated GFR


(Cockcroft-Gault) 


  


  48.2 


  


 


 


Glucose Level


  


  


  170 mg/dL


(70-99) 


 


 


Calcium Level


  


  


  8.2 mg/dL


(8.5-10.1) 


 


 


Test


  10/14/17


04:15 10/14/17


07:58 10/14/17


10:40 10/14/17


11:32


 


White Blood Count


  11.3 x10^3/uL


(4.0-11.0) 


  


  


 


 


Red Blood Count


  2.95 x10^6/uL


(4.30-5.70) 


  


  


 


 


Hemoglobin


  8.7 g/dL


(13.0-17.5) 


  


  


 


 


Hematocrit


  25.5 %


(39.0-53.0) 


  


  


 


 


Mean Corpuscular Volume 86 fL ()    


 


Mean Corpuscular Hemoglobin 30 pg (25-35)    


 


Mean Corpuscular Hemoglobin


Concent 34 g/dL


(31-37) 


  


  


 


 


Red Cell Distribution Width


  14.0 %


(11.5-14.5) 


  


  


 


 


Platelet Count


  177 x10^3/uL


(140-400) 


  


  


 


 


Neutrophils (%) (Auto) 87 % (31-73)    


 


Lymphocytes (%) (Auto) 8 % (24-48)    


 


Monocytes (%) (Auto) 5 % (0-9)    


 


Eosinophils (%) (Auto) 0 % (0-3)    


 


Basophils (%) (Auto) 0 % (0-3)    


 


Neutrophils # (Auto)


  9.8 x10^3uL


(1.8-7.7) 


  


  


 


 


Lymphocytes # (Auto)


  0.9 x10^3/uL


(1.0-4.8) 


  


  


 


 


Monocytes # (Auto)


  0.5 x10^3/uL


(0.0-1.1) 


  


  


 


 


Eosinophils # (Auto)


  0.0 x10^3/uL


(0.0-0.7) 


  


  


 


 


Basophils # (Auto)


  0.0 x10^3/uL


(0.0-0.2) 


  


  


 


 


Sodium Level


  141 mmol/L


(136-145) 


  


  


 


 


Potassium Level


  4.4 mmol/L


(3.5-5.1) 


  


  


 


 


Chloride Level


  105 mmol/L


() 


  


  


 


 


Carbon Dioxide Level


  26 mmol/L


(21-32) 


  


  


 


 


Anion Gap 10 (6-14)    


 


Blood Urea Nitrogen


  35 mg/dL


(8-26) 


  


  


 


 


Creatinine


  1.3 mg/dL


(0.7-1.3) 


  


  


 


 


Estimated GFR


(Cockcroft-Gault) 52.5 


  


  


  


 


 


Glucose Level


  260 mg/dL


(70-99) 


  


  


 


 


Calcium Level


  8.3 mg/dL


(8.5-10.1) 


  


  


 


 


Glucose (Fingerstick)


  


  262 mg/dL


(70-99) 


  207 mg/dL


(70-99)


 


Urine Collection Type   Unknown  


 


Urine Color   Yellow  


 


Urine Clarity   Clear  


 


Urine pH   5.0  


 


Urine Specific Gravity   1.010  


 


Urine Protein


  


  


  Negative mg/dL


(NEG-TRACE) 


 


 


Urine Glucose (UA)


  


  


  250 mg/dL


(NEG) 


 


 


Urine Ketones (Stick)


  


  


  Trace mg/dL


(NEG) 


 


 


Urine Blood   Small (NEG)  


 


Urine Nitrite   Negative (NEG)  


 


Urine Bilirubin   Negative (NEG)  


 


Urine Urobilinogen Dipstick


  


  


  0.2 mg/dL (0.2


mg/dL) 


 


 


Urine Leukocyte Esterase   Trace (NEG)  


 


Urine RBC   1-2 /HPF (0-2)  


 


Urine WBC


  


  


  5-10 /HPF


(0-4) 


 


 


Urine Squamous Epithelial


Cells 


  


  Few /LPF 


  


 


 


Urine Amorphous Sediment   Present /HPF  


 


Urine Bacteria


  


  


  Few /HPF


(0-FEW) 


 


 


Urine Mucus   Mod /LPF  


 


Urine Yeast   Present /HPF  








Laboratory Tests








Test


  10/13/17


17:24 10/14/17


04:15 10/14/17


07:58 10/14/17


10:40


 


Glucose (Fingerstick)


  188 mg/dL


(70-99) 


  262 mg/dL


(70-99) 


 


 


White Blood Count


  


  11.3 x10^3/uL


(4.0-11.0) 


  


 


 


Red Blood Count


  


  2.95 x10^6/uL


(4.30-5.70) 


  


 


 


Hemoglobin


  


  8.7 g/dL


(13.0-17.5) 


  


 


 


Hematocrit


  


  25.5 %


(39.0-53.0) 


  


 


 


Mean Corpuscular Volume  86 fL ()   


 


Mean Corpuscular Hemoglobin  30 pg (25-35)   


 


Mean Corpuscular Hemoglobin


Concent 


  34 g/dL


(31-37) 


  


 


 


Red Cell Distribution Width


  


  14.0 %


(11.5-14.5) 


  


 


 


Platelet Count


  


  177 x10^3/uL


(140-400) 


  


 


 


Neutrophils (%) (Auto)  87 % (31-73)   


 


Lymphocytes (%) (Auto)  8 % (24-48)   


 


Monocytes (%) (Auto)  5 % (0-9)   


 


Eosinophils (%) (Auto)  0 % (0-3)   


 


Basophils (%) (Auto)  0 % (0-3)   


 


Neutrophils # (Auto)


  


  9.8 x10^3uL


(1.8-7.7) 


  


 


 


Lymphocytes # (Auto)


  


  0.9 x10^3/uL


(1.0-4.8) 


  


 


 


Monocytes # (Auto)


  


  0.5 x10^3/uL


(0.0-1.1) 


  


 


 


Eosinophils # (Auto)


  


  0.0 x10^3/uL


(0.0-0.7) 


  


 


 


Basophils # (Auto)


  


  0.0 x10^3/uL


(0.0-0.2) 


  


 


 


Sodium Level


  


  141 mmol/L


(136-145) 


  


 


 


Potassium Level


  


  4.4 mmol/L


(3.5-5.1) 


  


 


 


Chloride Level


  


  105 mmol/L


() 


  


 


 


Carbon Dioxide Level


  


  26 mmol/L


(21-32) 


  


 


 


Anion Gap  10 (6-14)   


 


Blood Urea Nitrogen


  


  35 mg/dL


(8-26) 


  


 


 


Creatinine


  


  1.3 mg/dL


(0.7-1.3) 


  


 


 


Estimated GFR


(Cockcroft-Gault) 


  52.5 


  


  


 


 


Glucose Level


  


  260 mg/dL


(70-99) 


  


 


 


Calcium Level


  


  8.3 mg/dL


(8.5-10.1) 


  


 


 


Urine Collection Type    Unknown 


 


Urine Color    Yellow 


 


Urine Clarity    Clear 


 


Urine pH    5.0 


 


Urine Specific Gravity    1.010 


 


Urine Protein


  


  


  


  Negative mg/dL


(NEG-TRACE)


 


Urine Glucose (UA)


  


  


  


  250 mg/dL


(NEG)


 


Urine Ketones (Stick)


  


  


  


  Trace mg/dL


(NEG)


 


Urine Blood    Small (NEG) 


 


Urine Nitrite    Negative (NEG) 


 


Urine Bilirubin    Negative (NEG) 


 


Urine Urobilinogen Dipstick


  


  


  


  0.2 mg/dL (0.2


mg/dL)


 


Urine Leukocyte Esterase    Trace (NEG) 


 


Urine RBC    1-2 /HPF (0-2) 


 


Urine WBC


  


  


  


  5-10 /HPF


(0-4)


 


Urine Squamous Epithelial


Cells 


  


  


  Few /LPF 


 


 


Urine Amorphous Sediment    Present /HPF 


 


Urine Bacteria


  


  


  


  Few /HPF


(0-FEW)


 


Urine Mucus    Mod /LPF 


 


Urine Yeast    Present /HPF 


 


Test


  10/14/17


11:32 


  


  


 


 


Glucose (Fingerstick)


  207 mg/dL


(70-99) 


  


  


 








Assessment/Plan


ok to remove NG, sips/chips today


continue TF


Problems:  





MAINE DALTON MD 10/14/17 1516:


SURGICAL PROGRESS NOTE


Assessment/Plan


pt seen


happier with NG out


family expresses concerns over mental status


feel this is reflective of his hospitalization, meds


will follow


Problems:  











GIN NINO Oct 14, 2017 12:12


MAINE DALTON MD Oct 14, 2017 15:16

## 2017-10-14 NOTE — RAD
Portable AP upright view CXR:



Clinical indications: Postoperative fever.



Comparison: September 21, 2017.



Findings: [Old granulomatous disease is seen. No acute lung infiltrate or

pleural effusion or pulmonary edema or lung mass or pneumothorax is seen.  The

heart size, pulmonary vasculature, mediastinum and both gwen are stable.



Impression: No acute radiographic abnormality is seen.

## 2017-10-15 VITALS — SYSTOLIC BLOOD PRESSURE: 131 MMHG | DIASTOLIC BLOOD PRESSURE: 77 MMHG

## 2017-10-15 VITALS — DIASTOLIC BLOOD PRESSURE: 79 MMHG | SYSTOLIC BLOOD PRESSURE: 134 MMHG

## 2017-10-15 VITALS — SYSTOLIC BLOOD PRESSURE: 134 MMHG | DIASTOLIC BLOOD PRESSURE: 75 MMHG

## 2017-10-15 VITALS — SYSTOLIC BLOOD PRESSURE: 145 MMHG | DIASTOLIC BLOOD PRESSURE: 88 MMHG

## 2017-10-15 VITALS — DIASTOLIC BLOOD PRESSURE: 78 MMHG | SYSTOLIC BLOOD PRESSURE: 137 MMHG

## 2017-10-15 VITALS — DIASTOLIC BLOOD PRESSURE: 79 MMHG | SYSTOLIC BLOOD PRESSURE: 140 MMHG

## 2017-10-15 LAB
ANION GAP SERPL CALC-SCNC: 8 MMOL/L (ref 6–14)
BASOPHILS # BLD AUTO: 0 X10^3/UL (ref 0–0.2)
BASOPHILS NFR BLD: 0 % (ref 0–3)
BUN SERPL-MCNC: 38 MG/DL (ref 8–26)
CALCIUM SERPL-MCNC: 8.3 MG/DL (ref 8.5–10.1)
CHLORIDE SERPL-SCNC: 105 MMOL/L (ref 98–107)
CO2 SERPL-SCNC: 27 MMOL/L (ref 21–32)
CREAT SERPL-MCNC: 1.4 MG/DL (ref 0.7–1.3)
EOSINOPHIL NFR BLD: 0 % (ref 0–3)
ERYTHROCYTE [DISTWIDTH] IN BLOOD BY AUTOMATED COUNT: 14.1 % (ref 11.5–14.5)
GFR SERPLBLD BASED ON 1.73 SQ M-ARVRAT: 48.2 ML/MIN
GLUCOSE SERPL-MCNC: 143 MG/DL (ref 70–99)
HCT VFR BLD CALC: 24.5 % (ref 39–53)
HGB BLD-MCNC: 8.1 G/DL (ref 13–17.5)
LYMPHOCYTES # BLD: 1.3 X10^3/UL (ref 1–4.8)
LYMPHOCYTES NFR BLD AUTO: 10 % (ref 24–48)
MCH RBC QN AUTO: 29 PG (ref 25–35)
MCHC RBC AUTO-ENTMCNC: 33 G/DL (ref 31–37)
MCV RBC AUTO: 87 FL (ref 79–100)
MONOCYTES NFR BLD: 5 % (ref 0–9)
NEUTROPHILS NFR BLD AUTO: 85 % (ref 31–73)
PLATELET # BLD AUTO: 183 X10^3/UL (ref 140–400)
POTASSIUM SERPL-SCNC: 4.4 MMOL/L (ref 3.5–5.1)
RBC # BLD AUTO: 2.81 X10^6/UL (ref 4.3–5.7)
SODIUM SERPL-SCNC: 140 MMOL/L (ref 136–145)
WBC # BLD AUTO: 12.4 X10^3/UL (ref 4–11)

## 2017-10-15 RX ADMIN — PIPERACILLIN SODIUM AND TAZOBACTAM SODIUM SCH MLS/HR: 2; .25 INJECTION, POWDER, LYOPHILIZED, FOR SOLUTION INTRAVENOUS at 17:30

## 2017-10-15 RX ADMIN — PIPERACILLIN SODIUM AND TAZOBACTAM SODIUM SCH MLS/HR: 2; .25 INJECTION, POWDER, LYOPHILIZED, FOR SOLUTION INTRAVENOUS at 12:59

## 2017-10-15 RX ADMIN — METOPROLOL TARTRATE SCH MG: 5 INJECTION INTRAVENOUS at 06:00

## 2017-10-15 RX ADMIN — GLYCERIN, ISOLEUCINE, LEUCINE, LYSINE, METHIONINE, PHENYLALANINE, THREONINE, TRYPTOPHAN, VALINE, ALANINE, GLYCINE, ARGININE, HISTIDINE, PROLINE, SERINE, CYSTEINE, SODIUM ACETATE, MAGNESIUM ACETATE, CALCIUM ACETATE, SODIUM CHLORIDE, POTASSIUM CHLORIDE, PHOSPHORIC ACID, AND POTASSIUM METABISULFITE SCH MLS/HR
3; .21; .27; .22; .16; .17; .12; .046; .2; .21; .42; .29; .085; .34; .18; .014; .2; .054; .026; .12; .15; .041 INJECTION INTRAVENOUS at 15:52

## 2017-10-15 RX ADMIN — METOPROLOL TARTRATE SCH MG: 5 INJECTION INTRAVENOUS at 12:57

## 2017-10-15 RX ADMIN — INSULIN ASPART SCH UNITS: 100 INJECTION, SOLUTION INTRAVENOUS; SUBCUTANEOUS at 13:08

## 2017-10-15 RX ADMIN — GLYCERIN, ISOLEUCINE, LEUCINE, LYSINE, METHIONINE, PHENYLALANINE, THREONINE, TRYPTOPHAN, VALINE, ALANINE, GLYCINE, ARGININE, HISTIDINE, PROLINE, SERINE, CYSTEINE, SODIUM ACETATE, MAGNESIUM ACETATE, CALCIUM ACETATE, SODIUM CHLORIDE, POTASSIUM CHLORIDE, PHOSPHORIC ACID, AND POTASSIUM METABISULFITE SCH MLS/HR
3; .21; .27; .22; .16; .17; .12; .046; .2; .21; .42; .29; .085; .34; .18; .014; .2; .054; .026; .12; .15; .041 INJECTION INTRAVENOUS at 02:54

## 2017-10-15 RX ADMIN — ENOXAPARIN SODIUM SCH MG: 100 INJECTION SUBCUTANEOUS at 08:49

## 2017-10-15 RX ADMIN — RIVAROXABAN SCH MG: 15 TABLET, FILM COATED ORAL at 21:20

## 2017-10-15 RX ADMIN — PIPERACILLIN SODIUM AND TAZOBACTAM SODIUM SCH MLS/HR: 2; .25 INJECTION, POWDER, LYOPHILIZED, FOR SOLUTION INTRAVENOUS at 05:56

## 2017-10-15 RX ADMIN — VANCOMYCIN HYDROCHLORIDE PRN EACH: 1 INJECTION, POWDER, LYOPHILIZED, FOR SOLUTION INTRAVENOUS at 14:33

## 2017-10-15 RX ADMIN — Medication PRN EACH: at 14:22

## 2017-10-15 RX ADMIN — FLUCONAZOLE SCH MLS/HR: 2 INJECTION, SOLUTION INTRAVENOUS at 15:50

## 2017-10-15 RX ADMIN — SODIUM CHLORIDE, SODIUM LACTATE, POTASSIUM CHLORIDE, AND CALCIUM CHLORIDE SCH MLS/HR: .6; .31; .03; .02 INJECTION, SOLUTION INTRAVENOUS at 01:49

## 2017-10-15 RX ADMIN — INSULIN ASPART SCH UNITS: 100 INJECTION, SOLUTION INTRAVENOUS; SUBCUTANEOUS at 08:57

## 2017-10-15 RX ADMIN — METOPROLOL TARTRATE SCH MG: 5 INJECTION INTRAVENOUS at 00:00

## 2017-10-15 RX ADMIN — INSULIN ASPART SCH UNITS: 100 INJECTION, SOLUTION INTRAVENOUS; SUBCUTANEOUS at 17:38

## 2017-10-15 RX ADMIN — VANCOMYCIN HYDROCHLORIDE SCH MLS/HR: 1 INJECTION, POWDER, FOR SOLUTION INTRAVENOUS at 21:44

## 2017-10-15 RX ADMIN — VANCOMYCIN HYDROCHLORIDE PRN EACH: 1 INJECTION, POWDER, LYOPHILIZED, FOR SOLUTION INTRAVENOUS at 01:24

## 2017-10-15 RX ADMIN — MORPHINE SULFATE PRN MG: 2 INJECTION, SOLUTION INTRAMUSCULAR; INTRAVENOUS at 17:31

## 2017-10-15 RX ADMIN — FAMOTIDINE SCH MG: 10 INJECTION, SOLUTION INTRAVENOUS at 21:20

## 2017-10-15 RX ADMIN — RIVAROXABAN SCH MG: 15 TABLET, FILM COATED ORAL at 09:00

## 2017-10-15 RX ADMIN — METOPROLOL TARTRATE SCH MG: 5 INJECTION INTRAVENOUS at 17:30

## 2017-10-15 NOTE — PDOC
Infectious Disease Note


Vital Sign


Vital Signs





Vital Signs








  Date Time  Temp Pulse Resp B/P (MAP) Pulse Ox O2 Delivery O2 Flow Rate FiO2


 


10/15/17 11:00 97.9 95 16 134/75 (94) 97 Room Air  





 97.9       


 


10/14/17 10:37       3.0 











Labs


Lab





Laboratory Tests








Test


  10/14/17


16:35 10/14/17


19:45 10/14/17


20:40 10/15/17


03:33


 


Glucose (Fingerstick)


  158 mg/dL


(70-99) 


  218 mg/dL


(70-99) 


 


 


Lactic Acid Level


  


  1.2 mmol/L


(0.4-2.0) 


  


 


 


White Blood Count


  


  


  


  12.4 x10^3/uL


(4.0-11.0)


 


Red Blood Count


  


  


  


  2.81 x10^6/uL


(4.30-5.70)


 


Hemoglobin


  


  


  


  8.1 g/dL


(13.0-17.5)


 


Hematocrit


  


  


  


  24.5 %


(39.0-53.0)


 


Mean Corpuscular Volume    87 fL () 


 


Mean Corpuscular Hemoglobin    29 pg (25-35) 


 


Mean Corpuscular Hemoglobin


Concent 


  


  


  33 g/dL


(31-37)


 


Red Cell Distribution Width


  


  


  


  14.1 %


(11.5-14.5)


 


Platelet Count


  


  


  


  183 x10^3/uL


(140-400)


 


Neutrophils (%) (Auto)    85 % (31-73) 


 


Lymphocytes (%) (Auto)    10 % (24-48) 


 


Monocytes (%) (Auto)    5 % (0-9) 


 


Eosinophils (%) (Auto)    0 % (0-3) 


 


Basophils (%) (Auto)    0 % (0-3) 


 


Neutrophils # (Auto)


  


  


  


  10.5 x10^3uL


(1.8-7.7)


 


Lymphocytes # (Auto)


  


  


  


  1.3 x10^3/uL


(1.0-4.8)


 


Monocytes # (Auto)


  


  


  


  0.6 x10^3/uL


(0.0-1.1)


 


Eosinophils # (Auto)


  


  


  


  0.0 x10^3/uL


(0.0-0.7)


 


Basophils # (Auto)


  


  


  


  0.0 x10^3/uL


(0.0-0.2)


 


Sodium Level


  


  


  


  140 mmol/L


(136-145)


 


Potassium Level


  


  


  


  4.4 mmol/L


(3.5-5.1)


 


Chloride Level


  


  


  


  105 mmol/L


()


 


Carbon Dioxide Level


  


  


  


  27 mmol/L


(21-32)


 


Anion Gap    8 (6-14) 


 


Blood Urea Nitrogen


  


  


  


  38 mg/dL


(8-26)


 


Creatinine


  


  


  


  1.4 mg/dL


(0.7-1.3)


 


Estimated GFR


(Cockcroft-Gault) 


  


  


  48.2 


 


 


Glucose Level


  


  


  


  143 mg/dL


(70-99)


 


Calcium Level


  


  


  


  8.3 mg/dL


(8.5-10.1)


 


Test


  10/15/17


07:45 10/15/17


11:19 


  


 


 


Glucose (Fingerstick)


  312 mg/dL


(70-99) 245 mg/dL


(70-99) 


  


 











Objective


Assessment


Fever


Leukocytosis - hopefully reactive


 - pre-op steroids 10/10th


Yeast in urine


DVT RUE s/p PICC removal 


Gastric outlet obstruction s/p abdominal surgery, Oct 10th


Malnutrition 


Anemia s/p PRBCs 10/11th 


CKD





Plan


Plan of Care


Vanc and Zosyn initiated by Dr. Greco


Add Fluconazole


BC pending


f/u am labs





D/w family 





Thank you





Attending Co-Sign


Attending Co-Sign


The patient was seen and interviewed as well as examined at the bedside. The 

chart was reviewed. The case was discussed. Agree with the plan of care.











MORIAH WOLFE Oct 15, 2017 12:40


MADALYN GRECO MD Oct 15, 2017 14:11

## 2017-10-15 NOTE — CONS
DATE OF CONSULTATION:  10/14/2017



REQUESTING PHYSICIAN:  Dr. Mac.



REASON FOR CONSULTATION:  Sepsis screen triggered.



HISTORY OF PRESENT ILLNESS:  This patient is an 85-year-old  male who

was admitted on 10/10 for a same day exploratory laparotomy, extensive lysis of

adhesions, removal of dystrophic bone formation, abdominal wall mass, removal of

old mesh, repair of incisional hernia, antrectomy with Stalin-en-Y reconstruction

and J-tube placement, performed by Dr. Sotelo for a gastric outlet obstruction. 

He was previously hospitalized last month for worsening difficulty, swallowing

and weight loss.  He was found to have an esophageal stricture, status post

dilatation and ulcer disease.  He was discharged to [facility] on TPN as

an attempt to improve nutritional status.  However, according to his daughter,

he continued to loose weight.  He slept quite a bit and became more weak.



Since surgery, a sepsis screen was triggered.  He developed a low-grade

temperature of 100.5 yesterday with an elevated heart rate above 90 and an

elevated white blood cell count of 11,300 followed by further increased to

12,400 today.  In addition, the patient was found to have an extensive DVT of

right upper extremity.  The PICC line has since been removed.  The patient is

hard of hearing.  He requested a drink of water.  He was given a drink and

swallowed without problem.  He is tolerating tube feedings without any nausea,

vomiting or diarrhea.  He had a bowel movement earlier today.  He is having some

abdominal pain that is better in more of a supine position.  Denies cough,

shortness of air or chest discomfort.



Postop, he received a blood transfusion for low hemoglobin of 7.0.



PAST MEDICAL HISTORY:  As mentioned above, hearing impairment, coronary artery

disease, chronic kidney disease, hyperlipidemia, hypertension, diabetes

mellitus.



PAST SURGICAL HISTORY:  As mentioned above, cholecystectomy, appendectomy,

repair of perforated ulcer, coronary artery bypass graft.



FAMILY HISTORY:  Positive for hypertension and diabetes mellitus.



SOCIAL HISTORY:  The patient has been  for the past 64 years.  Nonsmoker.

 He was residing at [facility] prior to this admission.



ALLERGIES:  No known drug allergies.



MEDICATIONS:  The patient received a dose of dexamethasone preop on 10/10. 

Vancomycin started on 10/14, Zosyn started 10/14.  PPN.  Other medications are

available and have been reviewed on the MAR.



REVIEW OF SYSTEMS:  Per HPI, all other review of systems negative.



PHYSICAL EXAMINATION:

GENERAL:   male, lying in bed, awake, in no apparent distress.

VITAL SIGNS:  Temperature 97.9, T-max 100.5, blood pressure 135/75, heart rate

95, respiratory rate 16, pulse oximetry is 97% on room air, weight is 131

pounds, BMI is 17.

HEENT:  Pupils equally round, small, normal conjunctivae.  Oral mucosa is dry. 

Poor dentition.

LUNGS:  Clear, nonlabored.

HEART:  Normal S1 and S2.

ABDOMEN:  Mildly distended.  Midline abdominal incision well approximated,

healing well.  Penrose intact.  No redness or drainage noted.  KARTHIK drain with

serosanguineous drainage.

EXTREMITIES:  No gross edema or cyanosis.

SKIN:  Without rash.

NEUROLOGIC:  Alert, answers questions appropriately, but is hard of hearing. 

Follows commands.



LABORATORY DATA:  Today's WBC 12.4, hemoglobin 8.1, platelet count 183,000. 

Electrolytes are unremarkable.  Creatinine 1.4, BUN 38, glucose 143.  Lactic

acid 1.2.  Recent total bilirubin 0.4, AST 46, ALT 86, albumin 2.7.  Urinalysis

shows wbc's 5-10, leukocyte esterase trace, few bacteria and yeast present. 

MRSA screen negative.  Blood cultures pending.  Chest x-ray unremarkable.



IMPRESSION:

1.  Fever.

2.  Leukocytosis.

3.  Yeast in urine.

4.  Deep vein thrombosis of right upper extremity, status post peripherally

inserted central catheter line removal.

5.  Gastric outlet obstruction, status post abdominal surgery on 10/10/2017.

6.  Malnutrition.

7.  Anemia, status post blood transfusion.

8.  Chronic kidney disease.



PLAN:  Continue the vancomycin and Zosyn, which was initiated by Dr. Koch.  We

will add fluconazole as well.  We will follow up on morning laboratory values

and cultures.  Discussed with the patient's wife and daughter.



Thank you, Dr. Mac, for asking us to participate in this patient's care. 

Should you have any further questions or concerns, please call.  The patient was

seen, examined and plan of care implemented by Dr. Madalyn Koch.

 



______________________________

MADALYN KOCH MD



DR:  JOSE/elaina  JOB#:  [id number] / [id number]

DD:  10/15/2017 15:27  DT:  10/15/2017 22:04

## 2017-10-15 NOTE — PDOC
GIN NINO APRN 10/15/17 1153:


SURGICAL PROGRESS NOTE


Subjective


no complaints


Vital Signs





Vital Signs








  Date Time  Temp Pulse Resp B/P (MAP) Pulse Ox O2 Delivery O2 Flow Rate FiO2


 


10/15/17 11:00 97.9 95 16 134/75 (94) 97 Room Air  





 97.9       


 


10/14/17 10:37       3.0 








I&O











Intake and Output 


 


 10/16/17





 07:00


 


Intake Total 100 ml


 


Output Total 910 ml


 


Balance -810 ml


 


 


 


Tube Feeding 100 ml


 


Output Urine Total 800 ml


 


Drainage Total 110 ml


 


# Voids 2








General:  Alert, Cooperative, No acute distress


Abdomen:  Soft, Other (incision c/d/i, no erythema, patricio serosang)


Labs





Laboratory Tests








Test


  10/13/17


17:24 10/14/17


04:15 10/14/17


07:58 10/14/17


10:40


 


Glucose (Fingerstick)


  188 mg/dL


(70-99) 


  262 mg/dL


(70-99) 


 


 


White Blood Count


  


  11.3 x10^3/uL


(4.0-11.0) 


  


 


 


Red Blood Count


  


  2.95 x10^6/uL


(4.30-5.70) 


  


 


 


Hemoglobin


  


  8.7 g/dL


(13.0-17.5) 


  


 


 


Hematocrit


  


  25.5 %


(39.0-53.0) 


  


 


 


Mean Corpuscular Volume  86 fL ()   


 


Mean Corpuscular Hemoglobin  30 pg (25-35)   


 


Mean Corpuscular Hemoglobin


Concent 


  34 g/dL


(31-37) 


  


 


 


Red Cell Distribution Width


  


  14.0 %


(11.5-14.5) 


  


 


 


Platelet Count


  


  177 x10^3/uL


(140-400) 


  


 


 


Neutrophils (%) (Auto)  87 % (31-73)   


 


Lymphocytes (%) (Auto)  8 % (24-48)   


 


Monocytes (%) (Auto)  5 % (0-9)   


 


Eosinophils (%) (Auto)  0 % (0-3)   


 


Basophils (%) (Auto)  0 % (0-3)   


 


Neutrophils # (Auto)


  


  9.8 x10^3uL


(1.8-7.7) 


  


 


 


Lymphocytes # (Auto)


  


  0.9 x10^3/uL


(1.0-4.8) 


  


 


 


Monocytes # (Auto)


  


  0.5 x10^3/uL


(0.0-1.1) 


  


 


 


Eosinophils # (Auto)


  


  0.0 x10^3/uL


(0.0-0.7) 


  


 


 


Basophils # (Auto)


  


  0.0 x10^3/uL


(0.0-0.2) 


  


 


 


Sodium Level


  


  141 mmol/L


(136-145) 


  


 


 


Potassium Level


  


  4.4 mmol/L


(3.5-5.1) 


  


 


 


Chloride Level


  


  105 mmol/L


() 


  


 


 


Carbon Dioxide Level


  


  26 mmol/L


(21-32) 


  


 


 


Anion Gap  10 (6-14)   


 


Blood Urea Nitrogen


  


  35 mg/dL


(8-26) 


  


 


 


Creatinine


  


  1.3 mg/dL


(0.7-1.3) 


  


 


 


Estimated GFR


(Cockcroft-Gault) 


  52.5 


  


  


 


 


Glucose Level


  


  260 mg/dL


(70-99) 


  


 


 


Calcium Level


  


  8.3 mg/dL


(8.5-10.1) 


  


 


 


Urine Collection Type    Unknown 


 


Urine Color    Yellow 


 


Urine Clarity    Clear 


 


Urine pH    5.0 


 


Urine Specific Gravity    1.010 


 


Urine Protein


  


  


  


  Negative mg/dL


(NEG-TRACE)


 


Urine Glucose (UA)


  


  


  


  250 mg/dL


(NEG)


 


Urine Ketones (Stick)


  


  


  


  Trace mg/dL


(NEG)


 


Urine Blood    Small (NEG) 


 


Urine Nitrite    Negative (NEG) 


 


Urine Bilirubin    Negative (NEG) 


 


Urine Urobilinogen Dipstick


  


  


  


  0.2 mg/dL (0.2


mg/dL)


 


Urine Leukocyte Esterase    Trace (NEG) 


 


Urine RBC    1-2 /HPF (0-2) 


 


Urine WBC


  


  


  


  5-10 /HPF


(0-4)


 


Urine Squamous Epithelial


Cells 


  


  


  Few /LPF 


 


 


Urine Amorphous Sediment    Present /HPF 


 


Urine Bacteria


  


  


  


  Few /HPF


(0-FEW)


 


Urine Mucus    Mod /LPF 


 


Urine Yeast    Present /HPF 


 


Test


  10/14/17


11:32 10/14/17


16:35 10/14/17


19:45 10/14/17


20:40


 


Glucose (Fingerstick)


  207 mg/dL


(70-99) 158 mg/dL


(70-99) 


  218 mg/dL


(70-99)


 


Lactic Acid Level


  


  


  1.2 mmol/L


(0.4-2.0) 


 


 


Test


  10/15/17


03:33 10/15/17


07:45 


  


 


 


White Blood Count


  12.4 x10^3/uL


(4.0-11.0) 


  


  


 


 


Red Blood Count


  2.81 x10^6/uL


(4.30-5.70) 


  


  


 


 


Hemoglobin


  8.1 g/dL


(13.0-17.5) 


  


  


 


 


Hematocrit


  24.5 %


(39.0-53.0) 


  


  


 


 


Mean Corpuscular Volume 87 fL ()    


 


Mean Corpuscular Hemoglobin 29 pg (25-35)    


 


Mean Corpuscular Hemoglobin


Concent 33 g/dL


(31-37) 


  


  


 


 


Red Cell Distribution Width


  14.1 %


(11.5-14.5) 


  


  


 


 


Platelet Count


  183 x10^3/uL


(140-400) 


  


  


 


 


Neutrophils (%) (Auto) 85 % (31-73)    


 


Lymphocytes (%) (Auto) 10 % (24-48)    


 


Monocytes (%) (Auto) 5 % (0-9)    


 


Eosinophils (%) (Auto) 0 % (0-3)    


 


Basophils (%) (Auto) 0 % (0-3)    


 


Neutrophils # (Auto)


  10.5 x10^3uL


(1.8-7.7) 


  


  


 


 


Lymphocytes # (Auto)


  1.3 x10^3/uL


(1.0-4.8) 


  


  


 


 


Monocytes # (Auto)


  0.6 x10^3/uL


(0.0-1.1) 


  


  


 


 


Eosinophils # (Auto)


  0.0 x10^3/uL


(0.0-0.7) 


  


  


 


 


Basophils # (Auto)


  0.0 x10^3/uL


(0.0-0.2) 


  


  


 


 


Sodium Level


  140 mmol/L


(136-145) 


  


  


 


 


Potassium Level


  4.4 mmol/L


(3.5-5.1) 


  


  


 


 


Chloride Level


  105 mmol/L


() 


  


  


 


 


Carbon Dioxide Level


  27 mmol/L


(21-32) 


  


  


 


 


Anion Gap 8 (6-14)    


 


Blood Urea Nitrogen


  38 mg/dL


(8-26) 


  


  


 


 


Creatinine


  1.4 mg/dL


(0.7-1.3) 


  


  


 


 


Estimated GFR


(Cockcroft-Gault) 48.2 


  


  


  


 


 


Glucose Level


  143 mg/dL


(70-99) 


  


  


 


 


Calcium Level


  8.3 mg/dL


(8.5-10.1) 


  


  


 


 


Glucose (Fingerstick)


  


  312 mg/dL


(70-99) 


  


 








Laboratory Tests








Test


  10/14/17


16:35 10/14/17


19:45 10/14/17


20:40 10/15/17


03:33


 


Glucose (Fingerstick)


  158 mg/dL


(70-99) 


  218 mg/dL


(70-99) 


 


 


Lactic Acid Level


  


  1.2 mmol/L


(0.4-2.0) 


  


 


 


White Blood Count


  


  


  


  12.4 x10^3/uL


(4.0-11.0)


 


Red Blood Count


  


  


  


  2.81 x10^6/uL


(4.30-5.70)


 


Hemoglobin


  


  


  


  8.1 g/dL


(13.0-17.5)


 


Hematocrit


  


  


  


  24.5 %


(39.0-53.0)


 


Mean Corpuscular Volume    87 fL () 


 


Mean Corpuscular Hemoglobin    29 pg (25-35) 


 


Mean Corpuscular Hemoglobin


Concent 


  


  


  33 g/dL


(31-37)


 


Red Cell Distribution Width


  


  


  


  14.1 %


(11.5-14.5)


 


Platelet Count


  


  


  


  183 x10^3/uL


(140-400)


 


Neutrophils (%) (Auto)    85 % (31-73) 


 


Lymphocytes (%) (Auto)    10 % (24-48) 


 


Monocytes (%) (Auto)    5 % (0-9) 


 


Eosinophils (%) (Auto)    0 % (0-3) 


 


Basophils (%) (Auto)    0 % (0-3) 


 


Neutrophils # (Auto)


  


  


  


  10.5 x10^3uL


(1.8-7.7)


 


Lymphocytes # (Auto)


  


  


  


  1.3 x10^3/uL


(1.0-4.8)


 


Monocytes # (Auto)


  


  


  


  0.6 x10^3/uL


(0.0-1.1)


 


Eosinophils # (Auto)


  


  


  


  0.0 x10^3/uL


(0.0-0.7)


 


Basophils # (Auto)


  


  


  


  0.0 x10^3/uL


(0.0-0.2)


 


Sodium Level


  


  


  


  140 mmol/L


(136-145)


 


Potassium Level


  


  


  


  4.4 mmol/L


(3.5-5.1)


 


Chloride Level


  


  


  


  105 mmol/L


()


 


Carbon Dioxide Level


  


  


  


  27 mmol/L


(21-32)


 


Anion Gap    8 (6-14) 


 


Blood Urea Nitrogen


  


  


  


  38 mg/dL


(8-26)


 


Creatinine


  


  


  


  1.4 mg/dL


(0.7-1.3)


 


Estimated GFR


(Cockcroft-Gault) 


  


  


  48.2 


 


 


Glucose Level


  


  


  


  143 mg/dL


(70-99)


 


Calcium Level


  


  


  


  8.3 mg/dL


(8.5-10.1)


 


Test


  10/15/17


07:45 


  


  


 


 


Glucose (Fingerstick)


  312 mg/dL


(70-99) 


  


  


 








Assessment/Plan


continue tfs


Problems:  





MAINE DALTON MD 10/15/17 1452:


SURGICAL PROGRESS NOTE


Assessment/Plan


pt seen


more conversive today


slow improvement 





cont supportive care


Problems:  











GIN NINO Oct 15, 2017 11:53


MAINE DALTON MD Oct 15, 2017 14:52

## 2017-10-16 VITALS — SYSTOLIC BLOOD PRESSURE: 145 MMHG | DIASTOLIC BLOOD PRESSURE: 84 MMHG

## 2017-10-16 VITALS — DIASTOLIC BLOOD PRESSURE: 67 MMHG | SYSTOLIC BLOOD PRESSURE: 122 MMHG

## 2017-10-16 VITALS — SYSTOLIC BLOOD PRESSURE: 122 MMHG | DIASTOLIC BLOOD PRESSURE: 68 MMHG

## 2017-10-16 VITALS — DIASTOLIC BLOOD PRESSURE: 79 MMHG | SYSTOLIC BLOOD PRESSURE: 140 MMHG

## 2017-10-16 VITALS — DIASTOLIC BLOOD PRESSURE: 63 MMHG | SYSTOLIC BLOOD PRESSURE: 104 MMHG

## 2017-10-16 VITALS — DIASTOLIC BLOOD PRESSURE: 63 MMHG | SYSTOLIC BLOOD PRESSURE: 106 MMHG

## 2017-10-16 LAB
ANION GAP SERPL CALC-SCNC: 6 MMOL/L (ref 6–14)
BASOPHILS # BLD AUTO: 0 X10^3/UL (ref 0–0.2)
BASOPHILS NFR BLD: 0 % (ref 0–3)
BUN SERPL-MCNC: 42 MG/DL (ref 8–26)
CALCIUM SERPL-MCNC: 7.9 MG/DL (ref 8.5–10.1)
CHLORIDE SERPL-SCNC: 103 MMOL/L (ref 98–107)
CO2 SERPL-SCNC: 29 MMOL/L (ref 21–32)
CREAT SERPL-MCNC: 1.6 MG/DL (ref 0.7–1.3)
EOSINOPHIL NFR BLD: 1 % (ref 0–3)
ERYTHROCYTE [DISTWIDTH] IN BLOOD BY AUTOMATED COUNT: 14 % (ref 11.5–14.5)
GFR SERPLBLD BASED ON 1.73 SQ M-ARVRAT: 41.3 ML/MIN
GLUCOSE SERPL-MCNC: 329 MG/DL (ref 70–99)
HCT VFR BLD CALC: 24.2 % (ref 39–53)
HGB BLD-MCNC: 8.3 G/DL (ref 13–17.5)
LYMPHOCYTES # BLD: 1.2 X10^3/UL (ref 1–4.8)
LYMPHOCYTES NFR BLD AUTO: 13 % (ref 24–48)
MCH RBC QN AUTO: 30 PG (ref 25–35)
MCHC RBC AUTO-ENTMCNC: 34 G/DL (ref 31–37)
MCV RBC AUTO: 86 FL (ref 79–100)
MONOCYTES NFR BLD: 5 % (ref 0–9)
NEUTROPHILS NFR BLD AUTO: 81 % (ref 31–73)
PLATELET # BLD AUTO: 189 X10^3/UL (ref 140–400)
POTASSIUM SERPL-SCNC: 4.2 MMOL/L (ref 3.5–5.1)
RBC # BLD AUTO: 2.8 X10^6/UL (ref 4.3–5.7)
SODIUM SERPL-SCNC: 138 MMOL/L (ref 136–145)
WBC # BLD AUTO: 9.4 X10^3/UL (ref 4–11)

## 2017-10-16 RX ADMIN — Medication PRN EACH: at 16:22

## 2017-10-16 RX ADMIN — RIVAROXABAN SCH MG: 15 TABLET, FILM COATED ORAL at 21:56

## 2017-10-16 RX ADMIN — INSULIN ASPART SCH UNITS: 100 INJECTION, SOLUTION INTRAVENOUS; SUBCUTANEOUS at 11:12

## 2017-10-16 RX ADMIN — METOPROLOL TARTRATE SCH MG: 5 INJECTION INTRAVENOUS at 18:04

## 2017-10-16 RX ADMIN — RIVAROXABAN SCH MG: 15 TABLET, FILM COATED ORAL at 08:54

## 2017-10-16 RX ADMIN — INSULIN ASPART SCH UNITS: 100 INJECTION, SOLUTION INTRAVENOUS; SUBCUTANEOUS at 09:08

## 2017-10-16 RX ADMIN — VANCOMYCIN HYDROCHLORIDE PRN EACH: 1 INJECTION, POWDER, LYOPHILIZED, FOR SOLUTION INTRAVENOUS at 16:24

## 2017-10-16 RX ADMIN — PIPERACILLIN SODIUM AND TAZOBACTAM SODIUM SCH MLS/HR: 2; .25 INJECTION, POWDER, LYOPHILIZED, FOR SOLUTION INTRAVENOUS at 00:01

## 2017-10-16 RX ADMIN — METOPROLOL TARTRATE SCH MG: 5 INJECTION INTRAVENOUS at 05:23

## 2017-10-16 RX ADMIN — VANCOMYCIN HYDROCHLORIDE SCH MLS/HR: 1 INJECTION, POWDER, FOR SOLUTION INTRAVENOUS at 21:53

## 2017-10-16 RX ADMIN — INSULIN ASPART SCH UNITS: 100 INJECTION, SOLUTION INTRAVENOUS; SUBCUTANEOUS at 18:12

## 2017-10-16 RX ADMIN — METOPROLOL TARTRATE SCH MG: 5 INJECTION INTRAVENOUS at 00:03

## 2017-10-16 RX ADMIN — FAMOTIDINE SCH MG: 10 INJECTION, SOLUTION INTRAVENOUS at 21:53

## 2017-10-16 RX ADMIN — PIPERACILLIN SODIUM AND TAZOBACTAM SODIUM SCH MLS/HR: 2; .25 INJECTION, POWDER, LYOPHILIZED, FOR SOLUTION INTRAVENOUS at 11:00

## 2017-10-16 RX ADMIN — METOPROLOL TARTRATE SCH MG: 5 INJECTION INTRAVENOUS at 11:05

## 2017-10-16 RX ADMIN — VANCOMYCIN HYDROCHLORIDE PRN EACH: 1 INJECTION, POWDER, LYOPHILIZED, FOR SOLUTION INTRAVENOUS at 21:02

## 2017-10-16 RX ADMIN — GLYCERIN, ISOLEUCINE, LEUCINE, LYSINE, METHIONINE, PHENYLALANINE, THREONINE, TRYPTOPHAN, VALINE, ALANINE, GLYCINE, ARGININE, HISTIDINE, PROLINE, SERINE, CYSTEINE, SODIUM ACETATE, MAGNESIUM ACETATE, CALCIUM ACETATE, SODIUM CHLORIDE, POTASSIUM CHLORIDE, PHOSPHORIC ACID, AND POTASSIUM METABISULFITE SCH MLS/HR
3; .21; .27; .22; .16; .17; .12; .046; .2; .21; .42; .29; .085; .34; .18; .014; .2; .054; .026; .12; .15; .041 INJECTION INTRAVENOUS at 05:19

## 2017-10-16 RX ADMIN — PIPERACILLIN SODIUM AND TAZOBACTAM SODIUM SCH MLS/HR: 2; .25 INJECTION, POWDER, LYOPHILIZED, FOR SOLUTION INTRAVENOUS at 05:20

## 2017-10-16 RX ADMIN — PIPERACILLIN SODIUM AND TAZOBACTAM SODIUM SCH MLS/HR: 2; .25 INJECTION, POWDER, LYOPHILIZED, FOR SOLUTION INTRAVENOUS at 18:04

## 2017-10-16 RX ADMIN — FLUCONAZOLE SCH MLS/HR: 2 INJECTION, SOLUTION INTRAVENOUS at 15:00

## 2017-10-16 NOTE — PATHOLOGY
PATHOLOGY REPORT 

 

*    *    *    *    *    *    *    * 

 FINAL DIAGNOSIS:

A.  Segment of gastric body and gastric antrum, distal gastrectomy:

- Marked narrowing of gastric outlet with hypertrophy of muscular

wall of distal stomach.

- Superficial edema and mild chronic and focal acute inflammation of

gastric mucosa.  

- Serosal fibrosis and adhesions.

- Reactive changes of perigastric lymph node.

B.  Segment of fibromembranous and fibroadipose tissue, abdominal

wall mass:

- Mesh with associated scarring and focal chronic inflammation and

foreign body giant cell reaction.

C.  Segments of bone with attached fibrocartilaginous and

fibroadipose tissue, designated "dystrophic bone":

- Focal scarring and foreign body granulomatous reaction.

Comment: There is no evidence of malignancy.(JPM:mello; 10/16/2017) 

 

 

REPORT ELECTRONICALLY SIGNED BY:   Rivas Sheridan M.D.

DATE/TIME:   10/16/2017 15:41

*    *    *    *    *    *    *    *

 

GROSS PATHOLOGY:

A.  Received fresh for intraoperative frozen section consultation,

designated, "Rush - distal gastrectomy."  The specimen consists of

the distal stomach with attached lesser curvature and greater

curvature fat.  The segment is stapled closed at both ends.  The

proximal stapled margin measures 9.0 cm in length.  The distal

stapled margin is marked by a suture and measures approximately 4.0

cm in length.  The gastric serosa is pinkish tan to pinkish gray and

slightly erythematous.  The anterior serosa shows focal adhesions. 

The segment is opened along the greater curvature.  The distal

stapled resection margin is removed.  The distal outlet is narrowed. 

There is preservation of the gastric rugae of the proximal portion of

the gastrectomy.  The antral mucosa has a pale pink-tan to pink-gray

finely granular appearance.  There are no distinct ulcers or masses

identified.  The mucosa boarding the distal stapled margin is focally

hemorrhagic and disrupted.  The distal margin is removed and

submitted for frozen section.  The tissue remaining from frozen

section is submitted for permanent sections as A1.  The remainder of

the specimen is fixed in formalin prior to additional sectioning. 

(JPM:mml; 10/10/2017)

 Sectioning the perigastric fat reveals no enlarged lymph nodes,

masses, or lesions.  Representative sections are submitted.

A1FS frozen section residue

A2-A3 proximal margin

A4 transmural sections posterior gastric wall

A5 transmural sections anterior gastric wall

A6 transmural section lesser curvature

A7-A8 perigastric fat for possible microscopic lymph nodes 

(SEBASTIÁN; 10/11/2017)

B.  The specimen is received in formalin, labeled "Anne Li and

abdominal wall mass" and consists of a glistening yellow orange and

gray fragment of fibroadipose tissue measuring 13.2 x 8.0 x 4.4 cm. 

There are strands of sutures protruding from several surfaces. 

Sectioning reveals an underlying 10.0 x 6.2 cm cavity.  The wall of

the cavity is composed of thick dense membranous tissue while the

lining is composed of synthetic mesh material.  No additional lesions

or masses are identified.  Representative sections are submitted as

B1.

C.  The specimen is received in formalin, labeled "Anne Li,

dystrophic bone" and consists of three irregularly-shaped fragments

of probable osseous tissue each of which is surfaced by thick ragged

fibrous tissue and probable mesh material.  Two of the fragments

average 3.5 x 3.1 x 2.1 cm and the third measures 7.0 x 2.9 x 2.1 cm.

 Sectioning each reveals the mesh to average 0.3 cm thick.  The cut

surfaces appear grossly calcified. Representative sections are

submitted as C1 following decalcification.-

([facility]; 10/11/2017)

FROZEN SECTION DIAGNOSIS:

(Rivas Sheridan MD)

A1FS.  Distal gastrectomy:

- Distal margin negative for tumor.

The results are reported to Dr. Sotelo in the operating room. 

(JPM:mml; 10/10/2017) 

Testing performed by [facility] [place] 

[place] [address]

 

 

 

 INITIAL CPT CODE(S):

A; 61240, 82396

B; 23010

C; 37708, 69463

Professional services performed by [facility] [place] 

[place] [address]

 

  Technical services performed by [facility] at [address] [place] [address], [address]

[address] [address].

  

 SPECIMEN(S) RECEIVED:

A.Stomach

B.Abdominal wall mass

C.Dystrophic bone

 

 CLINICAL HISTORY:

Gastric outlet obstruction 

 

 PATIENT:  ANNE LI

/AGE:  1931 (Age: 85)  

PATIENT #:  [id number]

ACCESSION #:  [id number]          ALT CASE #:   

SPECIMEN COLLECTION DATE:  10/10/2017

SPECIMEN RECEIVED DATE:  10/10/2017

   

[facility] - [address] [place] [id number] - PHONE: 

[phone]

* * *  END OF REPORT  * * *

## 2017-10-16 NOTE — PDOC
SURGICAL PROGRESS NOTE


Subjective


resting


+ BM


tolerating TF


Vital Signs





Vital Signs








  Date Time  Temp Pulse Resp B/P (MAP) Pulse Ox O2 Delivery O2 Flow Rate FiO2


 


10/16/17 11:05  98  104/68    


 


10/16/17 10:54 97.9  18  94 Room Air  





 97.9       








I&O











Intake and Output 


 


 10/17/17





 06:59


 


 


 


# Bowel Movements 1








General:  Alert, Cooperative, No acute distress


Abdomen:  Soft, No tenderness, Other (Beto serosang)


Labs





Laboratory Tests








Test


  10/14/17


11:32 10/14/17


16:35 10/14/17


19:45 10/14/17


20:40


 


Glucose (Fingerstick)


  207 mg/dL


(70-99) 158 mg/dL


(70-99) 


  218 mg/dL


(70-99)


 


Lactic Acid Level


  


  


  1.2 mmol/L


(0.4-2.0) 


 


 


Test


  10/15/17


03:33 10/15/17


07:45 10/15/17


11:19 10/15/17


16:28


 


White Blood Count


  12.4 x10^3/uL


(4.0-11.0) 


  


  


 


 


Red Blood Count


  2.81 x10^6/uL


(4.30-5.70) 


  


  


 


 


Hemoglobin


  8.1 g/dL


(13.0-17.5) 


  


  


 


 


Hematocrit


  24.5 %


(39.0-53.0) 


  


  


 


 


Mean Corpuscular Volume 87 fL ()    


 


Mean Corpuscular Hemoglobin 29 pg (25-35)    


 


Mean Corpuscular Hemoglobin


Concent 33 g/dL


(31-37) 


  


  


 


 


Red Cell Distribution Width


  14.1 %


(11.5-14.5) 


  


  


 


 


Platelet Count


  183 x10^3/uL


(140-400) 


  


  


 


 


Neutrophils (%) (Auto) 85 % (31-73)    


 


Lymphocytes (%) (Auto) 10 % (24-48)    


 


Monocytes (%) (Auto) 5 % (0-9)    


 


Eosinophils (%) (Auto) 0 % (0-3)    


 


Basophils (%) (Auto) 0 % (0-3)    


 


Neutrophils # (Auto)


  10.5 x10^3uL


(1.8-7.7) 


  


  


 


 


Lymphocytes # (Auto)


  1.3 x10^3/uL


(1.0-4.8) 


  


  


 


 


Monocytes # (Auto)


  0.6 x10^3/uL


(0.0-1.1) 


  


  


 


 


Eosinophils # (Auto)


  0.0 x10^3/uL


(0.0-0.7) 


  


  


 


 


Basophils # (Auto)


  0.0 x10^3/uL


(0.0-0.2) 


  


  


 


 


Sodium Level


  140 mmol/L


(136-145) 


  


  


 


 


Potassium Level


  4.4 mmol/L


(3.5-5.1) 


  


  


 


 


Chloride Level


  105 mmol/L


() 


  


  


 


 


Carbon Dioxide Level


  27 mmol/L


(21-32) 


  


  


 


 


Anion Gap 8 (6-14)    


 


Blood Urea Nitrogen


  38 mg/dL


(8-26) 


  


  


 


 


Creatinine


  1.4 mg/dL


(0.7-1.3) 


  


  


 


 


Estimated GFR


(Cockcroft-Gault) 48.2 


  


  


  


 


 


Glucose Level


  143 mg/dL


(70-99) 


  


  


 


 


Calcium Level


  8.3 mg/dL


(8.5-10.1) 


  


  


 


 


Glucose (Fingerstick)


  


  312 mg/dL


(70-99) 245 mg/dL


(70-99) 189 mg/dL


(70-99)


 


Test


  10/15/17


20:58 10/16/17


03:20 10/16/17


05:20 10/16/17


07:14


 


Glucose (Fingerstick)


  174 mg/dL


(70-99) 


  


  323 mg/dL


(70-99)


 


Sodium Level


  


  138 mmol/L


(136-145) 


  


 


 


Potassium Level


  


  4.2 mmol/L


(3.5-5.1) 


  


 


 


Chloride Level


  


  103 mmol/L


() 


  


 


 


Carbon Dioxide Level


  


  29 mmol/L


(21-32) 


  


 


 


Anion Gap  6 (6-14)   


 


Blood Urea Nitrogen


  


  42 mg/dL


(8-26) 


  


 


 


Creatinine


  


  1.6 mg/dL


(0.7-1.3) 


  


 


 


Estimated GFR


(Cockcroft-Gault) 


  41.3 


  


  


 


 


Glucose Level


  


  329 mg/dL


(70-99) 


  


 


 


Calcium Level


  


  7.9 mg/dL


(8.5-10.1) 


  


 


 


White Blood Count


  


  


  9.4 x10^3/uL


(4.0-11.0) 


 


 


Red Blood Count


  


  


  2.80 x10^6/uL


(4.30-5.70) 


 


 


Hemoglobin


  


  


  8.3 g/dL


(13.0-17.5) 


 


 


Hematocrit


  


  


  24.2 %


(39.0-53.0) 


 


 


Mean Corpuscular Volume   86 fL ()  


 


Mean Corpuscular Hemoglobin   30 pg (25-35)  


 


Mean Corpuscular Hemoglobin


Concent 


  


  34 g/dL


(31-37) 


 


 


Red Cell Distribution Width


  


  


  14.0 %


(11.5-14.5) 


 


 


Platelet Count


  


  


  189 x10^3/uL


(140-400) 


 


 


Neutrophils (%) (Auto)   81 % (31-73)  


 


Lymphocytes (%) (Auto)   13 % (24-48)  


 


Monocytes (%) (Auto)   5 % (0-9)  


 


Eosinophils (%) (Auto)   1 % (0-3)  


 


Basophils (%) (Auto)   0 % (0-3)  


 


Neutrophils # (Auto)


  


  


  7.6 x10^3uL


(1.8-7.7) 


 


 


Lymphocytes # (Auto)


  


  


  1.2 x10^3/uL


(1.0-4.8) 


 


 


Monocytes # (Auto)


  


  


  0.5 x10^3/uL


(0.0-1.1) 


 


 


Eosinophils # (Auto)


  


  


  0.1 x10^3/uL


(0.0-0.7) 


 


 


Basophils # (Auto)


  


  


  0.0 x10^3/uL


(0.0-0.2) 


 


 


Test


  10/16/17


10:14 


  


  


 


 


Glucose (Fingerstick)


  318 mg/dL


(70-99) 


  


  


 








Laboratory Tests








Test


  10/15/17


16:28 10/15/17


20:58 10/16/17


03:20 10/16/17


05:20


 


Glucose (Fingerstick)


  189 mg/dL


(70-99) 174 mg/dL


(70-99) 


  


 


 


Sodium Level


  


  


  138 mmol/L


(136-145) 


 


 


Potassium Level


  


  


  4.2 mmol/L


(3.5-5.1) 


 


 


Chloride Level


  


  


  103 mmol/L


() 


 


 


Carbon Dioxide Level


  


  


  29 mmol/L


(21-32) 


 


 


Anion Gap   6 (6-14)  


 


Blood Urea Nitrogen


  


  


  42 mg/dL


(8-26) 


 


 


Creatinine


  


  


  1.6 mg/dL


(0.7-1.3) 


 


 


Estimated GFR


(Cockcroft-Gault) 


  


  41.3 


  


 


 


Glucose Level


  


  


  329 mg/dL


(70-99) 


 


 


Calcium Level


  


  


  7.9 mg/dL


(8.5-10.1) 


 


 


White Blood Count


  


  


  


  9.4 x10^3/uL


(4.0-11.0)


 


Red Blood Count


  


  


  


  2.80 x10^6/uL


(4.30-5.70)


 


Hemoglobin


  


  


  


  8.3 g/dL


(13.0-17.5)


 


Hematocrit


  


  


  


  24.2 %


(39.0-53.0)


 


Mean Corpuscular Volume    86 fL () 


 


Mean Corpuscular Hemoglobin    30 pg (25-35) 


 


Mean Corpuscular Hemoglobin


Concent 


  


  


  34 g/dL


(31-37)


 


Red Cell Distribution Width


  


  


  


  14.0 %


(11.5-14.5)


 


Platelet Count


  


  


  


  189 x10^3/uL


(140-400)


 


Neutrophils (%) (Auto)    81 % (31-73) 


 


Lymphocytes (%) (Auto)    13 % (24-48) 


 


Monocytes (%) (Auto)    5 % (0-9) 


 


Eosinophils (%) (Auto)    1 % (0-3) 


 


Basophils (%) (Auto)    0 % (0-3) 


 


Neutrophils # (Auto)


  


  


  


  7.6 x10^3uL


(1.8-7.7)


 


Lymphocytes # (Auto)


  


  


  


  1.2 x10^3/uL


(1.0-4.8)


 


Monocytes # (Auto)


  


  


  


  0.5 x10^3/uL


(0.0-1.1)


 


Eosinophils # (Auto)


  


  


  


  0.1 x10^3/uL


(0.0-0.7)


 


Basophils # (Auto)


  


  


  


  0.0 x10^3/uL


(0.0-0.2)


 


Test


  10/16/17


07:14 10/16/17


10:14 


  


 


 


Glucose (Fingerstick)


  323 mg/dL


(70-99) 318 mg/dL


(70-99) 


  


 








Assessment/Plan


continue TFs


Problems:  











GIN NINO Oct 16, 2017 11:29

## 2017-10-16 NOTE — PDOC
PROGRESS NOTES


Chief Complaint


Chief Complaint


Gastric outlet obstruction s/p extensive abd sx with adhesiolysis, J tube 

insertion etc 10/10


New DVT Rt arm - 10/12 - PICC in site


Hx perforated ulcer DISTANT PAST


Frailty, gen weakness


FULL code


Fevers/SEPSIS (10/14)





PMH:


DM


HTN


CAD


PUD


CABG





History of Present Illness


History of Present Illness


Lookig good today!


In the potty for BM


NO fevers, WBC better since ID got on board


Will go back to [facility] on dc


So far full code when Ia sked wife at bedside





Creat 1.,6 stable


WBC down to 9 from 11





PLAn:


may dc [facility] - TF via J tube at goal


PPLace maybe in  next 24-48 hrs





Vitals


Vitals





Vital Signs








  Date Time  Temp Pulse Resp B/P (MAP) Pulse Ox O2 Delivery O2 Flow Rate FiO2


 


10/16/17 10:54 97.9 98 18 104/63 (77) 94 Room Air  





 97.9       











Physical Exam


General:  Alert, Cooperative, No acute distress


Heart:  Regular rate, Normal S1, Normal S2


Lungs:  Clear


Abdomen:  Soft, Other (incision c/d/i, no erythema, patricio serosang)


Extremities:  No clubbing, No cyanosis


Skin:  No rashes, No breakdown





Labs


LABS





Laboratory Tests








Test


  10/15/17


11:19 10/15/17


16:28 10/15/17


20:58 10/16/17


03:20


 


Glucose (Fingerstick)


  245 mg/dL


(70-99) 189 mg/dL


(70-99) 174 mg/dL


(70-99) 


 


 


Sodium Level


  


  


  


  138 mmol/L


(136-145)


 


Potassium Level


  


  


  


  4.2 mmol/L


(3.5-5.1)


 


Chloride Level


  


  


  


  103 mmol/L


()


 


Carbon Dioxide Level


  


  


  


  29 mmol/L


(21-32)


 


Anion Gap    6 (6-14) 


 


Blood Urea Nitrogen


  


  


  


  42 mg/dL


(8-26)


 


Creatinine


  


  


  


  1.6 mg/dL


(0.7-1.3)


 


Estimated GFR


(Cockcroft-Gault) 


  


  


  41.3 


 


 


Glucose Level


  


  


  


  329 mg/dL


(70-99)


 


Calcium Level


  


  


  


  7.9 mg/dL


(8.5-10.1)


 


Test


  10/16/17


05:20 10/16/17


07:14 10/16/17


10:14 


 


 


White Blood Count


  9.4 x10^3/uL


(4.0-11.0) 


  


  


 


 


Red Blood Count


  2.80 x10^6/uL


(4.30-5.70) 


  


  


 


 


Hemoglobin


  8.3 g/dL


(13.0-17.5) 


  


  


 


 


Hematocrit


  24.2 %


(39.0-53.0) 


  


  


 


 


Mean Corpuscular Volume 86 fL ()    


 


Mean Corpuscular Hemoglobin 30 pg (25-35)    


 


Mean Corpuscular Hemoglobin


Concent 34 g/dL


(31-37) 


  


  


 


 


Red Cell Distribution Width


  14.0 %


(11.5-14.5) 


  


  


 


 


Platelet Count


  189 x10^3/uL


(140-400) 


  


  


 


 


Neutrophils (%) (Auto) 81 % (31-73)    


 


Lymphocytes (%) (Auto) 13 % (24-48)    


 


Monocytes (%) (Auto) 5 % (0-9)    


 


Eosinophils (%) (Auto) 1 % (0-3)    


 


Basophils (%) (Auto) 0 % (0-3)    


 


Neutrophils # (Auto)


  7.6 x10^3uL


(1.8-7.7) 


  


  


 


 


Lymphocytes # (Auto)


  1.2 x10^3/uL


(1.0-4.8) 


  


  


 


 


Monocytes # (Auto)


  0.5 x10^3/uL


(0.0-1.1) 


  


  


 


 


Eosinophils # (Auto)


  0.1 x10^3/uL


(0.0-0.7) 


  


  


 


 


Basophils # (Auto)


  0.0 x10^3/uL


(0.0-0.2) 


  


  


 


 


Glucose (Fingerstick)


  


  323 mg/dL


(70-99) 318 mg/dL


(70-99) 


 











Review of Systems


Review of Systems


in the potty, limited rOS, no CP< SOA<emesis, some mild post op soreness





Comment


Review of Relevant


I have reviewed the following items milana (where applicable) has been applied.


Labs





Laboratory Tests








Test


  10/14/17


11:32 10/14/17


16:35 10/14/17


19:45 10/14/17


20:40


 


Glucose (Fingerstick)


  207 mg/dL


(70-99) 158 mg/dL


(70-99) 


  218 mg/dL


(70-99)


 


Lactic Acid Level


  


  


  1.2 mmol/L


(0.4-2.0) 


 


 


Test


  10/15/17


03:33 10/15/17


07:45 10/15/17


11:19 10/15/17


16:28


 


White Blood Count


  12.4 x10^3/uL


(4.0-11.0) 


  


  


 


 


Red Blood Count


  2.81 x10^6/uL


(4.30-5.70) 


  


  


 


 


Hemoglobin


  8.1 g/dL


(13.0-17.5) 


  


  


 


 


Hematocrit


  24.5 %


(39.0-53.0) 


  


  


 


 


Mean Corpuscular Volume 87 fL ()    


 


Mean Corpuscular Hemoglobin 29 pg (25-35)    


 


Mean Corpuscular Hemoglobin


Concent 33 g/dL


(31-37) 


  


  


 


 


Red Cell Distribution Width


  14.1 %


(11.5-14.5) 


  


  


 


 


Platelet Count


  183 x10^3/uL


(140-400) 


  


  


 


 


Neutrophils (%) (Auto) 85 % (31-73)    


 


Lymphocytes (%) (Auto) 10 % (24-48)    


 


Monocytes (%) (Auto) 5 % (0-9)    


 


Eosinophils (%) (Auto) 0 % (0-3)    


 


Basophils (%) (Auto) 0 % (0-3)    


 


Neutrophils # (Auto)


  10.5 x10^3uL


(1.8-7.7) 


  


  


 


 


Lymphocytes # (Auto)


  1.3 x10^3/uL


(1.0-4.8) 


  


  


 


 


Monocytes # (Auto)


  0.6 x10^3/uL


(0.0-1.1) 


  


  


 


 


Eosinophils # (Auto)


  0.0 x10^3/uL


(0.0-0.7) 


  


  


 


 


Basophils # (Auto)


  0.0 x10^3/uL


(0.0-0.2) 


  


  


 


 


Sodium Level


  140 mmol/L


(136-145) 


  


  


 


 


Potassium Level


  4.4 mmol/L


(3.5-5.1) 


  


  


 


 


Chloride Level


  105 mmol/L


() 


  


  


 


 


Carbon Dioxide Level


  27 mmol/L


(21-32) 


  


  


 


 


Anion Gap 8 (6-14)    


 


Blood Urea Nitrogen


  38 mg/dL


(8-26) 


  


  


 


 


Creatinine


  1.4 mg/dL


(0.7-1.3) 


  


  


 


 


Estimated GFR


(Cockcroft-Gault) 48.2 


  


  


  


 


 


Glucose Level


  143 mg/dL


(70-99) 


  


  


 


 


Calcium Level


  8.3 mg/dL


(8.5-10.1) 


  


  


 


 


Glucose (Fingerstick)


  


  312 mg/dL


(70-99) 245 mg/dL


(70-99) 189 mg/dL


(70-99)


 


Test


  10/15/17


20:58 10/16/17


03:20 10/16/17


05:20 10/16/17


07:14


 


Glucose (Fingerstick)


  174 mg/dL


(70-99) 


  


  323 mg/dL


(70-99)


 


Sodium Level


  


  138 mmol/L


(136-145) 


  


 


 


Potassium Level


  


  4.2 mmol/L


(3.5-5.1) 


  


 


 


Chloride Level


  


  103 mmol/L


() 


  


 


 


Carbon Dioxide Level


  


  29 mmol/L


(21-32) 


  


 


 


Anion Gap  6 (6-14)   


 


Blood Urea Nitrogen


  


  42 mg/dL


(8-26) 


  


 


 


Creatinine


  


  1.6 mg/dL


(0.7-1.3) 


  


 


 


Estimated GFR


(Cockcroft-Gault) 


  41.3 


  


  


 


 


Glucose Level


  


  329 mg/dL


(70-99) 


  


 


 


Calcium Level


  


  7.9 mg/dL


(8.5-10.1) 


  


 


 


White Blood Count


  


  


  9.4 x10^3/uL


(4.0-11.0) 


 


 


Red Blood Count


  


  


  2.80 x10^6/uL


(4.30-5.70) 


 


 


Hemoglobin


  


  


  8.3 g/dL


(13.0-17.5) 


 


 


Hematocrit


  


  


  24.2 %


(39.0-53.0) 


 


 


Mean Corpuscular Volume   86 fL ()  


 


Mean Corpuscular Hemoglobin   30 pg (25-35)  


 


Mean Corpuscular Hemoglobin


Concent 


  


  34 g/dL


(31-37) 


 


 


Red Cell Distribution Width


  


  


  14.0 %


(11.5-14.5) 


 


 


Platelet Count


  


  


  189 x10^3/uL


(140-400) 


 


 


Neutrophils (%) (Auto)   81 % (31-73)  


 


Lymphocytes (%) (Auto)   13 % (24-48)  


 


Monocytes (%) (Auto)   5 % (0-9)  


 


Eosinophils (%) (Auto)   1 % (0-3)  


 


Basophils (%) (Auto)   0 % (0-3)  


 


Neutrophils # (Auto)


  


  


  7.6 x10^3uL


(1.8-7.7) 


 


 


Lymphocytes # (Auto)


  


  


  1.2 x10^3/uL


(1.0-4.8) 


 


 


Monocytes # (Auto)


  


  


  0.5 x10^3/uL


(0.0-1.1) 


 


 


Eosinophils # (Auto)


  


  


  0.1 x10^3/uL


(0.0-0.7) 


 


 


Basophils # (Auto)


  


  


  0.0 x10^3/uL


(0.0-0.2) 


 


 


Test


  10/16/17


10:14 


  


  


 


 


Glucose (Fingerstick)


  318 mg/dL


(70-99) 


  


  


 








Laboratory Tests








Test


  10/15/17


11:19 10/15/17


16:28 10/15/17


20:58 10/16/17


03:20


 


Glucose (Fingerstick)


  245 mg/dL


(70-99) 189 mg/dL


(70-99) 174 mg/dL


(70-99) 


 


 


Sodium Level


  


  


  


  138 mmol/L


(136-145)


 


Potassium Level


  


  


  


  4.2 mmol/L


(3.5-5.1)


 


Chloride Level


  


  


  


  103 mmol/L


()


 


Carbon Dioxide Level


  


  


  


  29 mmol/L


(21-32)


 


Anion Gap    6 (6-14) 


 


Blood Urea Nitrogen


  


  


  


  42 mg/dL


(8-26)


 


Creatinine


  


  


  


  1.6 mg/dL


(0.7-1.3)


 


Estimated GFR


(Cockcroft-Gault) 


  


  


  41.3 


 


 


Glucose Level


  


  


  


  329 mg/dL


(70-99)


 


Calcium Level


  


  


  


  7.9 mg/dL


(8.5-10.1)


 


Test


  10/16/17


05:20 10/16/17


07:14 10/16/17


10:14 


 


 


White Blood Count


  9.4 x10^3/uL


(4.0-11.0) 


  


  


 


 


Red Blood Count


  2.80 x10^6/uL


(4.30-5.70) 


  


  


 


 


Hemoglobin


  8.3 g/dL


(13.0-17.5) 


  


  


 


 


Hematocrit


  24.2 %


(39.0-53.0) 


  


  


 


 


Mean Corpuscular Volume 86 fL ()    


 


Mean Corpuscular Hemoglobin 30 pg (25-35)    


 


Mean Corpuscular Hemoglobin


Concent 34 g/dL


(31-37) 


  


  


 


 


Red Cell Distribution Width


  14.0 %


(11.5-14.5) 


  


  


 


 


Platelet Count


  189 x10^3/uL


(140-400) 


  


  


 


 


Neutrophils (%) (Auto) 81 % (31-73)    


 


Lymphocytes (%) (Auto) 13 % (24-48)    


 


Monocytes (%) (Auto) 5 % (0-9)    


 


Eosinophils (%) (Auto) 1 % (0-3)    


 


Basophils (%) (Auto) 0 % (0-3)    


 


Neutrophils # (Auto)


  7.6 x10^3uL


(1.8-7.7) 


  


  


 


 


Lymphocytes # (Auto)


  1.2 x10^3/uL


(1.0-4.8) 


  


  


 


 


Monocytes # (Auto)


  0.5 x10^3/uL


(0.0-1.1) 


  


  


 


 


Eosinophils # (Auto)


  0.1 x10^3/uL


(0.0-0.7) 


  


  


 


 


Basophils # (Auto)


  0.0 x10^3/uL


(0.0-0.2) 


  


  


 


 


Glucose (Fingerstick)


  


  323 mg/dL


(70-99) 318 mg/dL


(70-99) 


 








Microbiology


10/14/17 Blood Culture - Preliminary, Resulted


           NO GROWTH AFTER 1 DAY


Medications





Current Medications


Ondansetron HCl (Zofran) 4 mg PRN Q6HRS  PRN IV NAUSEA/VOMITING;  Start 10/10/

17 at 07:00;  Stop 10/11/17 at 06:59;  Status DC


Fentanyl Citrate (Fentanyl 2ml Vial) 25 mcg PRN Q5MIN  PRN IV MILD PAIN Last 

administered on 10/10/17at 14:08;  Start 10/10/17 at 07:00;  Stop 10/11/17 at 06

:59;  Status DC


Fentanyl Citrate (Fentanyl 2ml Vial) 50 mcg PRN Q5MIN  PRN IV MODERATE PAIN;  

Start 10/10/17 at 07:00;  Stop 10/11/17 at 06:59;  Status DC


Morphine Sulfate 1 mg PRN Q10MIN  PRN IV SEVERE PAIN;  Start 10/10/17 at 07:00;

  Stop 10/11/17 at 06:59;  Status DC


Ringer's Solution 1,000 ml @  0 mls/hr Q0M IV  Last administered on 10/10/17at 

13:45;  Start 10/10/17 at 07:00;  Stop 10/10/17 at 18:59;  Status DC


Lidocaine HCl (Xylocaine-Mpf 1% Vial) 2 ml PRN 1X  PRN ID PRIOR TO IV START;  

Start 10/10/17 at 07:00;  Stop 10/11/17 at 06:59;  Status DC


Hydromorphone HCl (Dilaudid) 0.5 mg PRN Q10MIN  PRN IV SEV PAIN, Second choice;

  Start 10/10/17 at 07:00;  Stop 10/11/17 at 06:59;  Status DC


Prochlorperazine Edisylate (Compazine) 5 mg PACU PRN  PRN IV NAUSEA, MRX1;  

Start 10/10/17 at 07:00;  Stop 10/11/17 at 06:59;  Status DC


Cefazolin Sodium/ Dextrose 50 ml @  100 mls/hr 1X PREOP  PRN IV PRIOR TO 

PROCEDURE;  Start 10/10/17 at 06:00;  Stop 10/10/17 at 18:00;  Status DC


Cefoxitin Sodium 2 gm/Sodium Chloride 100 ml @  200 mls/hr 1X  ONCE IV ;  Start 

10/10/17 at 07:00;  Stop 10/10/17 at 07:29;  Status UNV


Cefoxitin Sodium 100 ml @  200 mls/hr ONCE  ONCE IV  Last administered on 10/10/

17at 08:16;  Start 10/10/17 at 07:15;  Stop 10/10/17 at 07:44;  Status DC


Lidocaine HCl (Lidocaine Pf 2% Vial) 5 ml STK-MED ONCE .ROUTE ;  Start 10/10/17 

at 07:29;  Stop 10/10/17 at 07:30;  Status DC


Etomidate (Amidate) 20 mg STK-MED ONCE IV ;  Start 10/10/17 at 07:29;  Stop 10/

10/17 at 07:30;  Status DC


Fentanyl Citrate (Fentanyl 2ml Vial) 100 mcg STK-MED ONCE .ROUTE ;  Start 10/10/

17 at 07:29;  Stop 10/10/17 at 07:30;  Status DC


Rocuronium Bromide (Zemuron) 100 mg STK-MED ONCE .ROUTE ;  Start 10/10/17 at 07:

29;  Stop 10/10/17 at 07:30;  Status DC


Dexamethasone Sodium Phosphate (Decadron) 20 mg STK-MED ONCE .ROUTE ;  Start 10/

10/17 at 08:16;  Stop 10/10/17 at 08:17;  Status DC


Ondansetron HCl (Zofran) 4 mg STK-MED ONCE .ROUTE ;  Start 10/10/17 at 08:16;  

Stop 10/10/17 at 08:17;  Status DC


Esmolol HCl (Brevibloc) 100 mg STK-MED ONCE IV ;  Start 10/10/17 at 08:16;  

Stop 10/10/17 at 08:17;  Status DC


Phenylephrine HCl (Tanvir-Synephrine Inj) 10 mg STK-MED ONCE .ROUTE ;  Start 10/10/

17 at 08:16;  Stop 10/10/17 at 08:17;  Status DC


Fentanyl Citrate (Fentanyl 2ml Vial) 100 mcg STK-MED ONCE .ROUTE ;  Start 10/10/

17 at 08:17;  Stop 10/10/17 at 08:18;  Status DC


Labetalol HCl (Normodyne) 20 mg STK-MED ONCE .ROUTE ;  Start 10/10/17 at 08:50;

  Stop 10/10/17 at 08:51;  Status DC


Fentanyl Citrate (Fentanyl 2ml Vial) 100 mcg STK-MED ONCE .ROUTE ;  Start 10/10/

17 at 09:21;  Stop 10/10/17 at 09:22;  Status DC


Rocuronium Bromide (Zemuron) 100 mg STK-MED ONCE .ROUTE ;  Start 10/10/17 at 09:

30;  Stop 10/10/17 at 09:31;  Status DC


Cellulose 1 each STK-MED ONCE .ROUTE  Last administered on 10/10/17at 10:57;  

Start 10/10/17 at 09:57;  Stop 10/10/17 at 10:57;  Status DC


Albumin Human 500 ml @  As Directed STK-MED ONCE IV ;  Start 10/10/17 at 11:06;

  Stop 10/10/17 at 11:07;  Status DC


Neostigmine Methylsulfate (Bloxiverz) 10 mg STK-MED ONCE .ROUTE ;  Start 10/10/

17 at 11:20;  Stop 10/10/17 at 11:21;  Status DC


Glycopyrrolate (Robinul) 1 mg STK-MED ONCE .ROUTE ;  Start 10/10/17 at 11:20;  

Stop 10/10/17 at 11:21;  Status DC


Famotidine (Pepcid) 20 mg QHS IVP  Last administered on 10/15/17at 21:20;  

Start 10/10/17 at 21:00


Enoxaparin Sodium (Lovenox 30mg Syringe) 30 mg Q24H SQ  Last administered on 10/

12/17at 08:13;  Start 10/11/17 at 08:00;  Stop 10/12/17 at 10:41;  Status DC


Sodium Chloride (Normal Saline Flush) 3 ml QSHIFT  PRN IV AFTER MEDS AND BLOOD 

DRAWS;  Start 10/10/17 at 12:00


Ringer's Solution 1,000 ml @  100 mls/hr Q10H IV  Last administered on 10/14/

17at 15:49;  Start 10/10/17 at 11:49;  Stop 10/15/17 at 09:57;  Status DC


Naloxone HCl (Narcan) 0.4 mg PRN Q2MIN  PRN IV SEE INSTRUCTIONS;  Start 10/10/

17 at 12:00


Sodium Chloride 1,000 ml @  25 mls/hr Q24H IV  Last administered on 10/10/17at 

11:49;  Start 10/10/17 at 11:49;  Stop 10/15/17 at 07:53;  Status DC


Morphine Sulfate 30 ml @ 0 mls/hr CONT PRN  PRN IV PROTOCOL Last administered 

on 10/10/17at 15:13;  Start 10/10/17 at 12:00;  Stop 10/13/17 at 11:04;  Status 

DC


Ondansetron HCl (Zofran) 4 mg PRN Q6HRS  PRN IV NAUESA, 1ST CHOICE;  Start 10/10

/17 at 12:00


Insulin Aspart (NovoLOG VIAL) 100 unit STK-MED ONCE SQ ;  Start 10/10/17 at 14:

01;  Stop 10/10/17 at 14:02;  Status DC


Insulin Aspart (NovoLOG VIAL) 3 unit 1X  ONCE SQ  Last administered on 10/10/

17at 13:58;  Start 10/10/17 at 14:15;  Stop 10/10/17 at 14:16;  Status DC


Pneumococcal Polyvalent Vaccine (Do NOT chart on this placeholder)  1X  ONCE MC 

;  Start 10/10/17 at 16:15;  Stop 10/10/17 at 16:16;  Status UNV


Albuterol/ Ipratropium (Duoneb) 3 ml PRN Q6HRS  PRN NEB SHORTNESS OF BREATH;  

Start 10/11/17 at 09:30;  Stop 10/11/17 at 09:35;  Status DC


Albuterol Sulfate (Ventolin Neb Soln) 2.5 mg PRN Q4HRS  PRN NEB SHORTNESS OF 

BREATH;  Start 10/11/17 at 09:30


Insulin Aspart (NovoLOG) 0-9 UNITS TIDWMEALS SQ  Last administered on 10/13/

17at 18:00;  Start 10/11/17 at 12:00;  Stop 10/13/17 at 18:58;  Status DC


Dextrose (Dextrose 50%-Water Syringe) 12.5 gm PRN Q15MIN  PRN IV SEE COMMENTS;  

Start 10/11/17 at 09:30;  Stop 10/13/17 at 18:58;  Status DC


Enoxaparin Sodium (Lovenox 40mg Syringe) 40 mg Q24H SQ ;  Start 10/13/17 at 08:

00;  Stop 10/13/17 at 08:00;  Status DC


Enoxaparin Sodium (Lovenox 60mg Syringe) 60 mg Q12HR SQ  Last administered on 10

/15/17at 08:49;  Start 10/12/17 at 20:00;  Stop 10/15/17 at 08:58;  Status DC


Info 1 each PRN DAILY  PRN MC SEE COMMENTS;  Start 10/12/17 at 22:30;  Status 

Cancel


Morphine Sulfate 2 mg PRN Q2HR  PRN IV PAIN Last administered on 10/15/17at 17:

31;  Start 10/13/17 at 11:00


Amino Acids/ Glycerin/ Electrolytes 1,000 ml @  80 mls/hr [id number] IV  Last 

administered on 10/16/17at 05:19;  Start 10/13/17 at 13:45;  Stop 10/16/17 at 10

:02;  Status DC


Metoprolol Tartrate (Lopressor) 5 mg Q6HRS IVP  Last administered on 10/16/17at 

05:23;  Start 10/14/17 at 09:00


Diphenhydramine HCl (Benadryl) 25 mg PRN QHS  PRN IVP SLEEP Last administered 

on 10/15/17at 21:20;  Start 10/14/17 at 08:45


Insulin Aspart (NovoLOG) 0-9 UNITS TIDWMEALS SQ  Last administered on 10/16/

17at 09:08;  Start 10/14/17 at 09:30


Dextrose (Dextrose 50%-Water Syringe) 12.5 gm PRN Q15MIN  PRN IV SEE COMMENTS;  

Start 10/14/17 at 09:15


Info (Anti-Coagulation Monitoring By Pharmacy) 1 each PRN DAILY  PRN MC SEE 

COMMENTS Last administered on 10/15/17at 14:22;  Start 10/14/17 at 14:30


Piperacillin Sod/ Tazobactam Sod (Zosyn Per Pharmacy) 1 each PRN DAILY  PRN MC 

SEE COMMENTS;  Start 10/14/17 at 18:15


Vancomycin HCl (Vanco Per Pharmacy) 1 each PRN DAILY  PRN MC SEE COMMENTS Last 

administered on 10/15/17at 14:33;  Start 10/14/17 at 18:15


Vancomycin HCl 1.5 gm/Sodium Chloride 500 ml @  250 mls/hr 1X  ONCE IV  Last 

administered on 10/14/17at 21:23;  Start 10/14/17 at 19:00;  Stop 10/14/17 at 20

:59;  Status DC


Piperacillin Sod/ Tazobactam Sod 2.25 gm/Sodium Chloride 50 ml @  100 mls/hr 

Q6HRS IV  Last administered on 10/16/17at 05:20;  Start 10/14/17 at 22:00


Vancomycin HCl 1 gm/Sodium Chloride 250 ml @  250 mls/hr Q24H IV  Last 

administered on 10/15/17at 21:44;  Start 10/15/17 at 21:00


Vancomycin HCl 1 each 1X  ONCE MC ;  Start 10/16/17 at 20:30;  Stop 10/16/17 at 

20:31


Rivaroxaban (Xarelto) 15 mg BID PO  Last administered on 10/16/17at 08:54;  

Start 10/15/17 at 09:00


Fluconazole/ Sodium Chloride 50 ml @  100 mls/hr Q24H IV  Last administered on 

10/15/17at 15:50;  Start 10/15/17 at 15:00


Insulin Detemir (Levemir) 10 units ONCE  ONCE SQ ;  Start 10/16/17 at 09:15;  

Stop 10/16/17 at 09:17;  Status DC





Active Scripts


Active


Reported


[Lidocaine]   15 Ml PO Q6HRS PRN


[Ondansetron]   4 Mg IV PRN Q4HRS PRN


[Pepcid Suspension]   20 IV BID


Duoneb 0.5-3(2.5) Mg/3 Ml (Albuterol/Ipratropium) 3 Ml Ampul.neb 3 Ml NEB PRN 

Q6HRS PRN


Novolog (Insulin Aspart) 100 Unit/1 Ml Cartridge 0 SQ Q6HRS


Vitals/I & O





Vital Sign - Last 24 Hours








 10/15/17 10/15/17 10/15/17 10/15/17





 11:00 12:57 15:00 17:30


 


Temp 97.9  99.1 





 97.9  99.1 


 


Pulse 95 95 93 93


 


Resp 16  18 


 


B/P (MAP) 134/75 (94) 134/75 137/78 (97) 137/78


 


Pulse Ox 97  97 


 


O2 Delivery Room Air  Room Air 


 


    





    





 10/15/17 10/15/17 10/15/17 10/15/17





 17:31 18:22 19:00 20:00


 


Temp   97.7 





   97.7 


 


Pulse   90 


 


Resp   22 


 


B/P (MAP)   134/79 (97) 


 


Pulse Ox   97 


 


O2 Delivery Room Air Room Air Room Air Room Air


 


    





    





 10/15/17 10/16/17 10/16/17 10/16/17





 23:00 00:03 03:00 05:23


 


Temp 97.5  97.7 





 97.5  97.7 


 


Pulse 96 96 91 91


 


Resp 24  18 


 


B/P (MAP) 131/77 (95) 131/77 140/79 (99) 140/79


 


Pulse Ox 95  96 


 


O2 Delivery Room Air  Room Air 


 


    





    





 10/16/17 10/16/17 10/16/17 





 07:00 09:59 10:54 


 


Temp 97.5  97.9 





 97.5  97.9 


 


Pulse 86  98 


 


Resp 16  18 


 


B/P (MAP) 145/84 (104)  104/63 (77) 


 


Pulse Ox 97  94 


 


O2 Delivery Room Air Room Air Room Air 











Nutrition Consultation


Dietary Evaluation:


Recommendations by RD:  PPN/TPN


Comments:  


REC resume when able TF via J tube:





Diabetisource AC @10 ml/hr, increase 10 ml/hr q8 hrs to goal of 50 ml/hr 


w/100 ml flushes q6 hrs





STD TPN at this time per MD


Expected Outcomes/Goals:  


tolerate TF at goal





Malnutrition Findings:


Food and Nutrition Intake (Mod:  <75% est energy req 7days


Body Fat Depletion (Non Severe:  Mild Depletion


Weight Status:  Underweight











CAROLINA CLARK MD Oct 16, 2017 11:01

## 2017-10-16 NOTE — PDOC
Infectious Disease Note


Subjective


Subjective


Doing ok


Has occ pain across mid abd





ROS


ROS


Very [place] difficult to completely understand





GEN: Denies fevers, chills


HEENT:  sore throat


CV: Denies chest pain


RESP: Denies shortness of air, cough


GI: Denies n/v/d


NEURO: Denies confusion, dizziness


MSK: Denies weakness, joint pain/swelling





Vital Sign


Vital Signs





Vital Signs








  Date Time  Temp Pulse Resp B/P (MAP) Pulse Ox O2 Delivery O2 Flow Rate FiO2


 


10/16/17 05:23  91  140/79    


 


10/16/17 03:00 97.7  18  96 Room Air  





 97.7       











Physical Exam


PHYSICAL EXAM


GENERAL:  NAD, Alert


HEENT:  PERRL, OC/OP - dry


NECK:  Supple, no JVD, no LN


LUNGS:  Clear


HEART:  S1S2, no gallop, no murmur


ABD:  Soft, KARTHIK, clean incision.  Mid distension, J-tube


EXT:  No edema, no cyanosis


CNS:  Alert, oriented, no focal neurologic deficit, [place]


SKIN:  No rash


IV: ok





Labs


Lab





Laboratory Tests








Test


  10/15/17


07:45 10/15/17


11:19 10/15/17


16:28 10/15/17


20:58


 


Glucose (Fingerstick)


  312 mg/dL


(70-99) 245 mg/dL


(70-99) 189 mg/dL


(70-99) 174 mg/dL


(70-99)


 


Test


  10/16/17


03:20 10/16/17


05:20 


  


 


 


Sodium Level


  138 mmol/L


(136-145) 


  


  


 


 


Potassium Level


  4.2 mmol/L


(3.5-5.1) 


  


  


 


 


Chloride Level


  103 mmol/L


() 


  


  


 


 


Carbon Dioxide Level


  29 mmol/L


(21-32) 


  


  


 


 


Anion Gap 6 (6-14)    


 


Blood Urea Nitrogen


  42 mg/dL


(8-26) 


  


  


 


 


Creatinine


  1.6 mg/dL


(0.7-1.3) 


  


  


 


 


Estimated GFR


(Cockcroft-Gault) 41.3 


  


  


  


 


 


Glucose Level


  329 mg/dL


(70-99) 


  


  


 


 


Calcium Level


  7.9 mg/dL


(8.5-10.1) 


  


  


 


 


White Blood Count


  


  9.4 x10^3/uL


(4.0-11.0) 


  


 


 


Red Blood Count


  


  2.80 x10^6/uL


(4.30-5.70) 


  


 


 


Hemoglobin


  


  8.3 g/dL


(13.0-17.5) 


  


 


 


Hematocrit


  


  24.2 %


(39.0-53.0) 


  


 


 


Mean Corpuscular Volume  86 fL ()   


 


Mean Corpuscular Hemoglobin  30 pg (25-35)   


 


Mean Corpuscular Hemoglobin


Concent 


  34 g/dL


(31-37) 


  


 


 


Red Cell Distribution Width


  


  14.0 %


(11.5-14.5) 


  


 


 


Platelet Count


  


  189 x10^3/uL


(140-400) 


  


 


 


Neutrophils (%) (Auto)  81 % (31-73)   


 


Lymphocytes (%) (Auto)  13 % (24-48)   


 


Monocytes (%) (Auto)  5 % (0-9)   


 


Eosinophils (%) (Auto)  1 % (0-3)   


 


Basophils (%) (Auto)  0 % (0-3)   


 


Neutrophils # (Auto)


  


  7.6 x10^3uL


(1.8-7.7) 


  


 


 


Lymphocytes # (Auto)


  


  1.2 x10^3/uL


(1.0-4.8) 


  


 


 


Monocytes # (Auto)


  


  0.5 x10^3/uL


(0.0-1.1) 


  


 


 


Eosinophils # (Auto)


  


  0.1 x10^3/uL


(0.0-0.7) 


  


 


 


Basophils # (Auto)


  


  0.0 x10^3/uL


(0.0-0.2) 


  


 











Objective


Assessment


Fever


Leukocytosis - hopefully reactive - better


 - pre-op steroids 10/10th


Yeast in urine


DVT RUE s/p PICC removal 10/12


Gastric outlet obstruction s/p abdominal surgery, Oct 10th


Malnutrition 


Anemia s/p PRBCs 10/11th 


CKD





Plan


Plan of Care


Vanc and Zosyn initiated 10/14


Added Fluconazole 10/15


BC pending


f/u am labs





Try to wean abx soon











MADALYN KOCH MD Oct 16, 2017 07:39

## 2017-10-16 NOTE — PDOC2
PALLIATIVE CARE


Palliative Care Note


Palliative Care


Consult requested by Dr. Mac to address code status


Information obtained from medical record and wife.  Recently Admitted with 

gastric outlet obstruction


Diagnosis; 10/10 Post-op Antrectomy/vagotomy with Stalin en Y reconstruction; 

right upper extremity DVT;  EF 40%; deconditioning. DM2


Patient sleeping soundly when seen at 1145;  Did not participate in 

conversation.  Spoke with wife.  


They have never had any conversation about what he would want done if his heart 

stopped or he stopped breathing. 





Wife stated she would like to have her daughter here for more conversation.  

Daughter can not take off work.    


Checked with nurse again at 1700.  No family.  Nurse will page if any family 

arrives.


Code Status:  Full Code.  





Discharge Plan:  Will need more SNU and likely return to [facility]


Will attempt to reach daughter by phone tomorrow











RUSTY MALDONADO Oct 16, 2017 17:46

## 2017-10-17 VITALS — SYSTOLIC BLOOD PRESSURE: 121 MMHG | DIASTOLIC BLOOD PRESSURE: 67 MMHG

## 2017-10-17 VITALS — SYSTOLIC BLOOD PRESSURE: 112 MMHG | DIASTOLIC BLOOD PRESSURE: 65 MMHG

## 2017-10-17 VITALS — DIASTOLIC BLOOD PRESSURE: 60 MMHG | SYSTOLIC BLOOD PRESSURE: 92 MMHG

## 2017-10-17 VITALS — SYSTOLIC BLOOD PRESSURE: 115 MMHG | DIASTOLIC BLOOD PRESSURE: 72 MMHG

## 2017-10-17 VITALS — SYSTOLIC BLOOD PRESSURE: 113 MMHG | DIASTOLIC BLOOD PRESSURE: 70 MMHG

## 2017-10-17 VITALS — DIASTOLIC BLOOD PRESSURE: 70 MMHG | SYSTOLIC BLOOD PRESSURE: 114 MMHG

## 2017-10-17 LAB
ANION GAP SERPL CALC-SCNC: 9 MMOL/L (ref 6–14)
BASOPHILS # BLD AUTO: 0.1 X10^3/UL (ref 0–0.2)
BASOPHILS NFR BLD: 1 % (ref 0–3)
BUN SERPL-MCNC: 41 MG/DL (ref 8–26)
CALCIUM SERPL-MCNC: 8 MG/DL (ref 8.5–10.1)
CHLORIDE SERPL-SCNC: 105 MMOL/L (ref 98–107)
CO2 SERPL-SCNC: 28 MMOL/L (ref 21–32)
CREAT SERPL-MCNC: 1.5 MG/DL (ref 0.7–1.3)
EOSINOPHIL NFR BLD: 2 % (ref 0–3)
ERYTHROCYTE [DISTWIDTH] IN BLOOD BY AUTOMATED COUNT: 14.3 % (ref 11.5–14.5)
GFR SERPLBLD BASED ON 1.73 SQ M-ARVRAT: 44.5 ML/MIN
GLUCOSE SERPL-MCNC: 188 MG/DL (ref 70–99)
HCT VFR BLD CALC: 25.8 % (ref 39–53)
HGB BLD-MCNC: 8.5 G/DL (ref 13–17.5)
LYMPHOCYTES # BLD: 1.2 X10^3/UL (ref 1–4.8)
LYMPHOCYTES NFR BLD AUTO: 13 % (ref 24–48)
MCH RBC QN AUTO: 29 PG (ref 25–35)
MCHC RBC AUTO-ENTMCNC: 33 G/DL (ref 31–37)
MCV RBC AUTO: 88 FL (ref 79–100)
MONOCYTES NFR BLD: 5 % (ref 0–9)
NEUTROPHILS NFR BLD AUTO: 79 % (ref 31–73)
PLATELET # BLD AUTO: 178 X10^3/UL (ref 140–400)
POTASSIUM SERPL-SCNC: 3.6 MMOL/L (ref 3.5–5.1)
RBC # BLD AUTO: 2.94 X10^6/UL (ref 4.3–5.7)
SODIUM SERPL-SCNC: 142 MMOL/L (ref 136–145)
WBC # BLD AUTO: 8.7 X10^3/UL (ref 4–11)

## 2017-10-17 RX ADMIN — METOPROLOL TARTRATE SCH MG: 5 INJECTION INTRAVENOUS at 05:49

## 2017-10-17 RX ADMIN — INSULIN ASPART SCH UNITS: 100 INJECTION, SOLUTION INTRAVENOUS; SUBCUTANEOUS at 14:04

## 2017-10-17 RX ADMIN — PIPERACILLIN SODIUM AND TAZOBACTAM SODIUM SCH MLS/HR: 2; .25 INJECTION, POWDER, LYOPHILIZED, FOR SOLUTION INTRAVENOUS at 05:40

## 2017-10-17 RX ADMIN — Medication SCH ML: at 21:53

## 2017-10-17 RX ADMIN — Medication SCH ML: at 10:05

## 2017-10-17 RX ADMIN — METOPROLOL TARTRATE SCH MG: 5 INJECTION INTRAVENOUS at 00:00

## 2017-10-17 RX ADMIN — INSULIN ASPART SCH UNITS: 100 INJECTION, SOLUTION INTRAVENOUS; SUBCUTANEOUS at 10:20

## 2017-10-17 RX ADMIN — METOPROLOL TARTRATE SCH MG: 5 INJECTION INTRAVENOUS at 13:46

## 2017-10-17 RX ADMIN — INSULIN ASPART SCH UNITS: 100 INJECTION, SOLUTION INTRAVENOUS; SUBCUTANEOUS at 18:02

## 2017-10-17 RX ADMIN — METOPROLOL TARTRATE SCH MG: 5 INJECTION INTRAVENOUS at 17:54

## 2017-10-17 RX ADMIN — RIVAROXABAN SCH MG: 15 TABLET, FILM COATED ORAL at 07:37

## 2017-10-17 RX ADMIN — PIPERACILLIN SODIUM AND TAZOBACTAM SODIUM SCH MLS/HR: 2; .25 INJECTION, POWDER, LYOPHILIZED, FOR SOLUTION INTRAVENOUS at 00:01

## 2017-10-17 NOTE — PDOC
SURGICAL PROGRESS NOTE


Subjective


Pt without c/o, passing stools, interested in drinking water


Vital Signs





Vital Signs








  Date Time  Temp Pulse Resp B/P (MAP) Pulse Ox O2 Delivery O2 Flow Rate FiO2


 


10/17/17 07:22     97 Room Air  


 


10/17/17 07:00 98.3 77 18 115/72 (86)    





 98.3       








General:  Alert, Oriented X3, Cooperative, No acute distress


Abdomen:  Soft, No tenderness, Other (dressing c/d/i, KARTHIK min serosang)


Labs





Laboratory Tests








Test


  10/15/17


16:28 10/15/17


20:58 10/16/17


03:20 10/16/17


05:20


 


Glucose (Fingerstick)


  189 mg/dL


(70-99) 174 mg/dL


(70-99) 


  


 


 


Sodium Level


  


  


  138 mmol/L


(136-145) 


 


 


Potassium Level


  


  


  4.2 mmol/L


(3.5-5.1) 


 


 


Chloride Level


  


  


  103 mmol/L


() 


 


 


Carbon Dioxide Level


  


  


  29 mmol/L


(21-32) 


 


 


Anion Gap   6 (6-14)  


 


Blood Urea Nitrogen


  


  


  42 mg/dL


(8-26) 


 


 


Creatinine


  


  


  1.6 mg/dL


(0.7-1.3) 


 


 


Estimated GFR


(Cockcroft-Gault) 


  


  41.3 


  


 


 


Glucose Level


  


  


  329 mg/dL


(70-99) 


 


 


Calcium Level


  


  


  7.9 mg/dL


(8.5-10.1) 


 


 


White Blood Count


  


  


  


  9.4 x10^3/uL


(4.0-11.0)


 


Red Blood Count


  


  


  


  2.80 x10^6/uL


(4.30-5.70)


 


Hemoglobin


  


  


  


  8.3 g/dL


(13.0-17.5)


 


Hematocrit


  


  


  


  24.2 %


(39.0-53.0)


 


Mean Corpuscular Volume    86 fL () 


 


Mean Corpuscular Hemoglobin    30 pg (25-35) 


 


Mean Corpuscular Hemoglobin


Concent 


  


  


  34 g/dL


(31-37)


 


Red Cell Distribution Width


  


  


  


  14.0 %


(11.5-14.5)


 


Platelet Count


  


  


  


  189 x10^3/uL


(140-400)


 


Neutrophils (%) (Auto)    81 % (31-73) 


 


Lymphocytes (%) (Auto)    13 % (24-48) 


 


Monocytes (%) (Auto)    5 % (0-9) 


 


Eosinophils (%) (Auto)    1 % (0-3) 


 


Basophils (%) (Auto)    0 % (0-3) 


 


Neutrophils # (Auto)


  


  


  


  7.6 x10^3uL


(1.8-7.7)


 


Lymphocytes # (Auto)


  


  


  


  1.2 x10^3/uL


(1.0-4.8)


 


Monocytes # (Auto)


  


  


  


  0.5 x10^3/uL


(0.0-1.1)


 


Eosinophils # (Auto)


  


  


  


  0.1 x10^3/uL


(0.0-0.7)


 


Basophils # (Auto)


  


  


  


  0.0 x10^3/uL


(0.0-0.2)


 


Test


  10/16/17


07:14 10/16/17


10:14 10/16/17


16:10 10/16/17


20:32


 


Glucose (Fingerstick)


  323 mg/dL


(70-99) 318 mg/dL


(70-99) 206 mg/dL


(70-99) 


 


 


Vancomycin Level Trough


  


  


  


  16.0 mcg/mL


(10.0-20.0)


 


Vancomycin Last Dose Date    10/15/17 


 


Vancomycin Last Dose Time    2100 


 


Test


  10/16/17


20:59 10/17/17


04:45 10/17/17


07:19 


 


 


Glucose (Fingerstick)


  126 mg/dL


(70-99) 


  219 mg/dL


(70-99) 


 


 


White Blood Count


  


  8.7 x10^3/uL


(4.0-11.0) 


  


 


 


Red Blood Count


  


  2.94 x10^6/uL


(4.30-5.70) 


  


 


 


Hemoglobin


  


  8.5 g/dL


(13.0-17.5) 


  


 


 


Hematocrit


  


  25.8 %


(39.0-53.0) 


  


 


 


Mean Corpuscular Volume  88 fL ()   


 


Mean Corpuscular Hemoglobin  29 pg (25-35)   


 


Mean Corpuscular Hemoglobin


Concent 


  33 g/dL


(31-37) 


  


 


 


Red Cell Distribution Width


  


  14.3 %


(11.5-14.5) 


  


 


 


Platelet Count


  


  178 x10^3/uL


(140-400) 


  


 


 


Neutrophils (%) (Auto)  79 % (31-73)   


 


Lymphocytes (%) (Auto)  13 % (24-48)   


 


Monocytes (%) (Auto)  5 % (0-9)   


 


Eosinophils (%) (Auto)  2 % (0-3)   


 


Basophils (%) (Auto)  1 % (0-3)   


 


Neutrophils # (Auto)


  


  6.9 x10^3uL


(1.8-7.7) 


  


 


 


Lymphocytes # (Auto)


  


  1.2 x10^3/uL


(1.0-4.8) 


  


 


 


Monocytes # (Auto)


  


  0.5 x10^3/uL


(0.0-1.1) 


  


 


 


Eosinophils # (Auto)


  


  0.1 x10^3/uL


(0.0-0.7) 


  


 


 


Basophils # (Auto)


  


  0.1 x10^3/uL


(0.0-0.2) 


  


 


 


Sodium Level


  


  142 mmol/L


(136-145) 


  


 


 


Potassium Level


  


  3.6 mmol/L


(3.5-5.1) 


  


 


 


Chloride Level


  


  105 mmol/L


() 


  


 


 


Carbon Dioxide Level


  


  28 mmol/L


(21-32) 


  


 


 


Anion Gap  9 (6-14)   


 


Blood Urea Nitrogen


  


  41 mg/dL


(8-26) 


  


 


 


Creatinine


  


  1.5 mg/dL


(0.7-1.3) 


  


 


 


Estimated GFR


(Cockcroft-Gault) 


  44.5 


  


  


 


 


Glucose Level


  


  188 mg/dL


(70-99) 


  


 


 


Calcium Level


  


  8.0 mg/dL


(8.5-10.1) 


  


 








Laboratory Tests








Test


  10/16/17


16:10 10/16/17


20:32 10/16/17


20:59 10/17/17


04:45


 


Glucose (Fingerstick)


  206 mg/dL


(70-99) 


  126 mg/dL


(70-99) 


 


 


Vancomycin Level Trough


  


  16.0 mcg/mL


(10.0-20.0) 


  


 


 


Vancomycin Last Dose Date  10/15/17   


 


Vancomycin Last Dose Time  2100   


 


White Blood Count


  


  


  


  8.7 x10^3/uL


(4.0-11.0)


 


Red Blood Count


  


  


  


  2.94 x10^6/uL


(4.30-5.70)


 


Hemoglobin


  


  


  


  8.5 g/dL


(13.0-17.5)


 


Hematocrit


  


  


  


  25.8 %


(39.0-53.0)


 


Mean Corpuscular Volume    88 fL () 


 


Mean Corpuscular Hemoglobin    29 pg (25-35) 


 


Mean Corpuscular Hemoglobin


Concent 


  


  


  33 g/dL


(31-37)


 


Red Cell Distribution Width


  


  


  


  14.3 %


(11.5-14.5)


 


Platelet Count


  


  


  


  178 x10^3/uL


(140-400)


 


Neutrophils (%) (Auto)    79 % (31-73) 


 


Lymphocytes (%) (Auto)    13 % (24-48) 


 


Monocytes (%) (Auto)    5 % (0-9) 


 


Eosinophils (%) (Auto)    2 % (0-3) 


 


Basophils (%) (Auto)    1 % (0-3) 


 


Neutrophils # (Auto)


  


  


  


  6.9 x10^3uL


(1.8-7.7)


 


Lymphocytes # (Auto)


  


  


  


  1.2 x10^3/uL


(1.0-4.8)


 


Monocytes # (Auto)


  


  


  


  0.5 x10^3/uL


(0.0-1.1)


 


Eosinophils # (Auto)


  


  


  


  0.1 x10^3/uL


(0.0-0.7)


 


Basophils # (Auto)


  


  


  


  0.1 x10^3/uL


(0.0-0.2)


 


Sodium Level


  


  


  


  142 mmol/L


(136-145)


 


Potassium Level


  


  


  


  3.6 mmol/L


(3.5-5.1)


 


Chloride Level


  


  


  


  105 mmol/L


()


 


Carbon Dioxide Level


  


  


  


  28 mmol/L


(21-32)


 


Anion Gap    9 (6-14) 


 


Blood Urea Nitrogen


  


  


  


  41 mg/dL


(8-26)


 


Creatinine


  


  


  


  1.5 mg/dL


(0.7-1.3)


 


Estimated GFR


(Cockcroft-Gault) 


  


  


  44.5 


 


 


Glucose Level


  


  


  


  188 mg/dL


(70-99)


 


Calcium Level


  


  


  


  8.0 mg/dL


(8.5-10.1)


 


Test


  10/17/17


07:19 


  


  


 


 


Glucose (Fingerstick)


  219 mg/dL


(70-99) 


  


  


 








Assessment/Plan


s/p antrectomy


doing well


will check speech eval and then ADAT


plan transfer to  tomorrow


Problems:  











NIRALI BECKER MD Oct 17, 2017 11:50

## 2017-10-17 NOTE — PDOC
Infectious Disease Note


Subjective


Subjective


Doing ok


Has occ pain across mid abd





ROS


ROS





Very Unga difficult to completely understand but appears comfortable





GEN: Denies fevers, chills


HEENT:  sore throat


CV: Denies chest pain


RESP: Denies shortness of air, cough


GI: Denies n/v/d


NEURO: Denies confusion, dizziness


MSK: Denies weakness, joint pain/swelling





Vital Sign


Vital Signs





Vital Signs








  Date Time  Temp Pulse Resp B/P (MAP) Pulse Ox O2 Delivery O2 Flow Rate FiO2


 


10/17/17 07:22     97 Room Air  


 


10/17/17 07:00 98.3 77 18 115/72 (86)    





 98.3       











Physical Exam


PHYSICAL EXAM





GENERAL:  NAD, Alert, in bed


HEENT:  PERRL, OC/OP - dry


NECK:  Supple, no JVD, no LN


LUNGS:  Clear


HEART:  S1S2, no gallop, no murmur


ABD:  Soft, KARTHIK, clean incision.  Mid distension, J-tube


EXT:  No edema, no cyanosis


CNS:  Alert, oriented, no focal neurologic deficit, Unga


SKIN:  No rash


IV: ok





Labs


Lab





Laboratory Tests








Test


  10/16/17


10:14 10/16/17


16:10 10/16/17


20:32 10/16/17


20:59


 


Glucose (Fingerstick)


  318 mg/dL


(70-99) 206 mg/dL


(70-99) 


  126 mg/dL


(70-99)


 


Vancomycin Level Trough


  


  


  16.0 mcg/mL


(10.0-20.0) 


 


 


Vancomycin Last Dose Date   10/15/17  


 


Vancomycin Last Dose Time   2100  


 


Test


  10/17/17


04:45 10/17/17


07:19 


  


 


 


White Blood Count


  8.7 x10^3/uL


(4.0-11.0) 


  


  


 


 


Red Blood Count


  2.94 x10^6/uL


(4.30-5.70) 


  


  


 


 


Hemoglobin


  8.5 g/dL


(13.0-17.5) 


  


  


 


 


Hematocrit


  25.8 %


(39.0-53.0) 


  


  


 


 


Mean Corpuscular Volume 88 fL ()    


 


Mean Corpuscular Hemoglobin 29 pg (25-35)    


 


Mean Corpuscular Hemoglobin


Concent 33 g/dL


(31-37) 


  


  


 


 


Red Cell Distribution Width


  14.3 %


(11.5-14.5) 


  


  


 


 


Platelet Count


  178 x10^3/uL


(140-400) 


  


  


 


 


Neutrophils (%) (Auto) 79 % (31-73)    


 


Lymphocytes (%) (Auto) 13 % (24-48)    


 


Monocytes (%) (Auto) 5 % (0-9)    


 


Eosinophils (%) (Auto) 2 % (0-3)    


 


Basophils (%) (Auto) 1 % (0-3)    


 


Neutrophils # (Auto)


  6.9 x10^3uL


(1.8-7.7) 


  


  


 


 


Lymphocytes # (Auto)


  1.2 x10^3/uL


(1.0-4.8) 


  


  


 


 


Monocytes # (Auto)


  0.5 x10^3/uL


(0.0-1.1) 


  


  


 


 


Eosinophils # (Auto)


  0.1 x10^3/uL


(0.0-0.7) 


  


  


 


 


Basophils # (Auto)


  0.1 x10^3/uL


(0.0-0.2) 


  


  


 


 


Sodium Level


  142 mmol/L


(136-145) 


  


  


 


 


Potassium Level


  3.6 mmol/L


(3.5-5.1) 


  


  


 


 


Chloride Level


  105 mmol/L


() 


  


  


 


 


Carbon Dioxide Level


  28 mmol/L


(21-32) 


  


  


 


 


Anion Gap 9 (6-14)    


 


Blood Urea Nitrogen


  41 mg/dL


(8-26) 


  


  


 


 


Creatinine


  1.5 mg/dL


(0.7-1.3) 


  


  


 


 


Estimated GFR


(Cockcroft-Gault) 44.5 


  


  


  


 


 


Glucose Level


  188 mg/dL


(70-99) 


  


  


 


 


Calcium Level


  8.0 mg/dL


(8.5-10.1) 


  


  


 


 


Glucose (Fingerstick)


  


  219 mg/dL


(70-99) 


  


 











Objective


Assessment


Fever - better


Leukocytosis - hopefully reactive - better


 - pre-op steroids 10/10th


Yeast in urine


DVT RUE s/p PICC removal 10/12


Gastric outlet obstruction s/p abdominal surgery, Oct 10th


Malnutrition 


Anemia s/p PRBCs 10/11th 


CKD





Plan


Plan of Care


D/c Vanc and Zosyn initiated 10/14. Begin augmentin via tube, on tube feeds


Added Fluconazole IV 10/15 wean soon


BC pending


f/u am labs











MADALYN KOCH MD Oct 17, 2017 09:16

## 2017-10-17 NOTE — PDOC
PROGRESS NOTES


Chief Complaint


Chief Complaint


Gastric outlet obstruction s/p extensive abd sx with adhesiolysis, J tube 

insertion, antrectomy etc 10/10


New DVT Rt arm - 10/12 - PICC in site


Hx perforated ulcer DISTANT PAST


Frailty, gen weakness


FULL code


Fevers/SEPSIS (10/14)





PMH:


DM


HTN


CAD


PUD


CABG





History of Present Illness


History of Present Illness


Looking good, stooling fine


Wife wants SLP - pt feels thristy


GEtting nutrition via J tube


LYtes ok





PLAN:


Agree with SLP today


[facility]get [facility] shashank





Vitals


Vitals





Vital Signs








  Date Time  Temp Pulse Resp B/P (MAP) Pulse Ox O2 Delivery O2 Flow Rate FiO2


 


10/17/17 07:22     97 Room Air  


 


10/17/17 07:00 98.3 77 18 115/72 (86)    





 98.3       











Physical Exam


General:  Alert, Oriented X3, Cooperative, No acute distress


Heart:  Regular rate, Normal S1, Normal S2


Lungs:  Clear


Abdomen:  Soft, No tenderness, Other (dressing c/d/i, KARTHIK min serosang)


Extremities:  No clubbing, No cyanosis


Skin:  No rashes, No breakdown





Labs


LABS





Laboratory Tests








Test


  10/16/17


16:10 10/16/17


20:32 10/16/17


20:59 10/17/17


04:45


 


Glucose (Fingerstick)


  206 mg/dL


(70-99) 


  126 mg/dL


(70-99) 


 


 


Vancomycin Level Trough


  


  16.0 mcg/mL


(10.0-20.0) 


  


 


 


Vancomycin Last Dose Date  10/15/17   


 


Vancomycin Last Dose Time  2100   


 


White Blood Count


  


  


  


  8.7 x10^3/uL


(4.0-11.0)


 


Red Blood Count


  


  


  


  2.94 x10^6/uL


(4.30-5.70)


 


Hemoglobin


  


  


  


  8.5 g/dL


(13.0-17.5)


 


Hematocrit


  


  


  


  25.8 %


(39.0-53.0)


 


Mean Corpuscular Volume    88 fL () 


 


Mean Corpuscular Hemoglobin    29 pg (25-35) 


 


Mean Corpuscular Hemoglobin


Concent 


  


  


  33 g/dL


(31-37)


 


Red Cell Distribution Width


  


  


  


  14.3 %


(11.5-14.5)


 


Platelet Count


  


  


  


  178 x10^3/uL


(140-400)


 


Neutrophils (%) (Auto)    79 % (31-73) 


 


Lymphocytes (%) (Auto)    13 % (24-48) 


 


Monocytes (%) (Auto)    5 % (0-9) 


 


Eosinophils (%) (Auto)    2 % (0-3) 


 


Basophils (%) (Auto)    1 % (0-3) 


 


Neutrophils # (Auto)


  


  


  


  6.9 x10^3uL


(1.8-7.7)


 


Lymphocytes # (Auto)


  


  


  


  1.2 x10^3/uL


(1.0-4.8)


 


Monocytes # (Auto)


  


  


  


  0.5 x10^3/uL


(0.0-1.1)


 


Eosinophils # (Auto)


  


  


  


  0.1 x10^3/uL


(0.0-0.7)


 


Basophils # (Auto)


  


  


  


  0.1 x10^3/uL


(0.0-0.2)


 


Sodium Level


  


  


  


  142 mmol/L


(136-145)


 


Potassium Level


  


  


  


  3.6 mmol/L


(3.5-5.1)


 


Chloride Level


  


  


  


  105 mmol/L


()


 


Carbon Dioxide Level


  


  


  


  28 mmol/L


(21-32)


 


Anion Gap    9 (6-14) 


 


Blood Urea Nitrogen


  


  


  


  41 mg/dL


(8-26)


 


Creatinine


  


  


  


  1.5 mg/dL


(0.7-1.3)


 


Estimated GFR


(Cockcroft-Gault) 


  


  


  44.5 


 


 


Glucose Level


  


  


  


  188 mg/dL


(70-99)


 


Calcium Level


  


  


  


  8.0 mg/dL


(8.5-10.1)


 


Test


  10/17/17


07:19 


  


  


 


 


Glucose (Fingerstick)


  219 mg/dL


(70-99) 


  


  


 











Review of Systems


Review of Systems


denies 14 pt reviewed





Comment


Review of Relevant


I have reviewed the following items milana (where applicable) has been applied.


Labs





Laboratory Tests








Test


  10/15/17


16:28 10/15/17


20:58 10/16/17


03:20 10/16/17


05:20


 


Glucose (Fingerstick)


  189 mg/dL


(70-99) 174 mg/dL


(70-99) 


  


 


 


Sodium Level


  


  


  138 mmol/L


(136-145) 


 


 


Potassium Level


  


  


  4.2 mmol/L


(3.5-5.1) 


 


 


Chloride Level


  


  


  103 mmol/L


() 


 


 


Carbon Dioxide Level


  


  


  29 mmol/L


(21-32) 


 


 


Anion Gap   6 (6-14)  


 


Blood Urea Nitrogen


  


  


  42 mg/dL


(8-26) 


 


 


Creatinine


  


  


  1.6 mg/dL


(0.7-1.3) 


 


 


Estimated GFR


(Cockcroft-Gault) 


  


  41.3 


  


 


 


Glucose Level


  


  


  329 mg/dL


(70-99) 


 


 


Calcium Level


  


  


  7.9 mg/dL


(8.5-10.1) 


 


 


White Blood Count


  


  


  


  9.4 x10^3/uL


(4.0-11.0)


 


Red Blood Count


  


  


  


  2.80 x10^6/uL


(4.30-5.70)


 


Hemoglobin


  


  


  


  8.3 g/dL


(13.0-17.5)


 


Hematocrit


  


  


  


  24.2 %


(39.0-53.0)


 


Mean Corpuscular Volume    86 fL () 


 


Mean Corpuscular Hemoglobin    30 pg (25-35) 


 


Mean Corpuscular Hemoglobin


Concent 


  


  


  34 g/dL


(31-37)


 


Red Cell Distribution Width


  


  


  


  14.0 %


(11.5-14.5)


 


Platelet Count


  


  


  


  189 x10^3/uL


(140-400)


 


Neutrophils (%) (Auto)    81 % (31-73) 


 


Lymphocytes (%) (Auto)    13 % (24-48) 


 


Monocytes (%) (Auto)    5 % (0-9) 


 


Eosinophils (%) (Auto)    1 % (0-3) 


 


Basophils (%) (Auto)    0 % (0-3) 


 


Neutrophils # (Auto)


  


  


  


  7.6 x10^3uL


(1.8-7.7)


 


Lymphocytes # (Auto)


  


  


  


  1.2 x10^3/uL


(1.0-4.8)


 


Monocytes # (Auto)


  


  


  


  0.5 x10^3/uL


(0.0-1.1)


 


Eosinophils # (Auto)


  


  


  


  0.1 x10^3/uL


(0.0-0.7)


 


Basophils # (Auto)


  


  


  


  0.0 x10^3/uL


(0.0-0.2)


 


Test


  10/16/17


07:14 10/16/17


10:14 10/16/17


16:10 10/16/17


20:32


 


Glucose (Fingerstick)


  323 mg/dL


(70-99) 318 mg/dL


(70-99) 206 mg/dL


(70-99) 


 


 


Vancomycin Level Trough


  


  


  


  16.0 mcg/mL


(10.0-20.0)


 


Vancomycin Last Dose Date    10/15/17 


 


Vancomycin Last Dose Time    2100 


 


Test


  10/16/17


20:59 10/17/17


04:45 10/17/17


07:19 


 


 


Glucose (Fingerstick)


  126 mg/dL


(70-99) 


  219 mg/dL


(70-99) 


 


 


White Blood Count


  


  8.7 x10^3/uL


(4.0-11.0) 


  


 


 


Red Blood Count


  


  2.94 x10^6/uL


(4.30-5.70) 


  


 


 


Hemoglobin


  


  8.5 g/dL


(13.0-17.5) 


  


 


 


Hematocrit


  


  25.8 %


(39.0-53.0) 


  


 


 


Mean Corpuscular Volume  88 fL ()   


 


Mean Corpuscular Hemoglobin  29 pg (25-35)   


 


Mean Corpuscular Hemoglobin


Concent 


  33 g/dL


(31-37) 


  


 


 


Red Cell Distribution Width


  


  14.3 %


(11.5-14.5) 


  


 


 


Platelet Count


  


  178 x10^3/uL


(140-400) 


  


 


 


Neutrophils (%) (Auto)  79 % (31-73)   


 


Lymphocytes (%) (Auto)  13 % (24-48)   


 


Monocytes (%) (Auto)  5 % (0-9)   


 


Eosinophils (%) (Auto)  2 % (0-3)   


 


Basophils (%) (Auto)  1 % (0-3)   


 


Neutrophils # (Auto)


  


  6.9 x10^3uL


(1.8-7.7) 


  


 


 


Lymphocytes # (Auto)


  


  1.2 x10^3/uL


(1.0-4.8) 


  


 


 


Monocytes # (Auto)


  


  0.5 x10^3/uL


(0.0-1.1) 


  


 


 


Eosinophils # (Auto)


  


  0.1 x10^3/uL


(0.0-0.7) 


  


 


 


Basophils # (Auto)


  


  0.1 x10^3/uL


(0.0-0.2) 


  


 


 


Sodium Level


  


  142 mmol/L


(136-145) 


  


 


 


Potassium Level


  


  3.6 mmol/L


(3.5-5.1) 


  


 


 


Chloride Level


  


  105 mmol/L


() 


  


 


 


Carbon Dioxide Level


  


  28 mmol/L


(21-32) 


  


 


 


Anion Gap  9 (6-14)   


 


Blood Urea Nitrogen


  


  41 mg/dL


(8-26) 


  


 


 


Creatinine


  


  1.5 mg/dL


(0.7-1.3) 


  


 


 


Estimated GFR


(Cockcroft-Gault) 


  44.5 


  


  


 


 


Glucose Level


  


  188 mg/dL


(70-99) 


  


 


 


Calcium Level


  


  8.0 mg/dL


(8.5-10.1) 


  


 








Laboratory Tests








Test


  10/16/17


16:10 10/16/17


20:32 10/16/17


20:59 10/17/17


04:45


 


Glucose (Fingerstick)


  206 mg/dL


(70-99) 


  126 mg/dL


(70-99) 


 


 


Vancomycin Level Trough


  


  16.0 mcg/mL


(10.0-20.0) 


  


 


 


Vancomycin Last Dose Date  10/15/17   


 


Vancomycin Last Dose Time  2100   


 


White Blood Count


  


  


  


  8.7 x10^3/uL


(4.0-11.0)


 


Red Blood Count


  


  


  


  2.94 x10^6/uL


(4.30-5.70)


 


Hemoglobin


  


  


  


  8.5 g/dL


(13.0-17.5)


 


Hematocrit


  


  


  


  25.8 %


(39.0-53.0)


 


Mean Corpuscular Volume    88 fL () 


 


Mean Corpuscular Hemoglobin    29 pg (25-35) 


 


Mean Corpuscular Hemoglobin


Concent 


  


  


  33 g/dL


(31-37)


 


Red Cell Distribution Width


  


  


  


  14.3 %


(11.5-14.5)


 


Platelet Count


  


  


  


  178 x10^3/uL


(140-400)


 


Neutrophils (%) (Auto)    79 % (31-73) 


 


Lymphocytes (%) (Auto)    13 % (24-48) 


 


Monocytes (%) (Auto)    5 % (0-9) 


 


Eosinophils (%) (Auto)    2 % (0-3) 


 


Basophils (%) (Auto)    1 % (0-3) 


 


Neutrophils # (Auto)


  


  


  


  6.9 x10^3uL


(1.8-7.7)


 


Lymphocytes # (Auto)


  


  


  


  1.2 x10^3/uL


(1.0-4.8)


 


Monocytes # (Auto)


  


  


  


  0.5 x10^3/uL


(0.0-1.1)


 


Eosinophils # (Auto)


  


  


  


  0.1 x10^3/uL


(0.0-0.7)


 


Basophils # (Auto)


  


  


  


  0.1 x10^3/uL


(0.0-0.2)


 


Sodium Level


  


  


  


  142 mmol/L


(136-145)


 


Potassium Level


  


  


  


  3.6 mmol/L


(3.5-5.1)


 


Chloride Level


  


  


  


  105 mmol/L


()


 


Carbon Dioxide Level


  


  


  


  28 mmol/L


(21-32)


 


Anion Gap    9 (6-14) 


 


Blood Urea Nitrogen


  


  


  


  41 mg/dL


(8-26)


 


Creatinine


  


  


  


  1.5 mg/dL


(0.7-1.3)


 


Estimated GFR


(Cockcroft-Gault) 


  


  


  44.5 


 


 


Glucose Level


  


  


  


  188 mg/dL


(70-99)


 


Calcium Level


  


  


  


  8.0 mg/dL


(8.5-10.1)


 


Test


  10/17/17


07:19 


  


  


 


 


Glucose (Fingerstick)


  219 mg/dL


(70-99) 


  


  


 








Microbiology


10/14/17 Blood Culture - Preliminary, Resulted


           NO GROWTH AFTER 2 DAYS


Medications





Current Medications


Ondansetron HCl (Zofran) 4 mg PRN Q6HRS  PRN IV NAUSEA/VOMITING;  Start 10/10/

17 at 07:00;  Stop 10/11/17 at 06:59;  Status DC


Fentanyl Citrate (Fentanyl 2ml Vial) 25 mcg PRN Q5MIN  PRN IV MILD PAIN Last 

administered on 10/10/17at 14:08;  Start 10/10/17 at 07:00;  Stop 10/11/17 at 06

:59;  Status DC


Fentanyl Citrate (Fentanyl 2ml Vial) 50 mcg PRN Q5MIN  PRN IV MODERATE PAIN;  

Start 10/10/17 at 07:00;  Stop 10/11/17 at 06:59;  Status DC


Morphine Sulfate 1 mg PRN Q10MIN  PRN IV SEVERE PAIN;  Start 10/10/17 at 07:00;

  Stop 10/11/17 at 06:59;  Status DC


Ringer's Solution 1,000 ml @  0 mls/hr Q0M IV  Last administered on 10/10/17at 

13:45;  Start 10/10/17 at 07:00;  Stop 10/10/17 at 18:59;  Status DC


Lidocaine HCl (Xylocaine-Mpf 1% Vial) 2 ml PRN 1X  PRN ID PRIOR TO IV START;  

Start 10/10/17 at 07:00;  Stop 10/11/17 at 06:59;  Status DC


Hydromorphone HCl (Dilaudid) 0.5 mg PRN Q10MIN  PRN IV SEV PAIN, Second choice;

  Start 10/10/17 at 07:00;  Stop 10/11/17 at 06:59;  Status DC


Prochlorperazine Edisylate (Compazine) 5 mg PACU PRN  PRN IV NAUSEA, MRX1;  

Start 10/10/17 at 07:00;  Stop 10/11/17 at 06:59;  Status DC


Cefazolin Sodium/ Dextrose 50 ml @  100 mls/hr 1X PREOP  PRN IV PRIOR TO 

PROCEDURE;  Start 10/10/17 at 06:00;  Stop 10/10/17 at 18:00;  Status DC


Cefoxitin Sodium 2 gm/Sodium Chloride 100 ml @  200 mls/hr 1X  ONCE IV ;  Start 

10/10/17 at 07:00;  Stop 10/10/17 at 07:29;  Status UNV


Cefoxitin Sodium 100 ml @  200 mls/hr ONCE  ONCE IV  Last administered on 10/10/

17at 08:16;  Start 10/10/17 at 07:15;  Stop 10/10/17 at 07:44;  Status DC


Lidocaine HCl (Lidocaine Pf 2% Vial) 5 ml STK-MED ONCE .ROUTE ;  Start 10/10/17 

at 07:29;  Stop 10/10/17 at 07:30;  Status DC


Etomidate (Amidate) 20 mg STK-MED ONCE IV ;  Start 10/10/17 at 07:29;  Stop 10/

10/17 at 07:30;  Status DC


Fentanyl Citrate (Fentanyl 2ml Vial) 100 mcg STK-MED ONCE .ROUTE ;  Start 10/10/

17 at 07:29;  Stop 10/10/17 at 07:30;  Status DC


Rocuronium Bromide (Zemuron) 100 mg STK-MED ONCE .ROUTE ;  Start 10/10/17 at 07:

29;  Stop 10/10/17 at 07:30;  Status DC


Dexamethasone Sodium Phosphate (Decadron) 20 mg STK-MED ONCE .ROUTE ;  Start 10/

10/17 at 08:16;  Stop 10/10/17 at 08:17;  Status DC


Ondansetron HCl (Zofran) 4 mg STK-MED ONCE .ROUTE ;  Start 10/10/17 at 08:16;  

Stop 10/10/17 at 08:17;  Status DC


Esmolol HCl (Brevibloc) 100 mg STK-MED ONCE IV ;  Start 10/10/17 at 08:16;  

Stop 10/10/17 at 08:17;  Status DC


Phenylephrine HCl (Tanvir-Synephrine Inj) 10 mg STK-MED ONCE .ROUTE ;  Start 10/10/

17 at 08:16;  Stop 10/10/17 at 08:17;  Status DC


Fentanyl Citrate (Fentanyl 2ml Vial) 100 mcg STK-MED ONCE .ROUTE ;  Start 10/10/

17 at 08:17;  Stop 10/10/17 at 08:18;  Status DC


Labetalol HCl (Normodyne) 20 mg STK-MED ONCE .ROUTE ;  Start 10/10/17 at 08:50;

  Stop 10/10/17 at 08:51;  Status DC


Fentanyl Citrate (Fentanyl 2ml Vial) 100 mcg STK-MED ONCE .ROUTE ;  Start 10/10/

17 at 09:21;  Stop 10/10/17 at 09:22;  Status DC


Rocuronium Bromide (Zemuron) 100 mg STK-MED ONCE .ROUTE ;  Start 10/10/17 at 09:

30;  Stop 10/10/17 at 09:31;  Status DC


Cellulose 1 each STK-MED ONCE .ROUTE  Last administered on 10/10/17at 10:57;  

Start 10/10/17 at 09:57;  Stop 10/10/17 at 10:57;  Status DC


Albumin Human 500 ml @  As Directed STK-MED ONCE IV ;  Start 10/10/17 at 11:06;

  Stop 10/10/17 at 11:07;  Status DC


Neostigmine Methylsulfate (Bloxiverz) 10 mg STK-MED ONCE .ROUTE ;  Start 10/10/

17 at 11:20;  Stop 10/10/17 at 11:21;  Status DC


Glycopyrrolate (Robinul) 1 mg STK-MED ONCE .ROUTE ;  Start 10/10/17 at 11:20;  

Stop 10/10/17 at 11:21;  Status DC


Famotidine (Pepcid) 20 mg QHS IVP  Last administered on 10/16/17at 21:53;  

Start 10/10/17 at 21:00


Enoxaparin Sodium (Lovenox 30mg Syringe) 30 mg Q24H SQ  Last administered on 10/

12/17at 08:13;  Start 10/11/17 at 08:00;  Stop 10/12/17 at 10:41;  Status DC


Sodium Chloride (Normal Saline Flush) 3 ml QSHIFT  PRN IV AFTER MEDS AND BLOOD 

DRAWS;  Start 10/10/17 at 12:00


Ringer's Solution 1,000 ml @  100 mls/hr Q10H IV  Last administered on 10/14/

17at 15:49;  Start 10/10/17 at 11:49;  Stop 10/15/17 at 09:57;  Status DC


Naloxone HCl (Narcan) 0.4 mg PRN Q2MIN  PRN IV SEE INSTRUCTIONS;  Start 10/10/

17 at 12:00


Sodium Chloride 1,000 ml @  25 mls/hr Q24H IV  Last administered on 10/10/17at 

11:49;  Start 10/10/17 at 11:49;  Stop 10/15/17 at 07:53;  Status DC


Morphine Sulfate 30 ml @ 0 mls/hr CONT PRN  PRN IV PROTOCOL Last administered 

on 10/10/17at 15:13;  Start 10/10/17 at 12:00;  Stop 10/13/17 at 11:04;  Status 

DC


Ondansetron HCl (Zofran) 4 mg PRN Q6HRS  PRN IV NAUESA, 1ST CHOICE;  Start 10/10

/17 at 12:00


Insulin Aspart (NovoLOG VIAL) 100 unit STK-MED ONCE SQ ;  Start 10/10/17 at 14:

01;  Stop 10/10/17 at 14:02;  Status DC


Insulin Aspart (NovoLOG VIAL) 3 unit 1X  ONCE SQ  Last administered on 10/10/

17at 13:58;  Start 10/10/17 at 14:15;  Stop 10/10/17 at 14:16;  Status DC


Pneumococcal Polyvalent Vaccine (Do NOT chart on this placeholder)  1X  ONCE MC 

;  Start 10/10/17 at 16:15;  Stop 10/10/17 at 16:16;  Status UNV


Albuterol/ Ipratropium (Duoneb) 3 ml PRN Q6HRS  PRN NEB SHORTNESS OF BREATH;  

Start 10/11/17 at 09:30;  Stop 10/11/17 at 09:35;  Status DC


Albuterol Sulfate (Ventolin Neb Soln) 2.5 mg PRN Q4HRS  PRN NEB SHORTNESS OF 

BREATH;  Start 10/11/17 at 09:30


Insulin Aspart (NovoLOG) 0-9 UNITS TIDWMEALS SQ  Last administered on 10/13/

17at 18:00;  Start 10/11/17 at 12:00;  Stop 10/13/17 at 18:58;  Status DC


Dextrose (Dextrose 50%-Water Syringe) 12.5 gm PRN Q15MIN  PRN IV SEE COMMENTS;  

Start 10/11/17 at 09:30;  Stop 10/13/17 at 18:58;  Status DC


Enoxaparin Sodium (Lovenox 40mg Syringe) 40 mg Q24H SQ ;  Start 10/13/17 at 08:

00;  Stop 10/13/17 at 08:00;  Status DC


Enoxaparin Sodium (Lovenox 60mg Syringe) 60 mg Q12HR SQ  Last administered on 10

/15/17at 08:49;  Start 10/12/17 at 20:00;  Stop 10/15/17 at 08:58;  Status DC


Info 1 each PRN DAILY  PRN MC SEE COMMENTS;  Start 10/12/17 at 22:30;  Status 

Cancel


Morphine Sulfate 2 mg PRN Q2HR  PRN IV PAIN Last administered on 10/15/17at 17:

31;  Start 10/13/17 at 11:00


Amino Acids/ Glycerin/ Electrolytes 1,000 ml @  80 mls/hr [id number] IV  Last 

administered on 10/16/17at 05:19;  Start 10/13/17 at 13:45;  Stop 10/16/17 at 10

:02;  Status DC


Metoprolol Tartrate (Lopressor) 5 mg Q6HRS IVP  Last administered on 10/17/17at 

05:49;  Start 10/14/17 at 09:00


Diphenhydramine HCl (Benadryl) 25 mg PRN QHS  PRN IVP SLEEP Last administered 

on 10/15/17at 21:20;  Start 10/14/17 at 08:45


Insulin Aspart (NovoLOG) 0-9 UNITS TIDWMEALS SQ  Last administered on 10/17/

17at 10:20;  Start 10/14/17 at 09:30


Dextrose (Dextrose 50%-Water Syringe) 12.5 gm PRN Q15MIN  PRN IV SEE COMMENTS;  

Start 10/14/17 at 09:15


Info (Anti-Coagulation Monitoring By Pharmacy) 1 each PRN DAILY  PRN MC SEE 

COMMENTS Last administered on 10/16/17at 16:22;  Start 10/14/17 at 14:30


Piperacillin Sod/ Tazobactam Sod (Zosyn Per Pharmacy) 1 each PRN DAILY  PRN MC 

SEE COMMENTS;  Start 10/14/17 at 18:15;  Stop 10/17/17 at 09:39;  Status DC


Vancomycin HCl (Vanco Per Pharmacy) 1 each PRN DAILY  PRN MC SEE COMMENTS Last 

administered on 10/16/17at 21:02;  Start 10/14/17 at 18:15;  Stop 10/17/17 at 09

:15;  Status DC


Vancomycin HCl 1.5 gm/Sodium Chloride 500 ml @  250 mls/hr 1X  ONCE IV  Last 

administered on 10/14/17at 21:23;  Start 10/14/17 at 19:00;  Stop 10/14/17 at 20

:59;  Status DC


Piperacillin Sod/ Tazobactam Sod 2.25 gm/Sodium Chloride 50 ml @  100 mls/hr 

Q6HRS IV  Last administered on 10/17/17at 05:40;  Start 10/14/17 at 22:00;  

Stop 10/17/17 at 09:15;  Status DC


Vancomycin HCl 1 gm/Sodium Chloride 250 ml @  250 mls/hr Q24H IV  Last 

administered on 10/16/17at 21:53;  Start 10/15/17 at 21:00;  Stop 10/17/17 at 09

:15;  Status DC


Vancomycin HCl 1 each 1X  ONCE MC  Last administered on 10/16/17at 20:30;  

Start 10/16/17 at 20:30;  Stop 10/16/17 at 20:31;  Status DC


Rivaroxaban (Xarelto) 15 mg BID PO  Last administered on 10/16/17at 21:56;  

Start 10/15/17 at 09:00


Fluconazole/ Sodium Chloride 50 ml @  100 mls/hr Q24H IV  Last administered on 

10/16/17at 15:00;  Start 10/15/17 at 15:00


Insulin Detemir (Levemir) 10 units ONCE  ONCE SQ  Last administered on 10/16/

17at 11:12;  Start 10/16/17 at 09:15;  Stop 10/16/17 at 09:17;  Status DC


Amoxicillin/ Clavulanate Potassium (Augmentin 400-57mg/5ml Susp) 10 ml Q12HR 

PEG  Last administered on 10/17/17at 10:05;  Start 10/17/17 at 10:00


Amoxicillin/ Clavulanate Potassium (Augmentin 400-57mg/5ml Susp) 10 ml Q12HR 

PEG ;  Start 10/17/17 at 09:30;  Status UNV





Active Scripts


Active


Reported


[Lidocaine]   15 Ml PO Q6HRS PRN


[Ondansetron]   4 Mg IV PRN Q4HRS PRN


[Pepcid Suspension]   20 IV BID


Duoneb 0.5-3(2.5) Mg/3 Ml (Albuterol/Ipratropium) 3 Ml Ampul.neb 3 Ml NEB PRN 

Q6HRS PRN


Novolog (Insulin Aspart) 100 Unit/1 Ml Cartridge 0 SQ Q6HRS


Vitals/I & O





Vital Sign - Last 24 Hours








 10/16/17 10/16/17 10/16/17 10/16/17





 15:00 18:04 19:00 20:00


 


Temp 97.9  98.5 





 97.9  98.5 


 


Pulse 86 86 85 


 


Resp 18  16 


 


B/P (MAP) 122/68 (86) 122/68 106/63 (77) 


 


Pulse Ox 98  97 


 


O2 Delivery Room Air  Room Air Room Air


 


    





    





 10/16/17 10/17/17 10/17/17 10/17/17





 23:36 00:00 03:00 05:49


 


Temp 97.3  97.4 





 97.3  97.4 


 


Pulse 82 82 80 86


 


Resp 16  16 


 


B/P (MAP) 122/67 (85) 122/67 112/65 (81) 125/76


 


Pulse Ox 97  96 


 


O2 Delivery Room Air  BiPAP/CPAP 


 


    





    





 10/17/17 10/17/17  





 07:00 07:22  


 


Temp 98.3   





 98.3   


 


Pulse 77   


 


Resp 18   


 


B/P (MAP) 115/72 (86)   


 


Pulse Ox 97 97  


 


O2 Delivery Room Air Room Air  











Nutrition Consultation


Dietary Evaluation:


Recommendations by RD:  PPN/TPN


Comments:  


REC continue TF via J tube:





Diabetisource AC @10 ml/hr, increase 10 ml/hr q8 hrs to goal of 50 ml/hr 


w/100 ml flushes q6 hrs


Expected Outcomes/Goals:  


tolerate TF at goal- met, goal ongoing





Malnutrition Findings:


Food and Nutrition Intake (Mod:  <75% est energy req 7days


Body Fat Depletion (Non Severe:  Mild Depletion


Weight Status:  Underweight











CAROLINA CLARK MD Oct 17, 2017 12:03

## 2017-10-17 NOTE — PDOC2
PALLIATIVE CARE


Palliative Care Note


Palliative Care


Patient alert.  


Met with patient, wife Milagro Husain/dtr and son in-law Hemanth.  Nasim, Jimmy, 

Kaushik and Purvi unable to be here. 


Reviewed medical condition.  


Discussed Advanced Directive.  Patient and family want other family members 

involved before completing document. Copy of Document provided to patient. 


Discussed Code Status:  patient unable to make decision  at this time. Will 

continue Full Code Status.





Family understands co-pay of $160/day.  





Goal is to continue to try to get stronger. 


Plan;  PP for strengthening and continue tube feeding


Dysphagia I diet. 





Spoke with [place] at [facility] and informed of plan











RUSTY MALDONADO Oct 17, 2017 15:58

## 2017-10-18 VITALS — DIASTOLIC BLOOD PRESSURE: 74 MMHG | SYSTOLIC BLOOD PRESSURE: 128 MMHG

## 2017-10-18 VITALS — DIASTOLIC BLOOD PRESSURE: 65 MMHG | SYSTOLIC BLOOD PRESSURE: 128 MMHG

## 2017-10-18 VITALS — SYSTOLIC BLOOD PRESSURE: 121 MMHG | DIASTOLIC BLOOD PRESSURE: 62 MMHG

## 2017-10-18 LAB
ANION GAP SERPL CALC-SCNC: 6 MMOL/L (ref 6–14)
BASOPHILS # BLD AUTO: 0 X10^3/UL (ref 0–0.2)
BASOPHILS NFR BLD: 1 % (ref 0–3)
BUN SERPL-MCNC: 38 MG/DL (ref 8–26)
CALCIUM SERPL-MCNC: 7.6 MG/DL (ref 8.5–10.1)
CHLORIDE SERPL-SCNC: 105 MMOL/L (ref 98–107)
CO2 SERPL-SCNC: 32 MMOL/L (ref 21–32)
CREAT SERPL-MCNC: 1.7 MG/DL (ref 0.7–1.3)
EOSINOPHIL NFR BLD: 2 % (ref 0–3)
ERYTHROCYTE [DISTWIDTH] IN BLOOD BY AUTOMATED COUNT: 14.3 % (ref 11.5–14.5)
GFR SERPLBLD BASED ON 1.73 SQ M-ARVRAT: 38.5 ML/MIN
GLUCOSE SERPL-MCNC: 265 MG/DL (ref 70–99)
HCT VFR BLD CALC: 24.1 % (ref 39–53)
HGB BLD-MCNC: 7.9 G/DL (ref 13–17.5)
LYMPHOCYTES # BLD: 0.9 X10^3/UL (ref 1–4.8)
LYMPHOCYTES NFR BLD AUTO: 13 % (ref 24–48)
MCH RBC QN AUTO: 28 PG (ref 25–35)
MCHC RBC AUTO-ENTMCNC: 33 G/DL (ref 31–37)
MCV RBC AUTO: 86 FL (ref 79–100)
MONOCYTES NFR BLD: 7 % (ref 0–9)
NEUTROPHILS NFR BLD AUTO: 79 % (ref 31–73)
PLATELET # BLD AUTO: 204 X10^3/UL (ref 140–400)
POTASSIUM SERPL-SCNC: 3.7 MMOL/L (ref 3.5–5.1)
RBC # BLD AUTO: 2.79 X10^6/UL (ref 4.3–5.7)
SODIUM SERPL-SCNC: 143 MMOL/L (ref 136–145)
WBC # BLD AUTO: 7 X10^3/UL (ref 4–11)

## 2017-10-18 RX ADMIN — INSULIN ASPART SCH UNITS: 100 INJECTION, SOLUTION INTRAVENOUS; SUBCUTANEOUS at 09:32

## 2017-10-18 RX ADMIN — INSULIN ASPART SCH UNITS: 100 INJECTION, SOLUTION INTRAVENOUS; SUBCUTANEOUS at 12:58

## 2017-10-18 RX ADMIN — METOPROLOL TARTRATE SCH MG: 5 INJECTION INTRAVENOUS at 12:00

## 2017-10-18 RX ADMIN — METOPROLOL TARTRATE SCH MG: 5 INJECTION INTRAVENOUS at 06:04

## 2017-10-18 RX ADMIN — Medication SCH ML: at 09:29

## 2017-10-18 RX ADMIN — RIVAROXABAN SCH MG: 15 TABLET, FILM COATED ORAL at 09:29

## 2017-10-18 RX ADMIN — METOPROLOL TARTRATE SCH MG: 5 INJECTION INTRAVENOUS at 00:36

## 2017-10-18 NOTE — PDOC3
Discharge Summary


Visit Information


Date of Admission:  Oct 10, 2017


Date of Discharge:  Oct 18, 2017


Admitting Diagnosis:  gastric outlet obstruction


Final Diagnosis


benign gastric outlet obstruction, severe malnutrition





Brief Hospital Course


Allergies





 Allergies








Coded Allergies Type Severity Reaction Last Updated Verified


 


  No Known Drug Allergies    10/10/17 No








Vital Signs





Vital Signs








  Date Time  Temp Pulse Resp B/P (MAP) Pulse Ox O2 Delivery O2 Flow Rate FiO2


 


10/18/17 07:00 97.6 78 16 121/62 (81) 96 Room Air  





 97.6       








Lab Results





Laboratory Tests








Test


  10/16/17


10:14 10/16/17


16:10 10/16/17


20:32 10/16/17


20:59


 


Glucose (Fingerstick)


  318 mg/dL


(70-99) 206 mg/dL


(70-99) 


  126 mg/dL


(70-99)


 


Vancomycin Level Trough


  


  


  16.0 mcg/mL


(10.0-20.0) 


 


 


Vancomycin Last Dose Date   10/15/17  


 


Vancomycin Last Dose Time   2100  


 


Test


  10/17/17


04:45 10/17/17


07:19 10/17/17


11:57 10/17/17


16:32


 


White Blood Count


  8.7 x10^3/uL


(4.0-11.0) 


  


  


 


 


Red Blood Count


  2.94 x10^6/uL


(4.30-5.70) 


  


  


 


 


Hemoglobin


  8.5 g/dL


(13.0-17.5) 


  


  


 


 


Hematocrit


  25.8 %


(39.0-53.0) 


  


  


 


 


Mean Corpuscular Volume 88 fL ()    


 


Mean Corpuscular Hemoglobin 29 pg (25-35)    


 


Mean Corpuscular Hemoglobin


Concent 33 g/dL


(31-37) 


  


  


 


 


Red Cell Distribution Width


  14.3 %


(11.5-14.5) 


  


  


 


 


Platelet Count


  178 x10^3/uL


(140-400) 


  


  


 


 


Neutrophils (%) (Auto) 79 % (31-73)    


 


Lymphocytes (%) (Auto) 13 % (24-48)    


 


Monocytes (%) (Auto) 5 % (0-9)    


 


Eosinophils (%) (Auto) 2 % (0-3)    


 


Basophils (%) (Auto) 1 % (0-3)    


 


Neutrophils # (Auto)


  6.9 x10^3uL


(1.8-7.7) 


  


  


 


 


Lymphocytes # (Auto)


  1.2 x10^3/uL


(1.0-4.8) 


  


  


 


 


Monocytes # (Auto)


  0.5 x10^3/uL


(0.0-1.1) 


  


  


 


 


Eosinophils # (Auto)


  0.1 x10^3/uL


(0.0-0.7) 


  


  


 


 


Basophils # (Auto)


  0.1 x10^3/uL


(0.0-0.2) 


  


  


 


 


Sodium Level


  142 mmol/L


(136-145) 


  


  


 


 


Potassium Level


  3.6 mmol/L


(3.5-5.1) 


  


  


 


 


Chloride Level


  105 mmol/L


() 


  


  


 


 


Carbon Dioxide Level


  28 mmol/L


(21-32) 


  


  


 


 


Anion Gap 9 (6-14)    


 


Blood Urea Nitrogen


  41 mg/dL


(8-26) 


  


  


 


 


Creatinine


  1.5 mg/dL


(0.7-1.3) 


  


  


 


 


Estimated GFR


(Cockcroft-Gault) 44.5 


  


  


  


 


 


Glucose Level


  188 mg/dL


(70-99) 


  


  


 


 


Calcium Level


  8.0 mg/dL


(8.5-10.1) 


  


  


 


 


Glucose (Fingerstick)


  


  219 mg/dL


(70-99) 198 mg/dL


(70-99) 157 mg/dL


(70-99)


 


Test


  10/17/17


21:02 10/18/17


06:00 10/18/17


07:30 


 


 


Glucose (Fingerstick)


  157 mg/dL


(70-99) 


  293 mg/dL


(70-99) 


 


 


White Blood Count


  


  7.0 x10^3/uL


(4.0-11.0) 


  


 


 


Red Blood Count


  


  2.79 x10^6/uL


(4.30-5.70) 


  


 


 


Hemoglobin


  


  7.9 g/dL


(13.0-17.5) 


  


 


 


Hematocrit


  


  24.1 %


(39.0-53.0) 


  


 


 


Mean Corpuscular Volume  86 fL ()   


 


Mean Corpuscular Hemoglobin  28 pg (25-35)   


 


Mean Corpuscular Hemoglobin


Concent 


  33 g/dL


(31-37) 


  


 


 


Red Cell Distribution Width


  


  14.3 %


(11.5-14.5) 


  


 


 


Platelet Count


  


  204 x10^3/uL


(140-400) 


  


 


 


Neutrophils (%) (Auto)  79 % (31-73)   


 


Lymphocytes (%) (Auto)  13 % (24-48)   


 


Monocytes (%) (Auto)  7 % (0-9)   


 


Eosinophils (%) (Auto)  2 % (0-3)   


 


Basophils (%) (Auto)  1 % (0-3)   


 


Neutrophils # (Auto)


  


  5.5 x10^3uL


(1.8-7.7) 


  


 


 


Lymphocytes # (Auto)


  


  0.9 x10^3/uL


(1.0-4.8) 


  


 


 


Monocytes # (Auto)


  


  0.5 x10^3/uL


(0.0-1.1) 


  


 


 


Eosinophils # (Auto)


  


  0.1 x10^3/uL


(0.0-0.7) 


  


 


 


Basophils # (Auto)


  


  0.0 x10^3/uL


(0.0-0.2) 


  


 


 


Sodium Level


  


  143 mmol/L


(136-145) 


  


 


 


Potassium Level


  


  3.7 mmol/L


(3.5-5.1) 


  


 


 


Chloride Level


  


  105 mmol/L


() 


  


 


 


Carbon Dioxide Level


  


  32 mmol/L


(21-32) 


  


 


 


Anion Gap  6 (6-14)   


 


Blood Urea Nitrogen


  


  38 mg/dL


(8-26) 


  


 


 


Creatinine


  


  1.7 mg/dL


(0.7-1.3) 


  


 


 


Estimated GFR


(Cockcroft-Gault) 


  38.5 


  


  


 


 


Glucose Level


  


  265 mg/dL


(70-99) 


  


 


 


Calcium Level


  


  7.6 mg/dL


(8.5-10.1) 


  


 








Laboratory Tests








Test


  10/17/17


11:57 10/17/17


16:32 10/17/17


21:02 10/18/17


06:00


 


Glucose (Fingerstick)


  198 mg/dL


(70-99) 157 mg/dL


(70-99) 157 mg/dL


(70-99) 


 


 


White Blood Count


  


  


  


  7.0 x10^3/uL


(4.0-11.0)


 


Red Blood Count


  


  


  


  2.79 x10^6/uL


(4.30-5.70)


 


Hemoglobin


  


  


  


  7.9 g/dL


(13.0-17.5)


 


Hematocrit


  


  


  


  24.1 %


(39.0-53.0)


 


Mean Corpuscular Volume    86 fL () 


 


Mean Corpuscular Hemoglobin    28 pg (25-35) 


 


Mean Corpuscular Hemoglobin


Concent 


  


  


  33 g/dL


(31-37)


 


Red Cell Distribution Width


  


  


  


  14.3 %


(11.5-14.5)


 


Platelet Count


  


  


  


  204 x10^3/uL


(140-400)


 


Neutrophils (%) (Auto)    79 % (31-73) 


 


Lymphocytes (%) (Auto)    13 % (24-48) 


 


Monocytes (%) (Auto)    7 % (0-9) 


 


Eosinophils (%) (Auto)    2 % (0-3) 


 


Basophils (%) (Auto)    1 % (0-3) 


 


Neutrophils # (Auto)


  


  


  


  5.5 x10^3uL


(1.8-7.7)


 


Lymphocytes # (Auto)


  


  


  


  0.9 x10^3/uL


(1.0-4.8)


 


Monocytes # (Auto)


  


  


  


  0.5 x10^3/uL


(0.0-1.1)


 


Eosinophils # (Auto)


  


  


  


  0.1 x10^3/uL


(0.0-0.7)


 


Basophils # (Auto)


  


  


  


  0.0 x10^3/uL


(0.0-0.2)


 


Sodium Level


  


  


  


  143 mmol/L


(136-145)


 


Potassium Level


  


  


  


  3.7 mmol/L


(3.5-5.1)


 


Chloride Level


  


  


  


  105 mmol/L


()


 


Carbon Dioxide Level


  


  


  


  32 mmol/L


(21-32)


 


Anion Gap    6 (6-14) 


 


Blood Urea Nitrogen


  


  


  


  38 mg/dL


(8-26)


 


Creatinine


  


  


  


  1.7 mg/dL


(0.7-1.3)


 


Estimated GFR


(Cockcroft-Gault) 


  


  


  38.5 


 


 


Glucose Level


  


  


  


  265 mg/dL


(70-99)


 


Calcium Level


  


  


  


  7.6 mg/dL


(8.5-10.1)


 


Test


  10/18/17


07:30 


  


  


 


 


Glucose (Fingerstick)


  293 mg/dL


(70-99) 


  


  


 








Brief Hospital Course


Mr. Li  is a 85 old M who presented with gastric outlet obstruction.  He was 

treated with TPN for a few weeks.  He was subsequently admitted and underwent 

antrectomy with elaine en Y reconstruction.  He gradually improved, and has done 

with J-tube nutrition supplementation.  Day of discharge he is tolerating PO 

and tube feeds.  He will continue to improve at [facility].





Discharge Information


Scheduled


Insulin Aspart (Novolog), 0 SQ Q6HRS, (Reported)


[Pepcid Suspension], 20 IV BID, (Reported)





Scheduled PRN


Ipratropium/Albuterol Sulfate (Duoneb 0.5-3(2.5) Mg/3 Ml), 3 ML NEB PRN Q6HRS 

PRN for SHORTNESS OF BREATH, (Reported)


[Lidocaine], 15 ML PO Q6HRS PRN for SEE COMMENTS, (Reported)


[Ondansetron], 4 MG IV PRN Q4HRS PRN for NAUSEA/VOMITING, (Reported)





Patient Instructions


Patient Instructions


Diet as tolerated per speech guidelines.


Activity as tolerated.


Continue J-tube nutrition.


F/u with Deepak in two weeks.











NIRALI BECKER MD Oct 18, 2017 10:05

## 2017-10-18 NOTE — PDOC2
PALLIATIVE CARE


Palliative Care Note


Palliative Care


Patient alert.  Sitting up in bed.  More engaged in conversation today.  

Refusing to eat--indicating not hungry


Patient walked 160 feet yesterday with PT    Encouraged participation in PT/OT 

and exercises.  Encouraged to eat.  


Spoke with Wife and Milagro.   Hemanth and Purvi--daughter and son in-law live 

with patient.  Hemanth works from home and will be available to assist with care. 


Per family patient will have g-tube for six weeks.  


Plan is to go home after SNU.











RUSTY MALDONADO Oct 18, 2017 11:03

## 2017-10-18 NOTE — PDOC
Infectious Disease Note


Subjective


Subjective


Doing ok


Has occ pain across mid abd





ROS


ROS


[place] but seems more alert and appropriate today





GEN: Denies fevers, chills, sweats


HEENT: Denies blurred vision, sore throat


CV: Denies chest pain


RESP: Denies shortness of air, cough


GI: Denies n/v/d


NEURO: Denies confusion, dizziness


MSK: Denies weakness, joint pain/swelling





Vital Sign


Vital Signs





Vital Signs








  Date Time  Temp Pulse Resp B/P (MAP) Pulse Ox O2 Delivery O2 Flow Rate FiO2


 


10/18/17 06:04  83  128/74    


 


10/18/17 03:24 98.3  18  97 Room Air  





 98.3       











Physical Exam


PHYSICAL EXAM





GENERAL:  NAD, Alert, in bed,  looks comforable


HEENT:  PERRL, OC/OP - dry


NECK:  Supple, no JVD, no LN


LUNGS:  Clear


HEART:  S1S2, no gallop, no murmur


ABD:  Soft, KARTHIK, clean incision.  Mid distension, J-tube


EXT:  No edema, no cyanosis


CNS:  Alert, oriented, no focal neurologic deficit, [place]


SKIN:  No rash


IV: ok





Labs


Lab





Laboratory Tests








Test


  10/17/17


11:57 10/17/17


16:32 10/17/17


21:02 10/18/17


06:00


 


Glucose (Fingerstick)


  198 mg/dL


(70-99) 157 mg/dL


(70-99) 157 mg/dL


(70-99) 


 


 


White Blood Count


  


  


  


  7.0 x10^3/uL


(4.0-11.0)


 


Red Blood Count


  


  


  


  2.79 x10^6/uL


(4.30-5.70)


 


Hemoglobin


  


  


  


  7.9 g/dL


(13.0-17.5)


 


Hematocrit


  


  


  


  24.1 %


(39.0-53.0)


 


Mean Corpuscular Volume    86 fL () 


 


Mean Corpuscular Hemoglobin    28 pg (25-35) 


 


Mean Corpuscular Hemoglobin


Concent 


  


  


  33 g/dL


(31-37)


 


Red Cell Distribution Width


  


  


  


  14.3 %


(11.5-14.5)


 


Platelet Count


  


  


  


  204 x10^3/uL


(140-400)


 


Neutrophils (%) (Auto)    79 % (31-73) 


 


Lymphocytes (%) (Auto)    13 % (24-48) 


 


Monocytes (%) (Auto)    7 % (0-9) 


 


Eosinophils (%) (Auto)    2 % (0-3) 


 


Basophils (%) (Auto)    1 % (0-3) 


 


Neutrophils # (Auto)


  


  


  


  5.5 x10^3uL


(1.8-7.7)


 


Lymphocytes # (Auto)


  


  


  


  0.9 x10^3/uL


(1.0-4.8)


 


Monocytes # (Auto)


  


  


  


  0.5 x10^3/uL


(0.0-1.1)


 


Eosinophils # (Auto)


  


  


  


  0.1 x10^3/uL


(0.0-0.7)


 


Basophils # (Auto)


  


  


  


  0.0 x10^3/uL


(0.0-0.2)


 


Sodium Level


  


  


  


  143 mmol/L


(136-145)


 


Potassium Level


  


  


  


  3.7 mmol/L


(3.5-5.1)


 


Chloride Level


  


  


  


  105 mmol/L


()


 


Carbon Dioxide Level


  


  


  


  32 mmol/L


(21-32)


 


Anion Gap    6 (6-14) 


 


Blood Urea Nitrogen


  


  


  


  38 mg/dL


(8-26)


 


Creatinine


  


  


  


  1.7 mg/dL


(0.7-1.3)


 


Estimated GFR


(Cockcroft-Gault) 


  


  


  38.5 


 


 


Glucose Level


  


  


  


  265 mg/dL


(70-99)


 


Calcium Level


  


  


  


  7.6 mg/dL


(8.5-10.1)


 


Test


  10/18/17


07:30 


  


  


 


 


Glucose (Fingerstick)


  293 mg/dL


(70-99) 


  


  


 











Objective


Assessment


Fever - better


Leukocytosis - hopefully reactive - better


 - pre-op steroids 10/10th


Yeast in urine


DVT RUE s/p PICC removal 10/12


Gastric outlet obstruction s/p abdominal surgery, Oct 10th


Malnutrition 


Anemia s/p PRBCs 10/11th 


CKD





Plan


Plan of Care


Cont augmentin via tube through until 10/21


D/c Fluconazole











MADALYN KOCH MD Oct 18, 2017 08:22

## 2017-10-18 NOTE — PDOC
PROGRESS NOTES


Chief Complaint


Chief Complaint


Gastric outlet obstruction s/p extensive abd sx with adhesiolysis, J tube 

insertion, antrectomy etc 10/10


New DVT Rt arm - 10/12 - PICC in site


Hx perforated ulcer DISTANT PAST


Frailty, gen weakness


FULL code


Fevers/SEPSIS (10/14)





PMH:


DM


HTN


CAD


PUD


CABG





History of Present Illness


History of Present Illness


Looking good, stooling fine


PAssed SLP - on dysphagia 1


Cleared form [employer] service to dc - we are on consult


BUt I did fill out MAR








PLAN:


dc to ppalce today on dyaphgia 1 diet


FULL code


ff up GS as instructed





time paperwork etc 20 mins





Vitals


Vitals





Vital Signs








  Date Time  Temp Pulse Resp B/P (MAP) Pulse Ox O2 Delivery O2 Flow Rate FiO2


 


10/18/17 07:00 97.6 78 16 121/62 (81) 96 Room Air  





 97.6       











Physical Exam


General:  Alert, Oriented X3, Cooperative, No acute distress


Heart:  Regular rate, Normal S1, Normal S2


Lungs:  Clear


Abdomen:  Soft, No tenderness, Other (dressing c/d/i, KARTHIK min serosang)


Extremities:  No clubbing, No cyanosis


Skin:  No rashes, No breakdown





Labs


LABS





Laboratory Tests








Test


  10/17/17


11:57 10/17/17


16:32 10/17/17


21:02 10/18/17


06:00


 


Glucose (Fingerstick)


  198 mg/dL


(70-99) 157 mg/dL


(70-99) 157 mg/dL


(70-99) 


 


 


White Blood Count


  


  


  


  7.0 x10^3/uL


(4.0-11.0)


 


Red Blood Count


  


  


  


  2.79 x10^6/uL


(4.30-5.70)


 


Hemoglobin


  


  


  


  7.9 g/dL


(13.0-17.5)


 


Hematocrit


  


  


  


  24.1 %


(39.0-53.0)


 


Mean Corpuscular Volume    86 fL () 


 


Mean Corpuscular Hemoglobin    28 pg (25-35) 


 


Mean Corpuscular Hemoglobin


Concent 


  


  


  33 g/dL


(31-37)


 


Red Cell Distribution Width


  


  


  


  14.3 %


(11.5-14.5)


 


Platelet Count


  


  


  


  204 x10^3/uL


(140-400)


 


Neutrophils (%) (Auto)    79 % (31-73) 


 


Lymphocytes (%) (Auto)    13 % (24-48) 


 


Monocytes (%) (Auto)    7 % (0-9) 


 


Eosinophils (%) (Auto)    2 % (0-3) 


 


Basophils (%) (Auto)    1 % (0-3) 


 


Neutrophils # (Auto)


  


  


  


  5.5 x10^3uL


(1.8-7.7)


 


Lymphocytes # (Auto)


  


  


  


  0.9 x10^3/uL


(1.0-4.8)


 


Monocytes # (Auto)


  


  


  


  0.5 x10^3/uL


(0.0-1.1)


 


Eosinophils # (Auto)


  


  


  


  0.1 x10^3/uL


(0.0-0.7)


 


Basophils # (Auto)


  


  


  


  0.0 x10^3/uL


(0.0-0.2)


 


Sodium Level


  


  


  


  143 mmol/L


(136-145)


 


Potassium Level


  


  


  


  3.7 mmol/L


(3.5-5.1)


 


Chloride Level


  


  


  


  105 mmol/L


()


 


Carbon Dioxide Level


  


  


  


  32 mmol/L


(21-32)


 


Anion Gap    6 (6-14) 


 


Blood Urea Nitrogen


  


  


  


  38 mg/dL


(8-26)


 


Creatinine


  


  


  


  1.7 mg/dL


(0.7-1.3)


 


Estimated GFR


(Cockcroft-Gault) 


  


  


  38.5 


 


 


Glucose Level


  


  


  


  265 mg/dL


(70-99)


 


Calcium Level


  


  


  


  7.6 mg/dL


(8.5-10.1)


 


Test


  10/18/17


07:30 


  


  


 


 


Glucose (Fingerstick)


  293 mg/dL


(70-99) 


  


  


 











Review of Systems


Review of Systems


denies 14 pt reviewed





Comment


Review of Relevant


I have reviewed the following items milana (where applicable) has been applied.


Labs





Laboratory Tests








Test


  10/16/17


16:10 10/16/17


20:32 10/16/17


20:59 10/17/17


04:45


 


Glucose (Fingerstick)


  206 mg/dL


(70-99) 


  126 mg/dL


(70-99) 


 


 


Vancomycin Level Trough


  


  16.0 mcg/mL


(10.0-20.0) 


  


 


 


Vancomycin Last Dose Date  10/15/17   


 


Vancomycin Last Dose Time  2100   


 


White Blood Count


  


  


  


  8.7 x10^3/uL


(4.0-11.0)


 


Red Blood Count


  


  


  


  2.94 x10^6/uL


(4.30-5.70)


 


Hemoglobin


  


  


  


  8.5 g/dL


(13.0-17.5)


 


Hematocrit


  


  


  


  25.8 %


(39.0-53.0)


 


Mean Corpuscular Volume    88 fL () 


 


Mean Corpuscular Hemoglobin    29 pg (25-35) 


 


Mean Corpuscular Hemoglobin


Concent 


  


  


  33 g/dL


(31-37)


 


Red Cell Distribution Width


  


  


  


  14.3 %


(11.5-14.5)


 


Platelet Count


  


  


  


  178 x10^3/uL


(140-400)


 


Neutrophils (%) (Auto)    79 % (31-73) 


 


Lymphocytes (%) (Auto)    13 % (24-48) 


 


Monocytes (%) (Auto)    5 % (0-9) 


 


Eosinophils (%) (Auto)    2 % (0-3) 


 


Basophils (%) (Auto)    1 % (0-3) 


 


Neutrophils # (Auto)


  


  


  


  6.9 x10^3uL


(1.8-7.7)


 


Lymphocytes # (Auto)


  


  


  


  1.2 x10^3/uL


(1.0-4.8)


 


Monocytes # (Auto)


  


  


  


  0.5 x10^3/uL


(0.0-1.1)


 


Eosinophils # (Auto)


  


  


  


  0.1 x10^3/uL


(0.0-0.7)


 


Basophils # (Auto)


  


  


  


  0.1 x10^3/uL


(0.0-0.2)


 


Sodium Level


  


  


  


  142 mmol/L


(136-145)


 


Potassium Level


  


  


  


  3.6 mmol/L


(3.5-5.1)


 


Chloride Level


  


  


  


  105 mmol/L


()


 


Carbon Dioxide Level


  


  


  


  28 mmol/L


(21-32)


 


Anion Gap    9 (6-14) 


 


Blood Urea Nitrogen


  


  


  


  41 mg/dL


(8-26)


 


Creatinine


  


  


  


  1.5 mg/dL


(0.7-1.3)


 


Estimated GFR


(Cockcroft-Gault) 


  


  


  44.5 


 


 


Glucose Level


  


  


  


  188 mg/dL


(70-99)


 


Calcium Level


  


  


  


  8.0 mg/dL


(8.5-10.1)


 


Test


  10/17/17


07:19 10/17/17


11:57 10/17/17


16:32 10/17/17


21:02


 


Glucose (Fingerstick)


  219 mg/dL


(70-99) 198 mg/dL


(70-99) 157 mg/dL


(70-99) 157 mg/dL


(70-99)


 


Test


  10/18/17


06:00 10/18/17


07:30 


  


 


 


White Blood Count


  7.0 x10^3/uL


(4.0-11.0) 


  


  


 


 


Red Blood Count


  2.79 x10^6/uL


(4.30-5.70) 


  


  


 


 


Hemoglobin


  7.9 g/dL


(13.0-17.5) 


  


  


 


 


Hematocrit


  24.1 %


(39.0-53.0) 


  


  


 


 


Mean Corpuscular Volume 86 fL ()    


 


Mean Corpuscular Hemoglobin 28 pg (25-35)    


 


Mean Corpuscular Hemoglobin


Concent 33 g/dL


(31-37) 


  


  


 


 


Red Cell Distribution Width


  14.3 %


(11.5-14.5) 


  


  


 


 


Platelet Count


  204 x10^3/uL


(140-400) 


  


  


 


 


Neutrophils (%) (Auto) 79 % (31-73)    


 


Lymphocytes (%) (Auto) 13 % (24-48)    


 


Monocytes (%) (Auto) 7 % (0-9)    


 


Eosinophils (%) (Auto) 2 % (0-3)    


 


Basophils (%) (Auto) 1 % (0-3)    


 


Neutrophils # (Auto)


  5.5 x10^3uL


(1.8-7.7) 


  


  


 


 


Lymphocytes # (Auto)


  0.9 x10^3/uL


(1.0-4.8) 


  


  


 


 


Monocytes # (Auto)


  0.5 x10^3/uL


(0.0-1.1) 


  


  


 


 


Eosinophils # (Auto)


  0.1 x10^3/uL


(0.0-0.7) 


  


  


 


 


Basophils # (Auto)


  0.0 x10^3/uL


(0.0-0.2) 


  


  


 


 


Sodium Level


  143 mmol/L


(136-145) 


  


  


 


 


Potassium Level


  3.7 mmol/L


(3.5-5.1) 


  


  


 


 


Chloride Level


  105 mmol/L


() 


  


  


 


 


Carbon Dioxide Level


  32 mmol/L


(21-32) 


  


  


 


 


Anion Gap 6 (6-14)    


 


Blood Urea Nitrogen


  38 mg/dL


(8-26) 


  


  


 


 


Creatinine


  1.7 mg/dL


(0.7-1.3) 


  


  


 


 


Estimated GFR


(Cockcroft-Gault) 38.5 


  


  


  


 


 


Glucose Level


  265 mg/dL


(70-99) 


  


  


 


 


Calcium Level


  7.6 mg/dL


(8.5-10.1) 


  


  


 


 


Glucose (Fingerstick)


  


  293 mg/dL


(70-99) 


  


 








Laboratory Tests








Test


  10/17/17


11:57 10/17/17


16:32 10/17/17


21:02 10/18/17


06:00


 


Glucose (Fingerstick)


  198 mg/dL


(70-99) 157 mg/dL


(70-99) 157 mg/dL


(70-99) 


 


 


White Blood Count


  


  


  


  7.0 x10^3/uL


(4.0-11.0)


 


Red Blood Count


  


  


  


  2.79 x10^6/uL


(4.30-5.70)


 


Hemoglobin


  


  


  


  7.9 g/dL


(13.0-17.5)


 


Hematocrit


  


  


  


  24.1 %


(39.0-53.0)


 


Mean Corpuscular Volume    86 fL () 


 


Mean Corpuscular Hemoglobin    28 pg (25-35) 


 


Mean Corpuscular Hemoglobin


Concent 


  


  


  33 g/dL


(31-37)


 


Red Cell Distribution Width


  


  


  


  14.3 %


(11.5-14.5)


 


Platelet Count


  


  


  


  204 x10^3/uL


(140-400)


 


Neutrophils (%) (Auto)    79 % (31-73) 


 


Lymphocytes (%) (Auto)    13 % (24-48) 


 


Monocytes (%) (Auto)    7 % (0-9) 


 


Eosinophils (%) (Auto)    2 % (0-3) 


 


Basophils (%) (Auto)    1 % (0-3) 


 


Neutrophils # (Auto)


  


  


  


  5.5 x10^3uL


(1.8-7.7)


 


Lymphocytes # (Auto)


  


  


  


  0.9 x10^3/uL


(1.0-4.8)


 


Monocytes # (Auto)


  


  


  


  0.5 x10^3/uL


(0.0-1.1)


 


Eosinophils # (Auto)


  


  


  


  0.1 x10^3/uL


(0.0-0.7)


 


Basophils # (Auto)


  


  


  


  0.0 x10^3/uL


(0.0-0.2)


 


Sodium Level


  


  


  


  143 mmol/L


(136-145)


 


Potassium Level


  


  


  


  3.7 mmol/L


(3.5-5.1)


 


Chloride Level


  


  


  


  105 mmol/L


()


 


Carbon Dioxide Level


  


  


  


  32 mmol/L


(21-32)


 


Anion Gap    6 (6-14) 


 


Blood Urea Nitrogen


  


  


  


  38 mg/dL


(8-26)


 


Creatinine


  


  


  


  1.7 mg/dL


(0.7-1.3)


 


Estimated GFR


(Cockcroft-Gault) 


  


  


  38.5 


 


 


Glucose Level


  


  


  


  265 mg/dL


(70-99)


 


Calcium Level


  


  


  


  7.6 mg/dL


(8.5-10.1)


 


Test


  10/18/17


07:30 


  


  


 


 


Glucose (Fingerstick)


  293 mg/dL


(70-99) 


  


  


 








Microbiology


10/14/17 Blood Culture - Preliminary, Resulted


           NO GROWTH AFTER 3 DAYS


Medications





Current Medications


Ondansetron HCl (Zofran) 4 mg PRN Q6HRS  PRN IV NAUSEA/VOMITING;  Start 10/10/

17 at 07:00;  Stop 10/11/17 at 06:59;  Status DC


Fentanyl Citrate (Fentanyl 2ml Vial) 25 mcg PRN Q5MIN  PRN IV MILD PAIN Last 

administered on 10/10/17at 14:08;  Start 10/10/17 at 07:00;  Stop 10/11/17 at 06

:59;  Status DC


Fentanyl Citrate (Fentanyl 2ml Vial) 50 mcg PRN Q5MIN  PRN IV MODERATE PAIN;  

Start 10/10/17 at 07:00;  Stop 10/11/17 at 06:59;  Status DC


Morphine Sulfate 1 mg PRN Q10MIN  PRN IV SEVERE PAIN;  Start 10/10/17 at 07:00;

  Stop 10/11/17 at 06:59;  Status DC


Ringer's Solution 1,000 ml @  0 mls/hr Q0M IV  Last administered on 10/10/17at 

13:45;  Start 10/10/17 at 07:00;  Stop 10/10/17 at 18:59;  Status DC


Lidocaine HCl (Xylocaine-Mpf 1% Vial) 2 ml PRN 1X  PRN ID PRIOR TO IV START;  

Start 10/10/17 at 07:00;  Stop 10/11/17 at 06:59;  Status DC


Hydromorphone HCl (Dilaudid) 0.5 mg PRN Q10MIN  PRN IV SEV PAIN, Second choice;

  Start 10/10/17 at 07:00;  Stop 10/11/17 at 06:59;  Status DC


Prochlorperazine Edisylate (Compazine) 5 mg PACU PRN  PRN IV NAUSEA, MRX1;  

Start 10/10/17 at 07:00;  Stop 10/11/17 at 06:59;  Status DC


Cefazolin Sodium/ Dextrose 50 ml @  100 mls/hr 1X PREOP  PRN IV PRIOR TO 

PROCEDURE;  Start 10/10/17 at 06:00;  Stop 10/10/17 at 18:00;  Status DC


Cefoxitin Sodium 2 gm/Sodium Chloride 100 ml @  200 mls/hr 1X  ONCE IV ;  Start 

10/10/17 at 07:00;  Stop 10/10/17 at 07:29;  Status UNV


Cefoxitin Sodium 100 ml @  200 mls/hr ONCE  ONCE IV  Last administered on 10/10/

17at 08:16;  Start 10/10/17 at 07:15;  Stop 10/10/17 at 07:44;  Status DC


Lidocaine HCl (Lidocaine Pf 2% Vial) 5 ml STK-MED ONCE .ROUTE ;  Start 10/10/17 

at 07:29;  Stop 10/10/17 at 07:30;  Status DC


Etomidate (Amidate) 20 mg STK-MED ONCE IV ;  Start 10/10/17 at 07:29;  Stop 10/

10/17 at 07:30;  Status DC


Fentanyl Citrate (Fentanyl 2ml Vial) 100 mcg STK-MED ONCE .ROUTE ;  Start 10/10/

17 at 07:29;  Stop 10/10/17 at 07:30;  Status DC


Rocuronium Bromide (Zemuron) 100 mg STK-MED ONCE .ROUTE ;  Start 10/10/17 at 07:

29;  Stop 10/10/17 at 07:30;  Status DC


Dexamethasone Sodium Phosphate (Decadron) 20 mg STK-MED ONCE .ROUTE ;  Start 10/

10/17 at 08:16;  Stop 10/10/17 at 08:17;  Status DC


Ondansetron HCl (Zofran) 4 mg STK-MED ONCE .ROUTE ;  Start 10/10/17 at 08:16;  

Stop 10/10/17 at 08:17;  Status DC


Esmolol HCl (Brevibloc) 100 mg STK-MED ONCE IV ;  Start 10/10/17 at 08:16;  

Stop 10/10/17 at 08:17;  Status DC


Phenylephrine HCl (Tanvir-Synephrine Inj) 10 mg STK-MED ONCE .ROUTE ;  Start 10/10/

17 at 08:16;  Stop 10/10/17 at 08:17;  Status DC


Fentanyl Citrate (Fentanyl 2ml Vial) 100 mcg STK-MED ONCE .ROUTE ;  Start 10/10/

17 at 08:17;  Stop 10/10/17 at 08:18;  Status DC


Labetalol HCl (Normodyne) 20 mg STK-MED ONCE .ROUTE ;  Start 10/10/17 at 08:50;

  Stop 10/10/17 at 08:51;  Status DC


Fentanyl Citrate (Fentanyl 2ml Vial) 100 mcg STK-MED ONCE .ROUTE ;  Start 10/10/

17 at 09:21;  Stop 10/10/17 at 09:22;  Status DC


Rocuronium Bromide (Zemuron) 100 mg STK-MED ONCE .ROUTE ;  Start 10/10/17 at 09:

30;  Stop 10/10/17 at 09:31;  Status DC


Cellulose 1 each STK-MED ONCE .ROUTE  Last administered on 10/10/17at 10:57;  

Start 10/10/17 at 09:57;  Stop 10/10/17 at 10:57;  Status DC


Albumin Human 500 ml @  As Directed STK-MED ONCE IV ;  Start 10/10/17 at 11:06;

  Stop 10/10/17 at 11:07;  Status DC


Neostigmine Methylsulfate (Bloxiverz) 10 mg STK-MED ONCE .ROUTE ;  Start 10/10/

17 at 11:20;  Stop 10/10/17 at 11:21;  Status DC


Glycopyrrolate (Robinul) 1 mg STK-MED ONCE .ROUTE ;  Start 10/10/17 at 11:20;  

Stop 10/10/17 at 11:21;  Status DC


Famotidine (Pepcid) 20 mg QHS IVP  Last administered on 10/16/17at 21:53;  

Start 10/10/17 at 21:00;  Stop 10/17/17 at 12:06;  Status DC


Enoxaparin Sodium (Lovenox 30mg Syringe) 30 mg Q24H SQ  Last administered on 10/

12/17at 08:13;  Start 10/11/17 at 08:00;  Stop 10/12/17 at 10:41;  Status DC


Sodium Chloride (Normal Saline Flush) 3 ml QSHIFT  PRN IV AFTER MEDS AND BLOOD 

DRAWS;  Start 10/10/17 at 12:00


Ringer's Solution 1,000 ml @  100 mls/hr Q10H IV  Last administered on 10/14/

17at 15:49;  Start 10/10/17 at 11:49;  Stop 10/15/17 at 09:57;  Status DC


Naloxone HCl (Narcan) 0.4 mg PRN Q2MIN  PRN IV SEE INSTRUCTIONS;  Start 10/10/

17 at 12:00


Sodium Chloride 1,000 ml @  25 mls/hr Q24H IV  Last administered on 10/10/17at 

11:49;  Start 10/10/17 at 11:49;  Stop 10/15/17 at 07:53;  Status DC


Morphine Sulfate 30 ml @ 0 mls/hr CONT PRN  PRN IV PROTOCOL Last administered 

on 10/10/17at 15:13;  Start 10/10/17 at 12:00;  Stop 10/13/17 at 11:04;  Status 

DC


Ondansetron HCl (Zofran) 4 mg PRN Q6HRS  PRN IV NAUESA, 1ST CHOICE;  Start 10/10

/17 at 12:00


Insulin Aspart (NovoLOG VIAL) 100 unit STK-MED ONCE SQ ;  Start 10/10/17 at 14:

01;  Stop 10/10/17 at 14:02;  Status DC


Insulin Aspart (NovoLOG VIAL) 3 unit 1X  ONCE SQ  Last administered on 10/10/

17at 13:58;  Start 10/10/17 at 14:15;  Stop 10/10/17 at 14:16;  Status DC


Pneumococcal Polyvalent Vaccine (Do NOT chart on this placeholder)  1X  ONCE MC 

;  Start 10/10/17 at 16:15;  Stop 10/10/17 at 16:16;  Status UNV


Albuterol/ Ipratropium (Duoneb) 3 ml PRN Q6HRS  PRN NEB SHORTNESS OF BREATH;  

Start 10/11/17 at 09:30;  Stop 10/11/17 at 09:35;  Status DC


Albuterol Sulfate (Ventolin Neb Soln) 2.5 mg PRN Q4HRS  PRN NEB SHORTNESS OF 

BREATH;  Start 10/11/17 at 09:30


Insulin Aspart (NovoLOG) 0-9 UNITS TIDWMEALS SQ  Last administered on 10/13/

17at 18:00;  Start 10/11/17 at 12:00;  Stop 10/13/17 at 18:58;  Status DC


Dextrose (Dextrose 50%-Water Syringe) 12.5 gm PRN Q15MIN  PRN IV SEE COMMENTS;  

Start 10/11/17 at 09:30;  Stop 10/13/17 at 18:58;  Status DC


Enoxaparin Sodium (Lovenox 40mg Syringe) 40 mg Q24H SQ ;  Start 10/13/17 at 08:

00;  Stop 10/13/17 at 08:00;  Status DC


Enoxaparin Sodium (Lovenox 60mg Syringe) 60 mg Q12HR SQ  Last administered on 10

/15/17at 08:49;  Start 10/12/17 at 20:00;  Stop 10/15/17 at 08:58;  Status DC


Info 1 each PRN DAILY  PRN MC SEE COMMENTS;  Start 10/12/17 at 22:30;  Status 

Cancel


Morphine Sulfate 2 mg PRN Q2HR  PRN IV PAIN Last administered on 10/15/17at 17:

31;  Start 10/13/17 at 11:00


Amino Acids/ Glycerin/ Electrolytes 1,000 ml @  80 mls/hr [id number] IV  Last 

administered on 10/16/17at 05:19;  Start 10/13/17 at 13:45;  Stop 10/16/17 at 10

:02;  Status DC


Metoprolol Tartrate (Lopressor) 5 mg Q6HRS IVP  Last administered on 10/18/17at 

06:04;  Start 10/14/17 at 09:00


Diphenhydramine HCl (Benadryl) 25 mg PRN QHS  PRN IVP SLEEP Last administered 

on 10/15/17at 21:20;  Start 10/14/17 at 08:45


Insulin Aspart (NovoLOG) 0-9 UNITS TIDWMEALS SQ  Last administered on 10/18/

17at 09:32;  Start 10/14/17 at 09:30


Dextrose (Dextrose 50%-Water Syringe) 12.5 gm PRN Q15MIN  PRN IV SEE COMMENTS;  

Start 10/14/17 at 09:15


Info (Anti-Coagulation Monitoring By Pharmacy) 1 each PRN DAILY  PRN MC SEE 

COMMENTS Last administered on 10/16/17at 16:22;  Start 10/14/17 at 14:30


Piperacillin Sod/ Tazobactam Sod (Zosyn Per Pharmacy) 1 each PRN DAILY  PRN MC 

SEE COMMENTS;  Start 10/14/17 at 18:15;  Stop 10/17/17 at 09:39;  Status DC


Vancomycin HCl (Vanco Per Pharmacy) 1 each PRN DAILY  PRN MC SEE COMMENTS Last 

administered on 10/16/17at 21:02;  Start 10/14/17 at 18:15;  Stop 10/17/17 at 09

:15;  Status DC


Vancomycin HCl 1.5 gm/Sodium Chloride 500 ml @  250 mls/hr 1X  ONCE IV  Last 

administered on 10/14/17at 21:23;  Start 10/14/17 at 19:00;  Stop 10/14/17 at 20

:59;  Status DC


Piperacillin Sod/ Tazobactam Sod 2.25 gm/Sodium Chloride 50 ml @  100 mls/hr 

Q6HRS IV  Last administered on 10/17/17at 05:40;  Start 10/14/17 at 22:00;  

Stop 10/17/17 at 09:15;  Status DC


Vancomycin HCl 1 gm/Sodium Chloride 250 ml @  250 mls/hr Q24H IV  Last 

administered on 10/16/17at 21:53;  Start 10/15/17 at 21:00;  Stop 10/17/17 at 09

:15;  Status DC


Vancomycin HCl 1 each 1X  ONCE MC  Last administered on 10/16/17at 20:30;  

Start 10/16/17 at 20:30;  Stop 10/16/17 at 20:31;  Status DC


Rivaroxaban (Xarelto) 15 mg BID PO  Last administered on 10/18/17at 09:29;  

Start 10/15/17 at 09:00


Fluconazole/ Sodium Chloride 50 ml @  100 mls/hr Q24H IV  Last administered on 

10/16/17at 15:00;  Start 10/15/17 at 15:00;  Stop 10/17/17 at 12:06;  Status DC


Insulin Detemir (Levemir) 10 units ONCE  ONCE SQ  Last administered on 10/16/

17at 11:12;  Start 10/16/17 at 09:15;  Stop 10/16/17 at 09:17;  Status DC


Amoxicillin/ Clavulanate Potassium (Augmentin 400-57mg/5ml Susp) 10 ml Q12HR 

PEG  Last administered on 10/18/17at 09:29;  Start 10/17/17 at 10:00


Amoxicillin/ Clavulanate Potassium (Augmentin 400-57mg/5ml Susp) 10 ml Q12HR 

PEG ;  Start 10/17/17 at 09:30;  Status UNV


Fluconazole (Diflucan) 100 mg DAILY PO  Last administered on 10/17/17at 13:46;  

Start 10/17/17 at 13:00;  Stop 10/18/17 at 08:23;  Status DC





Active Scripts


Active


Reported


[Lidocaine]   15 Ml PO Q6HRS PRN


[Ondansetron]   4 Mg IV PRN Q4HRS PRN


[Pepcid Suspension]   20 IV BID


Duoneb 0.5-3(2.5) Mg/3 Ml (Albuterol/Ipratropium) 3 Ml Ampul.neb 3 Ml NEB PRN 

Q6HRS PRN


Novolog (Insulin Aspart) 100 Unit/1 Ml Cartridge 0 SQ Q6HRS


Vitals/I & O





Vital Sign - Last 24 Hours








 10/17/17 10/17/17 10/17/17 10/17/17





 13:46 15:00 17:54 19:30


 


Temp  98.2  98.3





  98.2  98.3


 


Pulse 86 84 86 80


 


Resp  18  18


 


B/P (MAP) 114/70 92/60 (71) 114/70 121/67 (85)


 


Pulse Ox  98  99


 


O2 Delivery  Room Air  Room Air


 


    





    





 10/17/17 10/17/17 10/18/17 10/18/17





 20:00 23:24 00:36 03:24


 


Temp  98.3  98.3





  98.3  98.3


 


Pulse  91 91 83


 


Resp  18  18


 


B/P (MAP)  113/70 (84) 113/70 128/74 (92)


 


Pulse Ox  96  97


 


O2 Delivery Room Air Room Air  Room Air


 


    





    





 10/18/17 10/18/17  





 06:04 07:00  


 


Temp  97.6  





  97.6  


 


Pulse 83 78  


 


Resp  16  


 


B/P (MAP) 128/74 121/62 (81)  


 


Pulse Ox  96  


 


O2 Delivery  Room Air  











Nutrition Consultation


Dietary Evaluation:


Recommendations by RD:  PPN/TPN


Comments:  


REC continue TF via J tube:





Diabetisource AC @10 ml/hr, increase 10 ml/hr q8 hrs to goal of 50 ml/hr 


w/100 ml flushes q6 hrs


Expected Outcomes/Goals:  


tolerate TF at goal- met, goal ongoing





Malnutrition Findings:


Food and Nutrition Intake (Mod:  <75% est energy req 7days


Body Fat Depletion (Non Severe:  Mild Depletion


Weight Status:  Underweight











CAROLINA CLARK MD Oct 18, 2017 11:48

## 2018-03-01 ENCOUNTER — HOSPITAL ENCOUNTER (OUTPATIENT)
Dept: HOSPITAL 61 - SURG | Age: 83
Discharge: HOME | End: 2018-03-01
Attending: INTERNAL MEDICINE
Payer: MEDICARE

## 2018-03-01 DIAGNOSIS — Z82.49: ICD-10-CM

## 2018-03-01 DIAGNOSIS — E78.5: ICD-10-CM

## 2018-03-01 DIAGNOSIS — Z79.899: ICD-10-CM

## 2018-03-01 DIAGNOSIS — I25.10: ICD-10-CM

## 2018-03-01 DIAGNOSIS — N18.9: ICD-10-CM

## 2018-03-01 DIAGNOSIS — Z82.3: ICD-10-CM

## 2018-03-01 DIAGNOSIS — Z79.4: ICD-10-CM

## 2018-03-01 DIAGNOSIS — I25.2: ICD-10-CM

## 2018-03-01 DIAGNOSIS — K25.7: Primary | ICD-10-CM

## 2018-03-01 DIAGNOSIS — Z98.890: ICD-10-CM

## 2018-03-01 DIAGNOSIS — K21.0: ICD-10-CM

## 2018-03-01 DIAGNOSIS — Z86.718: ICD-10-CM

## 2018-03-01 DIAGNOSIS — Z87.11: ICD-10-CM

## 2018-03-01 DIAGNOSIS — E11.22: ICD-10-CM

## 2018-03-01 DIAGNOSIS — D64.9: ICD-10-CM

## 2018-03-01 DIAGNOSIS — I12.9: ICD-10-CM

## 2018-03-01 DIAGNOSIS — K29.50: ICD-10-CM

## 2018-03-01 LAB — POC GLUCOSE: 86 MG/DL (ref 70–99)

## 2018-03-01 PROCEDURE — 82962 GLUCOSE BLOOD TEST: CPT

## 2018-03-01 PROCEDURE — 88305 TISSUE EXAM BY PATHOLOGIST: CPT

## 2018-03-01 PROCEDURE — 43239 EGD BIOPSY SINGLE/MULTIPLE: CPT

## 2018-05-09 ENCOUNTER — HOSPITAL ENCOUNTER (EMERGENCY)
Dept: HOSPITAL 61 - ER | Age: 83
Discharge: HOME | End: 2018-05-09
Payer: MEDICARE

## 2018-05-09 DIAGNOSIS — Z95.5: ICD-10-CM

## 2018-05-09 DIAGNOSIS — E11.9: ICD-10-CM

## 2018-05-09 DIAGNOSIS — Z46.59: Primary | ICD-10-CM

## 2018-05-09 DIAGNOSIS — I25.2: ICD-10-CM

## 2018-05-09 PROCEDURE — 49452 REPLACE G-J TUBE PERC: CPT

## 2018-05-09 PROCEDURE — 99284 EMERGENCY DEPT VISIT MOD MDM: CPT

## 2018-05-09 PROCEDURE — 49451 REPLACE DUOD/JEJ TUBE PERC: CPT

## 2018-05-09 RX ADMIN — LIDOCAINE HYDROCHLORIDE 1 ML: 10 INJECTION, SOLUTION INFILTRATION; PERINEURAL at 11:30

## 2018-05-09 RX ADMIN — IOHEXOL 1 ML: 300 INJECTION, SOLUTION INTRAVENOUS at 11:36

## 2019-10-17 ENCOUNTER — HOSPITAL ENCOUNTER (INPATIENT)
Dept: HOSPITAL 61 - ER | Age: 84
LOS: 6 days | Discharge: HOSPICE | DRG: 871 | End: 2019-10-23
Attending: INTERNAL MEDICINE | Admitting: INTERNAL MEDICINE
Payer: MEDICARE

## 2019-10-17 VITALS — DIASTOLIC BLOOD PRESSURE: 52 MMHG | SYSTOLIC BLOOD PRESSURE: 76 MMHG

## 2019-10-17 VITALS — HEIGHT: 66 IN | BODY MASS INDEX: 26.04 KG/M2 | WEIGHT: 162.01 LBS

## 2019-10-17 VITALS — SYSTOLIC BLOOD PRESSURE: 110 MMHG | DIASTOLIC BLOOD PRESSURE: 72 MMHG

## 2019-10-17 VITALS — SYSTOLIC BLOOD PRESSURE: 111 MMHG | DIASTOLIC BLOOD PRESSURE: 64 MMHG

## 2019-10-17 VITALS — DIASTOLIC BLOOD PRESSURE: 78 MMHG | SYSTOLIC BLOOD PRESSURE: 117 MMHG

## 2019-10-17 VITALS — DIASTOLIC BLOOD PRESSURE: 55 MMHG | SYSTOLIC BLOOD PRESSURE: 94 MMHG

## 2019-10-17 DIAGNOSIS — R65.21: ICD-10-CM

## 2019-10-17 DIAGNOSIS — E83.42: ICD-10-CM

## 2019-10-17 DIAGNOSIS — G93.40: ICD-10-CM

## 2019-10-17 DIAGNOSIS — K21.9: ICD-10-CM

## 2019-10-17 DIAGNOSIS — A41.9: Primary | ICD-10-CM

## 2019-10-17 DIAGNOSIS — I42.8: ICD-10-CM

## 2019-10-17 DIAGNOSIS — E43: ICD-10-CM

## 2019-10-17 DIAGNOSIS — E87.6: ICD-10-CM

## 2019-10-17 DIAGNOSIS — I25.2: ICD-10-CM

## 2019-10-17 DIAGNOSIS — H91.90: ICD-10-CM

## 2019-10-17 DIAGNOSIS — R57.0: ICD-10-CM

## 2019-10-17 DIAGNOSIS — E86.0: ICD-10-CM

## 2019-10-17 DIAGNOSIS — M75.100: ICD-10-CM

## 2019-10-17 DIAGNOSIS — R64: ICD-10-CM

## 2019-10-17 DIAGNOSIS — J98.11: ICD-10-CM

## 2019-10-17 DIAGNOSIS — N17.9: ICD-10-CM

## 2019-10-17 DIAGNOSIS — R19.7: ICD-10-CM

## 2019-10-17 DIAGNOSIS — Z74.01: ICD-10-CM

## 2019-10-17 DIAGNOSIS — D68.9: ICD-10-CM

## 2019-10-17 DIAGNOSIS — R74.0: ICD-10-CM

## 2019-10-17 DIAGNOSIS — Z87.891: ICD-10-CM

## 2019-10-17 DIAGNOSIS — I13.0: ICD-10-CM

## 2019-10-17 DIAGNOSIS — E78.5: ICD-10-CM

## 2019-10-17 DIAGNOSIS — I25.10: ICD-10-CM

## 2019-10-17 DIAGNOSIS — N13.30: ICD-10-CM

## 2019-10-17 DIAGNOSIS — Z82.49: ICD-10-CM

## 2019-10-17 DIAGNOSIS — Z90.49: ICD-10-CM

## 2019-10-17 DIAGNOSIS — Z95.1: ICD-10-CM

## 2019-10-17 DIAGNOSIS — M19.90: ICD-10-CM

## 2019-10-17 DIAGNOSIS — Z66: ICD-10-CM

## 2019-10-17 DIAGNOSIS — J96.90: ICD-10-CM

## 2019-10-17 DIAGNOSIS — Z87.11: ICD-10-CM

## 2019-10-17 DIAGNOSIS — N18.3: ICD-10-CM

## 2019-10-17 DIAGNOSIS — D69.6: ICD-10-CM

## 2019-10-17 DIAGNOSIS — I25.5: ICD-10-CM

## 2019-10-17 DIAGNOSIS — K22.4: ICD-10-CM

## 2019-10-17 DIAGNOSIS — E83.51: ICD-10-CM

## 2019-10-17 DIAGNOSIS — R62.7: ICD-10-CM

## 2019-10-17 DIAGNOSIS — D64.9: ICD-10-CM

## 2019-10-17 DIAGNOSIS — F41.9: ICD-10-CM

## 2019-10-17 DIAGNOSIS — E11.22: ICD-10-CM

## 2019-10-17 DIAGNOSIS — I50.43: ICD-10-CM

## 2019-10-17 LAB
ALBUMIN SERPL-MCNC: 1.4 G/DL (ref 3.4–5)
ALBUMIN/GLOB SERPL: 0.3 {RATIO} (ref 1–1.7)
ALP SERPL-CCNC: 240 U/L (ref 46–116)
ALT SERPL-CCNC: 52 U/L (ref 16–63)
ANION GAP SERPL CALC-SCNC: 14 MMOL/L (ref 6–14)
AST SERPL-CCNC: 233 U/L (ref 15–37)
BASOPHILS # BLD AUTO: 0 X10^3/UL (ref 0–0.2)
BASOPHILS NFR BLD: 0 % (ref 0–3)
BILIRUB SERPL-MCNC: 1.2 MG/DL (ref 0.2–1)
BUN SERPL-MCNC: 20 MG/DL (ref 8–26)
BUN/CREAT SERPL: 9 (ref 6–20)
CALCIUM SERPL-MCNC: 7.7 MG/DL (ref 8.5–10.1)
CHLORIDE SERPL-SCNC: 104 MMOL/L (ref 98–107)
CO2 SERPL-SCNC: 21 MMOL/L (ref 21–32)
CREAT SERPL-MCNC: 2.3 MG/DL (ref 0.7–1.3)
EOSINOPHIL NFR BLD: 0 % (ref 0–3)
EOSINOPHIL NFR BLD: 0 X10^3/UL (ref 0–0.7)
ERYTHROCYTE [DISTWIDTH] IN BLOOD BY AUTOMATED COUNT: 15.9 % (ref 11.5–14.5)
GFR SERPLBLD BASED ON 1.73 SQ M-ARVRAT: 27 ML/MIN
GLOBULIN SER-MCNC: 4.1 G/DL (ref 2.2–3.8)
GLUCOSE SERPL-MCNC: 164 MG/DL (ref 70–99)
HCT VFR BLD CALC: 36 % (ref 39–53)
HGB BLD-MCNC: 11.9 G/DL (ref 13–17.5)
LYMPHOCYTES # BLD: 1.2 X10^3/UL (ref 1–4.8)
LYMPHOCYTES NFR BLD AUTO: 13 % (ref 24–48)
MCH RBC QN AUTO: 31 PG (ref 25–35)
MCHC RBC AUTO-ENTMCNC: 33 G/DL (ref 31–37)
MCV RBC AUTO: 94 FL (ref 79–100)
MONO #: 0.6 X10^3/UL (ref 0–1.1)
MONOCYTES NFR BLD: 7 % (ref 0–9)
NEUT #: 7.8 X10^3/UL (ref 1.8–7.7)
NEUTROPHILS NFR BLD AUTO: 80 % (ref 31–73)
PLATELET # BLD AUTO: 156 X10^3/UL (ref 140–400)
POTASSIUM SERPL-SCNC: 3.8 MMOL/L (ref 3.5–5.1)
PROT SERPL-MCNC: 5.5 G/DL (ref 6.4–8.2)
PROTHROMBIN TIME: 17.6 SEC (ref 11.7–14)
RBC # BLD AUTO: 3.84 X10^6/UL (ref 4.3–5.7)
SODIUM SERPL-SCNC: 139 MMOL/L (ref 136–145)
WBC # BLD AUTO: 9.7 X10^3/UL (ref 4–11)

## 2019-10-17 PROCEDURE — P9046 ALBUMIN (HUMAN), 25%, 20 ML: HCPCS

## 2019-10-17 PROCEDURE — 96368 THER/DIAG CONCURRENT INF: CPT

## 2019-10-17 PROCEDURE — 84157 ASSAY OF PROTEIN OTHER: CPT

## 2019-10-17 PROCEDURE — 89050 BODY FLUID CELL COUNT: CPT

## 2019-10-17 PROCEDURE — 81001 URINALYSIS AUTO W/SCOPE: CPT

## 2019-10-17 PROCEDURE — 88112 CYTOPATH CELL ENHANCE TECH: CPT

## 2019-10-17 PROCEDURE — 96365 THER/PROPH/DIAG IV INF INIT: CPT

## 2019-10-17 PROCEDURE — 87045 FECES CULTURE AEROBIC BACT: CPT

## 2019-10-17 PROCEDURE — 84484 ASSAY OF TROPONIN QUANT: CPT

## 2019-10-17 PROCEDURE — 83540 ASSAY OF IRON: CPT

## 2019-10-17 PROCEDURE — 83550 IRON BINDING TEST: CPT

## 2019-10-17 PROCEDURE — 80053 COMPREHEN METABOLIC PANEL: CPT

## 2019-10-17 PROCEDURE — 71250 CT THORAX DX C-: CPT

## 2019-10-17 PROCEDURE — 74176 CT ABD & PELVIS W/O CONTRAST: CPT

## 2019-10-17 PROCEDURE — 36415 COLL VENOUS BLD VENIPUNCTURE: CPT

## 2019-10-17 PROCEDURE — 82962 GLUCOSE BLOOD TEST: CPT

## 2019-10-17 PROCEDURE — 96361 HYDRATE IV INFUSION ADD-ON: CPT

## 2019-10-17 PROCEDURE — 83605 ASSAY OF LACTIC ACID: CPT

## 2019-10-17 PROCEDURE — 85025 COMPLETE CBC W/AUTO DIFF WBC: CPT

## 2019-10-17 PROCEDURE — C9113 INJ PANTOPRAZOLE SODIUM, VIA: HCPCS

## 2019-10-17 PROCEDURE — 82945 GLUCOSE OTHER FLUID: CPT

## 2019-10-17 PROCEDURE — 85610 PROTHROMBIN TIME: CPT

## 2019-10-17 PROCEDURE — 88305 TISSUE EXAM BY PATHOLOGIST: CPT

## 2019-10-17 PROCEDURE — 71045 X-RAY EXAM CHEST 1 VIEW: CPT

## 2019-10-17 PROCEDURE — 87493 C DIFF AMPLIFIED PROBE: CPT

## 2019-10-17 PROCEDURE — 83735 ASSAY OF MAGNESIUM: CPT

## 2019-10-17 PROCEDURE — G0378 HOSPITAL OBSERVATION PER HR: HCPCS

## 2019-10-17 PROCEDURE — 87040 BLOOD CULTURE FOR BACTERIA: CPT

## 2019-10-17 PROCEDURE — 84100 ASSAY OF PHOSPHORUS: CPT

## 2019-10-17 PROCEDURE — 82607 VITAMIN B-12: CPT

## 2019-10-17 PROCEDURE — P9041 ALBUMIN (HUMAN),5%, 50ML: HCPCS

## 2019-10-17 PROCEDURE — 93306 TTE W/DOPPLER COMPLETE: CPT

## 2019-10-17 PROCEDURE — 83880 ASSAY OF NATRIURETIC PEPTIDE: CPT

## 2019-10-17 PROCEDURE — 80048 BASIC METABOLIC PNL TOTAL CA: CPT

## 2019-10-17 PROCEDURE — 87071 CULTURE AEROBIC QUANT OTHER: CPT

## 2019-10-17 PROCEDURE — 96375 TX/PRO/DX INJ NEW DRUG ADDON: CPT

## 2019-10-17 PROCEDURE — 32555 ASPIRATE PLEURA W/ IMAGING: CPT

## 2019-10-17 PROCEDURE — 85027 COMPLETE CBC AUTOMATED: CPT

## 2019-10-17 PROCEDURE — 87075 CULTR BACTERIA EXCEPT BLOOD: CPT

## 2019-10-17 PROCEDURE — 93005 ELECTROCARDIOGRAM TRACING: CPT

## 2019-10-17 PROCEDURE — 82805 BLOOD GASES W/O2 SATURATION: CPT

## 2019-10-17 PROCEDURE — 83615 LACTATE (LD) (LDH) ENZYME: CPT

## 2019-10-17 RX ADMIN — ENOXAPARIN SODIUM SCH MG: 100 INJECTION SUBCUTANEOUS at 21:00

## 2019-10-17 RX ADMIN — PIPERACILLIN SODIUM AND TAZOBACTAM SODIUM SCH MLS/HR: 3; .375 INJECTION, POWDER, LYOPHILIZED, FOR SOLUTION INTRAVENOUS at 21:40

## 2019-10-17 RX ADMIN — ONDANSETRON PRN MG: 2 INJECTION INTRAMUSCULAR; INTRAVENOUS at 21:47

## 2019-10-17 RX ADMIN — Medication PRN MLS/HR: at 21:40

## 2019-10-17 NOTE — PDOC2
CONSULT


Date of Consult


Date of Consult


DATE: 10/17/19 


TIME: 19:40





Reason for Consult


Reason for Consult:


Sepsis with abnormal CT scan findings





Referring Physician


Referring Physician:


Kellen





Identification/Chief Complaint


Chief Complaint


Fatigue feeling dizzy





Source


Source:  Caregiver, Chart review, Patient





History of Present Illness


Reason for Visit:


Patient is 87-year-old male who was taken to his primary care physician's office

today was found to be hypotensive and tachycardic complained of dizziness and 

fatigue he also had 1 episode of emesis at the primary care office and was 

referred to emergency department. In the emergency department he is again 

hypotensive with pressure is 80/40 and tachycardic. Currently patient is resting

comfortably in bed denies any abdominal pain no nausea just feeling somewhat 

dizzy and tired. CT scan was performed which shows large right pleural effusion 

abdominal contents appear to be normal with a moderate amount of pelvic fluid 

and a cyst or hydronephrosis of the kidney no evidence of bowel obstruction no 

evidence of perforation with free air





Past Medical History


Cardiovascular:  CAD, HTN


GI:  Peptic Ulcer disease


Endocrine:  Diabetes





Past Surgical History


Past Surgical History:  CABG, Other





Family History


Family History:  No Significant





Social History


ALCOHOL:  none


Drugs:  None


Lives:  with Family





Current Problem List


Problem List


Problems


Medical Problems:


(1) Anemia


Status: Acute  





(2) CHF (congestive heart failure)


Status: Acute  





(3) Diarrhea


Status: Acute  





(4) Elevated liver function tests


Status: Acute  





(5) Hypoalbuminemia


Status: Acute  





(6) Hypocalcemia


Status: Acute  





(7) Pleural effusion


Status: Acute  





(8) Renal insufficiency


Status: Acute  





(9) Severe sepsis


Status: Acute  





(10) Tachycardia


Status: Acute  











Current Medications


Current Medications





Current Medications


Sodium Chloride 1,000 ml @  1,000 mls/hr Q1H IV  Last administered on 10/17/19at

15:55;  Start 10/17/19 at 15:34;  Stop 10/17/19 at 16:33;  Status DC


Ondansetron HCl (Zofran) 4 mg 1X  ONCE IV  Last administered on 10/17/19at 

16:09;  Start 10/17/19 at 16:30;  Stop 10/17/19 at 16:31;  Status DC


Sodium Chloride 1,000 ml @  1,000 mls/hr 1X  ONCE IV  Last administered on 

10/17/19at 17:27;  Start 10/17/19 at 16:45;  Stop 10/17/19 at 17:44;  Status DC


Piperacillin Sod/ Tazobactam Sod 3.375 gm/Sodium Chloride 50 ml @  100 mls/hr 1X

 ONCE IV  Last administered on 10/17/19at 17:27;  Start 10/17/19 at 18:00;  Stop

10/17/19 at 18:29;  Status DC


Vancomycin HCl 250 ml @  250 mls/hr 1X  ONCE IV  Last administered on 10/17/19at

17:36;  Start 10/17/19 at 17:00;  Stop 10/17/19 at 17:59;  Status DC


Levofloxacin/ Dextrose 150 ml @  100 mls/hr 1X  ONCE IV  Last administered on 

10/17/19at 18:17;  Start 10/17/19 at 16:45;  Stop 10/17/19 at 18:14;  Status DC


Sodium Chloride 1,000 ml @  150 mls/hr Q6H40M IV ;  Start 10/17/19 at 17:05;  

Stop 10/18/19 at 17:04





Active Scripts


Active


Aspirin Ec (Aspirin) 81 Mg Tablet.dr 81 Mg PO DAILYWBKFT 30 Days


Reported


Protonix (Pantoprazole Sodium) 20 Mg Tablet.dr 40 Mg PO BID


Escitalopram Oxalate 10 Mg Tablet 10 Mg PO DAILY





Allergies


Allergies:  


Coded Allergies:  


     No Known Drug Allergies (Unverified , 3/1/18)





ROS


General:  YES: Fatigue


HEENT:  YES: Vertigo


Gastrointestinal:  Yes Diarrhea





Physical Exam


General:  Alert, Oriented X3, Cooperative, mild distress


HEENT:  Atraumatic, PERRLA, EOMI


Lungs:  Normal air movement


Heart:  Regular rate, No murmurs


Abdomen:  Normal bowel sounds, Soft, No tenderness


Extremities:  No edema


Skin:  No significant lesion


Neuro:  Normal speech





Vitals


VITALS





Vital Signs








  Date Time  Temp Pulse Resp B/P (MAP) Pulse Ox O2 Delivery O2 Flow Rate FiO2


 


10/17/19 18:25  86  121/66 (84) 100 Room Air  


 


10/17/19 15:27 97.6  20     





 97.6       











Labs


Labs





Laboratory Tests








Test


 10/17/19


15:40


 


White Blood Count


 9.7 x10^3/uL


(4.0-11.0)


 


Red Blood Count


 3.84 x10^6/uL


(4.30-5.70)


 


Hemoglobin


 11.9 g/dL


(13.0-17.5)


 


Hematocrit


 36.0 %


(39.0-53.0)


 


Mean Corpuscular Volume 94 fL () 


 


Mean Corpuscular Hemoglobin 31 pg (25-35) 


 


Mean Corpuscular Hemoglobin


Concent 33 g/dL


(31-37)


 


Red Cell Distribution Width


 15.9 %


(11.5-14.5)


 


Platelet Count


 156 x10^3/uL


(140-400)


 


Neutrophils (%) (Auto) 80 % (31-73) 


 


Lymphocytes (%) (Auto) 13 % (24-48) 


 


Monocytes (%) (Auto) 7 % (0-9) 


 


Eosinophils (%) (Auto) 0 % (0-3) 


 


Basophils (%) (Auto) 0 % (0-3) 


 


Neutrophils # (Auto)


 7.8 x10^3/uL


(1.8-7.7)


 


Lymphocytes # (Auto)


 1.2 x10^3/uL


(1.0-4.8)


 


Monocytes # (Auto)


 0.6 x10^3/uL


(0.0-1.1)


 


Eosinophils # (Auto)


 0.0 x10^3/uL


(0.0-0.7)


 


Basophils # (Auto)


 0.0 x10^3/uL


(0.0-0.2)


 


Prothrombin Time


 17.6 SEC


(11.7-14.0)


 


Prothromb Time International


Ratio 1.5 (0.8-1.1) 





 


Sodium Level


 139 mmol/L


(136-145)


 


Potassium Level


 3.8 mmol/L


(3.5-5.1)


 


Chloride Level


 104 mmol/L


()


 


Carbon Dioxide Level


 21 mmol/L


(21-32)


 


Anion Gap 14 (6-14) 


 


Blood Urea Nitrogen


 20 mg/dL


(8-26)


 


Creatinine


 2.3 mg/dL


(0.7-1.3)


 


Estimated GFR


(Cockcroft-Gault) 27.0 





 


BUN/Creatinine Ratio 9 (6-20) 


 


Glucose Level


 164 mg/dL


(70-99)


 


Lactic Acid Level


 7.6 mmol/L


(0.4-2.0)


 


Calcium Level


 7.7 mg/dL


(8.5-10.1)


 


Total Bilirubin


 1.2 mg/dL


(0.2-1.0)


 


Aspartate Amino Transf


(AST/SGOT) 233 U/L


(15-37)


 


Alanine Aminotransferase


(ALT/SGPT) 52 U/L (16-63) 





 


Alkaline Phosphatase


 240 U/L


()


 


Troponin I Quantitative


 < 0.017 ng/mL


(0.000-0.055)


 


NT-Pro-B-Type Natriuretic


Peptide 2822 pg/mL


(0-449)


 


Total Protein


 5.5 g/dL


(6.4-8.2)


 


Albumin


 1.4 g/dL


(3.4-5.0)


 


Albumin/Globulin Ratio 0.3 (1.0-1.7) 








Laboratory Tests








Test


 10/17/19


15:40


 


White Blood Count


 9.7 x10^3/uL


(4.0-11.0)


 


Red Blood Count


 3.84 x10^6/uL


(4.30-5.70)


 


Hemoglobin


 11.9 g/dL


(13.0-17.5)


 


Hematocrit


 36.0 %


(39.0-53.0)


 


Mean Corpuscular Volume 94 fL () 


 


Mean Corpuscular Hemoglobin 31 pg (25-35) 


 


Mean Corpuscular Hemoglobin


Concent 33 g/dL


(31-37)


 


Red Cell Distribution Width


 15.9 %


(11.5-14.5)


 


Platelet Count


 156 x10^3/uL


(140-400)


 


Neutrophils (%) (Auto) 80 % (31-73) 


 


Lymphocytes (%) (Auto) 13 % (24-48) 


 


Monocytes (%) (Auto) 7 % (0-9) 


 


Eosinophils (%) (Auto) 0 % (0-3) 


 


Basophils (%) (Auto) 0 % (0-3) 


 


Neutrophils # (Auto)


 7.8 x10^3/uL


(1.8-7.7)


 


Lymphocytes # (Auto)


 1.2 x10^3/uL


(1.0-4.8)


 


Monocytes # (Auto)


 0.6 x10^3/uL


(0.0-1.1)


 


Eosinophils # (Auto)


 0.0 x10^3/uL


(0.0-0.7)


 


Basophils # (Auto)


 0.0 x10^3/uL


(0.0-0.2)


 


Prothrombin Time


 17.6 SEC


(11.7-14.0)


 


Prothromb Time International


Ratio 1.5 (0.8-1.1) 





 


Sodium Level


 139 mmol/L


(136-145)


 


Potassium Level


 3.8 mmol/L


(3.5-5.1)


 


Chloride Level


 104 mmol/L


()


 


Carbon Dioxide Level


 21 mmol/L


(21-32)


 


Anion Gap 14 (6-14) 


 


Blood Urea Nitrogen


 20 mg/dL


(8-26)


 


Creatinine


 2.3 mg/dL


(0.7-1.3)


 


Estimated GFR


(Cockcroft-Gault) 27.0 





 


BUN/Creatinine Ratio 9 (6-20) 


 


Glucose Level


 164 mg/dL


(70-99)


 


Lactic Acid Level


 7.6 mmol/L


(0.4-2.0)


 


Calcium Level


 7.7 mg/dL


(8.5-10.1)


 


Total Bilirubin


 1.2 mg/dL


(0.2-1.0)


 


Aspartate Amino Transf


(AST/SGOT) 233 U/L


(15-37)


 


Alanine Aminotransferase


(ALT/SGPT) 52 U/L (16-63) 





 


Alkaline Phosphatase


 240 U/L


()


 


Troponin I Quantitative


 < 0.017 ng/mL


(0.000-0.055)


 


NT-Pro-B-Type Natriuretic


Peptide 2822 pg/mL


(0-449)


 


Total Protein


 5.5 g/dL


(6.4-8.2)


 


Albumin


 1.4 g/dL


(3.4-5.0)


 


Albumin/Globulin Ratio 0.3 (1.0-1.7) 











Images


Images


CT scan is in the history of present illness





Assessment/Plan


Assessment/Plan


87-year-old gentleman with several days of diarrhea appears dehydrated 

hypotensive tachycardic appears to have improved with IV fluid


Chronic renal insufficiency creatinine of 2.3


Pleural effusions etiology unknown possible source of his sepsis


Hypoalbuminemia some bleeding to generalized anasarca


Does not appear to have intra-abdominal catastrophe requiring surgical 

intervention at this time


Agree with medical therapy IV hydration antibiotics











VIOLETA FIELDS MD             Oct 17, 2019 19:46

## 2019-10-17 NOTE — PHYS DOC
Past Medical History


Past Medical History:  CAD, Diabetes-Type II, MI, Renal Disease, Other


Additional Past Medical Histor:  ULCERS,CKD,MALNUTRITION,ASPIRATION SYNDROME,J 

TUBE


Past Surgical History:  Appendectomy, Cholecystectomy, Coronary Bypass Surgery, 

Other


Additional Past Surgical Histo:  STOMACH MESH & REMOVED


Alcohol Use:  Sober


Drug Use:  None





Adult General


Chief Complaint


Chief Complaint:  DEHYDRATION





HPI


HPI





Patient is a 87  year old male who presents via EMS because of low blood 

pressure and high heartbeat. Patient is at home with his wife and daughter and 

had diarrhea for one week with generalized weakness and anorexia. Patient was 

seen by his primary care physician office and had blood pressure of 80/40 and 

heart rate of 140 and was very weak and EMS was called. He is alert and oriented

at arrival to ER and denies chest pain and abdominal pain. Patient states he had

a few episodes of vomiting last week.





Review of Systems


Review of Systems





Constitutional: Denies fever or chills, reports generalized weakness []


Eyes: Denies change in visual acuity, redness, or eye pain []


HENT: Denies nasal congestion or sore throat []


Respiratory: Denies cough or shortness of breath []


Cardiovascular: No additional information not addressed in HPI []


GI: Denies abdominal pain, reports nausea, vomiting,  diarrhea []


: Denies dysuria or hematuria []


Musculoskeletal: Denies back pain or joint pain []


Integument: Denies rash or skin lesions []


Neurologic: Denies headache, focal weakness or sensory changes []


Endocrine: Denies polyuria or polydipsia []





All other systems were reviewed and found to be within normal limits, except as 

documented in this note.





Current Medications


Current Medications





Current Medications








 Medications


  (Trade)  Dose


 Ordered  Sig/Leonidas  Start Time


 Stop Time Status Last Admin


Dose Admin


 


 Levofloxacin/


 Dextrose  150 ml @ 


 100 mls/hr  1X  ONCE  10/17/19 16:45


 10/17/19 18:14 UNV  





 


 Ondansetron HCl


  (Zofran)  4 mg  1X  ONCE  10/17/19 16:30


 10/17/19 16:31 DC 10/17/19 16:09


4 MG


 


 Piperacillin Sod/


 Tazobactam Sod


 3.375 gm/Sodium


 Chloride  50 ml @ 


 100 mls/hr  1X  ONCE  10/17/19 16:45


 10/17/19 17:14 UNV  





 


 Sodium Chloride  1,000 ml @ 


 1,000 mls/hr  1X  ONCE  10/17/19 16:45


 10/17/19 17:44   





 


 Vancomycin HCl  250 ml @ 


 250 mls/hr  1X  ONCE  10/17/19 16:45


 10/17/19 17:44 UNV  














Allergies


Allergies





Allergies








Coded Allergies Type Severity Reaction Last Updated Verified


 


  No Known Drug Allergies    3/1/18 No











Physical Exam


Physical Exam








Constitutional: Well developed, moderate distress, ill looking, pale and dry


HENT: Normocephalic, atraumatic, dry oral mucosa.


Eyes: PERRLA, EOMI, conjunctiva normal, no discharge. [] 


Neck: Normal range of motion, no tenderness, supple, no stridor. [] 


Cardiovascular: Tachycardia, no murmur []


Lungs & Thorax:  Bilateral breath sounds clear to auscultation []


Abdomen: Bowel sounds normal, soft, no tenderness, no masses, no pulsatile 

masses. [] 


Skin: Dry, no erythema, no rash. [] 


Back: No tenderness, no CVA tenderness. [] 


Extremities: No tenderness, no cyanosis, no clubbing, ROM intact,.


Neurologic: Alert and oriented X 3, no focal deficits noted. []


Psychologic: Affect normal, judgement normal, mood normal. []





Current Patient Data


Vital Signs





                                   Vital Signs








  Date Time  Temp Pulse Resp B/P (MAP) Pulse Ox O2 Delivery O2 Flow Rate FiO2


 


10/17/19 15:27 97.6 104 20 103/73 (83) 95 Room Air  





 97.6       








Lab Values





                                Laboratory Tests








Test


 10/17/19


15:40


 


White Blood Count


 9.7 x10^3/uL


(4.0-11.0)


 


Red Blood Count


 3.84 x10^6/uL


(4.30-5.70)  L


 


Hemoglobin


 11.9 g/dL


(13.0-17.5)  L


 


Hematocrit


 36.0 %


(39.0-53.0)  L


 


Mean Corpuscular Volume


 94 fL ()





 


Mean Corpuscular Hemoglobin 31 pg (25-35)  


 


Mean Corpuscular Hemoglobin


Concent 33 g/dL


(31-37)


 


Red Cell Distribution Width


 15.9 %


(11.5-14.5)  H


 


Platelet Count


 156 x10^3/uL


(140-400)


 


Neutrophils (%) (Auto) 80 % (31-73)  H


 


Lymphocytes (%) (Auto) 13 % (24-48)  L


 


Monocytes (%) (Auto) 7 % (0-9)  


 


Eosinophils (%) (Auto) 0 % (0-3)  


 


Basophils (%) (Auto) 0 % (0-3)  


 


Neutrophils # (Auto)


 7.8 x10^3/uL


(1.8-7.7)  H


 


Lymphocytes # (Auto)


 1.2 x10^3/uL


(1.0-4.8)


 


Monocytes # (Auto)


 0.6 x10^3/uL


(0.0-1.1)


 


Eosinophils # (Auto)


 0.0 x10^3/uL


(0.0-0.7)


 


Basophils # (Auto)


 0.0 x10^3/uL


(0.0-0.2)


 


Prothrombin Time


 17.6 SEC


(11.7-14.0)  H


 


Prothrombin Time INR


 1.5 (0.8-1.1)


H


 


Sodium Level


 139 mmol/L


(136-145)


 


Potassium Level


 3.8 mmol/L


(3.5-5.1)


 


Chloride Level


 104 mmol/L


()


 


Carbon Dioxide Level


 21 mmol/L


(21-32)


 


Anion Gap 14 (6-14)  


 


Blood Urea Nitrogen


 20 mg/dL


(8-26)


 


Creatinine


 2.3 mg/dL


(0.7-1.3)  H


 


Estimated GFR


(Cockcroft-Gault) 27.0  





 


BUN/Creatinine Ratio 9 (6-20)  


 


Glucose Level


 164 mg/dL


(70-99)  H


 


Lactic Acid Level


 7.6 mmol/L


(0.4-2.0)  *H


 


Calcium Level


 7.7 mg/dL


(8.5-10.1)  L


 


Total Bilirubin


 1.2 mg/dL


(0.2-1.0)  H


 


Aspartate Amino Transferase


(AST) 233 U/L


(15-37)  H


 


Alanine Aminotransferase (ALT)


 52 U/L (16-63)





 


Alkaline Phosphatase


 240 U/L


()  H


 


Troponin I Quantitative


 < 0.017 ng/mL


(0.000-0.055)


 


NT-Pro-B-Type Natriuretic


Peptide 2822 pg/mL


(0-449)  H


 


Total Protein


 5.5 g/dL


(6.4-8.2)  L


 


Albumin


 1.4 g/dL


(3.4-5.0)  L


 


Albumin/Globulin Ratio


 0.3 (1.0-1.7)


L





                                Laboratory Tests


10/17/19 15:40








                                Laboratory Tests


10/17/19 15:40














EKG


EKG


EKG interpreted by me. EKG at 1559 showed sinus rhythm at rate of 82, multiple 

artifact, PVCs, abnormal left axis deviation, Q wave in anteroseptal leads, no a

cute ST and T-wave elevation.





Radiology/Procedures


Radiology/Procedures


[][facility]


                    [address] [place] [address]


                                 [phone]


                                        


                                 IMAGING REPORT





                                     Signed





PATIENT: ANNE MACKAY    ACCOUNT: [id number]     MRN#: [id number]


: 1931           LOCATION: ER              AGE: 87


SEX: M                    EXAM DT: 10/17/19         ACCESSION#: [id number]


STATUS: REG ER            ORD. PHYSICIAN: ANTOINE CROUCH MD


REASON: dehydration. vomiting bile


PROCEDURE: PORTABLE CHEST 1V





PORTABLE CHEST 1V


 


Clinical indications: Dehydration. Vomiting bile. 


 


COMPARISON: 2018.


 


Findings: There is a large right-sided pleural effusion occupying one half


of the right hemithorax with associated compressive atelectasis or 


consolidative infiltrate of the right lung base. No pneumothorax is seen. 


Left lung field remains clear. The heart size and mediastinum are stable.


 


IMPRESSION: Large right-sided pleural effusion. 


 


Electronically signed by: Jose Luis Hugo MD (10/17/2019 4:02 PM) 


[id number]














DICTATED and SIGNED BY:     JOSE LUIS HUGO MD


DATE:     10/17/19 1601





Course & Med Decision Making


Course & Med Decision Making


Pertinent Labs and Imaging studies reviewed. (See chart for details)





Evaluation of patient in ER showed 87-year-old male patient with diarrhea and 

generalized weakness for one week and hypotension and tachycardia at his primary

 care physician office brought in because of dehydration. Patient treated with 

IV fluid and Zofran and his blood pressure increased to 112 and heart rate 

decreased to 80s and patient became more alert. Lactic was more than 7. 

Treatment for severe sepsis was a started. UA is pending. [facility] 

Patient requiring admission for further evaluation and treatment. Discussed with

 Dr. Foreman who is in agreement with admission. Discussed findings and plan with

 patient and family, who acknowledge understanding and agreement.





Dragon Disclaimer


Dragon Disclaimer


This electronic medical record was generated, in whole or in part, using a voice

 recognition dictation system.





Departure


Departure


Impression:  


   Primary Impression:  


   Severe sepsis


   Additional Impressions:  


   Diarrhea


   Tachycardia


   Elevated liver function tests


   CHF (congestive heart failure)


   Anemia


   Hypocalcemia


   Renal insufficiency


   Hypoalbuminemia


   Pleural effusion


Disposition:  09 ADMITTED AS INPATIENT (at 1650)


Admitting Physician:  ANDREY (Dr. Foreman accepted admission at 1649)


Condition:  GUARDED


Referrals:  


DANITA BARNHART MD (PCP)





Critical Care Time


 Critical care time was 60 minutes exclusive of procedures.





Date and Time of Reassessment


Date:  Oct 17, 2019


Time:  17:02





Fluid Challenge


Is the fluid challenge complet:  Yes


IBW Target Volume Used:  Yes


BMI > 30:  No





Vital Signs


Vital Signs:





Vital Signs








  Date Time  Temp Pulse Resp B/P (MAP) Pulse Ox O2 Delivery O2 Flow Rate FiO2


 


10/17/19 15:27 97.6 104 20 103/73 (83) 95 Room Air  





 97.6       








Temperature Source:  Oral





Respirations


Respiratory Pattern:  Normal





Cardiovascular


Pulse Rhythm:  Regular


Heart:  Nml S1, S2, no murmurs





Lung Sounds


Breath Sounds:  Clear





Capillary Refil


Capillary Refill:  Rt Hand < 3 seconds





Peripheral Pulse


Pulse Location:  Radial


Pulse Strength:  Normal (2+)


Pulse Assessment Method:  NIBP





Problem Qualifiers








   Additional Impressions:  


   Diarrhea


   Diarrhea type:  unspecified type  Qualified Codes:  R19.7 - Diarrhea, 

   unspecified


   CHF (congestive heart failure)


   Heart failure type:  unspecified  Heart failure chronicity:  unspecified  

   Qualified Codes:  I50.9 - Heart failure, unspecified


   Anemia


   Anemia type:  unspecified type  Qualified Codes:  D64.9 - Anemia, unspecified








ANTOINE CROUCH MD             Oct 17, 2019 17:05

## 2019-10-17 NOTE — RAD
PORTABLE CHEST 1V

 

Clinical indications: Dehydration. Vomiting bile. 

 

COMPARISON: 6/4/2018.

 

Findings: There is a large right-sided pleural effusion occupying one half

of the right hemithorax with associated compressive atelectasis or 

consolidative infiltrate of the right lung base. No pneumothorax is seen. 

Left lung field remains clear. The heart size and mediastinum are stable.

 

IMPRESSION: Large right-sided pleural effusion. 

 

Electronically signed by: Yong Hugo MD (10/17/2019 4:02 PM) 

[place] [id number]

## 2019-10-17 NOTE — RAD
CT CHEST ABDOMEN PELVIS WO 

 

Indication: Diarrhea, sepsis, pleural effusion

 

Technique: Noncontrast CT imaging was performed of the chest, abdomen, 

pelvis, multiplanar reconstruction images submitted. No oral contrast was 

given. One or more of the following individualized dose reduction 

techniques were utilized for this examination:  1. Automated exposure 

control  2. Adjustment of the mA and/or kV according to patient size  3. 

Use of iterative reconstruction technique.

 

Comparison: June 4, 2018 chest CTA, no previous abdomen pelvis exam 

available

 

CHEST:

 

Findings:  

There is a large right pleural effusion and mild left pleural effusion. 

There is compressive atelectasis greater on the right, very limited 

aeration of the right lower lobe. There is no pneumothorax. There has been

a median sternotomy. There is coronary calcification. Ascending thoracic 

aorta measures about 3.7 cm. There is no pericardial fluid. There is a 

small left upper lobe nodule 0.4 cm image 31 series 7. There is multilevel

thoracic and ankylosis.

 

IMPRESSION:

1.  There is large right pleural effusion and small left pleural effusion,

significant compressive atelectasis of the right lower lobe, to a lesser 

degree on the left.

2. There is a tiny left upper lobe noncalcified pulmonary nodule, optional

12 month follow-up as per revised Fleischner guidelines.

3. There is coronary calcification. There has been a median sternotomy.

 

Abdomen pelvis

 

FINDINGS: There is some motion degradation. Accurate evaluation of the 

abdominal visceral organs is limited without intravenous contrast, no 

obvious focal abnormality of the liver or spleen. Pancreas is very 

atrophic and poorly visualized, calcification near the pancreatic head. 

There is also vascular calcification. There apparently has been 

cholecystectomy. There is 4.4 cm cystic focus in the right renal pelvis 

which could be a cyst although difficult to exclude hydronephrosis. There 

is no left hydronephrosis. Accurate evaluation of bowel is limited without

oral contrast. Bowel is not significantly dilated. No significant free air

is identified. There is moderate free fluid in the pelvis. Rectum is 

slightly distended. There is diffuse body wall edema. There is severe L4 

compression deformity, degree of osseous retropulsion. There is severe 

bilateral L4-5 neural foramina compromise. There is degree of lateral 

recess stenosis greater on the left at L4-5. There is atherosclerotic 

calcification narrowing the superior mesenteric artery origin.

 

IMPRESSION:

 

1. There is moderate free fluid in the pelvis. There is body wall edema.

2. Pancreas is atrophic and poorly visualized, some calcifications near 

the pancreatic head which could be sequela of chronic pancreatitis.

3. There is severe L4 compression deformity with osseous retropulsion, 

uncertain chronicity. 

4. There is cystic focus in the right renal pelvis which may be a cyst 

although would be difficult to exclude hydronephrosis on this exam.

5. There is atherosclerotic calcification narrowing the superior 

mesenteric artery origin.

 

Electronically signed by: Giuseppe Cochran MD (10/17/2019 5:50 PM) 

[id number]

## 2019-10-17 NOTE — HP
ADMIT DATE:  10/17/2019



CHIEF COMPLAINT:  Diarrhea, weakness, mental status change.



HISTORY OF PRESENT ILLNESS:  The patient is a pleasant middle-aged male, who

presented with weakness, mental status change, diarrhea and when he got to the

ER, he was hypotensive with pressures into the 80s.  He also has a lactic

acidosis of greater than 7.  His creatinine is 2.  We just did a CAT scan.  I

went over and read it myself.  He has got a large right pleural effusion and a

small left pleural effusion.  I discussed the case with ER physician.  We are

going to admit the patient and consult Pulmonary, Infectious Disease and give

him IV antibiotics.  We will also be consulting Cardiology and Infectious

Disease.



PAST MEDICAL HISTORY:  CAD, diabetes, myocardial infarction, chronic renal

insufficiency, peptic ulcer disease, aspiration, J-tube, appendectomy,

cholecystectomy, coronary artery bypass, stomach mesh and then that was removed.



ALLERGIES:  None.



FAMILY HISTORY:  Hypertension.



SOCIAL HISTORY:  He is retired.  He does not drink, smoke or take drugs.



MEDICATIONS:  Reviewed, please refer to the MRAD.



REVIEW OF SYSTEMS:  Unable to obtain.  The patient is a little confused.



PHYSICAL EXAMINATION:

VITALS:  Within normal limits and are stable.

GENERAL:  No apparent distress.  Alert and oriented.

HEENT:  Head is normocephalic, atraumatic, pupils were equally round and

reactive to light and accommodation.

NECK:  Supple, no JVD, no thyromegaly was noted.

LUNGS:  Clear to auscultation in all lung fields without rhonchi or wheezing. 

He has decreased breath sounds on the right.

HEART:  RRR, S1, S2 present.  Peripheral pulses intact, no obvious murmurs were

noted.

ABDOMEN:  He has decreased bowel sounds, it is tender.

EXTREMITIES:  Without any cyanosis, clubbing, or edema.  Pedal pulses intact,

Homans sign is negative.

NEUROLOGIC:  He is confused and has decreased hearing.

PSYCHIATRIC:  Normal affect, normal mood.  Stable.

SKIN:  Thin and frail.

VASCULAR:  Good capillary refill, neurovascular bundle appears to be intact.



LABORATORY DATA:  White count 9, hemoglobin 11.9, platelets 156.  Electrolytes

are normal except for a creatinine of 2.3.  Lactic acid 7.6.  BNP 2822.



ASSESSMENT AND PLAN:  Mental status change, diarrhea, acute on chronic systolic

and diastolic heart failure, large right pleural effusion by my eye.  We are

awaiting the official report on that.  An elderly male who has multiple

comorbidities.  The patient has been admitted.  We will consult Nephrology,

Cardiology, Pulmonary and Infectious Disease.  IV antibiotics.  Suspect we might

need to have Interventional Radiology drain the pleural effusion, but we will

await Pulmonary input on that.  DVT prophylaxis, home meds.  DNR.



LONG-TERM PROGNOSIS:  Guarded.

 



______________________________

BRIANNE CEBALLOS DO



DR:  ROSEANNE/elaian  JOB#:  [id number] / [id number]

DD:  10/17/2019 18:19  DT:  10/17/2019 18:54

## 2019-10-18 VITALS — DIASTOLIC BLOOD PRESSURE: 55 MMHG | SYSTOLIC BLOOD PRESSURE: 117 MMHG

## 2019-10-18 VITALS — DIASTOLIC BLOOD PRESSURE: 66 MMHG | SYSTOLIC BLOOD PRESSURE: 120 MMHG

## 2019-10-18 VITALS — SYSTOLIC BLOOD PRESSURE: 106 MMHG | DIASTOLIC BLOOD PRESSURE: 60 MMHG

## 2019-10-18 VITALS — SYSTOLIC BLOOD PRESSURE: 113 MMHG | DIASTOLIC BLOOD PRESSURE: 62 MMHG

## 2019-10-18 VITALS — SYSTOLIC BLOOD PRESSURE: 108 MMHG | DIASTOLIC BLOOD PRESSURE: 61 MMHG

## 2019-10-18 VITALS — DIASTOLIC BLOOD PRESSURE: 58 MMHG | SYSTOLIC BLOOD PRESSURE: 107 MMHG

## 2019-10-18 VITALS — SYSTOLIC BLOOD PRESSURE: 103 MMHG | DIASTOLIC BLOOD PRESSURE: 59 MMHG

## 2019-10-18 VITALS — SYSTOLIC BLOOD PRESSURE: 115 MMHG | DIASTOLIC BLOOD PRESSURE: 63 MMHG

## 2019-10-18 VITALS — SYSTOLIC BLOOD PRESSURE: 98 MMHG | DIASTOLIC BLOOD PRESSURE: 59 MMHG

## 2019-10-18 VITALS — SYSTOLIC BLOOD PRESSURE: 101 MMHG | DIASTOLIC BLOOD PRESSURE: 56 MMHG

## 2019-10-18 VITALS — DIASTOLIC BLOOD PRESSURE: 53 MMHG | SYSTOLIC BLOOD PRESSURE: 112 MMHG

## 2019-10-18 VITALS — DIASTOLIC BLOOD PRESSURE: 59 MMHG | SYSTOLIC BLOOD PRESSURE: 97 MMHG

## 2019-10-18 VITALS — DIASTOLIC BLOOD PRESSURE: 62 MMHG | SYSTOLIC BLOOD PRESSURE: 102 MMHG

## 2019-10-18 VITALS — DIASTOLIC BLOOD PRESSURE: 61 MMHG | SYSTOLIC BLOOD PRESSURE: 101 MMHG

## 2019-10-18 VITALS — DIASTOLIC BLOOD PRESSURE: 65 MMHG | SYSTOLIC BLOOD PRESSURE: 140 MMHG

## 2019-10-18 VITALS — SYSTOLIC BLOOD PRESSURE: 111 MMHG | DIASTOLIC BLOOD PRESSURE: 61 MMHG

## 2019-10-18 VITALS — DIASTOLIC BLOOD PRESSURE: 59 MMHG | SYSTOLIC BLOOD PRESSURE: 108 MMHG

## 2019-10-18 VITALS — SYSTOLIC BLOOD PRESSURE: 101 MMHG | DIASTOLIC BLOOD PRESSURE: 62 MMHG

## 2019-10-18 VITALS — DIASTOLIC BLOOD PRESSURE: 67 MMHG | SYSTOLIC BLOOD PRESSURE: 141 MMHG

## 2019-10-18 VITALS — DIASTOLIC BLOOD PRESSURE: 58 MMHG | SYSTOLIC BLOOD PRESSURE: 106 MMHG

## 2019-10-18 VITALS — DIASTOLIC BLOOD PRESSURE: 63 MMHG | SYSTOLIC BLOOD PRESSURE: 113 MMHG

## 2019-10-18 VITALS — SYSTOLIC BLOOD PRESSURE: 112 MMHG | DIASTOLIC BLOOD PRESSURE: 59 MMHG

## 2019-10-18 VITALS — DIASTOLIC BLOOD PRESSURE: 62 MMHG | SYSTOLIC BLOOD PRESSURE: 108 MMHG

## 2019-10-18 VITALS — SYSTOLIC BLOOD PRESSURE: 107 MMHG | DIASTOLIC BLOOD PRESSURE: 58 MMHG

## 2019-10-18 LAB
ANION GAP SERPL CALC-SCNC: 9 MMOL/L (ref 6–14)
APTT PPP: (no result) S
BACTERIA #/AREA URNS HPF: (no result) /HPF
BASOPHILS # BLD AUTO: 0 X10^3/UL (ref 0–0.2)
BASOPHILS NFR BLD: 0 % (ref 0–3)
BF MON %: 100 %
BF PMN %: 0 %
BF RBC COUNT: 4 /CMM
BF SOURCE: (no result)
BF WBC COUNT: 16 /CMM
BILIRUB UR QL STRIP: (no result)
BUN SERPL-MCNC: 20 MG/DL (ref 8–26)
CALCIUM SERPL-MCNC: 7 MG/DL (ref 8.5–10.1)
CHLORIDE SERPL-SCNC: 107 MMOL/L (ref 98–107)
CLARITY SPEC: CLEAR
CO2 SERPL-SCNC: 22 MMOL/L (ref 21–32)
COLOR FLD: YELLOW
CREAT SERPL-MCNC: 1.8 MG/DL (ref 0.7–1.3)
EOSINOPHIL NFR BLD: 0 % (ref 0–3)
EOSINOPHIL NFR BLD: 0 X10^3/UL (ref 0–0.7)
ERYTHROCYTE [DISTWIDTH] IN BLOOD BY AUTOMATED COUNT: 15.4 % (ref 11.5–14.5)
FIBRINOGEN PPP-MCNC: CLEAR MG/DL
GFR SERPLBLD BASED ON 1.73 SQ M-ARVRAT: 35.9 ML/MIN
GLUCOSE SERPL-MCNC: 157 MG/DL (ref 70–99)
HCT VFR BLD CALC: 28.7 % (ref 39–53)
HGB BLD-MCNC: 9.8 G/DL (ref 13–17.5)
HYALINE CASTS #/AREA URNS LPF: (no result) /HPF
LYMPHOCYTES # BLD: 0.7 X10^3/UL (ref 1–4.8)
LYMPHOCYTES NFR BLD AUTO: 9 % (ref 24–48)
MCH RBC QN AUTO: 31 PG (ref 25–35)
MCHC RBC AUTO-ENTMCNC: 34 G/DL (ref 31–37)
MCV RBC AUTO: 91 FL (ref 79–100)
MONO #: 0.5 X10^3/UL (ref 0–1.1)
MONOCYTES NFR BLD: 7 % (ref 0–9)
NEUT #: 6.2 X10^3/UL (ref 1.8–7.7)
NEUTROPHILS NFR BLD AUTO: 84 % (ref 31–73)
NITRITE UR QL STRIP: NEGATIVE
PH UR STRIP: 5 [PH]
PLATELET # BLD AUTO: 129 X10^3/UL (ref 140–400)
POTASSIUM SERPL-SCNC: 3.4 MMOL/L (ref 3.5–5.1)
PROT UR STRIP-MCNC: NEGATIVE MG/DL
RBC # BLD AUTO: 3.14 X10^6/UL (ref 4.3–5.7)
RBC #/AREA URNS HPF: 0 /HPF (ref 0–2)
SODIUM SERPL-SCNC: 138 MMOL/L (ref 136–145)
SQUAMOUS #/AREA URNS LPF: (no result) /LPF
UROBILINOGEN UR-MCNC: 0.2 MG/DL
WBC # BLD AUTO: 7.4 X10^3/UL (ref 4–11)
WBC #/AREA URNS HPF: (no result) /HPF (ref 0–4)

## 2019-10-18 RX ADMIN — POTASSIUM CHLORIDE SCH MLS/HR: 7.46 INJECTION, SOLUTION INTRAVENOUS at 14:53

## 2019-10-18 RX ADMIN — ENOXAPARIN SODIUM SCH MG: 100 INJECTION SUBCUTANEOUS at 23:24

## 2019-10-18 RX ADMIN — PIPERACILLIN SODIUM AND TAZOBACTAM SODIUM SCH MLS/HR: 3; .375 INJECTION, POWDER, LYOPHILIZED, FOR SOLUTION INTRAVENOUS at 05:56

## 2019-10-18 RX ADMIN — PIPERACILLIN SODIUM AND TAZOBACTAM SODIUM SCH MLS/HR: 3; .375 INJECTION, POWDER, LYOPHILIZED, FOR SOLUTION INTRAVENOUS at 23:24

## 2019-10-18 RX ADMIN — PIPERACILLIN SODIUM AND TAZOBACTAM SODIUM SCH MLS/HR: 3; .375 INJECTION, POWDER, LYOPHILIZED, FOR SOLUTION INTRAVENOUS at 14:09

## 2019-10-18 RX ADMIN — POTASSIUM CHLORIDE SCH MLS/HR: 7.46 INJECTION, SOLUTION INTRAVENOUS at 11:54

## 2019-10-18 RX ADMIN — ONDANSETRON PRN MG: 2 INJECTION INTRAMUSCULAR; INTRAVENOUS at 15:42

## 2019-10-18 RX ADMIN — Medication PRN MLS/HR: at 22:48

## 2019-10-18 RX ADMIN — ONDANSETRON PRN MG: 2 INJECTION INTRAMUSCULAR; INTRAVENOUS at 08:54

## 2019-10-18 RX ADMIN — PANTOPRAZOLE SODIUM SCH MG: 40 INJECTION, POWDER, FOR SOLUTION INTRAVENOUS at 17:31

## 2019-10-18 RX ADMIN — CITALOPRAM HYDROBROMIDE SCH MG: 20 TABLET ORAL at 08:43

## 2019-10-18 RX ADMIN — Medication SCH CAP: at 23:24

## 2019-10-18 RX ADMIN — BACITRACIN SCH MLS/HR: 5000 INJECTION, POWDER, FOR SOLUTION INTRAMUSCULAR at 17:32

## 2019-10-18 RX ADMIN — POTASSIUM CHLORIDE SCH MLS/HR: 7.46 INJECTION, SOLUTION INTRAVENOUS at 14:09

## 2019-10-18 RX ADMIN — POTASSIUM CHLORIDE SCH MLS/HR: 7.46 INJECTION, SOLUTION INTRAVENOUS at 13:02

## 2019-10-18 NOTE — PDOC
PROGRESS NOTES


Chief Complaint


Chief Complaint


sepsis, with hypotension,


N/v, diarrhea and H/o dysphagia/esophageal dysmotility


chronic systolic CHF pleural effusions, anasarca,  from CAD


acute renal failure


severe malnutrition


chronic anemia, coagulopathy, 


transaminitis, 


deaf 


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





History of Present Illness


History of Present Illness


still on levephed


completely deaf


no event


vitals better


family here,  plan discussed, consult GI, 


mult other consults following





Vitals


Vitals





Vital Signs








  Date Time  Temp Pulse Resp B/P (MAP) Pulse Ox O2 Delivery O2 Flow Rate FiO2


 


10/18/19 14:00  93 16 111/61 (78) 100 Nasal Cannula 4.0 


 


10/18/19 13:00 97.6       





 97.6       











Physical Exam


General:  Alert, Cooperative, No acute distress, Other (cachectic)


Heart:  Regular rate (SR), Normal S1, Normal S2, No murmurs


Lungs:  Clear, Other


Abdomen:  Soft, No tenderness


Extremities:  No cyanosis, Other (anasarca)


Skin:  No breakdown, No significant lesion, Other (pale)





Labs


LABS





Laboratory Tests








Test


 10/17/19


15:40 10/17/19


19:05 10/18/19


04:30 10/18/19


07:40


 


White Blood Count


 9.7 x10^3/uL


(4.0-11.0) 


 7.4 x10^3/uL


(4.0-11.0) 





 


Red Blood Count


 3.84 x10^6/uL


(4.30-5.70) 


 3.14 x10^6/uL


(4.30-5.70) 





 


Hemoglobin


 11.9 g/dL


(13.0-17.5) 


 9.8 g/dL


(13.0-17.5) 





 


Hematocrit


 36.0 %


(39.0-53.0) 


 28.7 %


(39.0-53.0) 





 


Mean Corpuscular Volume 94 fL ()   91 fL ()  


 


Mean Corpuscular Hemoglobin 31 pg (25-35)   31 pg (25-35)  


 


Mean Corpuscular Hemoglobin


Concent 33 g/dL


(31-37) 


 34 g/dL


(31-37) 





 


Red Cell Distribution Width


 15.9 %


(11.5-14.5) 


 15.4 %


(11.5-14.5) 





 


Platelet Count


 156 x10^3/uL


(140-400) 


 129 x10^3/uL


(140-400) 





 


Neutrophils (%) (Auto) 80 % (31-73)   84 % (31-73)  


 


Lymphocytes (%) (Auto) 13 % (24-48)   9 % (24-48)  


 


Monocytes (%) (Auto) 7 % (0-9)   7 % (0-9)  


 


Eosinophils (%) (Auto) 0 % (0-3)   0 % (0-3)  


 


Basophils (%) (Auto) 0 % (0-3)   0 % (0-3)  


 


Neutrophils # (Auto)


 7.8 x10^3/uL


(1.8-7.7) 


 6.2 x10^3/uL


(1.8-7.7) 





 


Lymphocytes # (Auto)


 1.2 x10^3/uL


(1.0-4.8) 


 0.7 x10^3/uL


(1.0-4.8) 





 


Monocytes # (Auto)


 0.6 x10^3/uL


(0.0-1.1) 


 0.5 x10^3/uL


(0.0-1.1) 





 


Eosinophils # (Auto)


 0.0 x10^3/uL


(0.0-0.7) 


 0.0 x10^3/uL


(0.0-0.7) 





 


Basophils # (Auto)


 0.0 x10^3/uL


(0.0-0.2) 


 0.0 x10^3/uL


(0.0-0.2) 





 


Prothrombin Time


 17.6 SEC


(11.7-14.0) 


 


 





 


Prothromb Time International


Ratio 1.5 (0.8-1.1) 


 


 


 





 


Sodium Level


 139 mmol/L


(136-145) 


 138 mmol/L


(136-145) 





 


Potassium Level


 3.8 mmol/L


(3.5-5.1) 


 3.4 mmol/L


(3.5-5.1) 





 


Chloride Level


 104 mmol/L


() 


 107 mmol/L


() 





 


Carbon Dioxide Level


 21 mmol/L


(21-32) 


 22 mmol/L


(21-32) 





 


Anion Gap 14 (6-14)   9 (6-14)  


 


Blood Urea Nitrogen


 20 mg/dL


(8-26) 


 20 mg/dL


(8-26) 





 


Creatinine


 2.3 mg/dL


(0.7-1.3) 


 1.8 mg/dL


(0.7-1.3) 





 


Estimated GFR


(Cockcroft-Gault) 27.0 


 


 35.9 


 





 


BUN/Creatinine Ratio 9 (6-20)    


 


Glucose Level


 164 mg/dL


(70-99) 


 157 mg/dL


(70-99) 





 


Lactic Acid Level


 7.6 mmol/L


(0.4-2.0) 4.3 mmol/L


(0.4-2.0) 1.8 mmol/L


(0.4-2.0) 





 


Calcium Level


 7.7 mg/dL


(8.5-10.1) 


 7.0 mg/dL


(8.5-10.1) 





 


Total Bilirubin


 1.2 mg/dL


(0.2-1.0) 


 


 





 


Aspartate Amino Transf


(AST/SGOT) 233 U/L


(15-37) 


 


 





 


Alanine Aminotransferase


(ALT/SGPT) 52 U/L (16-63) 


 


 


 





 


Alkaline Phosphatase


 240 U/L


() 


 


 





 


Troponin I Quantitative


 < 0.017 ng/mL


(0.000-0.055) 


 


 





 


NT-Pro-B-Type Natriuretic


Peptide 2822 pg/mL


(0-449) 


 


 





 


Total Protein


 5.5 g/dL


(6.4-8.2) 


 


 





 


Albumin


 1.4 g/dL


(3.4-5.0) 


 


 





 


Albumin/Globulin Ratio 0.3 (1.0-1.7)    


 


Magnesium Level


 


 


 1.9 mg/dL


(1.8-2.4) 





 


Iron Level


 


 


 48 ug/dL


() 





 


Total Iron Binding Capacity


 


 


 59 ug/dL


(250-450) 





 


Iron Saturation   81 % (15-34)  


 


Urine Collection Type    Unknown 


 


Urine Color    Stefanie 


 


Urine Clarity    Clear 


 


Urine pH    5.0 


 


Urine Specific Gravity    >=1.030 


 


Urine Protein


 


 


 


 Negative mg/dL


(NEG-TRACE)


 


Urine Glucose (UA)


 


 


 


 Negative mg/dL


(NEG)


 


Urine Ketones (Stick)


 


 


 


 Negative mg/dL


(NEG)


 


Urine Blood    Negative (NEG) 


 


Urine Nitrite    Negative (NEG) 


 


Urine Bilirubin    Small (NEG) 


 


Urine Urobilinogen Dipstick


 


 


 


 0.2 mg/dL (0.2


mg/dL)


 


Urine Leukocyte Esterase    Negative (NEG) 


 


Urine RBC    0 /HPF (0-2) 


 


Urine WBC    1-4 /HPF (0-4) 


 


Urine Squamous Epithelial


Cells 


 


 


 Occ /LPF 





 


Urine Bacteria


 


 


 


 Few /HPF


(0-FEW)


 


Urine Hyaline Casts    Moderate /HPF 


 


Urine Mucus    Marked /LPF 


 


Test


 10/18/19


14:00 


 


 





 


Body Fluid Source Pleural    


 


Body Fluid Color Yellow    


 


Body Fluid Clarity Clear    


 


Body Fluid Nucleated Cells


 16 /cmm (Not


Established) 


 


 





 


Body Fluid Mononuclear WBCs


(%) 100 % 


 


 


 





 


Body Fluid Polymorphonuclear


Cells 0 % 


 


 


 





 


Body Fluid Total RBCs Counted


 4 /cmm (Not


Established) 


 


 














Review of Systems


Review of Systems


nausea, unable to eat





Assessment and Plan


Assessmemt and Plan


Problems


Medical Problems:


(1) Anemia


Status: Acute  





(2) CHF (congestive heart failure)


Status: Acute  





(3) Diarrhea


Status: Acute  





(4) Elevated liver function tests


Status: Acute  





(5) Hypoalbuminemia


Status: Acute  





(6) Hypocalcemia


Status: Acute  





(7) Pleural effusion


Status: Acute  





(8) Renal insufficiency


Status: Acute  





(9) Severe sepsis


Status: Acute  





(10) Tachycardia


Status: Acute  











Comment


Review of Relevant


I have reviewed the following items milana (where applicable) has been applied.


Labs





Laboratory Tests








Test


 10/17/19


15:40 10/17/19


19:05 10/18/19


04:30 10/18/19


07:40


 


White Blood Count


 9.7 x10^3/uL


(4.0-11.0) 


 7.4 x10^3/uL


(4.0-11.0) 





 


Red Blood Count


 3.84 x10^6/uL


(4.30-5.70) 


 3.14 x10^6/uL


(4.30-5.70) 





 


Hemoglobin


 11.9 g/dL


(13.0-17.5) 


 9.8 g/dL


(13.0-17.5) 





 


Hematocrit


 36.0 %


(39.0-53.0) 


 28.7 %


(39.0-53.0) 





 


Mean Corpuscular Volume 94 fL ()   91 fL ()  


 


Mean Corpuscular Hemoglobin 31 pg (25-35)   31 pg (25-35)  


 


Mean Corpuscular Hemoglobin


Concent 33 g/dL


(31-37) 


 34 g/dL


(31-37) 





 


Red Cell Distribution Width


 15.9 %


(11.5-14.5) 


 15.4 %


(11.5-14.5) 





 


Platelet Count


 156 x10^3/uL


(140-400) 


 129 x10^3/uL


(140-400) 





 


Neutrophils (%) (Auto) 80 % (31-73)   84 % (31-73)  


 


Lymphocytes (%) (Auto) 13 % (24-48)   9 % (24-48)  


 


Monocytes (%) (Auto) 7 % (0-9)   7 % (0-9)  


 


Eosinophils (%) (Auto) 0 % (0-3)   0 % (0-3)  


 


Basophils (%) (Auto) 0 % (0-3)   0 % (0-3)  


 


Neutrophils # (Auto)


 7.8 x10^3/uL


(1.8-7.7) 


 6.2 x10^3/uL


(1.8-7.7) 





 


Lymphocytes # (Auto)


 1.2 x10^3/uL


(1.0-4.8) 


 0.7 x10^3/uL


(1.0-4.8) 





 


Monocytes # (Auto)


 0.6 x10^3/uL


(0.0-1.1) 


 0.5 x10^3/uL


(0.0-1.1) 





 


Eosinophils # (Auto)


 0.0 x10^3/uL


(0.0-0.7) 


 0.0 x10^3/uL


(0.0-0.7) 





 


Basophils # (Auto)


 0.0 x10^3/uL


(0.0-0.2) 


 0.0 x10^3/uL


(0.0-0.2) 





 


Prothrombin Time


 17.6 SEC


(11.7-14.0) 


 


 





 


Prothromb Time International


Ratio 1.5 (0.8-1.1) 


 


 


 





 


Sodium Level


 139 mmol/L


(136-145) 


 138 mmol/L


(136-145) 





 


Potassium Level


 3.8 mmol/L


(3.5-5.1) 


 3.4 mmol/L


(3.5-5.1) 





 


Chloride Level


 104 mmol/L


() 


 107 mmol/L


() 





 


Carbon Dioxide Level


 21 mmol/L


(21-32) 


 22 mmol/L


(21-32) 





 


Anion Gap 14 (6-14)   9 (6-14)  


 


Blood Urea Nitrogen


 20 mg/dL


(8-26) 


 20 mg/dL


(8-26) 





 


Creatinine


 2.3 mg/dL


(0.7-1.3) 


 1.8 mg/dL


(0.7-1.3) 





 


Estimated GFR


(Cockcroft-Gault) 27.0 


 


 35.9 


 





 


BUN/Creatinine Ratio 9 (6-20)    


 


Glucose Level


 164 mg/dL


(70-99) 


 157 mg/dL


(70-99) 





 


Lactic Acid Level


 7.6 mmol/L


(0.4-2.0) 4.3 mmol/L


(0.4-2.0) 1.8 mmol/L


(0.4-2.0) 





 


Calcium Level


 7.7 mg/dL


(8.5-10.1) 


 7.0 mg/dL


(8.5-10.1) 





 


Total Bilirubin


 1.2 mg/dL


(0.2-1.0) 


 


 





 


Aspartate Amino Transf


(AST/SGOT) 233 U/L


(15-37) 


 


 





 


Alanine Aminotransferase


(ALT/SGPT) 52 U/L (16-63) 


 


 


 





 


Alkaline Phosphatase


 240 U/L


() 


 


 





 


Troponin I Quantitative


 < 0.017 ng/mL


(0.000-0.055) 


 


 





 


NT-Pro-B-Type Natriuretic


Peptide 2822 pg/mL


(0-449) 


 


 





 


Total Protein


 5.5 g/dL


(6.4-8.2) 


 


 





 


Albumin


 1.4 g/dL


(3.4-5.0) 


 


 





 


Albumin/Globulin Ratio 0.3 (1.0-1.7)    


 


Magnesium Level


 


 


 1.9 mg/dL


(1.8-2.4) 





 


Iron Level


 


 


 48 ug/dL


() 





 


Total Iron Binding Capacity


 


 


 59 ug/dL


(250-450) 





 


Iron Saturation   81 % (15-34)  


 


Urine Collection Type    Unknown 


 


Urine Color    Stefanie 


 


Urine Clarity    Clear 


 


Urine pH    5.0 


 


Urine Specific Gravity    >=1.030 


 


Urine Protein


 


 


 


 Negative mg/dL


(NEG-TRACE)


 


Urine Glucose (UA)


 


 


 


 Negative mg/dL


(NEG)


 


Urine Ketones (Stick)


 


 


 


 Negative mg/dL


(NEG)


 


Urine Blood    Negative (NEG) 


 


Urine Nitrite    Negative (NEG) 


 


Urine Bilirubin    Small (NEG) 


 


Urine Urobilinogen Dipstick


 


 


 


 0.2 mg/dL (0.2


mg/dL)


 


Urine Leukocyte Esterase    Negative (NEG) 


 


Urine RBC    0 /HPF (0-2) 


 


Urine WBC    1-4 /HPF (0-4) 


 


Urine Squamous Epithelial


Cells 


 


 


 Occ /LPF 





 


Urine Bacteria


 


 


 


 Few /HPF


(0-FEW)


 


Urine Hyaline Casts    Moderate /HPF 


 


Urine Mucus    Marked /LPF 


 


Test


 10/18/19


14:00 


 


 





 


Body Fluid Source Pleural    


 


Body Fluid Color Yellow    


 


Body Fluid Clarity Clear    


 


Body Fluid Nucleated Cells


 16 /cmm (Not


Established) 


 


 





 


Body Fluid Mononuclear WBCs


(%) 100 % 


 


 


 





 


Body Fluid Polymorphonuclear


Cells 0 % 


 


 


 





 


Body Fluid Total RBCs Counted


 4 /cmm (Not


Established) 


 


 











Laboratory Tests








Test


 10/17/19


15:40 10/17/19


19:05 10/18/19


04:30 10/18/19


07:40


 


White Blood Count


 9.7 x10^3/uL


(4.0-11.0) 


 7.4 x10^3/uL


(4.0-11.0) 





 


Red Blood Count


 3.84 x10^6/uL


(4.30-5.70) 


 3.14 x10^6/uL


(4.30-5.70) 





 


Hemoglobin


 11.9 g/dL


(13.0-17.5) 


 9.8 g/dL


(13.0-17.5) 





 


Hematocrit


 36.0 %


(39.0-53.0) 


 28.7 %


(39.0-53.0) 





 


Mean Corpuscular Volume 94 fL ()   91 fL ()  


 


Mean Corpuscular Hemoglobin 31 pg (25-35)   31 pg (25-35)  


 


Mean Corpuscular Hemoglobin


Concent 33 g/dL


(31-37) 


 34 g/dL


(31-37) 





 


Red Cell Distribution Width


 15.9 %


(11.5-14.5) 


 15.4 %


(11.5-14.5) 





 


Platelet Count


 156 x10^3/uL


(140-400) 


 129 x10^3/uL


(140-400) 





 


Neutrophils (%) (Auto) 80 % (31-73)   84 % (31-73)  


 


Lymphocytes (%) (Auto) 13 % (24-48)   9 % (24-48)  


 


Monocytes (%) (Auto) 7 % (0-9)   7 % (0-9)  


 


Eosinophils (%) (Auto) 0 % (0-3)   0 % (0-3)  


 


Basophils (%) (Auto) 0 % (0-3)   0 % (0-3)  


 


Neutrophils # (Auto)


 7.8 x10^3/uL


(1.8-7.7) 


 6.2 x10^3/uL


(1.8-7.7) 





 


Lymphocytes # (Auto)


 1.2 x10^3/uL


(1.0-4.8) 


 0.7 x10^3/uL


(1.0-4.8) 





 


Monocytes # (Auto)


 0.6 x10^3/uL


(0.0-1.1) 


 0.5 x10^3/uL


(0.0-1.1) 





 


Eosinophils # (Auto)


 0.0 x10^3/uL


(0.0-0.7) 


 0.0 x10^3/uL


(0.0-0.7) 





 


Basophils # (Auto)


 0.0 x10^3/uL


(0.0-0.2) 


 0.0 x10^3/uL


(0.0-0.2) 





 


Prothrombin Time


 17.6 SEC


(11.7-14.0) 


 


 





 


Prothromb Time International


Ratio 1.5 (0.8-1.1) 


 


 


 





 


Sodium Level


 139 mmol/L


(136-145) 


 138 mmol/L


(136-145) 





 


Potassium Level


 3.8 mmol/L


(3.5-5.1) 


 3.4 mmol/L


(3.5-5.1) 





 


Chloride Level


 104 mmol/L


() 


 107 mmol/L


() 





 


Carbon Dioxide Level


 21 mmol/L


(21-32) 


 22 mmol/L


(21-32) 





 


Anion Gap 14 (6-14)   9 (6-14)  


 


Blood Urea Nitrogen


 20 mg/dL


(8-26) 


 20 mg/dL


(8-26) 





 


Creatinine


 2.3 mg/dL


(0.7-1.3) 


 1.8 mg/dL


(0.7-1.3) 





 


Estimated GFR


(Cockcroft-Gault) 27.0 


 


 35.9 


 





 


BUN/Creatinine Ratio 9 (6-20)    


 


Glucose Level


 164 mg/dL


(70-99) 


 157 mg/dL


(70-99) 





 


Lactic Acid Level


 7.6 mmol/L


(0.4-2.0) 4.3 mmol/L


(0.4-2.0) 1.8 mmol/L


(0.4-2.0) 





 


Calcium Level


 7.7 mg/dL


(8.5-10.1) 


 7.0 mg/dL


(8.5-10.1) 





 


Total Bilirubin


 1.2 mg/dL


(0.2-1.0) 


 


 





 


Aspartate Amino Transf


(AST/SGOT) 233 U/L


(15-37) 


 


 





 


Alanine Aminotransferase


(ALT/SGPT) 52 U/L (16-63) 


 


 


 





 


Alkaline Phosphatase


 240 U/L


() 


 


 





 


Troponin I Quantitative


 < 0.017 ng/mL


(0.000-0.055) 


 


 





 


NT-Pro-B-Type Natriuretic


Peptide 2822 pg/mL


(0-449) 


 


 





 


Total Protein


 5.5 g/dL


(6.4-8.2) 


 


 





 


Albumin


 1.4 g/dL


(3.4-5.0) 


 


 





 


Albumin/Globulin Ratio 0.3 (1.0-1.7)    


 


Magnesium Level


 


 


 1.9 mg/dL


(1.8-2.4) 





 


Iron Level


 


 


 48 ug/dL


() 





 


Total Iron Binding Capacity


 


 


 59 ug/dL


(250-450) 





 


Iron Saturation   81 % (15-34)  


 


Urine Collection Type    Unknown 


 


Urine Color    Stefanie 


 


Urine Clarity    Clear 


 


Urine pH    5.0 


 


Urine Specific Gravity    >=1.030 


 


Urine Protein


 


 


 


 Negative mg/dL


(NEG-TRACE)


 


Urine Glucose (UA)


 


 


 


 Negative mg/dL


(NEG)


 


Urine Ketones (Stick)


 


 


 


 Negative mg/dL


(NEG)


 


Urine Blood    Negative (NEG) 


 


Urine Nitrite    Negative (NEG) 


 


Urine Bilirubin    Small (NEG) 


 


Urine Urobilinogen Dipstick


 


 


 


 0.2 mg/dL (0.2


mg/dL)


 


Urine Leukocyte Esterase    Negative (NEG) 


 


Urine RBC    0 /HPF (0-2) 


 


Urine WBC    1-4 /HPF (0-4) 


 


Urine Squamous Epithelial


Cells 


 


 


 Occ /LPF 





 


Urine Bacteria


 


 


 


 Few /HPF


(0-FEW)


 


Urine Hyaline Casts    Moderate /HPF 


 


Urine Mucus    Marked /LPF 


 


Test


 10/18/19


14:00 


 


 





 


Body Fluid Source Pleural    


 


Body Fluid Color Yellow    


 


Body Fluid Clarity Clear    


 


Body Fluid Nucleated Cells


 16 /cmm (Not


Established) 


 


 





 


Body Fluid Mononuclear WBCs


(%) 100 % 


 


 


 





 


Body Fluid Polymorphonuclear


Cells 0 % 


 


 


 





 


Body Fluid Total RBCs Counted


 4 /cmm (Not


Established) 


 


 











Medications





Current Medications


Sodium Chloride 1,000 ml @  1,000 mls/hr Q1H IV  Last administered on 10/17/19at

15:55;  Start 10/17/19 at 15:34;  Stop 10/17/19 at 16:33;  Status DC


Ondansetron HCl (Zofran) 4 mg 1X  ONCE IV  Last administered on 10/17/19at 

16:09;  Start 10/17/19 at 16:30;  Stop 10/17/19 at 16:31;  Status DC


Sodium Chloride 1,000 ml @  1,000 mls/hr 1X  ONCE IV  Last administered on 

10/17/19at 17:27;  Start 10/17/19 at 16:45;  Stop 10/17/19 at 17:44;  Status DC


Piperacillin Sod/ Tazobactam Sod 3.375 gm/Sodium Chloride 50 ml @  100 mls/hr 1X

 ONCE IV  Last administered on 10/17/19at 17:27;  Start 10/17/19 at 18:00;  Stop

10/17/19 at 18:29;  Status DC


Vancomycin HCl 250 ml @  250 mls/hr 1X  ONCE IV  Last administered on 10/17/19at

17:36;  Start 10/17/19 at 17:00;  Stop 10/17/19 at 17:59;  Status DC


Levofloxacin/ Dextrose 150 ml @  100 mls/hr 1X  ONCE IV  Last administered on 

10/17/19at 18:17;  Start 10/17/19 at 16:45;  Stop 10/17/19 at 18:14;  Status DC


Sodium Chloride 1,000 ml @  150 mls/hr Q6H40M IV ;  Start 10/17/19 at 17:05;  

Stop 10/17/19 at 21:10;  Status DC


Dopamine HCl/ Dextrose 250 ml @  10.121 mls/ hr CONT  PRN IV SEE I/O RECORD Last

administered on 10/17/19at 20:32;  Start 10/17/19 at 20:00


Piperacillin Sod/ Tazobactam Sod 3.375 gm/Sodium Chloride 50 ml @  100 mls/hr 

Q8HRS IV  Last administered on 10/18/19at 14:09;  Start 10/17/19 at 22:00


Linezolid/Dextrose 300 ml @  300 mls/hr Q12HR IV  Last administered on 

10/18/19at 10:37;  Start 10/17/19 at 21:00


Enoxaparin Sodium (Lovenox 30mg Syringe) 30 mg Q24H SQ  Last administered on 

10/17/19at 21:00;  Start 10/17/19 at 21:00


Sodium Chloride 1,000 ml @  75 mls/hr [id number] IV ;  Start 10/18/19 at 18:00


Pantoprazole Sodium (Protonix) 40 mg BIDAC PO  Last administered on 10/18/19at 

08:43;  Start 10/18/19 at 07:30;  Stop 10/18/19 at 13:49;  Status DC


Aspirin (Children'S Aspirin) 81 mg DAILYWBKFT PO  Last administered on 

10/18/19at 08:43;  Start 10/18/19 at 08:00;  Stop 10/18/19 at 09:29;  Status DC


Citalopram Hydrobromide (CeleXA) 20 mg DAILY PO  Last administered on 10/18/19at

08:43;  Start 10/18/19 at 09:00


Norepinephrine Bitartrate 250 ml @  12.97 mls/ hr CONT  PRN IV SEE I/O RECORD 

Last administered on 10/17/19at 21:40;  Start 10/17/19 at 21:15


Sodium Chloride 500 ml @  500 mls/hr 1X  ONCE IV  Last administered on 

10/17/19at 21:40;  Start 10/17/19 at 21:15;  Stop 10/17/19 at 22:14;  Status DC


Ondansetron HCl (Zofran) 4 mg PRN Q6HRS  PRN IVP NAUSEA/VOMITING Last 

administered on 10/18/19at 08:54;  Start 10/17/19 at 21:30


Albumin Human 100 ml @  100 mls/hr 1X  ONCE IV  Last administered on 10/18/19at 

10:37;  Start 10/18/19 at 09:15;  Stop 10/18/19 at 10:14;  Status DC


Furosemide (Lasix) 20 mg 1X  ONCE IVP  Last administered on 10/18/19at 11:42;  

Start 10/18/19 at 10:15;  Stop 10/18/19 at 10:16;  Status DC


Aspirin (Ecotrin) 81 mg DAILYWBKFT PO ;  Start 10/19/19 at 08:00


Potassium Chloride/Water 100 ml @  100 mls/hr Q1H IV  Last administered on 

10/18/19at 14:53;  Start 10/18/19 at 12:00;  Stop 10/18/19 at 15:59


Lactobacillus Rhamnosus (Culturelle) 1 cap BID PO ;  Start 10/18/19 at 21:00


Pantoprazole Sodium (PROTONIX VIAL for IV PUSH) 40 mg BIDAC IVP ;  Start 

10/18/19 at 16:30





Active Scripts


Active


Aspirin Ec (Aspirin) 81 Mg Tablet.dr 81 Mg PO DAILYWBKFT 30 Days


Reported


Protonix (Pantoprazole Sodium) 20 Mg Tablet.dr 40 Mg PO BID


Escitalopram Oxalate 10 Mg Tablet 10 Mg PO DAILY


Vitals/I & O





Vital Sign - Last 24 Hours








 10/17/19 10/17/19 10/17/19 10/17/19





 15:27 16:02 16:32 17:02


 


Temp 97.6   





 97.6   


 


Pulse 104 82 108 80


 


Resp 20   


 


B/P (MAP) 103/73 (83) 117/64 (81) 122/59 (80) 118/61 (80)


 


Pulse Ox 95  98 100


 


O2 Delivery Room Air Room Air Room Air Room Air


 


    





    





 10/17/19 10/17/19 10/17/19 10/17/19





 17:25 17:55 18:25 19:00


 


Temp    97.4





    97.4


 


Pulse  76 86 88


 


Resp    18


 


B/P (MAP) 132/66 (88) 128/69 (88) 121/66 (84) 94/55 (68)


 


Pulse Ox  100 100 85


 


O2 Delivery Room Air Room Air Room Air Room Air


 


    





    





 10/17/19 10/17/19 10/17/19 10/17/19





 20:00 20:00 21:00 22:00


 


Pulse 78  140 108


 


Resp 20  20 16


 


B/P (MAP) 76/52 (60)  110/72 (85) 117/78 (91)


 


Pulse Ox 95  91 91


 


O2 Delivery Room Air Nasal Cannula Room Air Room Air


 


O2 Flow Rate 4.0 4.0 4.0 4.0





 10/17/19 10/18/19 10/18/19 10/18/19





 23:00 00:00 00:00 01:00


 


Temp   98.5 





   98.5 


 


Pulse 80  82 75


 


Resp 20  18 18


 


B/P (MAP) 111/64 (80)  115/63 (80) 107/58 (74)


 


Pulse Ox 90  96 97


 


O2 Delivery Nasal Cannula Nasal Cannula Nasal Cannula Nasal Cannula


 


O2 Flow Rate 4.0 4.0 4.0 4.0


 


    





    





 10/18/19 10/18/19 10/18/19 10/18/19





 02:00 03:00 04:00 04:00


 


Temp   97.4 





   97.4 


 


Pulse 77 77 75 


 


Resp 22 18 20 


 


B/P (MAP) 113/63 (80) 107/58 (74) 108/62 (77) 


 


Pulse Ox 98 98 100 


 


O2 Delivery Nasal Cannula Nasal Cannula Nasal Cannula Nasal Cannula


 


O2 Flow Rate 4.0 4.0 4.0 4.0


 


    





    





 10/18/19 10/18/19 10/18/19 10/18/19





 05:00 06:00 07:00 08:00


 


Temp    97.6





    97.6


 


Pulse 73 74 75 72


 


Resp 18 20 20 19


 


B/P (MAP) 101/56 (71) 108/59 (75) 112/59 (76) 112/53 (72)


 


Pulse Ox 98 98 100 100


 


O2 Delivery Nasal Cannula Nasal Cannula Nasal Cannula Nasal Cannula


 


O2 Flow Rate 4.0 4.0 4.0 4.0


 


    





    





 10/18/19 10/18/19 10/18/19 10/18/19





 08:00 09:00 10:00 11:00


 


Pulse  68 74 102


 


Resp  16 20 33


 


B/P (MAP)  117/55 (75) 140/65 (90) 141/67 (91)


 


Pulse Ox  100 100 100


 


O2 Delivery Nasal Cannula Nasal Cannula Nasal Cannula Nasal Cannula


 


O2 Flow Rate 4.0 4.0 4.0 4.0





 10/18/19 10/18/19 10/18/19 10/18/19





 12:00 12:00 13:00 14:00


 


Temp   97.6 





   97.6 


 


Pulse  81 83 93


 


Resp  18 16 16


 


B/P (MAP)  113/62 (79) 120/66 (84) 111/61 (78)


 


Pulse Ox  97 96 100


 


O2 Delivery Nasal Cannula Nasal Cannula Nasal Cannula Nasal Cannula


 


O2 Flow Rate 4.0 4.0 4.0 4.0














Intake and Output   


 


 10/17/19 10/17/19 10/18/19





 15:00 23:00 07:00


 


Intake Total  3163.2 ml 1223.9 ml


 


Output Total  30 ml 0 ml


 


Balance  3133.2 ml 1223.9 ml











Nutrition Consultation


Dietary Evaluation:


Recommendations by RD:  Increase Calorie Intake, PPN/TPN


Comments:  


REC TPN per followin g dextrose, 60 g AA, 20 g lipids





REC advance diet as able pending SLP recommendations, goal diet ADA  


w/textures and liquids per SLP


Expected Outcomes/Goals:  


Nutrition support + PO intake to meet >75% est needs





Malnutrition Findings:


Body Fat Depletion (Non Severe:  Mild Depletion


Weight Status:  Appropriate











MAL HAMILTON MD                 Oct 18, 2019 15:23

## 2019-10-18 NOTE — CARD
MR#: [id number]

Account#: [id number]

Accession#: [id number]

Date of Study: 10/18/2019

Ordering Physician: STEVIE NORTH, 

Referring Physician: STEVIE NORTH, 

Tech: Marjan Rodriguez





--------------- APPROVED REPORT --------------





EXAM: Two-dimensional and M-mode echocardiogram with Doppler and color Doppler.



Other Information 

Quality : AverageHR: 68bpm

Technically limited study due to  body habitus.



INDICATION

Congestive Heart Failure 

Sepsis



2D DIMENSIONS 

RVDd2.4 (2.9-3.5cm)Left Atrium(2D)3.6 (1.6-4.0cm)

IVSd1.1 (0.7-1.1cm)Aortic Root(2D)3.9 (2.0-3.7cm)

LVDd4.9 (3.9-5.9cm)LVOT Diameter2.2 (1.8-2.4cm)

PWd0.9 (0.7-1.1cm)LVDs2.3 (2.5-4.0cm)

FS (%) 53.3 %SV95.2 ml

LVEF(%)84.1 (>50%)



Aortic Valve

AoV Peak Parish.109.1cm/sAoV VTI22.9cm

AO Peak GR.4.8mmHgLVOT  VTI 15.47cm

AO Mean GR.3mmHg



Mitral Valve

MV E Ovbdgufn04.8cm/sMV DECEL [id number]

MV A Xydqsgyb49.6cm/sE/A  Ratio0.5



TDI

Lateral E' P. V7.40cm/sMedial E' P. V4.70cm/s

E/Lateral E'6.5E/Medial E'10.2



Tricuspid Valve

TR P. Zmiyxxql386rt/sTR Peak Gr.32mmHg



Pulmonary Vein

S1 Jcoywjxg80.4cm/sS2 Arvhnkrr08.09cm/s

D2 Cnqfmljj05.1cm/sPVa feijrxdj428zutq



 LEFT VENTRICLE 

The left ventricle is normal size. There is borderline to mild concentric left ventricular hypertroph
y. The systolic function is mildly to moderately impaired. The Ejection Fraction is 40-45%. There is 
global hypokinesis of the left ventricle. Septal motion suggestive of conduction defect. Transmitral 
Doppler flow pattern is Grade I-abnormal relaxation pattern.



 RIGHT VENTRICLE 

The right ventricle is normal size. There is normal right ventricular wall thickness. The right ventr
icular systolic function is normal.



 ATRIA 

The left atrium size is normal. The right atrium size is normal. Interatrial septal thickening is not
ed.



 AORTIC VALVE 

Not well visualized. Doppler and Color Flow revealed no significant aortic regurgitation. There is no
 significant aortic valvular stenosis.



 MITRAL VALVE 

The mitral valve is normal in structure and function. There is no evidence of mitral valve prolapse. 
There is no mitral valve stenosis. Doppler and Color-flow revealed trace to mild mitral regurgitation
.



 TRICUSPID VALVE 

The tricuspid valve is normal in structure and function. Doppler and Color Flow revealed trace tricus
pid regurgitation with an estimated PAP of 39 mmHg. There is no tricuspid valve stenosis.



 PULMONIC VALVE 

The pulmonic valve is not well visualized. Doppler and Color Flow revealed no pulmonic valvular regur
gitation. There is no pulmonic valvular stenosis.



 GREAT VESSELS 

The aortic root is mildly enlarged. The IVC is normal in size and collapses >50% with inspiration.



 PERICARDIAL EFFUSION 

There is no evidence of significant pericardial effusion.



Critical Notification

Critical Value: No



<Conclusion>

The systolic function is mildly to moderately impaired. The Ejection Fraction is 40-45%.

There is global hypokinesis of the left ventricle. Septal motion suggestive of conduction defect.



Signed by : Link Smart, 

Electronically Approved : 10/18/2019 13:44:44

## 2019-10-18 NOTE — PDOC2
GI CONSULT


Reason For Consult:


N/v





HPI:


HPI:


88 y/o male who we've seen in the past.  History is challenging - he is 

extremely hard of hearing, not wearing his hearing aides, and there is no family

present.  Per nurse and chart, sent to ER from PCP's office for diarrhea, n/v, 

and hypotension.  Noted w/ elevated Cr and lactic acidosis.  Imaging notes 

pleural effusions (R>L) and moderate free fluid in pelvis.  Per nurse, vomited 

water and pills earlier, had one small stool.  Says family reported he drinks a 

lot of sweet tea.  D/w cardiology - apparently eats small amounts of oatmeal and

chicken noodle soup but not much more.  Possible thoracentesis today.





H/o esophageal dysmotility ("disordered" on barium swallow 2017, no aspiration 

on videoswallow then), erosive esophagitis w/ stricture/dilation, and 

obstructing duodenal ulcer/stricture.  S/p antrectomy w/ Stalin-en-Y and J tube 

placement/replacement/manipulation by IR/removal.





EGD for anemia (on Coumadin), coffee-ground emesis, and dark stools in 11/2017 

showed multiple ulcers on intestinal side of anastomosis (path c/w ulcer w/o 

malignancy, also w/ Candida, no H. pylori).  Fasting gastrin level was normal at

that time.  EGD in 3/2018 also showed ulcers (path w/ acute and chronic 

inflammation).  S/p cholecystectomy.  Imaging also noted narrowing of SMA and 

atrophic pancreas w/ calcifications near pancreatic head.





PMH:


PMH:


CAD, CHF, cardiomyopathy, HLD, CKD, DM, anastomotic ulcers, rotator cuff tear 

arthropathy


appendectomy, cholecystectomy, CABG, antrectomy w/ Stalin-en-Y





FH:


Family History:  No pertinent hx





Social History:


Smoke:  No


ALCOHOL:  none


Drugs:  None





ROS:


Unable to obtain - he denies pain and says "they changed my clothes."





Vitals:


Vitals:





                                   Vital Signs








  Date Time  Temp Pulse Resp B/P (MAP) Pulse Ox O2 Delivery O2 Flow Rate FiO2


 


10/18/19 13:00 97.6 83 16 120/66 (84) 96 Nasal Cannula 4.0 





 97.6       











Labs:


Labs:





Laboratory Tests








Test


 10/17/19


15:40 10/17/19


19:05 10/18/19


04:30 10/18/19


07:40


 


White Blood Count


 9.7 x10^3/uL


(4.0-11.0) 


 7.4 x10^3/uL


(4.0-11.0) 





 


Red Blood Count


 3.84 x10^6/uL


(4.30-5.70) 


 3.14 x10^6/uL


(4.30-5.70) 





 


Hemoglobin


 11.9 g/dL


(13.0-17.5) 


 9.8 g/dL


(13.0-17.5) 





 


Hematocrit


 36.0 %


(39.0-53.0) 


 28.7 %


(39.0-53.0) 





 


Mean Corpuscular Volume 94 fL ()   91 fL ()  


 


Mean Corpuscular Hemoglobin 31 pg (25-35)   31 pg (25-35)  


 


Mean Corpuscular Hemoglobin


Concent 33 g/dL


(31-37) 


 34 g/dL


(31-37) 





 


Red Cell Distribution Width


 15.9 %


(11.5-14.5) 


 15.4 %


(11.5-14.5) 





 


Platelet Count


 156 x10^3/uL


(140-400) 


 129 x10^3/uL


(140-400) 





 


Neutrophils (%) (Auto) 80 % (31-73)   84 % (31-73)  


 


Lymphocytes (%) (Auto) 13 % (24-48)   9 % (24-48)  


 


Monocytes (%) (Auto) 7 % (0-9)   7 % (0-9)  


 


Eosinophils (%) (Auto) 0 % (0-3)   0 % (0-3)  


 


Basophils (%) (Auto) 0 % (0-3)   0 % (0-3)  


 


Neutrophils # (Auto)


 7.8 x10^3/uL


(1.8-7.7) 


 6.2 x10^3/uL


(1.8-7.7) 





 


Lymphocytes # (Auto)


 1.2 x10^3/uL


(1.0-4.8) 


 0.7 x10^3/uL


(1.0-4.8) 





 


Monocytes # (Auto)


 0.6 x10^3/uL


(0.0-1.1) 


 0.5 x10^3/uL


(0.0-1.1) 





 


Eosinophils # (Auto)


 0.0 x10^3/uL


(0.0-0.7) 


 0.0 x10^3/uL


(0.0-0.7) 





 


Basophils # (Auto)


 0.0 x10^3/uL


(0.0-0.2) 


 0.0 x10^3/uL


(0.0-0.2) 





 


Prothrombin Time


 17.6 SEC


(11.7-14.0) 


 


 





 


Prothromb Time International


Ratio 1.5 (0.8-1.1) 


 


 


 





 


Sodium Level


 139 mmol/L


(136-145) 


 138 mmol/L


(136-145) 





 


Potassium Level


 3.8 mmol/L


(3.5-5.1) 


 3.4 mmol/L


(3.5-5.1) 





 


Chloride Level


 104 mmol/L


() 


 107 mmol/L


() 





 


Carbon Dioxide Level


 21 mmol/L


(21-32) 


 22 mmol/L


(21-32) 





 


Anion Gap 14 (6-14)   9 (6-14)  


 


Blood Urea Nitrogen


 20 mg/dL


(8-26) 


 20 mg/dL


(8-26) 





 


Creatinine


 2.3 mg/dL


(0.7-1.3) 


 1.8 mg/dL


(0.7-1.3) 





 


Estimated GFR


(Cockcroft-Gault) 27.0 


 


 35.9 


 





 


BUN/Creatinine Ratio 9 (6-20)    


 


Glucose Level


 164 mg/dL


(70-99) 


 157 mg/dL


(70-99) 





 


Lactic Acid Level


 7.6 mmol/L


(0.4-2.0) 4.3 mmol/L


(0.4-2.0) 1.8 mmol/L


(0.4-2.0) 





 


Calcium Level


 7.7 mg/dL


(8.5-10.1) 


 7.0 mg/dL


(8.5-10.1) 





 


Total Bilirubin


 1.2 mg/dL


(0.2-1.0) 


 


 





 


Aspartate Amino Transf


(AST/SGOT) 233 U/L


(15-37) 


 


 





 


Alanine Aminotransferase


(ALT/SGPT) 52 U/L (16-63) 


 


 


 





 


Alkaline Phosphatase


 240 U/L


() 


 


 





 


Troponin I Quantitative


 < 0.017 ng/mL


(0.000-0.055) 


 


 





 


NT-Pro-B-Type Natriuretic


Peptide 2822 pg/mL


(0-449) 


 


 





 


Total Protein


 5.5 g/dL


(6.4-8.2) 


 


 





 


Albumin


 1.4 g/dL


(3.4-5.0) 


 


 





 


Albumin/Globulin Ratio 0.3 (1.0-1.7)    


 


Magnesium Level


 


 


 1.9 mg/dL


(1.8-2.4) 





 


Urine Collection Type    Unknown 


 


Urine Color    Stefanie 


 


Urine Clarity    Clear 


 


Urine pH    5.0 


 


Urine Specific Gravity    >=1.030 


 


Urine Protein


 


 


 


 Negative mg/dL


(NEG-TRACE)


 


Urine Glucose (UA)


 


 


 


 Negative mg/dL


(NEG)


 


Urine Ketones (Stick)


 


 


 


 Negative mg/dL


(NEG)


 


Urine Blood    Negative (NEG) 


 


Urine Nitrite    Negative (NEG) 


 


Urine Bilirubin    Small (NEG) 


 


Urine Urobilinogen Dipstick


 


 


 


 0.2 mg/dL (0.2


mg/dL)


 


Urine Leukocyte Esterase    Negative (NEG) 


 


Urine RBC    0 /HPF (0-2) 


 


Urine WBC    1-4 /HPF (0-4) 


 


Urine Squamous Epithelial


Cells 


 


 


 Occ /LPF 





 


Urine Bacteria


 


 


 


 Few /HPF


(0-FEW)


 


Urine Hyaline Casts    Moderate /HPF 


 


Urine Mucus    Marked /LPF 











Allergies:


Coded Allergies:  


     No Known Drug Allergies (Unverified , 3/1/18)





Medications:





Current Medications








 Medications


  (Trade)  Dose


 Ordered  Sig/Leonidas


 Route


 PRN Reason  Start Time


 Stop Time Status Last Admin


Dose Admin


 


 Sodium Chloride  1,000 ml @ 


 1,000 mls/hr  Q1H


 IV


   10/17/19 15:34


 10/17/19 16:33 DC 10/17/19 15:55





 


 Ondansetron HCl


  (Zofran)  4 mg  1X  ONCE


 IV


   10/17/19 16:30


 10/17/19 16:31 DC 10/17/19 16:09





 


 Sodium Chloride  1,000 ml @ 


 1,000 mls/hr  1X  ONCE


 IV


   10/17/19 16:45


 10/17/19 17:44 DC 10/17/19 17:27





 


 Piperacillin Sod/


 Tazobactam Sod


 3.375 gm/Sodium


 Chloride  50 ml @ 


 100 mls/hr  1X  ONCE


 IV


   10/17/19 18:00


 10/17/19 18:29 DC 10/17/19 17:27





 


 Vancomycin HCl  250 ml @ 


 250 mls/hr  1X  ONCE


 IV


   10/17/19 17:00


 10/17/19 17:59 DC 10/17/19 17:36





 


 Levofloxacin/


 Dextrose  150 ml @ 


 100 mls/hr  1X  ONCE


 IV


   10/17/19 16:45


 10/17/19 18:14 DC 10/17/19 18:17





 


 Dopamine HCl/


 Dextrose  250 ml @ 


 10.121 mls/


 hr  CONT  PRN


 IV


 SEE I/O RECORD  10/17/19 20:00


    10/17/19 20:32





 


 Piperacillin Sod/


 Tazobactam Sod


 3.375 gm/Sodium


 Chloride  50 ml @ 


 100 mls/hr  Q8HRS


 IV


   10/17/19 22:00


    10/18/19 05:56





 


 Linezolid/Dextrose  300 ml @ 


 300 mls/hr  Q12HR


 IV


   10/17/19 21:00


    10/18/19 10:37





 


 Enoxaparin Sodium


  (Lovenox 30mg


 Syringe)  30 mg  Q24H


 SQ


   10/17/19 21:00


    10/17/19 21:00





 


 Pantoprazole


 Sodium


  (Protonix)  40 mg  BIDAC


 PO


   10/18/19 07:30


    10/18/19 08:43





 


 Aspirin


  (Children'S


 Aspirin)  81 mg  [id number]


 PO


   10/18/19 08:00


 10/18/19 09:29 DC 10/18/19 08:43





 


 Citalopram


 Hydrobromide


  (CeleXA)  20 mg  DAILY


 PO


   10/18/19 09:00


    10/18/19 08:43





 


 Norepinephrine


 Bitartrate  250 ml @ 


 12.97 mls/


 hr  CONT  PRN


 IV


 SEE I/O RECORD  10/17/19 21:15


    10/17/19 21:40





 


 Sodium Chloride  500 ml @ 


 500 mls/hr  1X  ONCE


 IV


   10/17/19 21:15


 10/17/19 22:14 DC 10/17/19 21:40





 


 Ondansetron HCl


  (Zofran)  4 mg  PRN Q6HRS  PRN


 IVP


 NAUSEA/VOMITING  10/17/19 21:30


    10/18/19 08:54





 


 Albumin Human  100 ml @ 


 100 mls/hr  1X  ONCE


 IV


   10/18/19 09:15


 10/18/19 10:14 DC 10/18/19 10:37





 


 Furosemide


  (Lasix)  20 mg  1X  ONCE


 IVP


   10/18/19 10:15


 10/18/19 10:16 DC 10/18/19 11:42





 


 Potassium


 Chloride/Water  100 ml @ 


 100 mls/hr  Q1H


 IV


   10/18/19 12:00


 10/18/19 15:59  10/18/19 13:02














Imaging:


Imaging:


CXR


Impression:


No pneumothorax.


Right internal jugular catheter is present.


Bilateral pleural effusions and adjacent atelectasis greater on the right noted 

without significant change especially considering positioning.





CT A/P


IMPRESSION:


1. There is moderate free fluid in the pelvis. There is body wall edema.


2. Pancreas is atrophic and poorly visualized, some calcifications near the 

pancreatic head which could be sequela of chronic pancreatitis.


3. There is severe L4 compression deformity with osseous retropulsion, uncertain

chronicity. 


4. There is cystic focus in the right renal pelvis which may be a cyst although 

would be difficult to exclude hydronephrosis on this exam.


5. There is atherosclerotic calcification narrowing the superior mesenteric 

artery origin.





PE:





GEN: NAD


HEENT: Atraumatic, PERRL


LUNGS: NC 4L


HEART: RRR


ABD: NABS, S/ND/NT


EXTREMITY: pitting edema BLE


SKIN: dry skin on feet


NEURO/PSYCH: awake and alert - can't hear anything I say





A/P:


A/P:


N/v, diarrhea


Hypotension, pleural effusions, anasarca


Lactic acidosis, CAYETANO, hypoalbuminemia, chronic anemia, coagulopathy, abnormal 

LFTs


H/o dysphagia/esophageal dysmotility


H/o anastomotic ulcers


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


S/p cholecystectomy


Anemia - iron studies c/w ACD in 2018


Cardiomyopathy, CAD, DM


Tribe





--


History very challenging - in the past abetter able to communicate with family 

present.


Currently NPO ?for thoracentesis


Agree w/ PPI - will change to IV for now.  Consider Carafate when able to 

tolerate.


?esophagram/UGI at some point considering dysphagia and surgical history w/ n/v


Stool studies.


Recheck anemia parameters.


Monitor Hgb, INR, and LFTs.


Will check for additional records w/ our office.











PREM HERNANDEZ         Oct 18, 2019 13:47

## 2019-10-18 NOTE — EKG
[facility]

              [address] [place] [address]

Test Date:    2019-10-17               Test Time:    15:59:56

Pat Name:     ANNE MACKAY              Department:   

Patient ID:   [id number]           Room:         111 1

Gender:       M                        Technician:   

:          [id number]               Requested By: ANTOINE CROUCH

Order Number: [id number]           Reading MD:   Link Smart MD

                                 Measurements

Intervals                              Axis          

Rate:         82                       P:            

GA:                                    QRS:          -39

QRSD:         104                      T:            132

QT:           394                                    

QTc:          464                                    

                           Interpretive Statements

atrial fibrillation

pvcs

NON-SPECIFIC ST/T CHANGES

limb lead misplacement

Electronically Signed On 10- 9:40:05 CDT by Link Smart MD

## 2019-10-18 NOTE — PDOC
SURGICAL PROGRESS NOTE


Subjective


Pt without c/o this AM, clinically appears improved, noted to have loose stools 

for several days, but none since admission.


Vital Signs





Vital Signs








  Date Time  Temp Pulse Resp B/P (MAP) Pulse Ox O2 Delivery O2 Flow Rate FiO2


 


10/18/19 08:00      Nasal Cannula 4.0 


 


10/18/19 07:00  75 20 112/59 (76) 100   


 


10/18/19 04:00 97.4       





 97.4       








I&O











Intake and Output 


 


 10/18/19





 07:00


 


Intake Total 4387.1 ml


 


Output Total 30 ml


 


Balance 4357.1 ml


 


 


 


Intake Oral 180 ml


 


IV Total 4207.1 ml


 


Output Urine Total 0 ml


 


Emesis 30 ml








General:  Alert, Cooperative, No acute distress, Other (severe [place])


Abdomen:  Soft, No tenderness


Labs





Laboratory Tests








Test


 10/17/19


15:40 10/17/19


19:05 10/18/19


04:30 10/18/19


07:40


 


White Blood Count


 9.7 x10^3/uL


(4.0-11.0) 


 7.4 x10^3/uL


(4.0-11.0) 





 


Red Blood Count


 3.84 x10^6/uL


(4.30-5.70) 


 3.14 x10^6/uL


(4.30-5.70) 





 


Hemoglobin


 11.9 g/dL


(13.0-17.5) 


 9.8 g/dL


(13.0-17.5) 





 


Hematocrit


 36.0 %


(39.0-53.0) 


 28.7 %


(39.0-53.0) 





 


Mean Corpuscular Volume 94 fL ()   91 fL ()  


 


Mean Corpuscular Hemoglobin 31 pg (25-35)   31 pg (25-35)  


 


Mean Corpuscular Hemoglobin


Concent 33 g/dL


(31-37) 


 34 g/dL


(31-37) 





 


Red Cell Distribution Width


 15.9 %


(11.5-14.5) 


 15.4 %


(11.5-14.5) 





 


Platelet Count


 156 x10^3/uL


(140-400) 


 129 x10^3/uL


(140-400) 





 


Neutrophils (%) (Auto) 80 % (31-73)   84 % (31-73)  


 


Lymphocytes (%) (Auto) 13 % (24-48)   9 % (24-48)  


 


Monocytes (%) (Auto) 7 % (0-9)   7 % (0-9)  


 


Eosinophils (%) (Auto) 0 % (0-3)   0 % (0-3)  


 


Basophils (%) (Auto) 0 % (0-3)   0 % (0-3)  


 


Neutrophils # (Auto)


 7.8 x10^3/uL


(1.8-7.7) 


 6.2 x10^3/uL


(1.8-7.7) 





 


Lymphocytes # (Auto)


 1.2 x10^3/uL


(1.0-4.8) 


 0.7 x10^3/uL


(1.0-4.8) 





 


Monocytes # (Auto)


 0.6 x10^3/uL


(0.0-1.1) 


 0.5 x10^3/uL


(0.0-1.1) 





 


Eosinophils # (Auto)


 0.0 x10^3/uL


(0.0-0.7) 


 0.0 x10^3/uL


(0.0-0.7) 





 


Basophils # (Auto)


 0.0 x10^3/uL


(0.0-0.2) 


 0.0 x10^3/uL


(0.0-0.2) 





 


Prothrombin Time


 17.6 SEC


(11.7-14.0) 


 


 





 


Prothromb Time International


Ratio 1.5 (0.8-1.1) 


 


 


 





 


Sodium Level


 139 mmol/L


(136-145) 


 138 mmol/L


(136-145) 





 


Potassium Level


 3.8 mmol/L


(3.5-5.1) 


 3.4 mmol/L


(3.5-5.1) 





 


Chloride Level


 104 mmol/L


() 


 107 mmol/L


() 





 


Carbon Dioxide Level


 21 mmol/L


(21-32) 


 22 mmol/L


(21-32) 





 


Anion Gap 14 (6-14)   9 (6-14)  


 


Blood Urea Nitrogen


 20 mg/dL


(8-26) 


 20 mg/dL


(8-26) 





 


Creatinine


 2.3 mg/dL


(0.7-1.3) 


 1.8 mg/dL


(0.7-1.3) 





 


Estimated GFR


(Cockcroft-Gault) 27.0 


 


 35.9 


 





 


BUN/Creatinine Ratio 9 (6-20)    


 


Glucose Level


 164 mg/dL


(70-99) 


 157 mg/dL


(70-99) 





 


Lactic Acid Level


 7.6 mmol/L


(0.4-2.0) 4.3 mmol/L


(0.4-2.0) 1.8 mmol/L


(0.4-2.0) 





 


Calcium Level


 7.7 mg/dL


(8.5-10.1) 


 7.0 mg/dL


(8.5-10.1) 





 


Total Bilirubin


 1.2 mg/dL


(0.2-1.0) 


 


 





 


Aspartate Amino Transf


(AST/SGOT) 233 U/L


(15-37) 


 


 





 


Alanine Aminotransferase


(ALT/SGPT) 52 U/L (16-63) 


 


 


 





 


Alkaline Phosphatase


 240 U/L


() 


 


 





 


Troponin I Quantitative


 < 0.017 ng/mL


(0.000-0.055) 


 


 





 


NT-Pro-B-Type Natriuretic


Peptide 2822 pg/mL


(0-449) 


 


 





 


Total Protein


 5.5 g/dL


(6.4-8.2) 


 


 





 


Albumin


 1.4 g/dL


(3.4-5.0) 


 


 





 


Albumin/Globulin Ratio 0.3 (1.0-1.7)    


 


Urine Collection Type    Unknown 


 


Urine Color    Stefanie 


 


Urine Clarity    Clear 


 


Urine pH    5.0 


 


Urine Specific Gravity    >=1.030 


 


Urine Protein


 


 


 


 Negative mg/dL


(NEG-TRACE)


 


Urine Glucose (UA)


 


 


 


 Negative mg/dL


(NEG)


 


Urine Ketones (Stick)


 


 


 


 Negative mg/dL


(NEG)


 


Urine Blood    Negative (NEG) 


 


Urine Nitrite    Negative (NEG) 


 


Urine Bilirubin    Small (NEG) 


 


Urine Urobilinogen Dipstick


 


 


 


 0.2 mg/dL (0.2


mg/dL)


 


Urine Leukocyte Esterase    Negative (NEG) 


 


Urine RBC    0 /HPF (0-2) 


 


Urine WBC    1-4 /HPF (0-4) 


 


Urine Squamous Epithelial


Cells 


 


 


 Occ /LPF 





 


Urine Bacteria


 


 


 


 Few /HPF


(0-FEW)


 


Urine Hyaline Casts    Moderate /HPF 


 


Urine Mucus    Marked /LPF 








Laboratory Tests








Test


 10/17/19


15:40 10/17/19


19:05 10/18/19


04:30 10/18/19


07:40


 


White Blood Count


 9.7 x10^3/uL


(4.0-11.0) 


 7.4 x10^3/uL


(4.0-11.0) 





 


Red Blood Count


 3.84 x10^6/uL


(4.30-5.70) 


 3.14 x10^6/uL


(4.30-5.70) 





 


Hemoglobin


 11.9 g/dL


(13.0-17.5) 


 9.8 g/dL


(13.0-17.5) 





 


Hematocrit


 36.0 %


(39.0-53.0) 


 28.7 %


(39.0-53.0) 





 


Mean Corpuscular Volume 94 fL ()   91 fL ()  


 


Mean Corpuscular Hemoglobin 31 pg (25-35)   31 pg (25-35)  


 


Mean Corpuscular Hemoglobin


Concent 33 g/dL


(31-37) 


 34 g/dL


(31-37) 





 


Red Cell Distribution Width


 15.9 %


(11.5-14.5) 


 15.4 %


(11.5-14.5) 





 


Platelet Count


 156 x10^3/uL


(140-400) 


 129 x10^3/uL


(140-400) 





 


Neutrophils (%) (Auto) 80 % (31-73)   84 % (31-73)  


 


Lymphocytes (%) (Auto) 13 % (24-48)   9 % (24-48)  


 


Monocytes (%) (Auto) 7 % (0-9)   7 % (0-9)  


 


Eosinophils (%) (Auto) 0 % (0-3)   0 % (0-3)  


 


Basophils (%) (Auto) 0 % (0-3)   0 % (0-3)  


 


Neutrophils # (Auto)


 7.8 x10^3/uL


(1.8-7.7) 


 6.2 x10^3/uL


(1.8-7.7) 





 


Lymphocytes # (Auto)


 1.2 x10^3/uL


(1.0-4.8) 


 0.7 x10^3/uL


(1.0-4.8) 





 


Monocytes # (Auto)


 0.6 x10^3/uL


(0.0-1.1) 


 0.5 x10^3/uL


(0.0-1.1) 





 


Eosinophils # (Auto)


 0.0 x10^3/uL


(0.0-0.7) 


 0.0 x10^3/uL


(0.0-0.7) 





 


Basophils # (Auto)


 0.0 x10^3/uL


(0.0-0.2) 


 0.0 x10^3/uL


(0.0-0.2) 





 


Prothrombin Time


 17.6 SEC


(11.7-14.0) 


 


 





 


Prothromb Time International


Ratio 1.5 (0.8-1.1) 


 


 


 





 


Sodium Level


 139 mmol/L


(136-145) 


 138 mmol/L


(136-145) 





 


Potassium Level


 3.8 mmol/L


(3.5-5.1) 


 3.4 mmol/L


(3.5-5.1) 





 


Chloride Level


 104 mmol/L


() 


 107 mmol/L


() 





 


Carbon Dioxide Level


 21 mmol/L


(21-32) 


 22 mmol/L


(21-32) 





 


Anion Gap 14 (6-14)   9 (6-14)  


 


Blood Urea Nitrogen


 20 mg/dL


(8-26) 


 20 mg/dL


(8-26) 





 


Creatinine


 2.3 mg/dL


(0.7-1.3) 


 1.8 mg/dL


(0.7-1.3) 





 


Estimated GFR


(Cockcroft-Gault) 27.0 


 


 35.9 


 





 


BUN/Creatinine Ratio 9 (6-20)    


 


Glucose Level


 164 mg/dL


(70-99) 


 157 mg/dL


(70-99) 





 


Lactic Acid Level


 7.6 mmol/L


(0.4-2.0) 4.3 mmol/L


(0.4-2.0) 1.8 mmol/L


(0.4-2.0) 





 


Calcium Level


 7.7 mg/dL


(8.5-10.1) 


 7.0 mg/dL


(8.5-10.1) 





 


Total Bilirubin


 1.2 mg/dL


(0.2-1.0) 


 


 





 


Aspartate Amino Transf


(AST/SGOT) 233 U/L


(15-37) 


 


 





 


Alanine Aminotransferase


(ALT/SGPT) 52 U/L (16-63) 


 


 


 





 


Alkaline Phosphatase


 240 U/L


() 


 


 





 


Troponin I Quantitative


 < 0.017 ng/mL


(0.000-0.055) 


 


 





 


NT-Pro-B-Type Natriuretic


Peptide 2822 pg/mL


(0-449) 


 


 





 


Total Protein


 5.5 g/dL


(6.4-8.2) 


 


 





 


Albumin


 1.4 g/dL


(3.4-5.0) 


 


 





 


Albumin/Globulin Ratio 0.3 (1.0-1.7)    


 


Urine Collection Type    Unknown 


 


Urine Color    Stefanie 


 


Urine Clarity    Clear 


 


Urine pH    5.0 


 


Urine Specific Gravity    >=1.030 


 


Urine Protein


 


 


 


 Negative mg/dL


(NEG-TRACE)


 


Urine Glucose (UA)


 


 


 


 Negative mg/dL


(NEG)


 


Urine Ketones (Stick)


 


 


 


 Negative mg/dL


(NEG)


 


Urine Blood    Negative (NEG) 


 


Urine Nitrite    Negative (NEG) 


 


Urine Bilirubin    Small (NEG) 


 


Urine Urobilinogen Dipstick


 


 


 


 0.2 mg/dL (0.2


mg/dL)


 


Urine Leukocyte Esterase    Negative (NEG) 


 


Urine RBC    0 /HPF (0-2) 


 


Urine WBC    1-4 /HPF (0-4) 


 


Urine Squamous Epithelial


Cells 


 


 


 Occ /LPF 





 


Urine Bacteria


 


 


 


 Few /HPF


(0-FEW)


 


Urine Hyaline Casts    Moderate /HPF 


 


Urine Mucus    Marked /LPF 








Problem List


Problems


Medical Problems:


(1) Anemia


Status: Acute  





(2) CHF (congestive heart failure)


Status: Acute  





(3) Diarrhea


Status: Acute  





(4) Elevated liver function tests


Status: Acute  





(5) Hypoalbuminemia


Status: Acute  





(6) Hypocalcemia


Status: Acute  





(7) Pleural effusion


Status: Acute  





(8) Renal insufficiency


Status: Acute  





(9) Severe sepsis


Status: Acute  





(10) Tachycardia


Status: Acute  








Assessment/Plan


dehydration, fluid effusion


appears improved on IV fluid resuscitation.


No surgical intervention at this time.  D/w pt and pt's supportive wife.











NIRALI BECKER MD             Oct 18, 2019 09:01

## 2019-10-18 NOTE — RAD
PORTABLE CHEST 1V 2:56 PM

 

Clinical indications: Status post thoracentesis. 

 

COMPARISON: Same day at 9:27 AM

 

FINDINGS/

IMPRESSION: Right-sided pleural effusion is much smaller in size after 

thoracentesis. No pneumothorax is seen. There is significant improved 

aeration of the right lower lung zone. Small left-sided pleural effusion 

and associated consolidative left lung base infiltrate is still evident 

and is unchanged.

 

Electronically signed by: Yong Hugo MD (10/18/2019 4:14 PM) 

[place] [id number]

## 2019-10-18 NOTE — PDOC2
CONSULT


Date of Consult


Date of Consult


DATE: 10/18/19 


TIME: 09:29





Reason for Consult


Reason for Consult:


CKD





History of Present Illness


Reason for Visit:


Patient is 87-year-old CM who was taken to his primary care physician's office 

was found to be hypotensive and tachycardia, complained of dizziness and fatigue

he also had 1 episode of emesis at the primary care office and was referred to 

emergency department. In the emergency  pressure of  80/40 and 

tachycardic.Denies any abdominal pain no nausea.


 


Currently Pt denies any complaints . Per RN Pt having diarrhea for approx past 

week. Gardner placed this am with UOP of 100 ml  





CT scan was performed which shows large right pleural effusion,moderate amount 

of pelvic fluid and a cyst or hydronephrosis of the kidney no evidence of bowel 

obstruction no evidence of perforation with free air





Past Medical History


Cardiovascular:  CAD, CHF (cardiomyopathy), Hyperlipidemia, Other (hypotension)


GI:  GERD, Peptic Ulcer disease, Other (malnutrition)


Psych:  Anxiety


Musculoskeletal:  Osteoarthritis


Renal/:  Chronic renal insuff


Endocrine:  Diabetes (2)





Past Surgical History


Past Surgical History:  Appendectomy, Cholecystectomy, CABG, Other (antrectomy 

w/ Stalin-en-Y; PCI/stent)





Family History


Family History:  No Significant





Social History


No


ALCOHOL:  none


Drugs:  None


Lives:  with Family (spouse)





Current Problem List


Problem List


Problems


Medical Problems:


(1) Anemia


Status: Acute  





(2) CHF (congestive heart failure)


Status: Acute  





(3) Diarrhea


Status: Acute  





(4) Elevated liver function tests


Status: Acute  





(5) Hypoalbuminemia


Status: Acute  





(6) Hypocalcemia


Status: Acute  





(7) Pleural effusion


Status: Acute  





(8) Renal insufficiency


Status: Acute  





(9) Severe sepsis


Status: Acute  





(10) Tachycardia


Status: Acute  











Current Medications


Current Medications





Current Medications


Sodium Chloride 1,000 ml @  1,000 mls/hr Q1H IV  Last administered on 10/17/19at

15:55;  Start 10/17/19 at 15:34;  Stop 10/17/19 at 16:33;  Status DC


Ondansetron HCl (Zofran) 4 mg 1X  ONCE IV  Last administered on 10/17/19at 

16:09;  Start 10/17/19 at 16:30;  Stop 10/17/19 at 16:31;  Status DC


Sodium Chloride 1,000 ml @  1,000 mls/hr 1X  ONCE IV  Last administered on 

10/17/19at 17:27;  Start 10/17/19 at 16:45;  Stop 10/17/19 at 17:44;  Status DC


Piperacillin Sod/ Tazobactam Sod 3.375 gm/Sodium Chloride 50 ml @  100 mls/hr 1X

 ONCE IV  Last administered on 10/17/19at 17:27;  Start 10/17/19 at 18:00;  Stop

10/17/19 at 18:29;  Status DC


Vancomycin HCl 250 ml @  250 mls/hr 1X  ONCE IV  Last administered on 10/17/19at

17:36;  Start 10/17/19 at 17:00;  Stop 10/17/19 at 17:59;  Status DC


Levofloxacin/ Dextrose 150 ml @  100 mls/hr 1X  ONCE IV  Last administered on 

10/17/19at 18:17;  Start 10/17/19 at 16:45;  Stop 10/17/19 at 18:14;  Status DC


Sodium Chloride 1,000 ml @  150 mls/hr Q6H40M IV ;  Start 10/17/19 at 17:05;  

Stop 10/17/19 at 21:10;  Status DC


Dopamine HCl/ Dextrose 250 ml @  10.121 mls/ hr CONT  PRN IV SEE I/O RECORD Last

administered on 10/17/19at 20:32;  Start 10/17/19 at 20:00


Piperacillin Sod/ Tazobactam Sod 3.375 gm/Sodium Chloride 50 ml @  100 mls/hr 

Q8HRS IV  Last administered on 10/18/19at 05:56;  Start 10/17/19 at 22:00


Linezolid/Dextrose 300 ml @  300 mls/hr Q12HR IV  Last administered on 10/17/1

9at 21:40;  Start 10/17/19 at 21:00


Enoxaparin Sodium (Lovenox 30mg Syringe) 30 mg Q24H SQ  Last administered on 

10/17/19at 21:00;  Start 10/17/19 at 21:00


Sodium Chloride 1,000 ml @  75 mls/hr [id number] IV ;  Start 10/18/19 at 18:00


Pantoprazole Sodium (Protonix) 40 mg BIDAC PO  Last administered on 10/18/19at 

08:43;  Start 10/18/19 at 07:30


Aspirin (Children'S Aspirin) 81 mg DAILYWBKFT PO  Last administered on 

10/18/19at 08:43;  Start 10/18/19 at 08:00


Citalopram Hydrobromide (CeleXA) 20 mg DAILY PO  Last administered on 10/18/19at

08:43;  Start 10/18/19 at 09:00


Norepinephrine Bitartrate 250 ml @  12.97 mls/ hr CONT  PRN IV SEE I/O RECORD 

Last administered on 10/17/19at 21:40;  Start 10/17/19 at 21:15


Sodium Chloride 500 ml @  500 mls/hr 1X  ONCE IV  Last administered on 

10/17/19at 21:40;  Start 10/17/19 at 21:15;  Stop 10/17/19 at 22:14;  Status DC


Ondansetron HCl (Zofran) 4 mg PRN Q6HRS  PRN IVP NAUSEA/VOMITING Last 

administered on 10/18/19at 08:54;  Start 10/17/19 at 21:30


Albumin Human 100 ml @  100 mls/hr 1X  ONCE IV ;  Start 10/18/19 at 09:15;  Stop

10/18/19 at 10:14


Furosemide (Lasix) 20 mg 1X  ONCE IVP ;  Start 10/18/19 at 10:15;  Stop 10/18/19

at 10:16





Active Scripts


Active


Aspirin Ec (Aspirin) 81 Mg Tablet. 81 Mg PO DAILYWBKFT 30 Days


Reported


Protonix (Pantoprazole Sodium) 20 Mg Tablet. 40 Mg PO BID


Escitalopram Oxalate 10 Mg Tablet 10 Mg PO DAILY





Allergies


Allergies:  


Coded Allergies:  


     No Known Drug Allergies (Unverified , 3/1/18)





ROS


Review of System


   Per HPI





Physical Exam


Physical Exam


General:   No acute distress,cachectic


HEENT:  OM dry  , On O2 by NC 


Neck supple  


Lungs:   CTA , decreased BS bases 


Heart:  Regular rate , Normal S1, Normal S2, No murmurs


Abdomen:  Soft, No tenderness


Extremities:  No cyanosis,


Skin:  No rash  


Neuro:  grossly normal 


 - Gardner +


Vital Signs





Vital Signs








  Date Time  Temp Pulse Resp B/P (MAP) Pulse Ox O2 Delivery O2 Flow Rate FiO2


 


10/18/19 08:00      Nasal Cannula 4.0 


 


10/18/19 07:00  75 20 112/59 (76) 100   


 


10/18/19 04:00 97.4       





 97.4       








Assessment & Plan


CAYETANO - Pre-renal 2/2 diarrhea, Poor PO intake  


Improving renal function  


CT - Cyst vs Hydronephrosis Rt kidney per Radiologist 


UA unremarkable except for hyaline casts 


Supportive care, Monitor, Avoid Nephrotoxins  





Hypokalemia- mild


Replace  





CKD stage 3 -Baseline Renal function per [facility] records 1.4-1.7,





Pleural Effusion- Rt large  


May need Thoracentesis 





Hypotension-  pressors  


  


Anasarca - 2/2 Severe protein malnutrition / Severe HypoAlbuminemia





  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


IV lasix x 1 per cardiology this am  





 Weakness/anorexia/cachexia





 Chronic anemia





 DM2





 Hx of antrectomy w/ Stalin-en-Y and PUD





Discussed with RN





Labs


Labs





Laboratory Tests








Test


 10/17/19


15:40 10/17/19


19:05 10/18/19


04:30 10/18/19


07:40


 


White Blood Count


 9.7 x10^3/uL


(4.0-11.0) 


 7.4 x10^3/uL


(4.0-11.0) 





 


Red Blood Count


 3.84 x10^6/uL


(4.30-5.70) 


 3.14 x10^6/uL


(4.30-5.70) 





 


Hemoglobin


 11.9 g/dL


(13.0-17.5) 


 9.8 g/dL


(13.0-17.5) 





 


Hematocrit


 36.0 %


(39.0-53.0) 


 28.7 %


(39.0-53.0) 





 


Mean Corpuscular Volume 94 fL ()   91 fL ()  


 


Mean Corpuscular Hemoglobin 31 pg (25-35)   31 pg (25-35)  


 


Mean Corpuscular Hemoglobin


Concent 33 g/dL


(31-37) 


 34 g/dL


(31-37) 





 


Red Cell Distribution Width


 15.9 %


(11.5-14.5) 


 15.4 %


(11.5-14.5) 





 


Platelet Count


 156 x10^3/uL


(140-400) 


 129 x10^3/uL


(140-400) 





 


Neutrophils (%) (Auto) 80 % (31-73)   84 % (31-73)  


 


Lymphocytes (%) (Auto) 13 % (24-48)   9 % (24-48)  


 


Monocytes (%) (Auto) 7 % (0-9)   7 % (0-9)  


 


Eosinophils (%) (Auto) 0 % (0-3)   0 % (0-3)  


 


Basophils (%) (Auto) 0 % (0-3)   0 % (0-3)  


 


Neutrophils # (Auto)


 7.8 x10^3/uL


(1.8-7.7) 


 6.2 x10^3/uL


(1.8-7.7) 





 


Lymphocytes # (Auto)


 1.2 x10^3/uL


(1.0-4.8) 


 0.7 x10^3/uL


(1.0-4.8) 





 


Monocytes # (Auto)


 0.6 x10^3/uL


(0.0-1.1) 


 0.5 x10^3/uL


(0.0-1.1) 





 


Eosinophils # (Auto)


 0.0 x10^3/uL


(0.0-0.7) 


 0.0 x10^3/uL


(0.0-0.7) 





 


Basophils # (Auto)


 0.0 x10^3/uL


(0.0-0.2) 


 0.0 x10^3/uL


(0.0-0.2) 





 


Prothrombin Time


 17.6 SEC


(11.7-14.0) 


 


 





 


Prothromb Time International


Ratio 1.5 (0.8-1.1) 


 


 


 





 


Sodium Level


 139 mmol/L


(136-145) 


 138 mmol/L


(136-145) 





 


Potassium Level


 3.8 mmol/L


(3.5-5.1) 


 3.4 mmol/L


(3.5-5.1) 





 


Chloride Level


 104 mmol/L


() 


 107 mmol/L


() 





 


Carbon Dioxide Level


 21 mmol/L


(21-32) 


 22 mmol/L


(21-32) 





 


Anion Gap 14 (6-14)   9 (6-14)  


 


Blood Urea Nitrogen


 20 mg/dL


(8-26) 


 20 mg/dL


(8-26) 





 


Creatinine


 2.3 mg/dL


(0.7-1.3) 


 1.8 mg/dL


(0.7-1.3) 





 


Estimated GFR


(Cockcroft-Gault) 27.0 


 


 35.9 


 





 


BUN/Creatinine Ratio 9 (6-20)    


 


Glucose Level


 164 mg/dL


(70-99) 


 157 mg/dL


(70-99) 





 


Lactic Acid Level


 7.6 mmol/L


(0.4-2.0) 4.3 mmol/L


(0.4-2.0) 1.8 mmol/L


(0.4-2.0) 





 


Calcium Level


 7.7 mg/dL


(8.5-10.1) 


 7.0 mg/dL


(8.5-10.1) 





 


Total Bilirubin


 1.2 mg/dL


(0.2-1.0) 


 


 





 


Aspartate Amino Transf


(AST/SGOT) 233 U/L


(15-37) 


 


 





 


Alanine Aminotransferase


(ALT/SGPT) 52 U/L (16-63) 


 


 


 





 


Alkaline Phosphatase


 240 U/L


() 


 


 





 


Troponin I Quantitative


 < 0.017 ng/mL


(0.000-0.055) 


 


 





 


NT-Pro-B-Type Natriuretic


Peptide 2822 pg/mL


(0-449) 


 


 





 


Total Protein


 5.5 g/dL


(6.4-8.2) 


 


 





 


Albumin


 1.4 g/dL


(3.4-5.0) 


 


 





 


Albumin/Globulin Ratio 0.3 (1.0-1.7)    


 


Urine Collection Type    Unknown 


 


Urine Color    Stefanie 


 


Urine Clarity    Clear 


 


Urine pH    5.0 


 


Urine Specific Gravity    >=1.030 


 


Urine Protein


 


 


 


 Negative mg/dL


(NEG-TRACE)


 


Urine Glucose (UA)


 


 


 


 Negative mg/dL


(NEG)


 


Urine Ketones (Stick)


 


 


 


 Negative mg/dL


(NEG)


 


Urine Blood    Negative (NEG) 


 


Urine Nitrite    Negative (NEG) 


 


Urine Bilirubin    Small (NEG) 


 


Urine Urobilinogen Dipstick


 


 


 


 0.2 mg/dL (0.2


mg/dL)


 


Urine Leukocyte Esterase    Negative (NEG) 


 


Urine RBC    0 /HPF (0-2) 


 


Urine WBC    1-4 /HPF (0-4) 


 


Urine Squamous Epithelial


Cells 


 


 


 Occ /LPF 





 


Urine Bacteria


 


 


 


 Few /HPF


(0-FEW)


 


Urine Hyaline Casts    Moderate /HPF 


 


Urine Mucus    Marked /LPF 








Laboratory Tests








Test


 10/17/19


15:40 10/17/19


19:05 10/18/19


04:30 10/18/19


07:40


 


White Blood Count


 9.7 x10^3/uL


(4.0-11.0) 


 7.4 x10^3/uL


(4.0-11.0) 





 


Red Blood Count


 3.84 x10^6/uL


(4.30-5.70) 


 3.14 x10^6/uL


(4.30-5.70) 





 


Hemoglobin


 11.9 g/dL


(13.0-17.5) 


 9.8 g/dL


(13.0-17.5) 





 


Hematocrit


 36.0 %


(39.0-53.0) 


 28.7 %


(39.0-53.0) 





 


Mean Corpuscular Volume 94 fL ()   91 fL ()  


 


Mean Corpuscular Hemoglobin 31 pg (25-35)   31 pg (25-35)  


 


Mean Corpuscular Hemoglobin


Concent 33 g/dL


(31-37) 


 34 g/dL


(31-37) 





 


Red Cell Distribution Width


 15.9 %


(11.5-14.5) 


 15.4 %


(11.5-14.5) 





 


Platelet Count


 156 x10^3/uL


(140-400) 


 129 x10^3/uL


(140-400) 





 


Neutrophils (%) (Auto) 80 % (31-73)   84 % (31-73)  


 


Lymphocytes (%) (Auto) 13 % (24-48)   9 % (24-48)  


 


Monocytes (%) (Auto) 7 % (0-9)   7 % (0-9)  


 


Eosinophils (%) (Auto) 0 % (0-3)   0 % (0-3)  


 


Basophils (%) (Auto) 0 % (0-3)   0 % (0-3)  


 


Neutrophils # (Auto)


 7.8 x10^3/uL


(1.8-7.7) 


 6.2 x10^3/uL


(1.8-7.7) 





 


Lymphocytes # (Auto)


 1.2 x10^3/uL


(1.0-4.8) 


 0.7 x10^3/uL


(1.0-4.8) 





 


Monocytes # (Auto)


 0.6 x10^3/uL


(0.0-1.1) 


 0.5 x10^3/uL


(0.0-1.1) 





 


Eosinophils # (Auto)


 0.0 x10^3/uL


(0.0-0.7) 


 0.0 x10^3/uL


(0.0-0.7) 





 


Basophils # (Auto)


 0.0 x10^3/uL


(0.0-0.2) 


 0.0 x10^3/uL


(0.0-0.2) 





 


Prothrombin Time


 17.6 SEC


(11.7-14.0) 


 


 





 


Prothromb Time International


Ratio 1.5 (0.8-1.1) 


 


 


 





 


Sodium Level


 139 mmol/L


(136-145) 


 138 mmol/L


(136-145) 





 


Potassium Level


 3.8 mmol/L


(3.5-5.1) 


 3.4 mmol/L


(3.5-5.1) 





 


Chloride Level


 104 mmol/L


() 


 107 mmol/L


() 





 


Carbon Dioxide Level


 21 mmol/L


(21-32) 


 22 mmol/L


(21-32) 





 


Anion Gap 14 (6-14)   9 (6-14)  


 


Blood Urea Nitrogen


 20 mg/dL


(8-26) 


 20 mg/dL


(8-26) 





 


Creatinine


 2.3 mg/dL


(0.7-1.3) 


 1.8 mg/dL


(0.7-1.3) 





 


Estimated GFR


(Cockcroft-Gault) 27.0 


 


 35.9 


 





 


BUN/Creatinine Ratio 9 (6-20)    


 


Glucose Level


 164 mg/dL


(70-99) 


 157 mg/dL


(70-99) 





 


Lactic Acid Level


 7.6 mmol/L


(0.4-2.0) 4.3 mmol/L


(0.4-2.0) 1.8 mmol/L


(0.4-2.0) 





 


Calcium Level


 7.7 mg/dL


(8.5-10.1) 


 7.0 mg/dL


(8.5-10.1) 





 


Total Bilirubin


 1.2 mg/dL


(0.2-1.0) 


 


 





 


Aspartate Amino Transf


(AST/SGOT) 233 U/L


(15-37) 


 


 





 


Alanine Aminotransferase


(ALT/SGPT) 52 U/L (16-63) 


 


 


 





 


Alkaline Phosphatase


 240 U/L


() 


 


 





 


Troponin I Quantitative


 < 0.017 ng/mL


(0.000-0.055) 


 


 





 


NT-Pro-B-Type Natriuretic


Peptide 2822 pg/mL


(0-449) 


 


 





 


Total Protein


 5.5 g/dL


(6.4-8.2) 


 


 





 


Albumin


 1.4 g/dL


(3.4-5.0) 


 


 





 


Albumin/Globulin Ratio 0.3 (1.0-1.7)    


 


Urine Collection Type    Unknown 


 


Urine Color    Stefanie 


 


Urine Clarity    Clear 


 


Urine pH    5.0 


 


Urine Specific Gravity    >=1.030 


 


Urine Protein


 


 


 


 Negative mg/dL


(NEG-TRACE)


 


Urine Glucose (UA)


 


 


 


 Negative mg/dL


(NEG)


 


Urine Ketones (Stick)


 


 


 


 Negative mg/dL


(NEG)


 


Urine Blood    Negative (NEG) 


 


Urine Nitrite    Negative (NEG) 


 


Urine Bilirubin    Small (NEG) 


 


Urine Urobilinogen Dipstick


 


 


 


 0.2 mg/dL (0.2


mg/dL)


 


Urine Leukocyte Esterase    Negative (NEG) 


 


Urine RBC    0 /HPF (0-2) 


 


Urine WBC    1-4 /HPF (0-4) 


 


Urine Squamous Epithelial


Cells 


 


 


 Occ /LPF 





 


Urine Bacteria


 


 


 


 Few /HPF


(0-FEW)


 


Urine Hyaline Casts    Moderate /HPF 


 


Urine Mucus    Marked /LPF 








Review


All relevant outside records, renal labs, imaging studies, telemetry/EKG's were 

reviewed.





Images


Images


CT chest and  abdomen--





1.  There is large right pleural effusion and small left pleural effusion,


significant compressive atelectasis of the right lower lobe, to a lesser 


degree on the left.


2. There is a tiny left upper lobe noncalcified pulmonary nodule, optional


12 month follow-up as per revised Fleischner guidelines.


3. There is coronary calcification. There has been a median sternotomy.


 


 


1. There is moderate free fluid in the pelvis. There is body wall edema.


2. Pancreas is atrophic and poorly visualized, some calcifications near 


the pancreatic head which could be sequela of chronic pancreatitis.


3. There is severe L4 compression deformity with osseous retropulsion, 


uncertain chronicity. 


4. There is cystic focus in the right renal pelvis which may be a cyst 


although would be difficult to exclude hydronephrosis on this exam.


5. There is atherosclerotic calcification narrowing the superior 


mesenteric artery origin.











JOSH HANLEY MD                Oct 18, 2019 09:43

## 2019-10-18 NOTE — PDOC
Infectious Disease Note


Vital Sign


Vital Signs





Vital Signs








  Date Time  Temp Pulse Resp B/P (MAP) Pulse Ox O2 Delivery O2 Flow Rate FiO2


 


10/18/19 07:00  75 20 112/59 (76) 100 Nasal Cannula 4.0 


 


10/18/19 04:00 97.4       





 97.4       











Labs


Lab





Laboratory Tests








Test


 10/17/19


15:40 10/17/19


19:05 10/18/19


04:30 10/18/19


07:40


 


White Blood Count


 9.7 x10^3/uL


(4.0-11.0) 


 7.4 x10^3/uL


(4.0-11.0) 





 


Red Blood Count


 3.84 x10^6/uL


(4.30-5.70) 


 3.14 x10^6/uL


(4.30-5.70) 





 


Hemoglobin


 11.9 g/dL


(13.0-17.5) 


 9.8 g/dL


(13.0-17.5) 





 


Hematocrit


 36.0 %


(39.0-53.0) 


 28.7 %


(39.0-53.0) 





 


Mean Corpuscular Volume 94 fL ()   91 fL ()  


 


Mean Corpuscular Hemoglobin 31 pg (25-35)   31 pg (25-35)  


 


Mean Corpuscular Hemoglobin


Concent 33 g/dL


(31-37) 


 34 g/dL


(31-37) 





 


Red Cell Distribution Width


 15.9 %


(11.5-14.5) 


 15.4 %


(11.5-14.5) 





 


Platelet Count


 156 x10^3/uL


(140-400) 


 129 x10^3/uL


(140-400) 





 


Neutrophils (%) (Auto) 80 % (31-73)   84 % (31-73)  


 


Lymphocytes (%) (Auto) 13 % (24-48)   9 % (24-48)  


 


Monocytes (%) (Auto) 7 % (0-9)   7 % (0-9)  


 


Eosinophils (%) (Auto) 0 % (0-3)   0 % (0-3)  


 


Basophils (%) (Auto) 0 % (0-3)   0 % (0-3)  


 


Neutrophils # (Auto)


 7.8 x10^3/uL


(1.8-7.7) 


 6.2 x10^3/uL


(1.8-7.7) 





 


Lymphocytes # (Auto)


 1.2 x10^3/uL


(1.0-4.8) 


 0.7 x10^3/uL


(1.0-4.8) 





 


Monocytes # (Auto)


 0.6 x10^3/uL


(0.0-1.1) 


 0.5 x10^3/uL


(0.0-1.1) 





 


Eosinophils # (Auto)


 0.0 x10^3/uL


(0.0-0.7) 


 0.0 x10^3/uL


(0.0-0.7) 





 


Basophils # (Auto)


 0.0 x10^3/uL


(0.0-0.2) 


 0.0 x10^3/uL


(0.0-0.2) 





 


Prothrombin Time


 17.6 SEC


(11.7-14.0) 


 


 





 


Prothromb Time International


Ratio 1.5 (0.8-1.1) 


 


 


 





 


Sodium Level


 139 mmol/L


(136-145) 


 138 mmol/L


(136-145) 





 


Potassium Level


 3.8 mmol/L


(3.5-5.1) 


 3.4 mmol/L


(3.5-5.1) 





 


Chloride Level


 104 mmol/L


() 


 107 mmol/L


() 





 


Carbon Dioxide Level


 21 mmol/L


(21-32) 


 22 mmol/L


(21-32) 





 


Anion Gap 14 (6-14)   9 (6-14)  


 


Blood Urea Nitrogen


 20 mg/dL


(8-26) 


 20 mg/dL


(8-26) 





 


Creatinine


 2.3 mg/dL


(0.7-1.3) 


 1.8 mg/dL


(0.7-1.3) 





 


Estimated GFR


(Cockcroft-Gault) 27.0 


 


 35.9 


 





 


BUN/Creatinine Ratio 9 (6-20)    


 


Glucose Level


 164 mg/dL


(70-99) 


 157 mg/dL


(70-99) 





 


Lactic Acid Level


 7.6 mmol/L


(0.4-2.0) 4.3 mmol/L


(0.4-2.0) 1.8 mmol/L


(0.4-2.0) 





 


Calcium Level


 7.7 mg/dL


(8.5-10.1) 


 7.0 mg/dL


(8.5-10.1) 





 


Total Bilirubin


 1.2 mg/dL


(0.2-1.0) 


 


 





 


Aspartate Amino Transf


(AST/SGOT) 233 U/L


(15-37) 


 


 





 


Alanine Aminotransferase


(ALT/SGPT) 52 U/L (16-63) 


 


 


 





 


Alkaline Phosphatase


 240 U/L


() 


 


 





 


Troponin I Quantitative


 < 0.017 ng/mL


(0.000-0.055) 


 


 





 


NT-Pro-B-Type Natriuretic


Peptide 2822 pg/mL


(0-449) 


 


 





 


Total Protein


 5.5 g/dL


(6.4-8.2) 


 


 





 


Albumin


 1.4 g/dL


(3.4-5.0) 


 


 





 


Albumin/Globulin Ratio 0.3 (1.0-1.7)    


 


Urine Collection Type    Unknown 


 


Urine Color    Stefanie 


 


Urine Clarity    Clear 


 


Urine pH    5.0 


 


Urine Specific Gravity    >=1.030 


 


Urine Protein


 


 


 


 Negative mg/dL


(NEG-TRACE)


 


Urine Glucose (UA)


 


 


 


 Negative mg/dL


(NEG)


 


Urine Ketones (Stick)


 


 


 


 Negative mg/dL


(NEG)


 


Urine Blood    Negative (NEG) 


 


Urine Nitrite    Negative (NEG) 


 


Urine Bilirubin    Small (NEG) 


 


Urine Urobilinogen Dipstick


 


 


 


 0.2 mg/dL (0.2


mg/dL)


 


Urine Leukocyte Esterase    Negative (NEG) 


 


Urine RBC    0 /HPF (0-2) 


 


Urine WBC    1-4 /HPF (0-4) 


 


Urine Squamous Epithelial


Cells 


 


 


 Occ /LPF 





 


Urine Bacteria


 


 


 


 Few /HPF


(0-FEW)


 


Urine Hyaline Casts    Moderate /HPF 


 


Urine Mucus    Marked /LPF 











Objective


Assessment


pt seen, consult dictated





Plan


Plan of Care


/











ARA JIMENEZ MD               Oct 18, 2019 08:08

## 2019-10-18 NOTE — PDOC2
STEVIE NORTH APRN 10/18/19 0919:


CARDIAC CONSULT


DATE OF CONSULT


Date of Consult


DATE: 10/18/19 


TIME: 08:56





REASON FOR CONSULT


Reason for Consult:


CHF





REFERRING PHYSICIAN


Referring Physician:


Kellen





SOURCE


Source:  Chart review, Patient





HISTORY OF PRESENT ILLNESS


HISTORY OF PRESENT ILLNESS


This is an 86 yo male admitted for complains of weakness and poor appetite.  Pt 

is a poor historian and told me that at least in the last week he has been 

having watery stools.  No fever or chills.  He also has been feeling weak and 

has not been drinking well. He tries to eat and spouse sprinkles some protein in

his food but mainly by description his food is high in carbs.  No chest pain, 

palpitations or SOA. He is known for cardiomyopathy and he has refused AICD in 

the past. He is cachectic and has anasarca.  He is laying flat without 

difficulty with respiration.  Denies any pain.





PAST MEDICAL HISTORY


Cardiovascular:  CAD, CHF (cardiomyopathy), Hyperlipidemia, Other (hypotension)


Pulmonary:  No pertinent hx


CENTRAL NERVOUS SYSTEM:  Other (No pertinent history)


GI:  GERD, Peptic Ulcer disease, Other (malnutrition)


Psych:  Anxiety


Musculoskeletal:  Osteoarthritis


Renal/:  Chronic renal insuff


Endocrine:  Diabetes (2)


Dermatology:  No pertinent hx





PAST SURGICAL HISTORY


Past Surgical History:  Appendectomy, Cholecystectomy, CABG, Other (antrectomy 

w/ Stalin-en-Y; PCI/stent)





FAMILY HISTORY


Family History


noncontributory





SOCIAL HISTORY


Smoke:  No


ALCOHOL:  none


Drugs:  None


Lives:  with Family (spouse)





CURRENT MEDICATIONS


CURRENT MEDICATIONS





Current Medications








 Medications


  (Trade)  Dose


 Ordered  Sig/Leonidas


 Route


 PRN Reason  Start Time


 Stop Time Status Last Admin


Dose Admin


 


 Sodium Chloride  1,000 ml @ 


 1,000 mls/hr  Q1H


 IV


   10/17/19 15:34


 10/17/19 16:33 DC 10/17/19 15:55





 


 Ondansetron HCl


  (Zofran)  4 mg  1X  ONCE


 IV


   10/17/19 16:30


 10/17/19 16:31 DC 10/17/19 16:09





 


 Sodium Chloride  1,000 ml @ 


 1,000 mls/hr  1X  ONCE


 IV


   10/17/19 16:45


 10/17/19 17:44 DC 10/17/19 17:27





 


 Piperacillin Sod/


 Tazobactam Sod


 3.375 gm/Sodium


 Chloride  50 ml @ 


 100 mls/hr  1X  ONCE


 IV


   10/17/19 18:00


 10/17/19 18:29 DC 10/17/19 17:27





 


 Vancomycin HCl  250 ml @ 


 250 mls/hr  1X  ONCE


 IV


   10/17/19 17:00


 10/17/19 17:59 DC 10/17/19 17:36





 


 Levofloxacin/


 Dextrose  150 ml @ 


 100 mls/hr  1X  ONCE


 IV


   10/17/19 16:45


 10/17/19 18:14 DC 10/17/19 18:17





 


 Dopamine HCl/


 Dextrose  250 ml @ 


 10.121 mls/


 hr  CONT  PRN


 IV


 SEE I/O RECORD  10/17/19 20:00


    10/17/19 20:32





 


 Piperacillin Sod/


 Tazobactam Sod


 3.375 gm/Sodium


 Chloride  50 ml @ 


 100 mls/hr  Q8HRS


 IV


   10/17/19 22:00


    10/18/19 05:56





 


 Linezolid/Dextrose  300 ml @ 


 300 mls/hr  Q12HR


 IV


   10/17/19 21:00


    10/17/19 21:40





 


 Enoxaparin Sodium


  (Lovenox 30mg


 Syringe)  30 mg  Q24H


 SQ


   10/17/19 21:00


    10/17/19 21:00





 


 Pantoprazole


 Sodium


  (Protonix)  40 mg  BIDAC


 PO


   10/18/19 07:30


    10/18/19 08:43





 


 Aspirin


  (Children'S


 Aspirin)  81 mg  DAILYWBKFT


 PO


   10/18/19 08:00


    10/18/19 08:43





 


 Citalopram


 Hydrobromide


  (CeleXA)  20 mg  DAILY


 PO


   10/18/19 09:00


    10/18/19 08:43





 


 Norepinephrine


 Bitartrate  250 ml @ 


 12.97 mls/


 hr  CONT  PRN


 IV


 SEE I/O RECORD  10/17/19 21:15


    10/17/19 21:40





 


 Sodium Chloride  500 ml @ 


 500 mls/hr  1X  ONCE


 IV


   10/17/19 21:15


 10/17/19 22:14 DC 10/17/19 21:40





 


 Ondansetron HCl


  (Zofran)  4 mg  PRN Q6HRS  PRN


 IVP


 NAUSEA/VOMITING  10/17/19 21:30


    10/18/19 08:54














ALLERGIES


ALLERGIES:  


Coded Allergies:  


     No Known Drug Allergies (Unverified , 3/1/18)





ROS


Review of System


limited, poor historian, discussed with spouse





PHYSICAL EXAM


General:  Alert, Cooperative, No acute distress, Other (cachectic)


HEENT:  Atraumatic, Other ([place]', dry oral mucosa)


Lungs:  Other (basilar crackles)


Heart:  Regular rate (SR), Normal S1, Normal S2, No murmurs


Abdomen:  Soft, No tenderness


Extremities:  No cyanosis, Other (anasarca)


Skin:  No breakdown, No significant lesion, Other (pale)


Neuro:  Normal speech, Sensation intact


Psych/Mental Status:  Other (flat affect)


MUSCULOSKELETAL:  Osteoarthritic changes both hands





VITALS/I&O


VITALS/I&O:





                                   Vital Signs








  Date Time  Temp Pulse Resp B/P (MAP) Pulse Ox O2 Delivery O2 Flow Rate FiO2


 


10/18/19 08:00      Nasal Cannula 4.0 


 


10/18/19 07:00  75 20 112/59 (76) 100   


 


10/18/19 04:00 97.4       





 97.4       














                                    I & O   


 


 10/17/19 10/17/19 10/18/19





 15:00 23:00 07:00


 


Intake Total  3163.2 ml 1223.9 ml


 


Output Total  30 ml 0 ml


 


Balance  3133.2 ml 1223.9 ml











LABS


Lab:





                                Laboratory Tests








Test


 10/17/19


15:40 10/17/19


19:05 10/18/19


04:30 10/18/19


07:40


 


White Blood Count


 9.7 x10^3/uL


(4.0-11.0) 


 7.4 x10^3/uL


(4.0-11.0) 





 


Red Blood Count


 3.84 x10^6/uL


(4.30-5.70)  L 


 3.14 x10^6/uL


(4.30-5.70)  L 





 


Hemoglobin


 11.9 g/dL


(13.0-17.5)  L 


 9.8 g/dL


(13.0-17.5)  L 





 


Hematocrit


 36.0 %


(39.0-53.0)  L 


 28.7 %


(39.0-53.0)  L 





 


Mean Corpuscular Volume


 94 fL ()


 


 91 fL ()


 





 


Mean Corpuscular Hemoglobin 31 pg (25-35)    31 pg (25-35)   


 


Mean Corpuscular Hemoglobin


Concent 33 g/dL


(31-37) 


 34 g/dL


(31-37) 





 


Red Cell Distribution Width


 15.9 %


(11.5-14.5)  H 


 15.4 %


(11.5-14.5)  H 





 


Platelet Count


 156 x10^3/uL


(140-400) 


 129 x10^3/uL


(140-400)  L 





 


Neutrophils (%) (Auto) 80 % (31-73)  H  84 % (31-73)  H 


 


Lymphocytes (%) (Auto) 13 % (24-48)  L  9 % (24-48)  L 


 


Monocytes (%) (Auto) 7 % (0-9)    7 % (0-9)   


 


Eosinophils (%) (Auto) 0 % (0-3)    0 % (0-3)   


 


Basophils (%) (Auto) 0 % (0-3)    0 % (0-3)   


 


Neutrophils # (Auto)


 7.8 x10^3/uL


(1.8-7.7)  H 


 6.2 x10^3/uL


(1.8-7.7) 





 


Lymphocytes # (Auto)


 1.2 x10^3/uL


(1.0-4.8) 


 0.7 x10^3/uL


(1.0-4.8)  L 





 


Monocytes # (Auto)


 0.6 x10^3/uL


(0.0-1.1) 


 0.5 x10^3/uL


(0.0-1.1) 





 


Eosinophils # (Auto)


 0.0 x10^3/uL


(0.0-0.7) 


 0.0 x10^3/uL


(0.0-0.7) 





 


Basophils # (Auto)


 0.0 x10^3/uL


(0.0-0.2) 


 0.0 x10^3/uL


(0.0-0.2) 





 


Prothrombin Time


 17.6 SEC


(11.7-14.0)  H 


 


 





 


Prothrombin Time INR


 1.5 (0.8-1.1)


H 


 


 





 


Sodium Level


 139 mmol/L


(136-145) 


 138 mmol/L


(136-145) 





 


Potassium Level


 3.8 mmol/L


(3.5-5.1) 


 3.4 mmol/L


(3.5-5.1)  L 





 


Chloride Level


 104 mmol/L


() 


 107 mmol/L


() 





 


Carbon Dioxide Level


 21 mmol/L


(21-32) 


 22 mmol/L


(21-32) 





 


Anion Gap 14 (6-14)    9 (6-14)   


 


Blood Urea Nitrogen


 20 mg/dL


(8-26) 


 20 mg/dL


(8-26) 





 


Creatinine


 2.3 mg/dL


(0.7-1.3)  H 


 1.8 mg/dL


(0.7-1.3)  H 





 


Estimated GFR


(Cockcroft-Gault) 27.0  


 


 35.9  


 





 


BUN/Creatinine Ratio 9 (6-20)     


 


Glucose Level


 164 mg/dL


(70-99)  H 


 157 mg/dL


(70-99)  H 





 


Lactic Acid Level


 7.6 mmol/L


(0.4-2.0)  *H 4.3 mmol/L


(0.4-2.0)  *H 1.8 mmol/L


(0.4-2.0) 





 


Calcium Level


 7.7 mg/dL


(8.5-10.1)  L 


 7.0 mg/dL


(8.5-10.1)  L 





 


Total Bilirubin


 1.2 mg/dL


(0.2-1.0)  H 


 


 





 


Aspartate Amino Transferase


(AST) 233 U/L


(15-37)  H 


 


 





 


Alanine Aminotransferase (ALT)


 52 U/L (16-63)


 


 


 





 


Alkaline Phosphatase


 240 U/L


()  H 


 


 





 


Troponin I Quantitative


 < 0.017 ng/mL


(0.000-0.055) 


 


 





 


NT-Pro-B-Type Natriuretic


Peptide 2822 pg/mL


(0-449)  H 


 


 





 


Total Protein


 5.5 g/dL


(6.4-8.2)  L 


 


 





 


Albumin


 1.4 g/dL


(3.4-5.0)  L 


 


 





 


Albumin/Globulin Ratio


 0.3 (1.0-1.7)


L 


 


 





 


Urine Collection Type    Unknown  


 


Urine Color    Stefanie  


 


Urine Clarity    Clear  


 


Urine pH    5.0  


 


Urine Specific Gravity    >=1.030  


 


Urine Protein


 


 


 


 Negative mg/dL


(NEG-TRACE)


 


Urine Glucose (UA)


 


 


 


 Negative mg/dL


(NEG)


 


Urine Ketones (Stick)


 


 


 


 Negative mg/dL


(NEG)


 


Urine Blood


 


 


 


 Negative (NEG)





 


Urine Nitrite


 


 


 


 Negative (NEG)





 


Urine Bilirubin    Small (NEG)  


 


Urine Urobilinogen Dipstick


 


 


 


 0.2 mg/dL (0.2


mg/dL)


 


Urine Leukocyte Esterase


 


 


 


 Negative (NEG)





 


Urine RBC    0 /HPF (0-2)  


 


Urine WBC


 


 


 


 1-4 /HPF (0-4)





 


Urine Squamous Epithelial


Cells 


 


 


 Occ /LPF  





 


Urine Bacteria


 


 


 


 Few /HPF


(0-FEW)


 


Urine Hyaline Casts    Moderate /HPF  


 


Urine Mucus    Marked /LPF  





                                Laboratory Tests


10/17/19 15:40








10/18/19 04:30








                                Laboratory Tests


10/17/19 15:40








10/18/19 04:30











ECHOCARDIOGRAM


ECHOCARDIOGRAM





<Conclusion>


Left ventricle systolic function is  severely impaired. The Ejection Fraction is

30%.


There is akinesis of the distal half of the LV with predominance in the septum, 

anterior wall and apex.


Doppler and Color Flow revealed mild aortic regurgitation.


Doppler and Color Flow revealed mild tricuspid regurgitation. There is moderate 

pulmonary hypertension. The PA pressure was estimated at 48 mmHg.





DATE:     06/07/18 0931





HEART CATH


HEART CATH





CORONARY ANGIOGRAPHY: 


LM is a large caliber with a mid body 50% stenosis. 


LAD is a large caliber severely ectactic vessel with diffuse irregularities of 

up to 80% and a mid 100% occlusion. The distal vessel fills via  a probable 

radial graft.


D1 is a moderate caliber ectactic vessel with mid 100% occlusion. The distal 

vessel fills via a patent probable radial graft. 


LCx is a large caliber aneurysmal vessel with a proximal 80% stenosis. 


OM1 is a small caliber vessel with mid 80% stenosis involving the graft 

anastomosis. The distal vessel is seen to fill via a patent SVG. 


RCA is a large caliber dominant severely aneurysmal and ectactic vessel with 

diffuse irregularities of up to 80%. 


RPDA is a moderate caliber vessels with diffuse irregularities and a mid 80% 

stenosis. 





BYPASS ANGIOGRAPHY: 


KUSH was not grafted. 


SVG to OM is patent with distal anastomotic stenosis of 80%


SVG to LAD/D1 is patent without anastomotic stenosis. 


No evidence of graft to the RCA. 





Conclusion


1. Severe three vessel coronary artery disease. 


2. Patent grafts to the LAD/Diag


3. Distal graft anastomosis of the OM graft. 


4. Severe diffuse RCA disease.





Recommendations


Given patient clinical status, down trending troponin and lack of clear culprit 

lesion, medical therapy is recommended. 





DATE:     06/06/18 0936





ASSESSMENT/PLAN


ASSESSMENT/PLAN


1. Metabolic acidosis: noted with 1 wk of diarrhea and poor hydration


2. Severe protein malnutrition with anasarca


3. 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


4. Acute on chronic systolic CHF: third spacing mostly due to malnutrition


5. Pleural effusion


6. Known hypotension and OH


7. Weakness/anorexia/cachexia


8. Chronic anemia


9. CAYETANO on CKD: prerenal


10. Hx of DM2


11. Hx of antrectomy w/ Stalin-en-Y and PUD


12. CAD: past CABG. Known 3VD with severe diffuse disease of the RCA. see above 

C.





Recommendations


1. Will likely need right side thoracentesis, will defer to pulmonary


2. TTE, TSH today


3. Presently needing low dose levophed.  Discussed with RN, currently having 

some n/v, gentle IV hydration. Lasix PRN


4. He is not been able to tolerate CHF regimen in the past. Monitor rhythm. 

Dietitian consult and will have speech to evaluate. 


5. Supportive care. 


6. ASA.





KRIS CORNELL MD 10/18/19 1436:


CARDIAC CONSULT


ASSESSMENT/PLAN


ASSESSMENT/PLAN


Pt. seen and examined. Agree with above NP note. 


Septic shock. 


EF unchanged from prior. Stable from CV standpoint. 


No chest pain. 


Successful thoracentesis. 


Supportive care. Check CVP and titrate off levophed


Will follow along.











STEVIE NORTH APRN          Oct 18, 2019 09:19


KRIS CORNELL MD       Oct 18, 2019 14:36

## 2019-10-18 NOTE — NUR
SS following for discharge planning. SS reviewed pt chart. Pt is from home with spouse and 
is currently requiring oxygen. Pt has had services with [facility] in 2018. Pt 
will need PT/OT evaluations when ready to assess needs for discharge. SS will continue to 
follow for discharge planning.

## 2019-10-18 NOTE — RAD
Examination: PORTABLE CHEST 1V

 

History: Central line placement

 

Comparison/Correlation: 10/17/2019 portable chest x-ray

 

Findings: Portable upright frontal view of the chest was obtained. Right 

internal jugular catheter, showing superior cavoatrial junction. Sternal 

wires and mediastinal clips are present.

 

Heart size is borderline to mildly enlarged. No pneumothorax. Moderate 

size right pleural effusion is present with suggestion of adjacent 

atelectasis. Small left pleural effusion is present. Opacification of the 

left lung base is noted in may represent adjacent atelectasis.

 

Elevation of the right humeral head is noted with spurring. Severe 

narrowing of the right acromiohumeral interval with remodeling of the 

acromion is present. Osteopenia noted.

 

 

Impression:

No pneumothorax.

 

Right internal jugular catheter is present.

 

Bilateral pleural effusions and adjacent atelectasis greater on the right 

noted without significant change especially considering positioning.

 

Electronically signed by: Dominguez Nayak MD (10/18/2019 9:54 AM) [facility]

## 2019-10-18 NOTE — NUR
Gardner catheter placed at 0800 for monitor of urine output in critically ill patient. Patient 
had 100ml of dark yellow urine out at time of placement.

## 2019-10-19 VITALS — SYSTOLIC BLOOD PRESSURE: 101 MMHG | DIASTOLIC BLOOD PRESSURE: 59 MMHG

## 2019-10-19 VITALS — DIASTOLIC BLOOD PRESSURE: 46 MMHG | SYSTOLIC BLOOD PRESSURE: 81 MMHG

## 2019-10-19 VITALS — SYSTOLIC BLOOD PRESSURE: 77 MMHG | DIASTOLIC BLOOD PRESSURE: 45 MMHG

## 2019-10-19 VITALS — DIASTOLIC BLOOD PRESSURE: 56 MMHG | SYSTOLIC BLOOD PRESSURE: 99 MMHG

## 2019-10-19 VITALS — DIASTOLIC BLOOD PRESSURE: 56 MMHG | SYSTOLIC BLOOD PRESSURE: 91 MMHG

## 2019-10-19 VITALS — SYSTOLIC BLOOD PRESSURE: 97 MMHG | DIASTOLIC BLOOD PRESSURE: 56 MMHG

## 2019-10-19 VITALS — DIASTOLIC BLOOD PRESSURE: 56 MMHG | SYSTOLIC BLOOD PRESSURE: 96 MMHG

## 2019-10-19 VITALS — DIASTOLIC BLOOD PRESSURE: 64 MMHG | SYSTOLIC BLOOD PRESSURE: 89 MMHG

## 2019-10-19 VITALS — SYSTOLIC BLOOD PRESSURE: 92 MMHG | DIASTOLIC BLOOD PRESSURE: 58 MMHG

## 2019-10-19 VITALS — SYSTOLIC BLOOD PRESSURE: 90 MMHG | DIASTOLIC BLOOD PRESSURE: 48 MMHG

## 2019-10-19 VITALS — DIASTOLIC BLOOD PRESSURE: 58 MMHG | SYSTOLIC BLOOD PRESSURE: 98 MMHG

## 2019-10-19 VITALS — DIASTOLIC BLOOD PRESSURE: 54 MMHG | SYSTOLIC BLOOD PRESSURE: 97 MMHG

## 2019-10-19 VITALS — SYSTOLIC BLOOD PRESSURE: 97 MMHG | DIASTOLIC BLOOD PRESSURE: 60 MMHG

## 2019-10-19 VITALS — DIASTOLIC BLOOD PRESSURE: 52 MMHG | SYSTOLIC BLOOD PRESSURE: 93 MMHG

## 2019-10-19 VITALS — SYSTOLIC BLOOD PRESSURE: 92 MMHG | DIASTOLIC BLOOD PRESSURE: 57 MMHG

## 2019-10-19 VITALS — DIASTOLIC BLOOD PRESSURE: 56 MMHG | SYSTOLIC BLOOD PRESSURE: 94 MMHG

## 2019-10-19 VITALS — SYSTOLIC BLOOD PRESSURE: 89 MMHG | DIASTOLIC BLOOD PRESSURE: 50 MMHG

## 2019-10-19 VITALS — SYSTOLIC BLOOD PRESSURE: 84 MMHG | DIASTOLIC BLOOD PRESSURE: 51 MMHG

## 2019-10-19 VITALS — SYSTOLIC BLOOD PRESSURE: 92 MMHG | DIASTOLIC BLOOD PRESSURE: 62 MMHG

## 2019-10-19 VITALS — SYSTOLIC BLOOD PRESSURE: 87 MMHG | DIASTOLIC BLOOD PRESSURE: 50 MMHG

## 2019-10-19 VITALS — DIASTOLIC BLOOD PRESSURE: 60 MMHG | SYSTOLIC BLOOD PRESSURE: 80 MMHG

## 2019-10-19 VITALS — DIASTOLIC BLOOD PRESSURE: 54 MMHG | SYSTOLIC BLOOD PRESSURE: 94 MMHG

## 2019-10-19 VITALS — DIASTOLIC BLOOD PRESSURE: 57 MMHG | SYSTOLIC BLOOD PRESSURE: 102 MMHG

## 2019-10-19 VITALS — SYSTOLIC BLOOD PRESSURE: 95 MMHG | DIASTOLIC BLOOD PRESSURE: 58 MMHG

## 2019-10-19 LAB
ALBUMIN SERPL-MCNC: 1.3 G/DL (ref 3.4–5)
ALBUMIN/GLOB SERPL: 0.4 {RATIO} (ref 1–1.7)
ALP SERPL-CCNC: 137 U/L (ref 46–116)
ALT SERPL-CCNC: 28 U/L (ref 16–63)
ANION GAP SERPL CALC-SCNC: 6 MMOL/L (ref 6–14)
AST SERPL-CCNC: 69 U/L (ref 15–37)
BASOPHILS # BLD AUTO: 0 X10^3/UL (ref 0–0.2)
BASOPHILS NFR BLD: 1 % (ref 0–3)
BILIRUB SERPL-MCNC: 1 MG/DL (ref 0.2–1)
BUN SERPL-MCNC: 18 MG/DL (ref 8–26)
BUN/CREAT SERPL: 10 (ref 6–20)
CALCIUM SERPL-MCNC: 6.8 MG/DL (ref 8.5–10.1)
CHLORIDE SERPL-SCNC: 108 MMOL/L (ref 98–107)
CO2 SERPL-SCNC: 24 MMOL/L (ref 21–32)
CREAT SERPL-MCNC: 1.8 MG/DL (ref 0.7–1.3)
EOSINOPHIL NFR BLD: 0 % (ref 0–3)
EOSINOPHIL NFR BLD: 0 X10^3/UL (ref 0–0.7)
ERYTHROCYTE [DISTWIDTH] IN BLOOD BY AUTOMATED COUNT: 15.3 % (ref 11.5–14.5)
GFR SERPLBLD BASED ON 1.73 SQ M-ARVRAT: 35.9 ML/MIN
GLOBULIN SER-MCNC: 3 G/DL (ref 2.2–3.8)
GLUCOSE SERPL-MCNC: 120 MG/DL (ref 70–99)
HCT VFR BLD CALC: 27.7 % (ref 39–53)
HGB BLD-MCNC: 9.4 G/DL (ref 13–17.5)
LYMPHOCYTES # BLD: 0.8 X10^3/UL (ref 1–4.8)
LYMPHOCYTES NFR BLD AUTO: 17 % (ref 24–48)
MCH RBC QN AUTO: 31 PG (ref 25–35)
MCHC RBC AUTO-ENTMCNC: 34 G/DL (ref 31–37)
MCV RBC AUTO: 91 FL (ref 79–100)
MONO #: 0.3 X10^3/UL (ref 0–1.1)
MONOCYTES NFR BLD: 6 % (ref 0–9)
NEUT #: 3.7 X10^3/UL (ref 1.8–7.7)
NEUTROPHILS NFR BLD AUTO: 76 % (ref 31–73)
PLATELET # BLD AUTO: 114 X10^3/UL (ref 140–400)
POTASSIUM SERPL-SCNC: 3.6 MMOL/L (ref 3.5–5.1)
PROT SERPL-MCNC: 4.3 G/DL (ref 6.4–8.2)
RBC # BLD AUTO: 3.05 X10^6/UL (ref 4.3–5.7)
SODIUM SERPL-SCNC: 138 MMOL/L (ref 136–145)
WBC # BLD AUTO: 4.9 X10^3/UL (ref 4–11)

## 2019-10-19 RX ADMIN — ENOXAPARIN SODIUM SCH MG: 100 INJECTION SUBCUTANEOUS at 21:18

## 2019-10-19 RX ADMIN — PANTOPRAZOLE SODIUM SCH MG: 40 INJECTION, POWDER, FOR SOLUTION INTRAVENOUS at 09:09

## 2019-10-19 RX ADMIN — Medication PRN MLS/HR: at 23:40

## 2019-10-19 RX ADMIN — Medication SCH CAP: at 21:18

## 2019-10-19 RX ADMIN — PIPERACILLIN SODIUM AND TAZOBACTAM SODIUM SCH MLS/HR: 3; .375 INJECTION, POWDER, LYOPHILIZED, FOR SOLUTION INTRAVENOUS at 22:30

## 2019-10-19 RX ADMIN — PANTOPRAZOLE SODIUM SCH MG: 40 INJECTION, POWDER, FOR SOLUTION INTRAVENOUS at 18:01

## 2019-10-19 RX ADMIN — Medication SCH CAP: at 09:10

## 2019-10-19 RX ADMIN — PIPERACILLIN SODIUM AND TAZOBACTAM SODIUM SCH MLS/HR: 3; .375 INJECTION, POWDER, LYOPHILIZED, FOR SOLUTION INTRAVENOUS at 18:00

## 2019-10-19 RX ADMIN — PIPERACILLIN SODIUM AND TAZOBACTAM SODIUM SCH MLS/HR: 3; .375 INJECTION, POWDER, LYOPHILIZED, FOR SOLUTION INTRAVENOUS at 06:00

## 2019-10-19 RX ADMIN — CITALOPRAM HYDROBROMIDE SCH MG: 20 TABLET ORAL at 09:09

## 2019-10-19 RX ADMIN — BACITRACIN SCH MLS/HR: 5000 INJECTION, POWDER, FOR SOLUTION INTRAMUSCULAR at 06:00

## 2019-10-19 RX ADMIN — ASPIRIN SCH MG: 81 TABLET, COATED ORAL at 09:09

## 2019-10-19 NOTE — PDOC
Infectious Disease Note


Subjective:


Subjective


pt is doing ok


no f/c/n/v/d


d/w rn


weaned off pressors,





ROS:


ROS


Negative otherwise.





Vital Signs:


Vital Signs





Vital Signs








  Date Time  Temp Pulse Resp B/P (MAP) Pulse Ox O2 Delivery O2 Flow Rate FiO2


 


10/19/19 07:00 97.8 74 16 98/58 (71) 99 Nasal Cannula 2.0 





 97.8       











Physical Exam:


PHYSICAL EXAM


GENERAL:  Alert, oriented gentleman, not in any distress.


HEENT:  Both pupils are round and reacting.  No conjunctival lesion, no lesion


in the mouth.


NECK:  Supple, RT IJ looks ok


LUNGS:  Decreased breath sounds.


HEART:  S1, S2 regular.  No gallop or murmur.


ABDOMEN:  Soft, nontender, no organomegaly.


EXTREMITIES:  edema +


NEUROLOGIC:  Other than poor hearing, the patient does move all the extremities.


 There is no other focal deficit.





Medications:


Inpatient Meds:





Current Medications








 Medications


  (Trade)  Dose


 Ordered  Sig/Leonidas  Start Time


 Stop Time Status Last Admin


Dose Admin


 


 Albumin Human  100 ml @ 


 100 mls/hr  1X  ONCE  10/18/19 09:15


 10/18/19 10:14 DC 10/18/19 10:37


100 MLS/HR


 


 Aspirin


  (Children'S


 Aspirin)  81 mg  DAILYWBKFT  10/18/19 08:00


 10/18/19 09:29 DC 10/18/19 08:43


81 MG


 


 Aspirin


  (Ecotrin)  81 mg  DAILYWBKFT  10/19/19 08:00


     





 


 Citalopram


 Hydrobromide


  (CeleXA)  20 mg  DAILY  10/18/19 09:00


    10/18/19 08:43


20 MG


 


 Dopamine HCl/


 Dextrose  250 ml @ 


 10.121 mls/


 hr  CONT  PRN  10/17/19 20:00


    10/17/19 20:32


13 MLS/HR


 


 Enoxaparin Sodium


  (Lovenox 30mg


 Syringe)  30 mg  Q24H  10/17/19 21:00


    10/18/19 23:24


30 MG


 


 Furosemide


  (Lasix)  20 mg  1X  ONCE  10/18/19 10:15


 10/18/19 10:16 DC 10/18/19 11:42


20 MG


 


 Lactobacillus


 Rhamnosus


  (Culturelle)  1 cap  BID  10/18/19 21:00


    10/18/19 23:24


1 CAP


 


 Levofloxacin/


 Dextrose  150 ml @ 


 100 mls/hr  1X  ONCE  10/17/19 16:45


 10/17/19 18:14 DC 10/17/19 18:17


100 MLS/HR


 


 Linezolid/Dextrose  300 ml @ 


 300 mls/hr  Q12HR  10/17/19 21:00


    10/18/19 23:59


300 MLS/HR


 


 Norepinephrine


 Bitartrate  250 ml @ 


 12.97 mls/


 hr  CONT  PRN  10/17/19 21:15


    10/18/19 22:48


12.97 MLS/HR


 


 Ondansetron HCl


  (Zofran)  4 mg  PRN Q6HRS  PRN  10/17/19 21:30


    10/18/19 15:42


4 MG


 


 Pantoprazole


 Sodium


  (PROTONIX VIAL


 for IV PUSH)  40 mg  BIDAC  10/18/19 16:30


    10/18/19 17:31


40 MG


 


 Pantoprazole


 Sodium


  (Protonix)  40 mg  BIDAC  10/18/19 07:30


 10/18/19 13:49 DC 10/18/19 08:43


40 MG


 


 Piperacillin Sod/


 Tazobactam Sod


 3.375 gm/Sodium


 Chloride  50 ml @ 


 100 mls/hr  Q8HRS  10/17/19 22:00


    10/19/19 06:00


100 MLS/HR


 


 Potassium


 Chloride/Water  100 ml @ 


 100 mls/hr  Q1H  10/18/19 12:00


 10/18/19 15:59 DC 10/18/19 14:53


100 MLS/HR


 


 Sodium Chloride  500 ml @ 


 500 mls/hr  1X  ONCE  10/17/19 21:15


 10/17/19 22:14 DC 10/17/19 21:40


500 MLS/HR


 


 Vancomycin HCl  250 ml @ 


 250 mls/hr  1X  ONCE  10/17/19 17:00


 10/17/19 17:59 DC 10/17/19 17:36


250 MLS/HR











Labs:


Lab





Laboratory Tests








Test


 10/18/19


14:00 10/18/19


17:34 10/19/19


05:35


 


Body Fluid Source Pleural   


 


Body Fluid Color Yellow   


 


Body Fluid Clarity Clear   


 


Body Fluid Nucleated Cells


 16 /cmm (Not


Established) 


 





 


Body Fluid Mononuclear WBCs


(%) 100 % 


 


 





 


Body Fluid Polymorphonuclear


Cells 0 % 


 


 





 


Body Fluid Total RBCs Counted


 4 /cmm (Not


Established) 


 





 


Glucose (Fingerstick)


 


 122 mg/dL


(70-99) 





 


White Blood Count


 


 


 4.9 x10^3/uL


(4.0-11.0)


 


Red Blood Count


 


 


 3.05 x10^6/uL


(4.30-5.70)


 


Hemoglobin


 


 


 9.4 g/dL


(13.0-17.5)


 


Hematocrit


 


 


 27.7 %


(39.0-53.0)


 


Mean Corpuscular Volume   91 fL () 


 


Mean Corpuscular Hemoglobin   31 pg (25-35) 


 


Mean Corpuscular Hemoglobin


Concent 


 


 34 g/dL


(31-37)


 


Red Cell Distribution Width


 


 


 15.3 %


(11.5-14.5)


 


Platelet Count


 


 


 114 x10^3/uL


(140-400)


 


Neutrophils (%) (Auto)   76 % (31-73) 


 


Lymphocytes (%) (Auto)   17 % (24-48) 


 


Monocytes (%) (Auto)   6 % (0-9) 


 


Eosinophils (%) (Auto)   0 % (0-3) 


 


Basophils (%) (Auto)   1 % (0-3) 


 


Neutrophils # (Auto)


 


 


 3.7 x10^3/uL


(1.8-7.7)


 


Lymphocytes # (Auto)


 


 


 0.8 x10^3/uL


(1.0-4.8)


 


Monocytes # (Auto)


 


 


 0.3 x10^3/uL


(0.0-1.1)


 


Eosinophils # (Auto)


 


 


 0.0 x10^3/uL


(0.0-0.7)


 


Basophils # (Auto)


 


 


 0.0 x10^3/uL


(0.0-0.2)


 


Sodium Level


 


 


 138 mmol/L


(136-145)


 


Potassium Level


 


 


 3.6 mmol/L


(3.5-5.1)


 


Chloride Level


 


 


 108 mmol/L


()


 


Carbon Dioxide Level


 


 


 24 mmol/L


(21-32)


 


Anion Gap   6 (6-14) 


 


Blood Urea Nitrogen


 


 


 18 mg/dL


(8-26)


 


Creatinine


 


 


 1.8 mg/dL


(0.7-1.3)


 


Estimated GFR


(Cockcroft-Gault) 


 


 35.9 





 


BUN/Creatinine Ratio   10 (6-20) 


 


Glucose Level


 


 


 120 mg/dL


(70-99)


 


Calcium Level


 


 


 6.8 mg/dL


(8.5-10.1)


 


Total Bilirubin


 


 


 1.0 mg/dL


(0.2-1.0)


 


Aspartate Amino Transf


(AST/SGOT) 


 


 69 U/L (15-37) 





 


Alanine Aminotransferase


(ALT/SGPT) 


 


 28 U/L (16-63) 





 


Alkaline Phosphatase


 


 


 137 U/L


()


 


Total Protein


 


 


 4.3 g/dL


(6.4-8.2)


 


Albumin


 


 


 1.3 g/dL


(3.4-5.0)


 


Albumin/Globulin Ratio   0.4 (1.0-1.7) 











Objective:


Assessment:





1.  Lactic acidosis,multifactorial,resolved


2.  Hypotension secondary to dehydration.


3.  History of congestive heart failure.


4.  History of renal insufficiency.


5.  Large pleural effusion.s/p thoracocentesis 


6.  Coronary artery disease.


7.  Malnutrition with albumin 1.4.


8.  Elevated liver function tests.





Plan:


Plan of Care


continue Zyvox and Zosyn 


f/u c/s and labs in am


D/W TORIN RUIZ MD           Oct 19, 2019 08:17

## 2019-10-19 NOTE — CONS
DATE OF CONSULTATION:  10/18/2019



ATTENDING PHYSICIAN:  Chava Foreman DO



REASON FOR CONSULTATION:  The patient seen in pulmonary consultation at the

request of Dr. Foreman for pleural effusion.



HISTORY OF PRESENT ILLNESS:  The patient is an 87-year-old that according to his

wife over the last several weeks, he is becoming more and more short of breath. 

He was walking approximately a week ago, but finally was not getting up out of

bed.  She called the doctor's office and was referred to the Emergency

Department.  The patient was seen in the Emergency Department and evaluated. 

The main problem was emesis and decreased level of energy.  He was found to be

hypotensive with blood pressure of 80/40, heart rate was elevated.



He underwent some imaging studies including a CT chest, abdomen and pelvis.  I

reviewed the CT chest, which revealed a large right-sided effusion.  There was a

tiny left upper lobe noncalcified nodule.  CT of the abdomen revealed moderate

free fluid in the pelvis.  There was body wall edema and the pancreas was

atrophic.  There was a L4 compression deformity.



The patient is not complaining of being short of breath.  He has no complaints

actually.



PAST MEDICAL HISTORY:  Remarkable for coronary artery disease, cardiomyopathy,

hyperlipidemia, peptic ulcer disease, gastroesophageal reflux, anxiety,

osteoarthritis, chronic renal insufficiency, type 2 diabetes.



PAST SURGICAL HISTORY:  He has had previous appendectomy, cholecystectomy,

coronary artery bypass grafting, PCI with stenting.



FAMILY HISTORY:  Noncontributory in this age group.



SOCIAL HISTORY:  He smoked for a short period of time, quit in 1950.



ALLERGIES:  No known drug allergies.



CURRENT MEDICATIONS:  List was reviewed.  He is on multiple antibiotics.  He has

been seen by the Infectious Disease Service.



PHYSICAL EXAMINATION:

VITAL SIGNS:  Stable.  He is on norepinephrine.  His blood pressure has

improved.  He has received IV fluids.

HEENT:  Eyes, the sclerae were nonicteric.

NECK:  Jugular venous distention was not elevated.  No lymphadenopathy.

CHEST:  Full expansion.

LUNGS:  Diminished breath sounds at the bases, right greater than left.

CARDIOVASCULAR:  Regular rate and rhythm with S1, S2, no S3.

ABDOMEN:  Soft, nontender, nondistended.

EXTREMITIES:  No clubbing, cyanosis, some edema.



LABORATORY DATA:  White count was noted to be normal.  Electrolytes were noted. 

BUN was 20, creatinine was 1.8.  Lactic acid level initially 4.3, repeat was

1.8.  INR was 1.5.  UA was noted.



IMPRESSION:

1.  Large right-sided effusion.

2.  Septic shock.

3.  Elevated liver chemistries.

4.  Acute-on-chronic congestive heart failure, suspect diastolic.

5.  Renal insufficiency.

6.  Protein malnutrition, present upon admission.



PLAN:

1.  Case discussed with Dr. Phan.  We will proceed with a diagnostic

thoracentesis.

2.  Continue IV antibiotics per Infectious Disease service.

3.  Follow Cardiology input.

4.  We will follow up on the pleural fluid analysis.



The above was discussed with the wife at the bedside.  I do appreciate the

privilege in sharing in the patient's care.

 



______________________________

ALICE TILLMAN MD



DR:  ARTHUR/elaina  JOB#:  [id number] / [id number]

DD:  10/18/2019 14:45  DT:  10/18/2019 17:29

## 2019-10-19 NOTE — PDOC
PULMONARY PROGRESS NOTES


Subjective


very hard of hearing, no sob


Vitals





Vital Signs








  Date Time  Temp Pulse Resp B/P (MAP) Pulse Ox O2 Delivery O2 Flow Rate FiO2


 


10/19/19 06:00  73 18 94/54 (67) 97 Nasal Cannula 2.0 


 


10/19/19 04:00 98.1       





 98.1       








Comments


ros as mentioned as above other sys otherwise neg


ROS:  No Nausea


General:  Alert


HEENT:  Other (nc at perll  nose throat clear  neck  no lad  no thyromegaly)


Lungs:  Crackles, Other (dul at bases)


Cardiovascular:  S1, S2


Abdomen:  Soft, Non-tender, Other (no mass)


Neuro Exam:  Alert


Skin:  Warm


Labs





Laboratory Tests








Test


 10/17/19


15:40 10/17/19


19:05 10/18/19


04:30 10/18/19


07:40


 


White Blood Count


 9.7 x10^3/uL


(4.0-11.0) 


 7.4 x10^3/uL


(4.0-11.0) 





 


Red Blood Count


 3.84 x10^6/uL


(4.30-5.70) 


 3.14 x10^6/uL


(4.30-5.70) 





 


Hemoglobin


 11.9 g/dL


(13.0-17.5) 


 9.8 g/dL


(13.0-17.5) 





 


Hematocrit


 36.0 %


(39.0-53.0) 


 28.7 %


(39.0-53.0) 





 


Mean Corpuscular Volume 94 fL ()   91 fL ()  


 


Mean Corpuscular Hemoglobin 31 pg (25-35)   31 pg (25-35)  


 


Mean Corpuscular Hemoglobin


Concent 33 g/dL


(31-37) 


 34 g/dL


(31-37) 





 


Red Cell Distribution Width


 15.9 %


(11.5-14.5) 


 15.4 %


(11.5-14.5) 





 


Platelet Count


 156 x10^3/uL


(140-400) 


 129 x10^3/uL


(140-400) 





 


Neutrophils (%) (Auto) 80 % (31-73)   84 % (31-73)  


 


Lymphocytes (%) (Auto) 13 % (24-48)   9 % (24-48)  


 


Monocytes (%) (Auto) 7 % (0-9)   7 % (0-9)  


 


Eosinophils (%) (Auto) 0 % (0-3)   0 % (0-3)  


 


Basophils (%) (Auto) 0 % (0-3)   0 % (0-3)  


 


Neutrophils # (Auto)


 7.8 x10^3/uL


(1.8-7.7) 


 6.2 x10^3/uL


(1.8-7.7) 





 


Lymphocytes # (Auto)


 1.2 x10^3/uL


(1.0-4.8) 


 0.7 x10^3/uL


(1.0-4.8) 





 


Monocytes # (Auto)


 0.6 x10^3/uL


(0.0-1.1) 


 0.5 x10^3/uL


(0.0-1.1) 





 


Eosinophils # (Auto)


 0.0 x10^3/uL


(0.0-0.7) 


 0.0 x10^3/uL


(0.0-0.7) 





 


Basophils # (Auto)


 0.0 x10^3/uL


(0.0-0.2) 


 0.0 x10^3/uL


(0.0-0.2) 





 


Prothrombin Time


 17.6 SEC


(11.7-14.0) 


 


 





 


Prothromb Time International


Ratio 1.5 (0.8-1.1) 


 


 


 





 


Sodium Level


 139 mmol/L


(136-145) 


 138 mmol/L


(136-145) 





 


Potassium Level


 3.8 mmol/L


(3.5-5.1) 


 3.4 mmol/L


(3.5-5.1) 





 


Chloride Level


 104 mmol/L


() 


 107 mmol/L


() 





 


Carbon Dioxide Level


 21 mmol/L


(21-32) 


 22 mmol/L


(21-32) 





 


Anion Gap 14 (6-14)   9 (6-14)  


 


Blood Urea Nitrogen


 20 mg/dL


(8-26) 


 20 mg/dL


(8-26) 





 


Creatinine


 2.3 mg/dL


(0.7-1.3) 


 1.8 mg/dL


(0.7-1.3) 





 


Estimated GFR


(Cockcroft-Gault) 27.0 


 


 35.9 


 





 


BUN/Creatinine Ratio 9 (6-20)    


 


Glucose Level


 164 mg/dL


(70-99) 


 157 mg/dL


(70-99) 





 


Lactic Acid Level


 7.6 mmol/L


(0.4-2.0) 4.3 mmol/L


(0.4-2.0) 1.8 mmol/L


(0.4-2.0) 





 


Calcium Level


 7.7 mg/dL


(8.5-10.1) 


 7.0 mg/dL


(8.5-10.1) 





 


Total Bilirubin


 1.2 mg/dL


(0.2-1.0) 


 


 





 


Aspartate Amino Transf


(AST/SGOT) 233 U/L


(15-37) 


 


 





 


Alanine Aminotransferase


(ALT/SGPT) 52 U/L (16-63) 


 


 


 





 


Alkaline Phosphatase


 240 U/L


() 


 


 





 


Troponin I Quantitative


 < 0.017 ng/mL


(0.000-0.055) 


 


 





 


NT-Pro-B-Type Natriuretic


Peptide 2822 pg/mL


(0-449) 


 


 





 


Total Protein


 5.5 g/dL


(6.4-8.2) 


 


 





 


Albumin


 1.4 g/dL


(3.4-5.0) 


 


 





 


Albumin/Globulin Ratio 0.3 (1.0-1.7)    


 


Magnesium Level


 


 


 1.9 mg/dL


(1.8-2.4) 





 


Iron Level


 


 


 48 ug/dL


() 





 


Total Iron Binding Capacity


 


 


 59 ug/dL


(250-450) 





 


Iron Saturation   81 % (15-34)  


 


Vitamin B12 Level


 


 


 > 2000 pg/mL


(247-911) 





 


Urine Collection Type    Unknown 


 


Urine Color    Stefanie 


 


Urine Clarity    Clear 


 


Urine pH    5.0 


 


Urine Specific Gravity    >=1.030 


 


Urine Protein


 


 


 


 Negative mg/dL


(NEG-TRACE)


 


Urine Glucose (UA)


 


 


 


 Negative mg/dL


(NEG)


 


Urine Ketones (Stick)


 


 


 


 Negative mg/dL


(NEG)


 


Urine Blood    Negative (NEG) 


 


Urine Nitrite    Negative (NEG) 


 


Urine Bilirubin    Small (NEG) 


 


Urine Urobilinogen Dipstick


 


 


 


 0.2 mg/dL (0.2


mg/dL)


 


Urine Leukocyte Esterase    Negative (NEG) 


 


Urine RBC    0 /HPF (0-2) 


 


Urine WBC    1-4 /HPF (0-4) 


 


Urine Squamous Epithelial


Cells 


 


 


 Occ /LPF 





 


Urine Bacteria


 


 


 


 Few /HPF


(0-FEW)


 


Urine Hyaline Casts    Moderate /HPF 


 


Urine Mucus    Marked /LPF 


 


Test


 10/18/19


14:00 10/18/19


17:34 10/19/19


05:35 





 


Body Fluid Source Pleural    


 


Body Fluid Color Yellow    


 


Body Fluid Clarity Clear    


 


Body Fluid Nucleated Cells


 16 /cmm (Not


Established) 


 


 





 


Body Fluid Mononuclear WBCs


(%) 100 % 


 


 


 





 


Body Fluid Polymorphonuclear


Cells 0 % 


 


 


 





 


Body Fluid Total RBCs Counted


 4 /cmm (Not


Established) 


 


 





 


Glucose (Fingerstick)


 


 122 mg/dL


(70-99) 


 





 


White Blood Count


 


 


 4.9 x10^3/uL


(4.0-11.0) 





 


Red Blood Count


 


 


 3.05 x10^6/uL


(4.30-5.70) 





 


Hemoglobin


 


 


 9.4 g/dL


(13.0-17.5) 





 


Hematocrit


 


 


 27.7 %


(39.0-53.0) 





 


Mean Corpuscular Volume   91 fL ()  


 


Mean Corpuscular Hemoglobin   31 pg (25-35)  


 


Mean Corpuscular Hemoglobin


Concent 


 


 34 g/dL


(31-37) 





 


Red Cell Distribution Width


 


 


 15.3 %


(11.5-14.5) 





 


Platelet Count


 


 


 114 x10^3/uL


(140-400) 





 


Neutrophils (%) (Auto)   76 % (31-73)  


 


Lymphocytes (%) (Auto)   17 % (24-48)  


 


Monocytes (%) (Auto)   6 % (0-9)  


 


Eosinophils (%) (Auto)   0 % (0-3)  


 


Basophils (%) (Auto)   1 % (0-3)  


 


Neutrophils # (Auto)


 


 


 3.7 x10^3/uL


(1.8-7.7) 





 


Lymphocytes # (Auto)


 


 


 0.8 x10^3/uL


(1.0-4.8) 





 


Monocytes # (Auto)


 


 


 0.3 x10^3/uL


(0.0-1.1) 





 


Eosinophils # (Auto)


 


 


 0.0 x10^3/uL


(0.0-0.7) 





 


Basophils # (Auto)


 


 


 0.0 x10^3/uL


(0.0-0.2) 





 


Sodium Level


 


 


 138 mmol/L


(136-145) 





 


Potassium Level


 


 


 3.6 mmol/L


(3.5-5.1) 





 


Chloride Level


 


 


 108 mmol/L


() 





 


Carbon Dioxide Level


 


 


 24 mmol/L


(21-32) 





 


Anion Gap   6 (6-14)  


 


Blood Urea Nitrogen


 


 


 18 mg/dL


(8-26) 





 


Creatinine


 


 


 1.8 mg/dL


(0.7-1.3) 





 


Estimated GFR


(Cockcroft-Gault) 


 


 35.9 


 





 


BUN/Creatinine Ratio   10 (6-20)  


 


Glucose Level


 


 


 120 mg/dL


(70-99) 





 


Calcium Level


 


 


 6.8 mg/dL


(8.5-10.1) 





 


Total Bilirubin


 


 


 1.0 mg/dL


(0.2-1.0) 





 


Aspartate Amino Transf


(AST/SGOT) 


 


 69 U/L (15-37) 


 





 


Alanine Aminotransferase


(ALT/SGPT) 


 


 28 U/L (16-63) 


 





 


Alkaline Phosphatase


 


 


 137 U/L


() 





 


Total Protein


 


 


 4.3 g/dL


(6.4-8.2) 





 


Albumin


 


 


 1.3 g/dL


(3.4-5.0) 





 


Albumin/Globulin Ratio   0.4 (1.0-1.7)  








Laboratory Tests








Test


 10/18/19


07:40 10/18/19


14:00 10/18/19


17:34 10/19/19


05:35


 


Urine Collection Type Unknown    


 


Urine Color Stefanie    


 


Urine Clarity Clear    


 


Urine pH 5.0    


 


Urine Specific Gravity >=1.030    


 


Urine Protein


 Negative mg/dL


(NEG-TRACE) 


 


 





 


Urine Glucose (UA)


 Negative mg/dL


(NEG) 


 


 





 


Urine Ketones (Stick)


 Negative mg/dL


(NEG) 


 


 





 


Urine Blood Negative (NEG)    


 


Urine Nitrite Negative (NEG)    


 


Urine Bilirubin Small (NEG)    


 


Urine Urobilinogen Dipstick


 0.2 mg/dL (0.2


mg/dL) 


 


 





 


Urine Leukocyte Esterase Negative (NEG)    


 


Urine RBC 0 /HPF (0-2)    


 


Urine WBC 1-4 /HPF (0-4)    


 


Urine Squamous Epithelial


Cells Occ /LPF 


 


 


 





 


Urine Bacteria


 Few /HPF


(0-FEW) 


 


 





 


Urine Hyaline Casts Moderate /HPF    


 


Urine Mucus Marked /LPF    


 


Body Fluid Source  Pleural   


 


Body Fluid Color  Yellow   


 


Body Fluid Clarity  Clear   


 


Body Fluid Nucleated Cells


 


 16 /cmm (Not


Established) 


 





 


Body Fluid Mononuclear WBCs


(%) 


 100 % 


 


 





 


Body Fluid Polymorphonuclear


Cells 


 0 % 


 


 





 


Body Fluid Total RBCs Counted


 


 4 /cmm (Not


Established) 


 





 


Glucose (Fingerstick)


 


 


 122 mg/dL


(70-99) 





 


White Blood Count


 


 


 


 4.9 x10^3/uL


(4.0-11.0)


 


Red Blood Count


 


 


 


 3.05 x10^6/uL


(4.30-5.70)


 


Hemoglobin


 


 


 


 9.4 g/dL


(13.0-17.5)


 


Hematocrit


 


 


 


 27.7 %


(39.0-53.0)


 


Mean Corpuscular Volume    91 fL () 


 


Mean Corpuscular Hemoglobin    31 pg (25-35) 


 


Mean Corpuscular Hemoglobin


Concent 


 


 


 34 g/dL


(31-37)


 


Red Cell Distribution Width


 


 


 


 15.3 %


(11.5-14.5)


 


Platelet Count


 


 


 


 114 x10^3/uL


(140-400)


 


Neutrophils (%) (Auto)    76 % (31-73) 


 


Lymphocytes (%) (Auto)    17 % (24-48) 


 


Monocytes (%) (Auto)    6 % (0-9) 


 


Eosinophils (%) (Auto)    0 % (0-3) 


 


Basophils (%) (Auto)    1 % (0-3) 


 


Neutrophils # (Auto)


 


 


 


 3.7 x10^3/uL


(1.8-7.7)


 


Lymphocytes # (Auto)


 


 


 


 0.8 x10^3/uL


(1.0-4.8)


 


Monocytes # (Auto)


 


 


 


 0.3 x10^3/uL


(0.0-1.1)


 


Eosinophils # (Auto)


 


 


 


 0.0 x10^3/uL


(0.0-0.7)


 


Basophils # (Auto)


 


 


 


 0.0 x10^3/uL


(0.0-0.2)


 


Sodium Level


 


 


 


 138 mmol/L


(136-145)


 


Potassium Level


 


 


 


 3.6 mmol/L


(3.5-5.1)


 


Chloride Level


 


 


 


 108 mmol/L


()


 


Carbon Dioxide Level


 


 


 


 24 mmol/L


(21-32)


 


Anion Gap    6 (6-14) 


 


Blood Urea Nitrogen


 


 


 


 18 mg/dL


(8-26)


 


Creatinine


 


 


 


 1.8 mg/dL


(0.7-1.3)


 


Estimated GFR


(Cockcroft-Gault) 


 


 


 35.9 





 


BUN/Creatinine Ratio    10 (6-20) 


 


Glucose Level


 


 


 


 120 mg/dL


(70-99)


 


Calcium Level


 


 


 


 6.8 mg/dL


(8.5-10.1)


 


Total Bilirubin


 


 


 


 1.0 mg/dL


(0.2-1.0)


 


Aspartate Amino Transf


(AST/SGOT) 


 


 


 69 U/L (15-37) 





 


Alanine Aminotransferase


(ALT/SGPT) 


 


 


 28 U/L (16-63) 





 


Alkaline Phosphatase


 


 


 


 137 U/L


()


 


Total Protein


 


 


 


 4.3 g/dL


(6.4-8.2)


 


Albumin


 


 


 


 1.3 g/dL


(3.4-5.0)


 


Albumin/Globulin Ratio    0.4 (1.0-1.7) 








Medications





Active Scripts








 Medications  Dose


 Route/Sig


 Max Daily Dose Days Date Category


 


 Aspirin Ec


  (Aspirin) 81 Mg


 Tablet.dr  81 Mg


 PO [id number]


  30 6/8/18 Rx


 


 Protonix


  (Pantoprazole


 Sodium) 20 Mg


 Tablet.dr  40 Mg


 PO BID


   3/1/18 Reported


 


 Escitalopram


 Oxalate 10 Mg


 Tablet  10 Mg


 PO DAILY


   11/30/17 Reported








Comments


cxr reviewed   


Right-sided pleural effusion is much smaller in size after 


thoracentesis. No pneumothorax is seen. There is significant improved 


aeration of the right lower lung zone. Small left-sided pleural effusion 


and associated consolidative left lung base infiltrate is still evident 


and is unchanged.





Impression


.


IMPRESSION:


1.  Large right-sided effusion, s/p thoracentesis, transudate, due to chf.


2.  Septic shock.


3.  Elevated liver chemistries.


4.  Acute-on-chronic congestive heart failure, systolic.


5.  Renal insufficiency.


6.  Protein malnutrition, present upon admission.








echo


The systolic function is mildly to moderately impaired. The Ejection Fraction is

40-45%.


There is global hypokinesis of the left ventricle. Septal motion suggestive of 

conduction defect.





Plan


.


PLAN:


1.  s/p thoracentesis. transudate, fu cxs  cytology


2.  Continue IV antibiotics per Infectious Disease service.


3.  Follow Cardiology input.


4.  keep I<O, monitor k, cr


5.  02 titration to keep sat 92%





discussed w SHERI Lopez MD               Oct 19, 2019 06:55

## 2019-10-19 NOTE — PDOC
PROGRESS NOTES


Subjective


Subjective


pt sleeping quietly,  I did not wake him





Objective


Objective





Vital Signs








  Date Time  Temp Pulse Resp B/P (MAP) Pulse Ox O2 Delivery O2 Flow Rate FiO2


 


10/19/19 09:00 97.8 73 18 89/64 (72) 99 Nasal Cannula 2.0 





 97.8       














Intake and Output 


 


 10/19/19





 07:00


 


Intake Total 250 ml


 


Output Total 2940 ml


 


Balance -2690 ml


 


 


 


Intake Oral 250 ml


 


Output Urine Total 1440 ml


 


Drainage Total 1500 ml











Assessment


Assessment


Problems


Medical Problems:


(1) Anemia


Status: Acute  





(2) CHF (congestive heart failure)


Status: Acute  





(3) Diarrhea


Status: Acute  





(4) Elevated liver function tests


Status: Acute  





(5) Hypoalbuminemia


Status: Acute  





(6) Hypocalcemia


Status: Acute  





(7) Pleural effusion


Status: Acute  





(8) Renal insufficiency


Status: Acute  





(9) Severe sepsis


Status: Acute  





(10) Tachycardia


Status: Acute  











Plan


Plan of Care


no new surgery recs





Comment


Review of Relevant


I have reviewed the following items milana (where applicable) has been applied.


Labs





Laboratory Tests








Test


 10/17/19


15:40 10/17/19


19:05 10/18/19


04:30 10/18/19


07:40


 


White Blood Count


 9.7 x10^3/uL


(4.0-11.0) 


 7.4 x10^3/uL


(4.0-11.0) 





 


Red Blood Count


 3.84 x10^6/uL


(4.30-5.70) 


 3.14 x10^6/uL


(4.30-5.70) 





 


Hemoglobin


 11.9 g/dL


(13.0-17.5) 


 9.8 g/dL


(13.0-17.5) 





 


Hematocrit


 36.0 %


(39.0-53.0) 


 28.7 %


(39.0-53.0) 





 


Mean Corpuscular Volume 94 fL ()   91 fL ()  


 


Mean Corpuscular Hemoglobin 31 pg (25-35)   31 pg (25-35)  


 


Mean Corpuscular Hemoglobin


Concent 33 g/dL


(31-37) 


 34 g/dL


(31-37) 





 


Red Cell Distribution Width


 15.9 %


(11.5-14.5) 


 15.4 %


(11.5-14.5) 





 


Platelet Count


 156 x10^3/uL


(140-400) 


 129 x10^3/uL


(140-400) 





 


Neutrophils (%) (Auto) 80 % (31-73)   84 % (31-73)  


 


Lymphocytes (%) (Auto) 13 % (24-48)   9 % (24-48)  


 


Monocytes (%) (Auto) 7 % (0-9)   7 % (0-9)  


 


Eosinophils (%) (Auto) 0 % (0-3)   0 % (0-3)  


 


Basophils (%) (Auto) 0 % (0-3)   0 % (0-3)  


 


Neutrophils # (Auto)


 7.8 x10^3/uL


(1.8-7.7) 


 6.2 x10^3/uL


(1.8-7.7) 





 


Lymphocytes # (Auto)


 1.2 x10^3/uL


(1.0-4.8) 


 0.7 x10^3/uL


(1.0-4.8) 





 


Monocytes # (Auto)


 0.6 x10^3/uL


(0.0-1.1) 


 0.5 x10^3/uL


(0.0-1.1) 





 


Eosinophils # (Auto)


 0.0 x10^3/uL


(0.0-0.7) 


 0.0 x10^3/uL


(0.0-0.7) 





 


Basophils # (Auto)


 0.0 x10^3/uL


(0.0-0.2) 


 0.0 x10^3/uL


(0.0-0.2) 





 


Prothrombin Time


 17.6 SEC


(11.7-14.0) 


 


 





 


Prothromb Time International


Ratio 1.5 (0.8-1.1) 


 


 


 





 


Sodium Level


 139 mmol/L


(136-145) 


 138 mmol/L


(136-145) 





 


Potassium Level


 3.8 mmol/L


(3.5-5.1) 


 3.4 mmol/L


(3.5-5.1) 





 


Chloride Level


 104 mmol/L


() 


 107 mmol/L


() 





 


Carbon Dioxide Level


 21 mmol/L


(21-32) 


 22 mmol/L


(21-32) 





 


Anion Gap 14 (6-14)   9 (6-14)  


 


Blood Urea Nitrogen


 20 mg/dL


(8-26) 


 20 mg/dL


(8-26) 





 


Creatinine


 2.3 mg/dL


(0.7-1.3) 


 1.8 mg/dL


(0.7-1.3) 





 


Estimated GFR


(Cockcroft-Gault) 27.0 


 


 35.9 


 





 


BUN/Creatinine Ratio 9 (6-20)    


 


Glucose Level


 164 mg/dL


(70-99) 


 157 mg/dL


(70-99) 





 


Lactic Acid Level


 7.6 mmol/L


(0.4-2.0) 4.3 mmol/L


(0.4-2.0) 1.8 mmol/L


(0.4-2.0) 





 


Calcium Level


 7.7 mg/dL


(8.5-10.1) 


 7.0 mg/dL


(8.5-10.1) 





 


Total Bilirubin


 1.2 mg/dL


(0.2-1.0) 


 


 





 


Aspartate Amino Transf


(AST/SGOT) 233 U/L


(15-37) 


 


 





 


Alanine Aminotransferase


(ALT/SGPT) 52 U/L (16-63) 


 


 


 





 


Alkaline Phosphatase


 240 U/L


() 


 


 





 


Troponin I Quantitative


 < 0.017 ng/mL


(0.000-0.055) 


 


 





 


NT-Pro-B-Type Natriuretic


Peptide 2822 pg/mL


(0-449) 


 


 





 


Total Protein


 5.5 g/dL


(6.4-8.2) 


 


 





 


Albumin


 1.4 g/dL


(3.4-5.0) 


 


 





 


Albumin/Globulin Ratio 0.3 (1.0-1.7)    


 


Magnesium Level


 


 


 1.9 mg/dL


(1.8-2.4) 





 


Iron Level


 


 


 48 ug/dL


() 





 


Total Iron Binding Capacity


 


 


 59 ug/dL


(250-450) 





 


Iron Saturation   81 % (15-34)  


 


Vitamin B12 Level


 


 


 > 2000 pg/mL


(247-911) 





 


Urine Collection Type    Unknown 


 


Urine Color    Stefanie 


 


Urine Clarity    Clear 


 


Urine pH    5.0 


 


Urine Specific Gravity    >=1.030 


 


Urine Protein


 


 


 


 Negative mg/dL


(NEG-TRACE)


 


Urine Glucose (UA)


 


 


 


 Negative mg/dL


(NEG)


 


Urine Ketones (Stick)


 


 


 


 Negative mg/dL


(NEG)


 


Urine Blood    Negative (NEG) 


 


Urine Nitrite    Negative (NEG) 


 


Urine Bilirubin    Small (NEG) 


 


Urine Urobilinogen Dipstick


 


 


 


 0.2 mg/dL (0.2


mg/dL)


 


Urine Leukocyte Esterase    Negative (NEG) 


 


Urine RBC    0 /HPF (0-2) 


 


Urine WBC    1-4 /HPF (0-4) 


 


Urine Squamous Epithelial


Cells 


 


 


 Occ /LPF 





 


Urine Bacteria


 


 


 


 Few /HPF


(0-FEW)


 


Urine Hyaline Casts    Moderate /HPF 


 


Urine Mucus    Marked /LPF 


 


Test


 10/18/19


14:00 10/18/19


17:34 10/19/19


05:35 





 


Body Fluid Source Pleural    


 


Body Fluid Color Yellow    


 


Body Fluid Clarity Clear    


 


Body Fluid Nucleated Cells


 16 /cmm (Not


Established) 


 


 





 


Body Fluid Mononuclear WBCs


(%) 100 % 


 


 


 





 


Body Fluid Polymorphonuclear


Cells 0 % 


 


 


 





 


Body Fluid Total RBCs Counted


 4 /cmm (Not


Established) 


 


 





 


Glucose (Fingerstick)


 


 122 mg/dL


(70-99) 


 





 


White Blood Count


 


 


 4.9 x10^3/uL


(4.0-11.0) 





 


Red Blood Count


 


 


 3.05 x10^6/uL


(4.30-5.70) 





 


Hemoglobin


 


 


 9.4 g/dL


(13.0-17.5) 





 


Hematocrit


 


 


 27.7 %


(39.0-53.0) 





 


Mean Corpuscular Volume   91 fL ()  


 


Mean Corpuscular Hemoglobin   31 pg (25-35)  


 


Mean Corpuscular Hemoglobin


Concent 


 


 34 g/dL


(31-37) 





 


Red Cell Distribution Width


 


 


 15.3 %


(11.5-14.5) 





 


Platelet Count


 


 


 114 x10^3/uL


(140-400) 





 


Neutrophils (%) (Auto)   76 % (31-73)  


 


Lymphocytes (%) (Auto)   17 % (24-48)  


 


Monocytes (%) (Auto)   6 % (0-9)  


 


Eosinophils (%) (Auto)   0 % (0-3)  


 


Basophils (%) (Auto)   1 % (0-3)  


 


Neutrophils # (Auto)


 


 


 3.7 x10^3/uL


(1.8-7.7) 





 


Lymphocytes # (Auto)


 


 


 0.8 x10^3/uL


(1.0-4.8) 





 


Monocytes # (Auto)


 


 


 0.3 x10^3/uL


(0.0-1.1) 





 


Eosinophils # (Auto)


 


 


 0.0 x10^3/uL


(0.0-0.7) 





 


Basophils # (Auto)


 


 


 0.0 x10^3/uL


(0.0-0.2) 





 


Sodium Level


 


 


 138 mmol/L


(136-145) 





 


Potassium Level


 


 


 3.6 mmol/L


(3.5-5.1) 





 


Chloride Level


 


 


 108 mmol/L


() 





 


Carbon Dioxide Level


 


 


 24 mmol/L


(21-32) 





 


Anion Gap   6 (6-14)  


 


Blood Urea Nitrogen


 


 


 18 mg/dL


(8-26) 





 


Creatinine


 


 


 1.8 mg/dL


(0.7-1.3) 





 


Estimated GFR


(Cockcroft-Gault) 


 


 35.9 


 





 


BUN/Creatinine Ratio   10 (6-20)  


 


Glucose Level


 


 


 120 mg/dL


(70-99) 





 


Calcium Level


 


 


 6.8 mg/dL


(8.5-10.1) 





 


Total Bilirubin


 


 


 1.0 mg/dL


(0.2-1.0) 





 


Aspartate Amino Transf


(AST/SGOT) 


 


 69 U/L (15-37) 


 





 


Alanine Aminotransferase


(ALT/SGPT) 


 


 28 U/L (16-63) 


 





 


Alkaline Phosphatase


 


 


 137 U/L


() 





 


Total Protein


 


 


 4.3 g/dL


(6.4-8.2) 





 


Albumin


 


 


 1.3 g/dL


(3.4-5.0) 





 


Albumin/Globulin Ratio   0.4 (1.0-1.7)  








Laboratory Tests








Test


 10/18/19


14:00 10/18/19


17:34 10/19/19


05:35


 


Body Fluid Source Pleural   


 


Body Fluid Color Yellow   


 


Body Fluid Clarity Clear   


 


Body Fluid Nucleated Cells


 16 /cmm (Not


Established) 


 





 


Body Fluid Mononuclear WBCs


(%) 100 % 


 


 





 


Body Fluid Polymorphonuclear


Cells 0 % 


 


 





 


Body Fluid Total RBCs Counted


 4 /cmm (Not


Established) 


 





 


Glucose (Fingerstick)


 


 122 mg/dL


(70-99) 





 


White Blood Count


 


 


 4.9 x10^3/uL


(4.0-11.0)


 


Red Blood Count


 


 


 3.05 x10^6/uL


(4.30-5.70)


 


Hemoglobin


 


 


 9.4 g/dL


(13.0-17.5)


 


Hematocrit


 


 


 27.7 %


(39.0-53.0)


 


Mean Corpuscular Volume   91 fL () 


 


Mean Corpuscular Hemoglobin   31 pg (25-35) 


 


Mean Corpuscular Hemoglobin


Concent 


 


 34 g/dL


(31-37)


 


Red Cell Distribution Width


 


 


 15.3 %


(11.5-14.5)


 


Platelet Count


 


 


 114 x10^3/uL


(140-400)


 


Neutrophils (%) (Auto)   76 % (31-73) 


 


Lymphocytes (%) (Auto)   17 % (24-48) 


 


Monocytes (%) (Auto)   6 % (0-9) 


 


Eosinophils (%) (Auto)   0 % (0-3) 


 


Basophils (%) (Auto)   1 % (0-3) 


 


Neutrophils # (Auto)


 


 


 3.7 x10^3/uL


(1.8-7.7)


 


Lymphocytes # (Auto)


 


 


 0.8 x10^3/uL


(1.0-4.8)


 


Monocytes # (Auto)


 


 


 0.3 x10^3/uL


(0.0-1.1)


 


Eosinophils # (Auto)


 


 


 0.0 x10^3/uL


(0.0-0.7)


 


Basophils # (Auto)


 


 


 0.0 x10^3/uL


(0.0-0.2)


 


Sodium Level


 


 


 138 mmol/L


(136-145)


 


Potassium Level


 


 


 3.6 mmol/L


(3.5-5.1)


 


Chloride Level


 


 


 108 mmol/L


()


 


Carbon Dioxide Level


 


 


 24 mmol/L


(21-32)


 


Anion Gap   6 (6-14) 


 


Blood Urea Nitrogen


 


 


 18 mg/dL


(8-26)


 


Creatinine


 


 


 1.8 mg/dL


(0.7-1.3)


 


Estimated GFR


(Cockcroft-Gault) 


 


 35.9 





 


BUN/Creatinine Ratio   10 (6-20) 


 


Glucose Level


 


 


 120 mg/dL


(70-99)


 


Calcium Level


 


 


 6.8 mg/dL


(8.5-10.1)


 


Total Bilirubin


 


 


 1.0 mg/dL


(0.2-1.0)


 


Aspartate Amino Transf


(AST/SGOT) 


 


 69 U/L (15-37) 





 


Alanine Aminotransferase


(ALT/SGPT) 


 


 28 U/L (16-63) 





 


Alkaline Phosphatase


 


 


 137 U/L


()


 


Total Protein


 


 


 4.3 g/dL


(6.4-8.2)


 


Albumin


 


 


 1.3 g/dL


(3.4-5.0)


 


Albumin/Globulin Ratio   0.4 (1.0-1.7) 








Microbiology


10/17/19 Blood Culture - Preliminary, Resulted


           NO GROWTH AFTER 1 DAY


Medications





Current Medications


Sodium Chloride 1,000 ml @  1,000 mls/hr Q1H IV  Last administered on 10/17/19at

15:55;  Start 10/17/19 at 15:34;  Stop 10/17/19 at 16:33;  Status DC


Ondansetron HCl (Zofran) 4 mg 1X  ONCE IV  Last administered on 10/17/19at 

16:09;  Start 10/17/19 at 16:30;  Stop 10/17/19 at 16:31;  Status DC


Sodium Chloride 1,000 ml @  1,000 mls/hr 1X  ONCE IV  Last administered on 

10/17/19at 17:27;  Start 10/17/19 at 16:45;  Stop 10/17/19 at 17:44;  Status DC


Piperacillin Sod/ Tazobactam Sod 3.375 gm/Sodium Chloride 50 ml @  100 mls/hr 1X

 ONCE IV  Last administered on 10/17/19at 17:27;  Start 10/17/19 at 18:00;  Stop

10/17/19 at 18:29;  Status DC


Vancomycin HCl 250 ml @  250 mls/hr 1X  ONCE IV  Last administered on 10/17/19at

17:36;  Start 10/17/19 at 17:00;  Stop 10/17/19 at 17:59;  Status DC


Levofloxacin/ Dextrose 150 ml @  100 mls/hr 1X  ONCE IV  Last administered on 

10/17/19at 18:17;  Start 10/17/19 at 16:45;  Stop 10/17/19 at 18:14;  Status DC


Sodium Chloride 1,000 ml @  150 mls/hr Q6H40M IV ;  Start 10/17/19 at 17:05;  

Stop 10/17/19 at 21:10;  Status DC


Dopamine HCl/ Dextrose 250 ml @  10.121 mls/ hr CONT  PRN IV SEE I/O RECORD Last

administered on 10/17/19at 20:32;  Start 10/17/19 at 20:00


Piperacillin Sod/ Tazobactam Sod 3.375 gm/Sodium Chloride 50 ml @  100 mls/hr 

Q8HRS IV  Last administered on 10/19/19at 06:00;  Start 10/17/19 at 22:00


Linezolid/Dextrose 300 ml @  300 mls/hr Q12HR IV  Last administered on 

10/19/19at 09:10;  Start 10/17/19 at 21:00


Enoxaparin Sodium (Lovenox 30mg Syringe) 30 mg Q24H SQ  Last administered on 

10/18/19at 23:24;  Start 10/17/19 at 21:00


Sodium Chloride 1,000 ml @  75 mls/hr [id number] IV  Last administered on 

10/19/19at 06:00;  Start 10/18/19 at 18:00


Pantoprazole Sodium (Protonix) 40 mg BIDAC PO  Last administered on 10/18/19at 

08:43;  Start 10/18/19 at 07:30;  Stop 10/18/19 at 13:49;  Status DC


Aspirin (Children'S Aspirin) 81 mg DAILYWBKFT PO  Last administered on 

10/18/19at 08:43;  Start 10/18/19 at 08:00;  Stop 10/18/19 at 09:29;  Status DC


Citalopram Hydrobromide (CeleXA) 20 mg DAILY PO  Last administered on 10/19/19at

09:09;  Start 10/18/19 at 09:00


Norepinephrine Bitartrate 250 ml @  12.97 mls/ hr CONT  PRN IV SEE I/O RECORD 

Last administered on 10/18/19at 22:48;  Start 10/17/19 at 21:15


Sodium Chloride 500 ml @  500 mls/hr 1X  ONCE IV  Last administered on 

10/17/19at 21:40;  Start 10/17/19 at 21:15;  Stop 10/17/19 at 22:14;  Status DC


Ondansetron HCl (Zofran) 4 mg PRN Q6HRS  PRN IVP NAUSEA/VOMITING Last 

administered on 10/18/19at 15:42;  Start 10/17/19 at 21:30


Albumin Human 100 ml @  100 mls/hr 1X  ONCE IV  Last administered on 10/18/19at 

10:37;  Start 10/18/19 at 09:15;  Stop 10/18/19 at 10:14;  Status DC


Furosemide (Lasix) 20 mg 1X  ONCE IVP  Last administered on 10/18/19at 11:42;  

Start 10/18/19 at 10:15;  Stop 10/18/19 at 10:16;  Status DC


Aspirin (Ecotrin) 81 mg DAILYWBKFT PO  Last administered on 10/19/19at 09:09;  

Start 10/19/19 at 08:00


Potassium Chloride/Water 100 ml @  100 mls/hr Q1H IV  Last administered on 

10/18/19at 14:53;  Start 10/18/19 at 12:00;  Stop 10/18/19 at 15:59;  Status DC


Lactobacillus Rhamnosus (Culturelle) 1 cap BID PO  Last administered on 

10/19/19at 09:10;  Start 10/18/19 at 21:00


Pantoprazole Sodium (PROTONIX VIAL for IV PUSH) 40 mg BIDAC IVP  Last 

administered on 10/19/19at 09:09;  Start 10/18/19 at 16:30





Active Scripts


Active


Aspirin Ec (Aspirin) 81 Mg Tablet. 81 Mg PO DAILYWBKFT 30 Days


Reported


Protonix (Pantoprazole Sodium) 20 Mg Tablet. 40 Mg PO BID


Escitalopram Oxalate 10 Mg Tablet 10 Mg PO DAILY


Vitals/I & O





Vital Sign - Last 24 Hours








 10/18/19 10/18/19 10/18/19 10/18/19





 11:00 12:00 12:00 13:00


 


Temp    97.6





    97.6


 


Pulse 102  81 83


 


Resp 33  18 16


 


B/P (MAP) 141/67 (91)  113/62 (79) 120/66 (84)


 


Pulse Ox 100  97 96


 


O2 Delivery Nasal Cannula Nasal Cannula Nasal Cannula Nasal Cannula


 


O2 Flow Rate 4.0 4.0 4.0 4.0


 


    





    





 10/18/19 10/18/19 10/18/19 10/18/19





 14:00 15:00 16:00 16:00


 


Pulse 93 74  73


 


Resp 16 16  14


 


B/P (MAP) 111/61 (78) 106/60 (75)  108/61 (77)


 


Pulse Ox 100 98  99


 


O2 Delivery Nasal Cannula Nasal Cannula Nasal Cannula Nasal Cannula


 


O2 Flow Rate 4.0 2.0 2.0 2.0





 10/18/19 10/18/19 10/18/19 10/18/19





 17:00 17:11 18:00 19:00


 


Temp 97.6  97.6 





 97.6  97.6 


 


Pulse 84  70 70


 


Resp   18 18


 


B/P (MAP) 106/58 (74)  101/62 (75) 98/59 (72)


 


Pulse Ox 99  99 98


 


O2 Delivery Nasal Cannula Nasal Cannula Nasal Cannula Nasal Cannula


 


O2 Flow Rate 2.0 2.0 2.0 2.0


 


    





    





 10/18/19 10/18/19 10/18/19 10/18/19





 20:00 20:00 21:00 22:00


 


Pulse 74  72 72


 


Resp 18  18 18


 


B/P (MAP) 101/61 (74)  103/59 (74) 97/59 (72)


 


Pulse Ox 98  100 100


 


O2 Delivery Nasal Cannula Nasal Cannula Nasal Cannula Nasal Cannula


 


O2 Flow Rate 2.0 2.0 2.0 2.0





 10/18/19 10/18/19 10/19/19 10/19/19





 23:00 23:59 00:00 01:00


 


Pulse 72  74 76


 


Resp 18  18 18


 


B/P (MAP) 102/62 (75)  89/50 (63) 97/56 (70)


 


Pulse Ox 100  100 100


 


O2 Delivery Nasal Cannula Nasal Cannula Nasal Cannula Nasal Cannula


 


O2 Flow Rate 2.0 2.0 2.0 2.0





 10/19/19 10/19/19 10/19/19 10/19/19





 02:00 03:00 04:00 04:00


 


Temp 97.7   98.1





 97.7   98.1


 


Pulse 74 72  75


 


Resp 18 18  18


 


B/P (MAP) 102/57 (72) 96/56 (69)  95/58 (70)


 


Pulse Ox 100 98  100


 


O2 Delivery Nasal Cannula Nasal Cannula Nasal Cannula Nasal Cannula


 


O2 Flow Rate 2.0 2.0 2.0 2.0


 


    





    





 10/19/19 10/19/19 10/19/19 10/19/19





 05:02 06:00 07:00 08:00


 


Temp   97.8 





   97.8 


 


Pulse 73 73 74 


 


Resp 18 18 16 


 


B/P (MAP) 101/59 (73) 94/54 (67) 98/58 (71) 


 


Pulse Ox 99 97 99 


 


O2 Delivery Nasal Cannula Nasal Cannula Nasal Cannula Nasal Cannula


 


O2 Flow Rate 2.0 2.0 2.0 2.0


 


    





    





 10/19/19 10/19/19  





 08:00 09:00  


 


Temp  97.8  





  97.8  


 


Pulse 94 73  


 


Resp 19 18  


 


B/P (MAP) 94/56 (69) 89/64 (72)  


 


Pulse Ox 99 99  


 


O2 Delivery Nasal Cannula Nasal Cannula  


 


O2 Flow Rate 2.0 2.0  














Intake and Output   


 


 10/18/19 10/18/19 10/19/19





 15:00 23:00 07:00


 


Intake Total  250 ml 0 ml


 


Output Total 1850 ml 540 ml 550 ml


 


Balance -1850 ml -290 ml -550 ml











Nutrition Consultation


Dietary Evaluation:


Recommendations by RD:  Increase Calorie Intake, PPN/TPN


Comments:  


REC TPN per followin g dextrose, 60 g AA, 20 g lipids





REC advance diet as able pending SLP recommendations, goal diet ADA  


w/textures and liquids per SLP


Expected Outcomes/Goals:  


Nutrition support + PO intake to meet >75% est needs





Malnutrition Findings:


Body Fat Depletion (Non Severe:  Mild Depletion


Weight Status:  Appropriate











DEBBY MARTINEZ MD             Oct 19, 2019 10:45

## 2019-10-19 NOTE — PDOC
PROGRESS NOTES


Chief Complaint


Chief Complaint


sepsis, with hypotension,


N/v, diarrhea and H/o dysphagia/esophageal dysmotility


chronic systolic CHF pleural effusions, anasarca,  from CAD


acute renal failure


severe malnutrition


chronic anemia, coagulopathy, 


transaminitis, 


deaf 


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





History of Present Illness


History of Present Illness


still on levephed


 deaf


no event


vitals better


family here,  plan discussed, consult GI, 


mult other consults following





Vitals


Vitals





Vital Signs








  Date Time  Temp Pulse Resp B/P (MAP) Pulse Ox O2 Delivery O2 Flow Rate FiO2


 


10/19/19 11:00 97.5 72 18 97/60 (72) 99 Nasal Cannula 2.0 





 97.5       











Physical Exam


Physical Exam


GENERAL:  Alert, oriented gentleman, not in any distress.


HEENT:  Both pupils are round and reacting.  No conjunctival lesion, no lesion


in the mouth.


NECK:  Supple, RT IJ looks ok


LUNGS:  Decreased breath sounds.


HEART:  S1, S2 regular.  No gallop or murmur.


ABDOMEN:  Soft, nontender, no organomegaly.


EXTREMITIES:  edema +


NEUROLOGIC:  Other than poor hearing, the patient does move all the extremities.


 There is no other focal deficit.


General:  Alert, Cooperative, No acute distress, Other (cachectic)


Heart:  Regular rate (SR), Normal S1, Normal S2, No murmurs


Lungs:  Clear, Other


Abdomen:  Soft, No tenderness


Extremities:  No cyanosis, Other (anasarca)


Skin:  No breakdown, No significant lesion, Other (pale)





Labs


LABS





Laboratory Tests








Test


 10/18/19


14:00 10/18/19


17:34 10/19/19


05:35


 


Body Fluid Source Pleural   


 


Body Fluid Color Yellow   


 


Body Fluid Clarity Clear   


 


Body Fluid Nucleated Cells


 16 /cmm (Not


Established) 


 





 


Body Fluid Mononuclear WBCs


(%) 100 % 


 


 





 


Body Fluid Polymorphonuclear


Cells 0 % 


 


 





 


Body Fluid Total RBCs Counted


 4 /cmm (Not


Established) 


 





 


Body Fluid Glucose 171 mg/dL (.)   


 


Body Fluid Total Protein 1.0 g/dL (.)   


 


Body Fluid Lactate


Dehydrogenase 72 IU/L (.) 


 


 





 


Glucose (Fingerstick)


 


 122 mg/dL


(70-99) 





 


White Blood Count


 


 


 4.9 x10^3/uL


(4.0-11.0)


 


Red Blood Count


 


 


 3.05 x10^6/uL


(4.30-5.70)


 


Hemoglobin


 


 


 9.4 g/dL


(13.0-17.5)


 


Hematocrit


 


 


 27.7 %


(39.0-53.0)


 


Mean Corpuscular Volume   91 fL () 


 


Mean Corpuscular Hemoglobin   31 pg (25-35) 


 


Mean Corpuscular Hemoglobin


Concent 


 


 34 g/dL


(31-37)


 


Red Cell Distribution Width


 


 


 15.3 %


(11.5-14.5)


 


Platelet Count


 


 


 114 x10^3/uL


(140-400)


 


Neutrophils (%) (Auto)   76 % (31-73) 


 


Lymphocytes (%) (Auto)   17 % (24-48) 


 


Monocytes (%) (Auto)   6 % (0-9) 


 


Eosinophils (%) (Auto)   0 % (0-3) 


 


Basophils (%) (Auto)   1 % (0-3) 


 


Neutrophils # (Auto)


 


 


 3.7 x10^3/uL


(1.8-7.7)


 


Lymphocytes # (Auto)


 


 


 0.8 x10^3/uL


(1.0-4.8)


 


Monocytes # (Auto)


 


 


 0.3 x10^3/uL


(0.0-1.1)


 


Eosinophils # (Auto)


 


 


 0.0 x10^3/uL


(0.0-0.7)


 


Basophils # (Auto)


 


 


 0.0 x10^3/uL


(0.0-0.2)


 


Sodium Level


 


 


 138 mmol/L


(136-145)


 


Potassium Level


 


 


 3.6 mmol/L


(3.5-5.1)


 


Chloride Level


 


 


 108 mmol/L


()


 


Carbon Dioxide Level


 


 


 24 mmol/L


(21-32)


 


Anion Gap   6 (6-14) 


 


Blood Urea Nitrogen


 


 


 18 mg/dL


(8-26)


 


Creatinine


 


 


 1.8 mg/dL


(0.7-1.3)


 


Estimated GFR


(Cockcroft-Gault) 


 


 35.9 





 


BUN/Creatinine Ratio   10 (6-20) 


 


Glucose Level


 


 


 120 mg/dL


(70-99)


 


Calcium Level


 


 


 6.8 mg/dL


(8.5-10.1)


 


Total Bilirubin


 


 


 1.0 mg/dL


(0.2-1.0)


 


Aspartate Amino Transf


(AST/SGOT) 


 


 69 U/L (15-37) 





 


Alanine Aminotransferase


(ALT/SGPT) 


 


 28 U/L (16-63) 





 


Alkaline Phosphatase


 


 


 137 U/L


()


 


Total Protein


 


 


 4.3 g/dL


(6.4-8.2)


 


Albumin


 


 


 1.3 g/dL


(3.4-5.0)


 


Albumin/Globulin Ratio   0.4 (1.0-1.7) 











Assessment and Plan


Assessmemt and Plan


Problems


Medical Problems:


(1) Anemia


Status: Acute  





(2) CHF (congestive heart failure)


Status: Acute  





(3) Diarrhea


Status: Acute  





(4) Elevated liver function tests


Status: Acute  





(5) Hypoalbuminemia


Status: Acute  





(6) Hypocalcemia


Status: Acute  





(7) Pleural effusion


Status: Acute  





(8) Renal insufficiency


Status: Acute  





(9) Severe sepsis


Status: Acute  





(10) Tachycardia


Status: Acute  











Comment


Review of Relevant


I have reviewed the following items milana (where applicable) has been applied.


Labs





Laboratory Tests








Test


 10/17/19


15:40 10/17/19


19:05 10/18/19


04:30 10/18/19


07:40


 


White Blood Count


 9.7 x10^3/uL


(4.0-11.0) 


 7.4 x10^3/uL


(4.0-11.0) 





 


Red Blood Count


 3.84 x10^6/uL


(4.30-5.70) 


 3.14 x10^6/uL


(4.30-5.70) 





 


Hemoglobin


 11.9 g/dL


(13.0-17.5) 


 9.8 g/dL


(13.0-17.5) 





 


Hematocrit


 36.0 %


(39.0-53.0) 


 28.7 %


(39.0-53.0) 





 


Mean Corpuscular Volume 94 fL ()   91 fL ()  


 


Mean Corpuscular Hemoglobin 31 pg (25-35)   31 pg (25-35)  


 


Mean Corpuscular Hemoglobin


Concent 33 g/dL


(31-37) 


 34 g/dL


(31-37) 





 


Red Cell Distribution Width


 15.9 %


(11.5-14.5) 


 15.4 %


(11.5-14.5) 





 


Platelet Count


 156 x10^3/uL


(140-400) 


 129 x10^3/uL


(140-400) 





 


Neutrophils (%) (Auto) 80 % (31-73)   84 % (31-73)  


 


Lymphocytes (%) (Auto) 13 % (24-48)   9 % (24-48)  


 


Monocytes (%) (Auto) 7 % (0-9)   7 % (0-9)  


 


Eosinophils (%) (Auto) 0 % (0-3)   0 % (0-3)  


 


Basophils (%) (Auto) 0 % (0-3)   0 % (0-3)  


 


Neutrophils # (Auto)


 7.8 x10^3/uL


(1.8-7.7) 


 6.2 x10^3/uL


(1.8-7.7) 





 


Lymphocytes # (Auto)


 1.2 x10^3/uL


(1.0-4.8) 


 0.7 x10^3/uL


(1.0-4.8) 





 


Monocytes # (Auto)


 0.6 x10^3/uL


(0.0-1.1) 


 0.5 x10^3/uL


(0.0-1.1) 





 


Eosinophils # (Auto)


 0.0 x10^3/uL


(0.0-0.7) 


 0.0 x10^3/uL


(0.0-0.7) 





 


Basophils # (Auto)


 0.0 x10^3/uL


(0.0-0.2) 


 0.0 x10^3/uL


(0.0-0.2) 





 


Prothrombin Time


 17.6 SEC


(11.7-14.0) 


 


 





 


Prothromb Time International


Ratio 1.5 (0.8-1.1) 


 


 


 





 


Sodium Level


 139 mmol/L


(136-145) 


 138 mmol/L


(136-145) 





 


Potassium Level


 3.8 mmol/L


(3.5-5.1) 


 3.4 mmol/L


(3.5-5.1) 





 


Chloride Level


 104 mmol/L


() 


 107 mmol/L


() 





 


Carbon Dioxide Level


 21 mmol/L


(21-32) 


 22 mmol/L


(21-32) 





 


Anion Gap 14 (6-14)   9 (6-14)  


 


Blood Urea Nitrogen


 20 mg/dL


(8-26) 


 20 mg/dL


(8-26) 





 


Creatinine


 2.3 mg/dL


(0.7-1.3) 


 1.8 mg/dL


(0.7-1.3) 





 


Estimated GFR


(Cockcroft-Gault) 27.0 


 


 35.9 


 





 


BUN/Creatinine Ratio 9 (6-20)    


 


Glucose Level


 164 mg/dL


(70-99) 


 157 mg/dL


(70-99) 





 


Lactic Acid Level


 7.6 mmol/L


(0.4-2.0) 4.3 mmol/L


(0.4-2.0) 1.8 mmol/L


(0.4-2.0) 





 


Calcium Level


 7.7 mg/dL


(8.5-10.1) 


 7.0 mg/dL


(8.5-10.1) 





 


Total Bilirubin


 1.2 mg/dL


(0.2-1.0) 


 


 





 


Aspartate Amino Transf


(AST/SGOT) 233 U/L


(15-37) 


 


 





 


Alanine Aminotransferase


(ALT/SGPT) 52 U/L (16-63) 


 


 


 





 


Alkaline Phosphatase


 240 U/L


() 


 


 





 


Troponin I Quantitative


 < 0.017 ng/mL


(0.000-0.055) 


 


 





 


NT-Pro-B-Type Natriuretic


Peptide 2822 pg/mL


(0-449) 


 


 





 


Total Protein


 5.5 g/dL


(6.4-8.2) 


 


 





 


Albumin


 1.4 g/dL


(3.4-5.0) 


 


 





 


Albumin/Globulin Ratio 0.3 (1.0-1.7)    


 


Magnesium Level


 


 


 1.9 mg/dL


(1.8-2.4) 





 


Iron Level


 


 


 48 ug/dL


() 





 


Total Iron Binding Capacity


 


 


 59 ug/dL


(250-450) 





 


Iron Saturation   81 % (15-34)  


 


Vitamin B12 Level


 


 


 > 2000 pg/mL


(247-911) 





 


Urine Collection Type    Unknown 


 


Urine Color    Stefanie 


 


Urine Clarity    Clear 


 


Urine pH    5.0 


 


Urine Specific Gravity    >=1.030 


 


Urine Protein


 


 


 


 Negative mg/dL


(NEG-TRACE)


 


Urine Glucose (UA)


 


 


 


 Negative mg/dL


(NEG)


 


Urine Ketones (Stick)


 


 


 


 Negative mg/dL


(NEG)


 


Urine Blood    Negative (NEG) 


 


Urine Nitrite    Negative (NEG) 


 


Urine Bilirubin    Small (NEG) 


 


Urine Urobilinogen Dipstick


 


 


 


 0.2 mg/dL (0.2


mg/dL)


 


Urine Leukocyte Esterase    Negative (NEG) 


 


Urine RBC    0 /HPF (0-2) 


 


Urine WBC    1-4 /HPF (0-4) 


 


Urine Squamous Epithelial


Cells 


 


 


 Occ /LPF 





 


Urine Bacteria


 


 


 


 Few /HPF


(0-FEW)


 


Urine Hyaline Casts    Moderate /HPF 


 


Urine Mucus    Marked /LPF 


 


Test


 10/18/19


14:00 10/18/19


17:34 10/19/19


05:35 





 


Body Fluid Source Pleural    


 


Body Fluid Color Yellow    


 


Body Fluid Clarity Clear    


 


Body Fluid Nucleated Cells


 16 /cmm (Not


Established) 


 


 





 


Body Fluid Mononuclear WBCs


(%) 100 % 


 


 


 





 


Body Fluid Polymorphonuclear


Cells 0 % 


 


 


 





 


Body Fluid Total RBCs Counted


 4 /cmm (Not


Established) 


 


 





 


Body Fluid Glucose 171 mg/dL (.)    


 


Body Fluid Total Protein 1.0 g/dL (.)    


 


Body Fluid Lactate


Dehydrogenase 72 IU/L (.) 


 


 


 





 


Glucose (Fingerstick)


 


 122 mg/dL


(70-99) 


 





 


White Blood Count


 


 


 4.9 x10^3/uL


(4.0-11.0) 





 


Red Blood Count


 


 


 3.05 x10^6/uL


(4.30-5.70) 





 


Hemoglobin


 


 


 9.4 g/dL


(13.0-17.5) 





 


Hematocrit


 


 


 27.7 %


(39.0-53.0) 





 


Mean Corpuscular Volume   91 fL ()  


 


Mean Corpuscular Hemoglobin   31 pg (25-35)  


 


Mean Corpuscular Hemoglobin


Concent 


 


 34 g/dL


(31-37) 





 


Red Cell Distribution Width


 


 


 15.3 %


(11.5-14.5) 





 


Platelet Count


 


 


 114 x10^3/uL


(140-400) 





 


Neutrophils (%) (Auto)   76 % (31-73)  


 


Lymphocytes (%) (Auto)   17 % (24-48)  


 


Monocytes (%) (Auto)   6 % (0-9)  


 


Eosinophils (%) (Auto)   0 % (0-3)  


 


Basophils (%) (Auto)   1 % (0-3)  


 


Neutrophils # (Auto)


 


 


 3.7 x10^3/uL


(1.8-7.7) 





 


Lymphocytes # (Auto)


 


 


 0.8 x10^3/uL


(1.0-4.8) 





 


Monocytes # (Auto)


 


 


 0.3 x10^3/uL


(0.0-1.1) 





 


Eosinophils # (Auto)


 


 


 0.0 x10^3/uL


(0.0-0.7) 





 


Basophils # (Auto)


 


 


 0.0 x10^3/uL


(0.0-0.2) 





 


Sodium Level


 


 


 138 mmol/L


(136-145) 





 


Potassium Level


 


 


 3.6 mmol/L


(3.5-5.1) 





 


Chloride Level


 


 


 108 mmol/L


() 





 


Carbon Dioxide Level


 


 


 24 mmol/L


(21-32) 





 


Anion Gap   6 (6-14)  


 


Blood Urea Nitrogen


 


 


 18 mg/dL


(8-26) 





 


Creatinine


 


 


 1.8 mg/dL


(0.7-1.3) 





 


Estimated GFR


(Cockcroft-Gault) 


 


 35.9 


 





 


BUN/Creatinine Ratio   10 (6-20)  


 


Glucose Level


 


 


 120 mg/dL


(70-99) 





 


Calcium Level


 


 


 6.8 mg/dL


(8.5-10.1) 





 


Total Bilirubin


 


 


 1.0 mg/dL


(0.2-1.0) 





 


Aspartate Amino Transf


(AST/SGOT) 


 


 69 U/L (15-37) 


 





 


Alanine Aminotransferase


(ALT/SGPT) 


 


 28 U/L (16-63) 


 





 


Alkaline Phosphatase


 


 


 137 U/L


() 





 


Total Protein


 


 


 4.3 g/dL


(6.4-8.2) 





 


Albumin


 


 


 1.3 g/dL


(3.4-5.0) 





 


Albumin/Globulin Ratio   0.4 (1.0-1.7)  








Laboratory Tests








Test


 10/18/19


14:00 10/18/19


17:34 10/19/19


05:35


 


Body Fluid Source Pleural   


 


Body Fluid Color Yellow   


 


Body Fluid Clarity Clear   


 


Body Fluid Nucleated Cells


 16 /cmm (Not


Established) 


 





 


Body Fluid Mononuclear WBCs


(%) 100 % 


 


 





 


Body Fluid Polymorphonuclear


Cells 0 % 


 


 





 


Body Fluid Total RBCs Counted


 4 /cmm (Not


Established) 


 





 


Body Fluid Glucose 171 mg/dL (.)   


 


Body Fluid Total Protein 1.0 g/dL (.)   


 


Body Fluid Lactate


Dehydrogenase 72 IU/L (.) 


 


 





 


Glucose (Fingerstick)


 


 122 mg/dL


(70-99) 





 


White Blood Count


 


 


 4.9 x10^3/uL


(4.0-11.0)


 


Red Blood Count


 


 


 3.05 x10^6/uL


(4.30-5.70)


 


Hemoglobin


 


 


 9.4 g/dL


(13.0-17.5)


 


Hematocrit


 


 


 27.7 %


(39.0-53.0)


 


Mean Corpuscular Volume   91 fL () 


 


Mean Corpuscular Hemoglobin   31 pg (25-35) 


 


Mean Corpuscular Hemoglobin


Concent 


 


 34 g/dL


(31-37)


 


Red Cell Distribution Width


 


 


 15.3 %


(11.5-14.5)


 


Platelet Count


 


 


 114 x10^3/uL


(140-400)


 


Neutrophils (%) (Auto)   76 % (31-73) 


 


Lymphocytes (%) (Auto)   17 % (24-48) 


 


Monocytes (%) (Auto)   6 % (0-9) 


 


Eosinophils (%) (Auto)   0 % (0-3) 


 


Basophils (%) (Auto)   1 % (0-3) 


 


Neutrophils # (Auto)


 


 


 3.7 x10^3/uL


(1.8-7.7)


 


Lymphocytes # (Auto)


 


 


 0.8 x10^3/uL


(1.0-4.8)


 


Monocytes # (Auto)


 


 


 0.3 x10^3/uL


(0.0-1.1)


 


Eosinophils # (Auto)


 


 


 0.0 x10^3/uL


(0.0-0.7)


 


Basophils # (Auto)


 


 


 0.0 x10^3/uL


(0.0-0.2)


 


Sodium Level


 


 


 138 mmol/L


(136-145)


 


Potassium Level


 


 


 3.6 mmol/L


(3.5-5.1)


 


Chloride Level


 


 


 108 mmol/L


()


 


Carbon Dioxide Level


 


 


 24 mmol/L


(21-32)


 


Anion Gap   6 (6-14) 


 


Blood Urea Nitrogen


 


 


 18 mg/dL


(8-26)


 


Creatinine


 


 


 1.8 mg/dL


(0.7-1.3)


 


Estimated GFR


(Cockcroft-Gault) 


 


 35.9 





 


BUN/Creatinine Ratio   10 (6-20) 


 


Glucose Level


 


 


 120 mg/dL


(70-99)


 


Calcium Level


 


 


 6.8 mg/dL


(8.5-10.1)


 


Total Bilirubin


 


 


 1.0 mg/dL


(0.2-1.0)


 


Aspartate Amino Transf


(AST/SGOT) 


 


 69 U/L (15-37) 





 


Alanine Aminotransferase


(ALT/SGPT) 


 


 28 U/L (16-63) 





 


Alkaline Phosphatase


 


 


 137 U/L


()


 


Total Protein


 


 


 4.3 g/dL


(6.4-8.2)


 


Albumin


 


 


 1.3 g/dL


(3.4-5.0)


 


Albumin/Globulin Ratio   0.4 (1.0-1.7) 








Microbiology


10/17/19 Blood Culture - Preliminary, Resulted


           NO GROWTH AFTER 1 DAY


Medications





Current Medications


Sodium Chloride 1,000 ml @  1,000 mls/hr Q1H IV  Last administered on 10/17/19at

15:55;  Start 10/17/19 at 15:34;  Stop 10/17/19 at 16:33;  Status DC


Ondansetron HCl (Zofran) 4 mg 1X  ONCE IV  Last administered on 10/17/19at 

16:09;  Start 10/17/19 at 16:30;  Stop 10/17/19 at 16:31;  Status DC


Sodium Chloride 1,000 ml @  1,000 mls/hr 1X  ONCE IV  Last administered on 

10/17/19at 17:27;  Start 10/17/19 at 16:45;  Stop 10/17/19 at 17:44;  Status DC


Piperacillin Sod/ Tazobactam Sod 3.375 gm/Sodium Chloride 50 ml @  100 mls/hr 1X

 ONCE IV  Last administered on 10/17/19at 17:27;  Start 10/17/19 at 18:00;  Stop

10/17/19 at 18:29;  Status DC


Vancomycin HCl 250 ml @  250 mls/hr 1X  ONCE IV  Last administered on 10/17/19at

17:36;  Start 10/17/19 at 17:00;  Stop 10/17/19 at 17:59;  Status DC


Levofloxacin/ Dextrose 150 ml @  100 mls/hr 1X  ONCE IV  Last administered on 

10/17/19at 18:17;  Start 10/17/19 at 16:45;  Stop 10/17/19 at 18:14;  Status DC


Sodium Chloride 1,000 ml @  150 mls/hr Q6H40M IV ;  Start 10/17/19 at 17:05;  

Stop 10/17/19 at 21:10;  Status DC


Dopamine HCl/ Dextrose 250 ml @  10.121 mls/ hr CONT  PRN IV SEE I/O RECORD Last

administered on 10/17/19at 20:32;  Start 10/17/19 at 20:00


Piperacillin Sod/ Tazobactam Sod 3.375 gm/Sodium Chloride 50 ml @  100 mls/hr 

Q8HRS IV  Last administered on 10/19/19at 06:00;  Start 10/17/19 at 22:00


Linezolid/Dextrose 300 ml @  300 mls/hr Q12HR IV  Last administered on 10/19/1

9at 09:10;  Start 10/17/19 at 21:00


Enoxaparin Sodium (Lovenox 30mg Syringe) 30 mg Q24H SQ  Last administered on 

10/18/19at 23:24;  Start 10/17/19 at 21:00


Sodium Chloride 1,000 ml @  75 mls/hr [id number] IV  Last administered on 

10/19/19at 06:00;  Start 10/18/19 at 18:00


Pantoprazole Sodium (Protonix) 40 mg BIDAC PO  Last administered on 10/18/19at 

08:43;  Start 10/18/19 at 07:30;  Stop 10/18/19 at 13:49;  Status DC


Aspirin (Children'S Aspirin) 81 mg DAILYWBKFT PO  Last administered on 

10/18/19at 08:43;  Start 10/18/19 at 08:00;  Stop 10/18/19 at 09:29;  Status DC


Citalopram Hydrobromide (CeleXA) 20 mg DAILY PO  Last administered on 10/19/19at

09:09;  Start 10/18/19 at 09:00


Norepinephrine Bitartrate 250 ml @  12.97 mls/ hr CONT  PRN IV SEE I/O RECORD 

Last administered on 10/18/19at 22:48;  Start 10/17/19 at 21:15


Sodium Chloride 500 ml @  500 mls/hr 1X  ONCE IV  Last administered on at 21:40;  Start 10/17/19 at 21:15;  Stop 10/17/19 at 22:14;  Status DC


Ondansetron HCl (Zofran) 4 mg PRN Q6HRS  PRN IVP NAUSEA/VOMITING Last 

administered on 10/18/19at 15:42;  Start 10/17/19 at 21:30


Albumin Human 100 ml @  100 mls/hr 1X  ONCE IV  Last administered on 10/18/19at 

10:37;  Start 10/18/19 at 09:15;  Stop 10/18/19 at 10:14;  Status DC


Furosemide (Lasix) 20 mg 1X  ONCE IVP  Last administered on 10/18/19at 11:42;  

Start 10/18/19 at 10:15;  Stop 10/18/19 at 10:16;  Status DC


Aspirin (Ecotrin) 81 mg DAILYWBKFT PO  Last administered on 10/19/19at 09:09;  

Start 10/19/19 at 08:00


Potassium Chloride/Water 100 ml @  100 mls/hr Q1H IV  Last administered on 

10/18/19at 14:53;  Start 10/18/19 at 12:00;  Stop 10/18/19 at 15:59;  Status DC


Lactobacillus Rhamnosus (Culturelle) 1 cap BID PO  Last administered on 

10/19/19at 09:10;  Start 10/18/19 at 21:00


Pantoprazole Sodium (PROTONIX VIAL for IV PUSH) 40 mg BIDAC IVP  Last 

administered on 10/19/19at 09:09;  Start 10/18/19 at 16:30





Active Scripts


Active


Aspirin Ec (Aspirin) 81 Mg Tablet.dr 81 Mg PO DAILYWBKFT 30 Days


Reported


Protonix (Pantoprazole Sodium) 20 Mg Tablet.dr 40 Mg PO BID


Escitalopram Oxalate 10 Mg Tablet 10 Mg PO DAILY


Vitals/I & O





Vital Sign - Last 24 Hours








 10/18/19 10/18/19 10/18/19 10/18/19





 13:00 14:00 15:00 16:00


 


Temp 97.6   





 97.6   


 


Pulse 83 93 74 


 


Resp 16 16 16 


 


B/P (MAP) 120/66 (84) 111/61 (78) 106/60 (75) 


 


Pulse Ox 96 100 98 


 


O2 Delivery Nasal Cannula Nasal Cannula Nasal Cannula Nasal Cannula


 


O2 Flow Rate 4.0 4.0 2.0 2.0


 


    





    





 10/18/19 10/18/19 10/18/19 10/18/19





 16:00 17:00 17:11 18:00


 


Temp  97.6  97.6





  97.6  97.6


 


Pulse 73 84  70


 


Resp 14   18


 


B/P (MAP) 108/61 (77) 106/58 (74)  101/62 (75)


 


Pulse Ox 99 99  99


 


O2 Delivery Nasal Cannula Nasal Cannula Nasal Cannula Nasal Cannula


 


O2 Flow Rate 2.0 2.0 2.0 2.0


 


    





    





 10/18/19 10/18/19 10/18/19 10/18/19





 19:00 20:00 20:00 21:00


 


Pulse 70 74  72


 


Resp 18 18  18


 


B/P (MAP) 98/59 (72) 101/61 (74)  103/59 (74)


 


Pulse Ox 98 98  100


 


O2 Delivery Nasal Cannula Nasal Cannula Nasal Cannula Nasal Cannula


 


O2 Flow Rate 2.0 2.0 2.0 2.0





 10/18/19 10/18/19 10/18/19 10/19/19





 22:00 23:00 23:59 00:00


 


Pulse 72 72  74


 


Resp 18 18  18


 


B/P (MAP) 97/59 (72) 102/62 (75)  89/50 (63)


 


Pulse Ox 100 100  100


 


O2 Delivery Nasal Cannula Nasal Cannula Nasal Cannula Nasal Cannula


 


O2 Flow Rate 2.0 2.0 2.0 2.0





 10/19/19 10/19/19 10/19/19 10/19/19





 01:00 02:00 03:00 04:00


 


Temp  97.7  





  97.7  


 


Pulse 76 74 72 


 


Resp 18 18 18 


 


B/P (MAP) 97/56 (70) 102/57 (72) 96/56 (69) 


 


Pulse Ox 100 100 98 


 


O2 Delivery Nasal Cannula Nasal Cannula Nasal Cannula Nasal Cannula


 


O2 Flow Rate 2.0 2.0 2.0 2.0


 


    





    





 10/19/19 10/19/19 10/19/19 10/19/19





 04:00 05:02 06:00 07:00


 


Temp 98.1   97.8





 98.1   97.8


 


Pulse 75 73 73 74


 


Resp 18 18 18 16


 


B/P (MAP) 95/58 (70) 101/59 (73) 94/54 (67) 98/58 (71)


 


Pulse Ox 100 99 97 99


 


O2 Delivery Nasal Cannula Nasal Cannula Nasal Cannula Nasal Cannula


 


O2 Flow Rate 2.0 2.0 2.0 2.0


 


    





    





 10/19/19 10/19/19 10/19/19 10/19/19





 08:00 08:00 09:00 10:00


 


Temp   97.8 97.5





   97.8 97.5


 


Pulse  94 73 74


 


Resp  19 18 18


 


B/P (MAP)  94/56 (69) 89/64 (72) 99/56 (70)


 


Pulse Ox  99 99 99


 


O2 Delivery Nasal Cannula Nasal Cannula Nasal Cannula Nasal Cannula


 


O2 Flow Rate 2.0 2.0 2.0 2.0


 


    





    





 10/19/19   





 11:00   


 


Temp 97.5   





 97.5   


 


Pulse 72   


 


Resp 18   


 


B/P (MAP) 97/60 (72)   


 


Pulse Ox 99   


 


O2 Delivery Nasal Cannula   


 


O2 Flow Rate 2.0   














Intake and Output   


 


 10/18/19 10/18/19 10/19/19





 14:59 22:59 06:59


 


Intake Total  250 ml 0 ml


 


Output Total 1750 ml 640 ml 425 ml


 


Balance -1750 ml -390 ml -425 ml











Nutrition Consultation


Dietary Evaluation:


Recommendations by RD:  Increase Calorie Intake, PPN/TPN


Comments:  


REC TPN per followin g dextrose, 60 g AA, 20 g lipids





REC advance diet as able pending SLP recommendations, goal diet ADA  


w/textures and liquids per SLP


Expected Outcomes/Goals:  


Nutrition support + PO intake to meet >75% est needs





Malnutrition Findings:


Body Fat Depletion (Non Severe:  Mild Depletion


Weight Status:  Appropriate











MAL HAMILTON MD                 Oct 19, 2019 12:31

## 2019-10-19 NOTE — EKG
[facility]

              [address] [place] [address]

Test Date:    2019-10-19               Test Time:    17:43:04

Pat Name:     ANNE MACKAY              Department:   

Patient ID:   [id number]           Room:         111 1

Gender:       M                        Technician:   

:          [id number]               Requested By: MAL HAMILTON

Order Number: [id number]           Reading MD:   Link Smart MD

                                 Measurements

Intervals                              Axis          

Rate:         71                       P:            48

AK:           182                      QRS:          -56

QRSD:         92                       T:            -130

QT:           468                                    

QTc:          514                                    

                           Interpretive Statements

SINUS RHYTHM

LAFB

IVCD

NON-SPECIFIC ST/T CHANGES

Electronically Signed On 10- 13:23:49 CDT by Link Smart MD

## 2019-10-19 NOTE — PDOC
SUBJECTIVE


ROS


Doing better per RN, Refusing food, states he is not hungry  


S/P thoracentesis , UOP improved





OBJECTIVE


Vital Signs





Vital Signs








  Date Time  Temp Pulse Resp B/P (MAP) Pulse Ox O2 Delivery O2 Flow Rate FiO2


 


10/19/19 12:00 97.4 73 18 92/62 (72) 99 Nasal Cannula 1.0 





 97.4       








I & 0











Intake and Output 


 


 10/19/19





 07:00


 


Intake Total 250 ml


 


Output Total 2940 ml


 


Balance -2690 ml


 


 


 


Intake Oral 250 ml


 


Output Urine Total 1440 ml


 


Drainage Total 1500 ml











PHYSICAL EXAM


Physical Exam


General:   No acute distress,cachectic


HEENT:  OM dry  , On O2 by NC 


Neck supple  


Lungs:   CTA , decreased BS bases 


Heart:  Regular rate , Normal S1, Normal S2, No murmurs


Abdomen:  Soft, No tenderness


Extremities: 2-3 + EDEMA ( 2/2 severe hypoalbuminemia)


Skin:  No rash  


Neuro:  grossly normal 


 - Gardner +





DIAGNOSIS/ASSESSMENT


Assessment & Plan


CAYETANO - Pre-renal 2/2 diarrhea, Poor PO intake  


Renal function  improved - probably at his baseline  , UOP improved 


CT - Cyst vs Hydronephrosis Rt kidney per Radiologist 


UA unremarkable except for hyaline casts 


Supportive care, Monitor, Avoid Nephrotoxins  





Hypokalemia- Normal K 


Replace  as needed 





Hypocalcemia- Corrected for Glucose is normal  





CKD stage 3 -Baseline Renal function per [facility] records 1.4-1.7,





Pleural Effusion- Rt large  


S/P Thoracentesis 1.5 LTS 10/18  





Hypotension-  pressors  


  


Anasarca - 2/2 Severe protein malnutrition / Severe HypoAlbuminemia





  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


IV lasix x 1 per cardiology  





 Weakness/anorexia/cachexia





 Chronic anemia





 DM2





 Hx of antrectomy w/ Stalin-en-Y and PUD





Discussed with RN





COMMENT/RELEVANT DATA


Meds





Current Medications








 Medications


  (Trade)  Dose


 Ordered  Sig/Leonidas  Start Time


 Stop Time Status Last Admin


Dose Admin


 


 Albumin Human  100 ml @ 


 100 mls/hr  1X  ONCE  10/18/19 09:15


 10/18/19 10:14 DC 10/18/19 10:37


100 MLS/HR


 


 Aspirin


  (Children'S


 Aspirin)  81 mg  DAILYWBKFT  10/18/19 08:00


 10/18/19 09:29 DC 10/18/19 08:43


81 MG


 


 Aspirin


  (Ecotrin)  81 mg  DAILYWBKFT  10/19/19 08:00


    10/19/19 09:09


81 MG


 


 Citalopram


 Hydrobromide


  (CeleXA)  20 mg  DAILY  10/18/19 09:00


    10/19/19 09:09


20 MG


 


 Dopamine HCl/


 Dextrose  250 ml @ 


 10.121 mls/


 hr  CONT  PRN  10/17/19 20:00


    10/17/19 20:32


13 MLS/HR


 


 Enoxaparin Sodium


  (Lovenox 30mg


 Syringe)  30 mg  Q24H  10/17/19 21:00


    10/18/19 23:24


30 MG


 


 Furosemide


  (Lasix)  20 mg  1X  ONCE  10/18/19 10:15


 10/18/19 10:16 DC 10/18/19 11:42


20 MG


 


 Lactobacillus


 Rhamnosus


  (Culturelle)  1 cap  BID  10/18/19 21:00


    10/19/19 09:10


1 CAP


 


 Levofloxacin/


 Dextrose  150 ml @ 


 100 mls/hr  1X  ONCE  10/17/19 16:45


 10/17/19 18:14 DC 10/17/19 18:17


100 MLS/HR


 


 Linezolid/Dextrose  300 ml @ 


 300 mls/hr  Q12HR  10/17/19 21:00


    10/19/19 09:10


300 MLS/HR


 


 Norepinephrine


 Bitartrate  250 ml @ 


 12.97 mls/


 hr  CONT  PRN  10/17/19 21:15


    10/18/19 22:48


12.97 MLS/HR


 


 Ondansetron HCl


  (Zofran)  4 mg  PRN Q6HRS  PRN  10/17/19 21:30


    10/18/19 15:42


4 MG


 


 Pantoprazole


 Sodium


  (PROTONIX VIAL


 for IV PUSH)  40 mg  BIDAC  10/18/19 16:30


    10/19/19 09:09


40 MG


 


 Pantoprazole


 Sodium


  (Protonix)  40 mg  BIDAC  10/18/19 07:30


 10/18/19 13:49 DC 10/18/19 08:43


40 MG


 


 Piperacillin Sod/


 Tazobactam Sod


 3.375 gm/Sodium


 Chloride  50 ml @ 


 100 mls/hr  Q8HRS  10/17/19 22:00


    10/19/19 06:00


100 MLS/HR


 


 Potassium


 Chloride/Water  100 ml @ 


 100 mls/hr  Q1H  10/18/19 12:00


 10/18/19 15:59 DC 10/18/19 14:53


100 MLS/HR


 


 Sodium Chloride  500 ml @ 


 500 mls/hr  1X  ONCE  10/17/19 21:15


 10/17/19 22:14 DC 10/17/19 21:40


500 MLS/HR


 


 Vancomycin HCl  250 ml @ 


 250 mls/hr  1X  ONCE  10/17/19 17:00


 10/17/19 17:59 DC 10/17/19 17:36


250 MLS/HR








Lab





Laboratory Tests








Test


 10/18/19


14:00 10/18/19


17:34 10/19/19


05:35


 


Body Fluid Source Pleural   


 


Body Fluid Color Yellow   


 


Body Fluid Clarity Clear   


 


Body Fluid Nucleated Cells


 16 /cmm (Not


Established) 


 





 


Body Fluid Mononuclear WBCs


(%) 100 % 


 


 





 


Body Fluid Polymorphonuclear


Cells 0 % 


 


 





 


Body Fluid Total RBCs Counted


 4 /cmm (Not


Established) 


 





 


Body Fluid Glucose 171 mg/dL (.)   


 


Body Fluid Total Protein 1.0 g/dL (.)   


 


Body Fluid Lactate


Dehydrogenase 72 IU/L (.) 


 


 





 


Glucose (Fingerstick)


 


 122 mg/dL


(70-99) 





 


White Blood Count


 


 


 4.9 x10^3/uL


(4.0-11.0)


 


Red Blood Count


 


 


 3.05 x10^6/uL


(4.30-5.70)


 


Hemoglobin


 


 


 9.4 g/dL


(13.0-17.5)


 


Hematocrit


 


 


 27.7 %


(39.0-53.0)


 


Mean Corpuscular Volume   91 fL () 


 


Mean Corpuscular Hemoglobin   31 pg (25-35) 


 


Mean Corpuscular Hemoglobin


Concent 


 


 34 g/dL


(31-37)


 


Red Cell Distribution Width


 


 


 15.3 %


(11.5-14.5)


 


Platelet Count


 


 


 114 x10^3/uL


(140-400)


 


Neutrophils (%) (Auto)   76 % (31-73) 


 


Lymphocytes (%) (Auto)   17 % (24-48) 


 


Monocytes (%) (Auto)   6 % (0-9) 


 


Eosinophils (%) (Auto)   0 % (0-3) 


 


Basophils (%) (Auto)   1 % (0-3) 


 


Neutrophils # (Auto)


 


 


 3.7 x10^3/uL


(1.8-7.7)


 


Lymphocytes # (Auto)


 


 


 0.8 x10^3/uL


(1.0-4.8)


 


Monocytes # (Auto)


 


 


 0.3 x10^3/uL


(0.0-1.1)


 


Eosinophils # (Auto)


 


 


 0.0 x10^3/uL


(0.0-0.7)


 


Basophils # (Auto)


 


 


 0.0 x10^3/uL


(0.0-0.2)


 


Sodium Level


 


 


 138 mmol/L


(136-145)


 


Potassium Level


 


 


 3.6 mmol/L


(3.5-5.1)


 


Chloride Level


 


 


 108 mmol/L


()


 


Carbon Dioxide Level


 


 


 24 mmol/L


(21-32)


 


Anion Gap   6 (6-14) 


 


Blood Urea Nitrogen


 


 


 18 mg/dL


(8-26)


 


Creatinine


 


 


 1.8 mg/dL


(0.7-1.3)


 


Estimated GFR


(Cockcroft-Gault) 


 


 35.9 





 


BUN/Creatinine Ratio   10 (6-20) 


 


Glucose Level


 


 


 120 mg/dL


(70-99)


 


Calcium Level


 


 


 6.8 mg/dL


(8.5-10.1)


 


Total Bilirubin


 


 


 1.0 mg/dL


(0.2-1.0)


 


Aspartate Amino Transf


(AST/SGOT) 


 


 69 U/L (15-37) 





 


Alanine Aminotransferase


(ALT/SGPT) 


 


 28 U/L (16-63) 





 


Alkaline Phosphatase


 


 


 137 U/L


()


 


Total Protein


 


 


 4.3 g/dL


(6.4-8.2)


 


Albumin


 


 


 1.3 g/dL


(3.4-5.0)


 


Albumin/Globulin Ratio   0.4 (1.0-1.7) 








Results


All relevant outside records, renal labs, imaging studies, telemetry/EKG's were 

reviewed.











JOSH HANLEY MD                Oct 19, 2019 13:17

## 2019-10-19 NOTE — PDOC
Subjective:


Subjective:


Per wife, patient tolerated pudding yesterday.  Per nursing, speech advanced 

diet to dysphagia





Objective:


Vital Signs:





Vital Signs








  Date Time  Temp Pulse Resp B/P (MAP) Pulse Ox O2 Delivery O2 Flow Rate FiO2


 


10/19/19 09:00 97.8 73 18 89/64 (72) 99 Nasal Cannula 2.0 





 97.8       








Labs:





Laboratory Tests








Test


 10/18/19


14:00 10/18/19


17:34 10/19/19


05:35


 


Body Fluid Source Pleural   


 


Body Fluid Color Yellow   


 


Body Fluid Clarity Clear   


 


Body Fluid Nucleated Cells


 16 /cmm (Not


Established) 


 





 


Body Fluid Mononuclear WBCs


(%) 100 % 


 


 





 


Body Fluid Polymorphonuclear


Cells 0 % 


 


 





 


Body Fluid Total RBCs Counted


 4 /cmm (Not


Established) 


 





 


Glucose (Fingerstick)


 


 122 mg/dL


(70-99) 





 


White Blood Count


 


 


 4.9 x10^3/uL


(4.0-11.0)


 


Red Blood Count


 


 


 3.05 x10^6/uL


(4.30-5.70)


 


Hemoglobin


 


 


 9.4 g/dL


(13.0-17.5)


 


Hematocrit


 


 


 27.7 %


(39.0-53.0)


 


Mean Corpuscular Volume   91 fL () 


 


Mean Corpuscular Hemoglobin   31 pg (25-35) 


 


Mean Corpuscular Hemoglobin


Concent 


 


 34 g/dL


(31-37)


 


Red Cell Distribution Width


 


 


 15.3 %


(11.5-14.5)


 


Platelet Count


 


 


 114 x10^3/uL


(140-400)


 


Neutrophils (%) (Auto)   76 % (31-73) 


 


Lymphocytes (%) (Auto)   17 % (24-48) 


 


Monocytes (%) (Auto)   6 % (0-9) 


 


Eosinophils (%) (Auto)   0 % (0-3) 


 


Basophils (%) (Auto)   1 % (0-3) 


 


Neutrophils # (Auto)


 


 


 3.7 x10^3/uL


(1.8-7.7)


 


Lymphocytes # (Auto)


 


 


 0.8 x10^3/uL


(1.0-4.8)


 


Monocytes # (Auto)


 


 


 0.3 x10^3/uL


(0.0-1.1)


 


Eosinophils # (Auto)


 


 


 0.0 x10^3/uL


(0.0-0.7)


 


Basophils # (Auto)


 


 


 0.0 x10^3/uL


(0.0-0.2)


 


Sodium Level


 


 


 138 mmol/L


(136-145)


 


Potassium Level


 


 


 3.6 mmol/L


(3.5-5.1)


 


Chloride Level


 


 


 108 mmol/L


()


 


Carbon Dioxide Level


 


 


 24 mmol/L


(21-32)


 


Anion Gap   6 (6-14) 


 


Blood Urea Nitrogen


 


 


 18 mg/dL


(8-26)


 


Creatinine


 


 


 1.8 mg/dL


(0.7-1.3)


 


Estimated GFR


(Cockcroft-Gault) 


 


 35.9 





 


BUN/Creatinine Ratio   10 (6-20) 


 


Glucose Level


 


 


 120 mg/dL


(70-99)


 


Calcium Level


 


 


 6.8 mg/dL


(8.5-10.1)


 


Total Bilirubin


 


 


 1.0 mg/dL


(0.2-1.0)


 


Aspartate Amino Transf


(AST/SGOT) 


 


 69 U/L (15-37) 





 


Alanine Aminotransferase


(ALT/SGPT) 


 


 28 U/L (16-63) 





 


Alkaline Phosphatase


 


 


 137 U/L


()


 


Total Protein


 


 


 4.3 g/dL


(6.4-8.2)


 


Albumin


 


 


 1.3 g/dL


(3.4-5.0)


 


Albumin/Globulin Ratio   0.4 (1.0-1.7) 











Physical Exam:


Physical Exam:


PE:





GEN: NAD.


HEENT: Atraumatic, PERRL


LUNGS: NC 4L


HEART: RRR


ABD: NABS, S/ND/NT


EXTREMITY: pitting edema BLE


SKIN: dry skin on feet





Assessment & Plan:


Assessment :


A


N/v, diarrhea


Hypotension, pleural effusions, anasarca


Lactic acidosis, CAYETANO, hypoalbuminemia, chronic anemia, coagulopathy, abnormal 

LFTs


H/o dysphagia/esophageal dysmotility


H/o anastomotic ulcers


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


S/p cholecystectomy


Anemia - iron studies c/w ACD in 2018


Cardiomyopathy, CAD, DM


Lummi


Plan:


1) Diet per speech- consider esophagram when able


2) Anemia parameters suggest chronic disease 


3) Follow stool studies











LUCERO ZHONG MD               Oct 19, 2019 10:43

## 2019-10-19 NOTE — CONS
DATE OF CONSULTATION:  10/18/2019



REQUESTING PHYSICIAN:  Dr. Foreman.



REASON FOR CONSULTATION:  Sepsis.



HISTORY OF PRESENT ILLNESS:  This is an 87-year-old  gentleman with

multiple medical problems and very poor hearing, who was admitted.  In fact, he

went to see his regular doctor.  He has been having weakness, anorexia and

diarrhea that had been going on for about a week or so.  The patient was noted

to have a low blood pressure and tachycardia.  Hence, he was asked to come to

the hospital.  The patient was evaluated in the ER where his white count was

normal, but the creatinine was 2.3.  Lactic acid was 7.6.  Urinalysis was

unremarkable.  Chest x-ray showed pleural effusion.  The patient was admitted

for further management.  CT of the chest, abdomen and pelvis was unremarkable

other than pleural effusion and possible cyst or hydronephrosis.



The patient received one dose of vancomycin and Levaquin.  I started Zosyn and

Zyvox.  The patient is alert, awake and comfortable.  He received 2.5 liters of

fluid.  Blood pressure is stable.  No nausea or vomiting noted.  Denies any

pain.  Again, communication with the patient is very limited because of very

poor hearing.  The patient denies any headache or visual symptoms.



PAST MEDICAL HISTORY:  Positive for coronary artery disease, diabetes mellitus

and renal insufficiency.  He has had some sort of abdominal mesh, which has been

removed.



PAST SURGICAL HISTORY:  Has had appendicectomy, cholecystectomy and coronary

artery bypass grafting.



SOCIAL HISTORY:  Negative for smoking, alcohol or illicit drug use.  The patient

lives with the wife and daughter at home.



ALLERGIES:  No known drug allergies.



CURRENT MEDICATIONS:  Reviewed.  The patient is on Zyvox and Zosyn.



REVIEW OF SYSTEMS:  As per HPI, all other systems reviewed are negative.



PHYSICAL EXAMINATION:

GENERAL:  Alert, oriented gentleman, not in any distress.

VITAL SIGNS:  Stable, afebrile.

HEENT:  Both pupils are round and reacting.  No conjunctival lesion, no lesion

in the mouth.

NECK:  Supple, no JVP, no lymphadenopathy.

LUNGS:  Decreased breath sounds.

HEART:  S1, S2 regular.  No gallop or murmur.

ABDOMEN:  Soft, nontender, no organomegaly.

EXTREMITIES:  In fact, his right foot is cold, although there is no cyanosis. 

Peripheral pulses are not palpable.  Rest of the skin exam is unremarkable.  The

patient does have edema in the upper extremity as well as lower extremity like

anasarca.

NEUROLOGIC:  Other than poor hearing, the patient does move all the extremities.

 There is no other focal deficit.



LABORATORY DATA:  White count is 7.4, hemoglobin 9.8, platelets are 129,000. 

BUN and creatinine is improved to 1.820 and 1.8.  Lactic acid improved to 1.8. 

Urinalysis is unremarkable.  CT as I mentioned.  Chest x-ray showed large

right-sided pleural effusion.



IMPRESSION:

1.  Lactic acidosis, most likely is a combination of severe dehydration and

congestive heart failure with a circulatory failure, infection always is a

possibility, needs to rule out, although there is no obvious area of concern.

2.  Hypotension secondary to dehydration.

3.  History of congestive heart failure.

4.  History of renal insufficiency.

5.  Large pleural effusion.

6.  Coronary artery disease.

7.  Malnutrition with albumin 1.4.

8.  Elevated liver function tests.



RECOMMENDATIONS:  Continue Zyvox and Zosyn, supportive care.  We will check the

cultures.  May consider thoracentesis for diagnostic and therapeutic purpose. 

Also, probably the patient still may need hydration but he may require albumin

along with it.  Supportive care and we will continue to follow.



Thank you very much, Dr. Foreman, for giving me the opportunity to participate in

this patient's care.

 



______________________________

ARA JIMENEZ MD



DR:  ADRIAN/elaina  JOB#:  [id number] / [id number]

DD:  10/18/2019 08:14  DT:  10/18/2019 08:52

## 2019-10-20 VITALS — DIASTOLIC BLOOD PRESSURE: 47 MMHG | SYSTOLIC BLOOD PRESSURE: 84 MMHG

## 2019-10-20 VITALS — SYSTOLIC BLOOD PRESSURE: 90 MMHG | DIASTOLIC BLOOD PRESSURE: 57 MMHG

## 2019-10-20 VITALS — DIASTOLIC BLOOD PRESSURE: 50 MMHG | SYSTOLIC BLOOD PRESSURE: 95 MMHG

## 2019-10-20 VITALS — SYSTOLIC BLOOD PRESSURE: 98 MMHG | DIASTOLIC BLOOD PRESSURE: 52 MMHG

## 2019-10-20 VITALS — SYSTOLIC BLOOD PRESSURE: 93 MMHG | DIASTOLIC BLOOD PRESSURE: 49 MMHG

## 2019-10-20 VITALS — DIASTOLIC BLOOD PRESSURE: 55 MMHG | SYSTOLIC BLOOD PRESSURE: 94 MMHG

## 2019-10-20 VITALS — DIASTOLIC BLOOD PRESSURE: 45 MMHG | SYSTOLIC BLOOD PRESSURE: 85 MMHG

## 2019-10-20 VITALS — DIASTOLIC BLOOD PRESSURE: 53 MMHG | SYSTOLIC BLOOD PRESSURE: 94 MMHG

## 2019-10-20 VITALS — DIASTOLIC BLOOD PRESSURE: 46 MMHG | SYSTOLIC BLOOD PRESSURE: 84 MMHG

## 2019-10-20 VITALS — SYSTOLIC BLOOD PRESSURE: 84 MMHG | DIASTOLIC BLOOD PRESSURE: 48 MMHG

## 2019-10-20 VITALS — SYSTOLIC BLOOD PRESSURE: 83 MMHG | DIASTOLIC BLOOD PRESSURE: 58 MMHG

## 2019-10-20 VITALS — DIASTOLIC BLOOD PRESSURE: 49 MMHG | SYSTOLIC BLOOD PRESSURE: 89 MMHG

## 2019-10-20 VITALS — SYSTOLIC BLOOD PRESSURE: 79 MMHG | DIASTOLIC BLOOD PRESSURE: 47 MMHG

## 2019-10-20 VITALS — DIASTOLIC BLOOD PRESSURE: 60 MMHG | SYSTOLIC BLOOD PRESSURE: 95 MMHG

## 2019-10-20 VITALS — DIASTOLIC BLOOD PRESSURE: 53 MMHG | SYSTOLIC BLOOD PRESSURE: 95 MMHG

## 2019-10-20 VITALS — DIASTOLIC BLOOD PRESSURE: 41 MMHG | SYSTOLIC BLOOD PRESSURE: 80 MMHG

## 2019-10-20 VITALS — SYSTOLIC BLOOD PRESSURE: 82 MMHG | DIASTOLIC BLOOD PRESSURE: 49 MMHG

## 2019-10-20 VITALS — DIASTOLIC BLOOD PRESSURE: 51 MMHG | SYSTOLIC BLOOD PRESSURE: 90 MMHG

## 2019-10-20 VITALS — DIASTOLIC BLOOD PRESSURE: 51 MMHG | SYSTOLIC BLOOD PRESSURE: 88 MMHG

## 2019-10-20 VITALS — DIASTOLIC BLOOD PRESSURE: 50 MMHG | SYSTOLIC BLOOD PRESSURE: 96 MMHG

## 2019-10-20 VITALS — SYSTOLIC BLOOD PRESSURE: 87 MMHG | DIASTOLIC BLOOD PRESSURE: 53 MMHG

## 2019-10-20 VITALS — DIASTOLIC BLOOD PRESSURE: 51 MMHG | SYSTOLIC BLOOD PRESSURE: 87 MMHG

## 2019-10-20 VITALS — DIASTOLIC BLOOD PRESSURE: 45 MMHG | SYSTOLIC BLOOD PRESSURE: 96 MMHG

## 2019-10-20 LAB
MAGNESIUM SERPL-MCNC: 1.5 MG/DL (ref 1.8–2.4)
PHOSPHATE SERPL-MCNC: 2.2 MG/DL (ref 2.6–4.7)

## 2019-10-20 RX ADMIN — ENOXAPARIN SODIUM SCH MG: 100 INJECTION SUBCUTANEOUS at 22:01

## 2019-10-20 RX ADMIN — GLYCERIN, ISOLEUCINE, LEUCINE, LYSINE, METHIONINE, PHENYLALANINE, THREONINE, TRYPTOPHAN, VALINE, ALANINE, GLYCINE, ARGININE, HISTIDINE, PROLINE, SERINE, CYSTEINE, SODIUM ACETATE, MAGNESIUM ACETATE, CALCIUM ACETATE, SODIUM CHLORIDE, POTASSIUM CHLORIDE, PHOSPHORIC ACID, AND POTASSIUM METABISULFITE SCH MLS/HR
3; .21; .27; .22; .16; .17; .12; .046; .2; .21; .42; .29; .085; .34; .18; .014; .2; .054; .026; .12; .15; .041 INJECTION INTRAVENOUS at 11:08

## 2019-10-20 RX ADMIN — Medication SCH CAP: at 22:00

## 2019-10-20 RX ADMIN — BACITRACIN SCH MLS/HR: 5000 INJECTION, POWDER, FOR SOLUTION INTRAMUSCULAR at 22:01

## 2019-10-20 RX ADMIN — ASPIRIN SCH MG: 81 TABLET, COATED ORAL at 08:35

## 2019-10-20 RX ADMIN — Medication PRN EACH: at 13:29

## 2019-10-20 RX ADMIN — PIPERACILLIN SODIUM AND TAZOBACTAM SODIUM SCH MLS/HR: 3; .375 INJECTION, POWDER, LYOPHILIZED, FOR SOLUTION INTRAVENOUS at 22:01

## 2019-10-20 RX ADMIN — BACITRACIN SCH MLS/HR: 5000 INJECTION, POWDER, FOR SOLUTION INTRAMUSCULAR at 10:16

## 2019-10-20 RX ADMIN — PANTOPRAZOLE SODIUM SCH MG: 40 INJECTION, POWDER, FOR SOLUTION INTRAVENOUS at 08:36

## 2019-10-20 RX ADMIN — GLYCERIN, ISOLEUCINE, LEUCINE, LYSINE, METHIONINE, PHENYLALANINE, THREONINE, TRYPTOPHAN, VALINE, ALANINE, GLYCINE, ARGININE, HISTIDINE, PROLINE, SERINE, CYSTEINE, SODIUM ACETATE, MAGNESIUM ACETATE, CALCIUM ACETATE, SODIUM CHLORIDE, POTASSIUM CHLORIDE, PHOSPHORIC ACID, AND POTASSIUM METABISULFITE SCH MLS/HR
3; .21; .27; .22; .16; .17; .12; .046; .2; .21; .42; .29; .085; .34; .18; .014; .2; .054; .026; .12; .15; .041 INJECTION INTRAVENOUS at 21:00

## 2019-10-20 RX ADMIN — POTASSIUM PHOSPHATE, MONOBASIC AND POTASSIUM PHOSPHATE, DIBASIC SCH MLS/HR: 224; 236 INJECTION, SOLUTION INTRAVENOUS at 13:42

## 2019-10-20 RX ADMIN — PIPERACILLIN SODIUM AND TAZOBACTAM SODIUM SCH MLS/HR: 3; .375 INJECTION, POWDER, LYOPHILIZED, FOR SOLUTION INTRAVENOUS at 05:59

## 2019-10-20 RX ADMIN — POTASSIUM PHOSPHATE, MONOBASIC AND POTASSIUM PHOSPHATE, DIBASIC SCH MLS/HR: 224; 236 INJECTION, SOLUTION INTRAVENOUS at 15:49

## 2019-10-20 RX ADMIN — PIPERACILLIN SODIUM AND TAZOBACTAM SODIUM SCH MLS/HR: 3; .375 INJECTION, POWDER, LYOPHILIZED, FOR SOLUTION INTRAVENOUS at 13:42

## 2019-10-20 RX ADMIN — PANTOPRAZOLE SODIUM SCH MG: 40 INJECTION, POWDER, FOR SOLUTION INTRAVENOUS at 16:47

## 2019-10-20 RX ADMIN — Medication SCH CAP: at 08:35

## 2019-10-20 RX ADMIN — BACITRACIN SCH MLS/HR: 5000 INJECTION, POWDER, FOR SOLUTION INTRAMUSCULAR at 02:59

## 2019-10-20 RX ADMIN — CITALOPRAM HYDROBROMIDE SCH MG: 20 TABLET ORAL at 08:36

## 2019-10-20 RX ADMIN — BACITRACIN SCH MLS/HR: 5000 INJECTION, POWDER, FOR SOLUTION INTRAMUSCULAR at 18:15

## 2019-10-20 NOTE — PDOC
PROGRESS NOTES


Subjective


Subjective


pt sleeping, wife present





Objective


Objective





Vital Signs








  Date Time  Temp Pulse Resp B/P (MAP) Pulse Ox O2 Delivery O2 Flow Rate FiO2


 


10/20/19 11:00 97.4 76 20 96/50 (65) 97 Room Air 1.0 





 97.4       














Intake and Output 


 


 10/20/19





 07:00


 


Intake Total 110 ml


 


Output Total 324 ml


 


Balance -214 ml


 


 


 


Intake Oral 110 ml


 


Output Urine Total 324 ml











Physical Exam


Abdomen:  Soft, No tenderness





Assessment


Assessment


Problems


Medical Problems:


(1) Anemia


Status: Acute  





(2) CHF (congestive heart failure)


Status: Acute  





(3) Diarrhea


Status: Acute  





(4) Elevated liver function tests


Status: Acute  





(5) Hypoalbuminemia


Status: Acute  





(6) Hypocalcemia


Status: Acute  





(7) Pleural effusion


Status: Acute  





(8) Renal insufficiency


Status: Acute  





(9) Severe sepsis


Status: Acute  





(10) Tachycardia


Status: Acute  











Plan


Plan of Care


Supportive care,  no surgical recs





Comment


Review of Relevant


I have reviewed the following items milana (where applicable) has been applied.


Labs





Laboratory Tests








Test


 10/18/19


14:00 10/18/19


17:34 10/19/19


05:35 10/20/19


09:15


 


Body Fluid Source Pleural    


 


Body Fluid Color Yellow    


 


Body Fluid Clarity Clear    


 


Body Fluid Nucleated Cells


 16 /cmm (Not


Established) 


 


 





 


Body Fluid Mononuclear WBCs


(%) 100 % 


 


 


 





 


Body Fluid Polymorphonuclear


Cells 0 % 


 


 


 





 


Body Fluid Total RBCs Counted


 4 /cmm (Not


Established) 


 


 





 


Body Fluid Glucose 171 mg/dL (.)    


 


Body Fluid Total Protein 1.0 g/dL (.)    


 


Body Fluid Lactate


Dehydrogenase 72 IU/L (.) 


 


 


 





 


Glucose (Fingerstick)


 


 122 mg/dL


(70-99) 


 





 


White Blood Count


 


 


 4.9 x10^3/uL


(4.0-11.0) 





 


Red Blood Count


 


 


 3.05 x10^6/uL


(4.30-5.70) 





 


Hemoglobin


 


 


 9.4 g/dL


(13.0-17.5) 





 


Hematocrit


 


 


 27.7 %


(39.0-53.0) 





 


Mean Corpuscular Volume   91 fL ()  


 


Mean Corpuscular Hemoglobin   31 pg (25-35)  


 


Mean Corpuscular Hemoglobin


Concent 


 


 34 g/dL


(31-37) 





 


Red Cell Distribution Width


 


 


 15.3 %


(11.5-14.5) 





 


Platelet Count


 


 


 114 x10^3/uL


(140-400) 





 


Neutrophils (%) (Auto)   76 % (31-73)  


 


Lymphocytes (%) (Auto)   17 % (24-48)  


 


Monocytes (%) (Auto)   6 % (0-9)  


 


Eosinophils (%) (Auto)   0 % (0-3)  


 


Basophils (%) (Auto)   1 % (0-3)  


 


Neutrophils # (Auto)


 


 


 3.7 x10^3/uL


(1.8-7.7) 





 


Lymphocytes # (Auto)


 


 


 0.8 x10^3/uL


(1.0-4.8) 





 


Monocytes # (Auto)


 


 


 0.3 x10^3/uL


(0.0-1.1) 





 


Eosinophils # (Auto)


 


 


 0.0 x10^3/uL


(0.0-0.7) 





 


Basophils # (Auto)


 


 


 0.0 x10^3/uL


(0.0-0.2) 





 


Sodium Level


 


 


 138 mmol/L


(136-145) 





 


Potassium Level


 


 


 3.6 mmol/L


(3.5-5.1) 





 


Chloride Level


 


 


 108 mmol/L


() 





 


Carbon Dioxide Level


 


 


 24 mmol/L


(21-32) 





 


Anion Gap   6 (6-14)  


 


Blood Urea Nitrogen


 


 


 18 mg/dL


(8-26) 





 


Creatinine


 


 


 1.8 mg/dL


(0.7-1.3) 





 


Estimated GFR


(Cockcroft-Gault) 


 


 35.9 


 





 


BUN/Creatinine Ratio   10 (6-20)  


 


Glucose Level


 


 


 120 mg/dL


(70-99) 





 


Calcium Level


 


 


 6.8 mg/dL


(8.5-10.1) 





 


Total Bilirubin


 


 


 1.0 mg/dL


(0.2-1.0) 





 


Aspartate Amino Transf


(AST/SGOT) 


 


 69 U/L (15-37) 


 





 


Alanine Aminotransferase


(ALT/SGPT) 


 


 28 U/L (16-63) 


 





 


Alkaline Phosphatase


 


 


 137 U/L


() 





 


Total Protein


 


 


 4.3 g/dL


(6.4-8.2) 





 


Albumin


 


 


 1.3 g/dL


(3.4-5.0) 





 


Albumin/Globulin Ratio   0.4 (1.0-1.7)  


 


Mixed Venous Blood pH     


 


Mixed Venous Blood PCO2     mmHg 


 


Mixed Venous Blood PO2    < 42 mmHg 


 


Mixed Venous Blood HCO3     mmol/L 


 


Mixed Venous Blood Base Excess     mmol/L 


 


Mixed Venous Blood O2


Saturation 


 


 


 73 % 











Laboratory Tests








Test


 10/20/19


09:15


 


Mixed Venous Blood pH  


 


Mixed Venous Blood PCO2  mmHg 


 


Mixed Venous Blood PO2 < 42 mmHg 


 


Mixed Venous Blood HCO3  mmol/L 


 


Mixed Venous Blood Base Excess  mmol/L 


 


Mixed Venous Blood O2


Saturation 73 % 











Microbiology


10/17/19 Blood Culture - Preliminary, Resulted


           NO GROWTH AFTER 2 DAYS


Medications





Current Medications


Sodium Chloride 1,000 ml @  1,000 mls/hr Q1H IV  Last administered on 10/17/19at

15:55;  Start 10/17/19 at 15:34;  Stop 10/17/19 at 16:33;  Status DC


Ondansetron HCl (Zofran) 4 mg 1X  ONCE IV  Last administered on 10/17/19at 

16:09;  Start 10/17/19 at 16:30;  Stop 10/17/19 at 16:31;  Status DC


Sodium Chloride 1,000 ml @  1,000 mls/hr 1X  ONCE IV  Last administered on 

10/17/19at 17:27;  Start 10/17/19 at 16:45;  Stop 10/17/19 at 17:44;  Status DC


Piperacillin Sod/ Tazobactam Sod 3.375 gm/Sodium Chloride 50 ml @  100 mls/hr 1X

 ONCE IV  Last administered on 10/17/19at 17:27;  Start 10/17/19 at 18:00;  Stop

10/17/19 at 18:29;  Status DC


Vancomycin HCl 250 ml @  250 mls/hr 1X  ONCE IV  Last administered on 10/17/19at

17:36;  Start 10/17/19 at 17:00;  Stop 10/17/19 at 17:59;  Status DC


Levofloxacin/ Dextrose 150 ml @  100 mls/hr 1X  ONCE IV  Last administered on 

10/17/19at 18:17;  Start 10/17/19 at 16:45;  Stop 10/17/19 at 18:14;  Status DC


Sodium Chloride 1,000 ml @  150 mls/hr Q6H40M IV ;  Start 10/17/19 at 17:05;  

Stop 10/17/19 at 21:10;  Status DC


Dopamine HCl/ Dextrose 250 ml @  10.121 mls/ hr CONT  PRN IV SEE I/O RECORD Last

administered on 10/17/19at 20:32;  Start 10/17/19 at 20:00


Piperacillin Sod/ Tazobactam Sod 3.375 gm/Sodium Chloride 50 ml @  100 mls/hr 

Q8HRS IV  Last administered on 10/20/19at 05:59;  Start 10/17/19 at 22:00


Linezolid/Dextrose 300 ml @  300 mls/hr Q12HR IV  Last administered on 

10/20/19at 08:36;  Start 10/17/19 at 21:00;  Stop 10/20/19 at 08:56;  Status DC


Enoxaparin Sodium (Lovenox 30mg Syringe) 30 mg Q24H SQ  Last administered on 

10/19/19at 21:18;  Start 10/17/19 at 21:00


Sodium Chloride 1,000 ml @  75 mls/hr [id number] IV  Last administered on 10

/20/19at 10:16;  Start 10/18/19 at 18:00


Pantoprazole Sodium (Protonix) 40 mg BIDAC PO  Last administered on 10/18/19at 

08:43;  Start 10/18/19 at 07:30;  Stop 10/18/19 at 13:49;  Status DC


Aspirin (Children'S Aspirin) 81 mg DAILYWBKFT PO  Last administered on 

10/18/19at 08:43;  Start 10/18/19 at 08:00;  Stop 10/18/19 at 09:29;  Status DC


Citalopram Hydrobromide (CeleXA) 20 mg DAILY PO  Last administered on 10/20/19at

08:36;  Start 10/18/19 at 09:00


Norepinephrine Bitartrate 250 ml @  12.97 mls/ hr CONT  PRN IV SEE I/O RECORD 

Last administered on 10/19/19at 23:40;  Start 10/17/19 at 21:15


Sodium Chloride 500 ml @  500 mls/hr 1X  ONCE IV  Last administered on 

10/17/19at 21:40;  Start 10/17/19 at 21:15;  Stop 10/17/19 at 22:14;  Status DC


Ondansetron HCl (Zofran) 4 mg PRN Q6HRS  PRN IVP NAUSEA/VOMITING Last 

administered on 10/18/19at 15:42;  Start 10/17/19 at 21:30


Albumin Human 100 ml @  100 mls/hr 1X  ONCE IV  Last administered on 10/18/19at 

10:37;  Start 10/18/19 at 09:15;  Stop 10/18/19 at 10:14;  Status DC


Furosemide (Lasix) 20 mg 1X  ONCE IVP  Last administered on 10/18/19at 11:42;  

Start 10/18/19 at 10:15;  Stop 10/18/19 at 10:16;  Status DC


Aspirin (Ecotrin) 81 mg DAILYWBKFT PO  Last administered on 10/20/19at 08:35;  

Start 10/19/19 at 08:00


Potassium Chloride/Water 100 ml @  100 mls/hr Q1H IV  Last administered on 

10/18/19at 14:53;  Start 10/18/19 at 12:00;  Stop 10/18/19 at 15:59;  Status DC


Lactobacillus Rhamnosus (Culturelle) 1 cap BID PO  Last administered on 

10/20/19at 08:35;  Start 10/18/19 at 21:00


Pantoprazole Sodium (PROTONIX VIAL for IV PUSH) 40 mg BIDAC IVP  Last 

administered on 10/20/19at 08:36;  Start 10/18/19 at 16:30


Fentanyl Citrate 30 ml @ 0 mls/hr CONT  PRN IV SEE PROTOCOL;  Start 10/19/19 at 

17:30;  Status Cancel


Fentanyl Citrate (Fentanyl 2ml Vial) 25 mcg PRN Q1HR  PRN IV SEE COMMENTS;  

Start 10/19/19 at 17:30;  Status Cancel


Fentanyl Citrate (Fentanyl 2ml Vial) 50 mcg PRN Q1HR  PRN IV SEE COMMENTS;  

Start 10/19/19 at 17:30;  Status Cancel


Chlorhexidine Gluconate (Peridex) 15 ml BID MM ;  Start 10/19/19 at 21:00;  

Status Cancel


Famotidine (Pepcid Vial) 20 mg QHS IVP ;  Start 10/19/19 at 21:00;  Status 

Cancel


Morphine Sulfate (Morphine Sulfate) 2 mg PRN Q1HR  PRN IV SEE COMMENTS.;  Start 

10/19/19 at 17:30;  Status Cancel


Morphine Sulfate (Morphine Sulfate) 4 mg PRN Q1HR  PRN IV SEE COMMENTS.;  Start 

10/19/19 at 17:30;  Status Cancel


Enoxaparin Sodium (Lovenox 40mg Syringe) 40 mg Q12HR SQ ;  Start 10/19/19 at 

21:00;  Status UNV


Midazolam HCl 100 ml @ 5 mls/hr CONT  PRN IV SEE I/O RECORD;  Start 10/19/19 at 

17:30;  Status Cancel


Propofol 100 ml @  1.061 mls/ hr CONT  PRN IV SEE I/O RECORD;  Start 10/19/19 at

17:30;  Status Cancel


Info (Tpn Per Pharmacy) 1 each PRN DAILY  PRN MC SEE COMMENTS;  Start 10/20/19 

at 10:45


Amino Acids/ Glycerin/ Electrolytes 1,000 ml @  100 mls/hr Q10H IV  Last 

administered on 10/20/19at 11:08;  Start 10/20/19 at 11:00;  Stop 10/21/19 at 

22:00





Active Scripts


Active


Aspirin Ec (Aspirin) 81 Mg Tablet. 81 Mg PO DAILYWBKFT 30 Days


Reported


Protonix (Pantoprazole Sodium) 20 Mg Tablet. 40 Mg PO BID


Escitalopram Oxalate 10 Mg Tablet 10 Mg PO DAILY


Vitals/I & O





Vital Sign - Last 24 Hours








 10/19/19 10/19/19 10/19/19 10/19/19





 12:00 12:00 13:00 14:00


 


Temp  97.4 97.4 97.4





  97.4 97.4 97.4


 


Pulse  73 73 72


 


Resp  18  16


 


B/P (MAP)  92/62 (72) 92/58 (69) 80/60 (67)


 


Pulse Ox  99 100 100


 


O2 Delivery Nasal Cannula Nasal Cannula Nasal Cannula Nasal Cannula


 


O2 Flow Rate 1.0 1.0 1.0 1.0


 


    





    





 10/19/19 10/19/19 10/19/19 10/19/19





 15:00 16:00 16:00 17:00


 


Temp 97.4 97.4  97.4





 97.4 97.4  97.4


 


Pulse 74 68  66


 


Resp  18  18


 


B/P (MAP) 91/56 (68) 93/52 (66)  81/46 (58)


 


Pulse Ox 100 100  100


 


O2 Delivery Nasal Cannula Nasal Cannula Nasal Cannula Nasal Cannula


 


O2 Flow Rate 1.0 1.0 1.0 1.0


 


    





    





 10/19/19 10/19/19 10/19/19 10/19/19





 18:00 19:00 20:00 20:01


 


Temp 97.4  97.9 





 97.4  97.9 


 


Pulse 66 74 78 


 


Resp 18  20 


 


B/P (MAP) 84/51 (62) 77/45 (56) 92/57 (69) 


 


Pulse Ox 100 98 98 


 


O2 Delivery Nasal Cannula Nasal Cannula Nasal Cannula Nasal Cannula


 


O2 Flow Rate 1.0 1.0 1.0 1.0


 


    





    





 10/19/19 10/19/19 10/19/19 10/20/19





 21:00 22:00 23:00 00:01


 


Pulse 76 80 80 


 


B/P (MAP) 97/54 (68) 90/48 (62) 87/50 (62) 


 


Pulse Ox 99 99 99 


 


O2 Delivery Nasal Cannula Nasal Cannula Nasal Cannula Nasal Cannula


 


O2 Flow Rate 1.0 1.0 1.0 1.0





 10/20/19 10/20/19 10/20/19 10/20/19





 00:01 01:00 02:00 03:00


 


Temp 98.3   





 98.3   


 


Pulse 80 76 76 74


 


B/P (MAP) 87/53 (64) 93/49 (64) 83/58 (66) 96/45 (62)


 


Pulse Ox 99 99 97 98


 


O2 Delivery Nasal Cannula Nasal Cannula Nasal Cannula Room Air


 


O2 Flow Rate 1.0 1.0 1.0 


 


    





    





 10/20/19 10/20/19 10/20/19 10/20/19





 04:00 04:00 05:00 06:00


 


Temp  98.1  





  98.1  


 


Pulse  74 78 76


 


B/P (MAP)  95/53 (67) 98/52 (67) 95/50 (65)


 


Pulse Ox  97 98 98


 


O2 Delivery Room Air Room Air Room Air Room Air


 


    





    





 10/20/19 10/20/19 10/20/19 10/20/19





 07:00 08:00 08:00 09:00


 


Temp 97.6 97.6  





 97.6 97.6  


 


Pulse 74 76  78


 


Resp 20 18  20


 


B/P (MAP) 95/60 (72) 94/55 (68)  85/45 (58)


 


Pulse Ox 98 97  97


 


O2 Delivery Room Air Room Air Room Air Room Air


 


O2 Flow Rate 1.0 1.0 1.0 1.0


 


    





    





 10/20/19 10/20/19  





 10:00 11:00  


 


Temp  97.4  





  97.4  


 


Pulse 78 76  


 


Resp 18 20  


 


B/P (MAP) 90/57 (68) 96/50 (65)  


 


Pulse Ox 97 97  


 


O2 Delivery Room Air Room Air  


 


O2 Flow Rate 1.0 1.0  














Intake and Output   


 


 10/19/19 10/19/19 10/20/19





 15:00 23:00 07:00


 


Intake Total 40 ml 50 ml 20 ml


 


Output Total 113 ml 140 ml 71 ml


 


Balance -73 ml -90 ml -51 ml











Nutrition Consultation


Dietary Evaluation:


Recommendations by RD:  Increase Calorie Intake, PPN/TPN


Comments:  


REC TPN per followin g dextrose, 60 g AA, 20 g lipids





REC advance diet as able pending SLP recommendations, goal diet ADA  


w/textures and liquids per SLP


Expected Outcomes/Goals:  


Nutrition support + PO intake to meet >75% est needs





Malnutrition Findings:


Body Fat Depletion (Non Severe:  Mild Depletion


Weight Status:  Appropriate











DEBBY MARTINEZ MD             Oct 20, 2019 11:44

## 2019-10-20 NOTE — PDOC
PULMONARY PROGRESS NOTES


Subjective


very hard of hearing, no sob, no cough, on levo


Vitals





Vital Signs








  Date Time  Temp Pulse Resp B/P (MAP) Pulse Ox O2 Delivery O2 Flow Rate FiO2


 


10/20/19 04:00      Room Air  


 


10/20/19 00:01       1.0 


 


10/19/19 23:00  80  87/50 (62) 99   


 


10/19/19 20:00 97.9  20     





 97.9       








Comments


ros as mentioned as above other sys otherwise neg


ROS:  No Nausea


General:  Alert


HEENT:  Other (nc at perll  nose throat clear  neck  no lad  no thyromegaly)


Lungs:  Crackles, Other (dul at bases)


Cardiovascular:  S1, S2


Abdomen:  Soft, Non-tender, Other (no mass)


Neuro Exam:  Alert


Skin:  Warm


Labs





Laboratory Tests








Test


 10/18/19


07:40 10/18/19


14:00 10/18/19


17:34 10/19/19


05:35


 


Urine Collection Type Unknown    


 


Urine Color Stefanie    


 


Urine Clarity Clear    


 


Urine pH 5.0    


 


Urine Specific Gravity >=1.030    


 


Urine Protein


 Negative mg/dL


(NEG-TRACE) 


 


 





 


Urine Glucose (UA)


 Negative mg/dL


(NEG) 


 


 





 


Urine Ketones (Stick)


 Negative mg/dL


(NEG) 


 


 





 


Urine Blood Negative (NEG)    


 


Urine Nitrite Negative (NEG)    


 


Urine Bilirubin Small (NEG)    


 


Urine Urobilinogen Dipstick


 0.2 mg/dL (0.2


mg/dL) 


 


 





 


Urine Leukocyte Esterase Negative (NEG)    


 


Urine RBC 0 /HPF (0-2)    


 


Urine WBC 1-4 /HPF (0-4)    


 


Urine Squamous Epithelial


Cells Occ /LPF 


 


 


 





 


Urine Bacteria


 Few /HPF


(0-FEW) 


 


 





 


Urine Hyaline Casts Moderate /HPF    


 


Urine Mucus Marked /LPF    


 


Body Fluid Source  Pleural   


 


Body Fluid Color  Yellow   


 


Body Fluid Clarity  Clear   


 


Body Fluid Nucleated Cells


 


 16 /cmm (Not


Established) 


 





 


Body Fluid Mononuclear WBCs


(%) 


 100 % 


 


 





 


Body Fluid Polymorphonuclear


Cells 


 0 % 


 


 





 


Body Fluid Total RBCs Counted


 


 4 /cmm (Not


Established) 


 





 


Body Fluid Glucose  171 mg/dL (.)   


 


Body Fluid Total Protein  1.0 g/dL (.)   


 


Body Fluid Lactate


Dehydrogenase 


 72 IU/L (.) 


 


 





 


Glucose (Fingerstick)


 


 


 122 mg/dL


(70-99) 





 


White Blood Count


 


 


 


 4.9 x10^3/uL


(4.0-11.0)


 


Red Blood Count


 


 


 


 3.05 x10^6/uL


(4.30-5.70)


 


Hemoglobin


 


 


 


 9.4 g/dL


(13.0-17.5)


 


Hematocrit


 


 


 


 27.7 %


(39.0-53.0)


 


Mean Corpuscular Volume    91 fL () 


 


Mean Corpuscular Hemoglobin    31 pg (25-35) 


 


Mean Corpuscular Hemoglobin


Concent 


 


 


 34 g/dL


(31-37)


 


Red Cell Distribution Width


 


 


 


 15.3 %


(11.5-14.5)


 


Platelet Count


 


 


 


 114 x10^3/uL


(140-400)


 


Neutrophils (%) (Auto)    76 % (31-73) 


 


Lymphocytes (%) (Auto)    17 % (24-48) 


 


Monocytes (%) (Auto)    6 % (0-9) 


 


Eosinophils (%) (Auto)    0 % (0-3) 


 


Basophils (%) (Auto)    1 % (0-3) 


 


Neutrophils # (Auto)


 


 


 


 3.7 x10^3/uL


(1.8-7.7)


 


Lymphocytes # (Auto)


 


 


 


 0.8 x10^3/uL


(1.0-4.8)


 


Monocytes # (Auto)


 


 


 


 0.3 x10^3/uL


(0.0-1.1)


 


Eosinophils # (Auto)


 


 


 


 0.0 x10^3/uL


(0.0-0.7)


 


Basophils # (Auto)


 


 


 


 0.0 x10^3/uL


(0.0-0.2)


 


Sodium Level


 


 


 


 138 mmol/L


(136-145)


 


Potassium Level


 


 


 


 3.6 mmol/L


(3.5-5.1)


 


Chloride Level


 


 


 


 108 mmol/L


()


 


Carbon Dioxide Level


 


 


 


 24 mmol/L


(21-32)


 


Anion Gap    6 (6-14) 


 


Blood Urea Nitrogen


 


 


 


 18 mg/dL


(8-26)


 


Creatinine


 


 


 


 1.8 mg/dL


(0.7-1.3)


 


Estimated GFR


(Cockcroft-Gault) 


 


 


 35.9 





 


BUN/Creatinine Ratio    10 (6-20) 


 


Glucose Level


 


 


 


 120 mg/dL


(70-99)


 


Calcium Level


 


 


 


 6.8 mg/dL


(8.5-10.1)


 


Total Bilirubin


 


 


 


 1.0 mg/dL


(0.2-1.0)


 


Aspartate Amino Transf


(AST/SGOT) 


 


 


 69 U/L (15-37) 





 


Alanine Aminotransferase


(ALT/SGPT) 


 


 


 28 U/L (16-63) 





 


Alkaline Phosphatase


 


 


 


 137 U/L


()


 


Total Protein


 


 


 


 4.3 g/dL


(6.4-8.2)


 


Albumin


 


 


 


 1.3 g/dL


(3.4-5.0)


 


Albumin/Globulin Ratio    0.4 (1.0-1.7) 








Medications





Active Scripts








 Medications  Dose


 Route/Sig


 Max Daily Dose Days Date Category


 


 Aspirin Ec


  (Aspirin) 81 Mg


 Tablet.dr  81 Mg


 PO DAILYWBKFT


  30 6/8/18 Rx


 


 Protonix


  (Pantoprazole


 Sodium) 20 Mg


 Tablet.dr  40 Mg


 PO BID


   3/1/18 Reported


 


 Escitalopram


 Oxalate 10 Mg


 Tablet  10 Mg


 PO DAILY


   11/30/17 Reported








Comments


cxr reviewed   


Right-sided pleural effusion is much smaller in size after 


thoracentesis. No pneumothorax is seen. There is significant improved 


aeration of the right lower lung zone. Small left-sided pleural effusion 


and associated consolidative left lung base infiltrate is still evident 


and is unchanged.





Impression


.


IMPRESSION:


1.  Large right-sided effusion, s/p thoracentesis, transudate, due to chf.


2.  Septic/cardiogenic shock.


3.  Elevated liver chemistries.


4.  Acute-on-chronic congestive heart failure, systolic.


5.  Renal insufficiency.


6.  Protein malnutrition, present upon admission.








echo


The systolic function is mildly to moderately impaired. The Ejection Fraction is

40-45%.


There is global hypokinesis of the left ventricle. Septal motion suggestive of 

conduction defect.





Plan


.


PLAN:


1.  s/p thoracentesis. transudate, fu cxs  cytology


2.  Continue IV antibiotics per Infectious Disease service.


3.  Follow Cardiology input.


4.  keep I<O, monitor k, cr


5.  02 titration to keep sat 92%


6.  pressors to keep map>65





discussed w SHERI Lopez MD               Oct 20, 2019 06:26

## 2019-10-20 NOTE — PDOC
Subjective:


Subjective:


Hgb not checked today.  On PPN





Objective:


Vital Signs:





Vital Signs








  Date Time  Temp Pulse Resp B/P (MAP) Pulse Ox O2 Delivery O2 Flow Rate FiO2


 


10/20/19 13:00  88 20 79/47 (58) 96 Room Air 1.0 


 


10/20/19 11:00 97.4       





 97.4       








Labs:





Laboratory Tests








Test


 10/19/19


19:00 10/20/19


09:15 10/20/19


11:45


 


Clostridium difficile Toxin B


Gene Negative


(Negative) 


 





 


Mixed Venous Blood pH    


 


Mixed Venous Blood PCO2   mmHg  


 


Mixed Venous Blood PO2  < 42 mmHg  


 


Mixed Venous Blood HCO3   mmol/L  


 


Mixed Venous Blood Base Excess   mmol/L  


 


Mixed Venous Blood O2


Saturation 


 73 % 


 





 


Phosphorus Level


 


 


 2.2 mg/dL


(2.6-4.7)


 


Magnesium Level


 


 


 1.5 mg/dL


(1.8-2.4)











Physical Exam:


Physical Exam:


GEN:  NAD


HEENT:  OP clear


CV:  S1S2 without murmurs, rubs, or gallops


RESP:  CTAB without wheezing, rhonchi, or crackles


ABD:  NABS, SNT/ND


EXT:  No edema


NEURO:  AAO x 3





Assessment & Plan:


Assessment :


A


N/v, diarrhea


Hypotension, pleural effusions, anasarca


Lactic acidosis, CAYETANO, hypoalbuminemia, chronic anemia, coagulopathy, abnormal 

LFTs


H/o dysphagia/esophageal dysmotility


H/o anastomotic ulcers


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


S/p cholecystectomy


Anemia - iron studies c/w ACD in 2018


Cardiomyopathy, CAD, DM


[facility]


Plan:


1) Diet per speech- consider esophagram when able


2) Anemia parameters suggest chronic disease.  Check CBC tomorrow


3) Follow stool studies











LUCERO ZHONG MD               Oct 20, 2019 14:58

## 2019-10-20 NOTE — PDOC
SUBJECTIVE


ROS


Refusing to eat, Refused NG tube, On TPN





OBJECTIVE


Vital Signs





Vital Signs








  Date Time  Temp Pulse Resp B/P (MAP) Pulse Ox O2 Delivery O2 Flow Rate FiO2


 


10/20/19 11:00 97.4 76 20 96/50 (65) 97 Room Air 1.0 





 97.4       








I & 0











Intake and Output 


 


 10/20/19





 06:59


 


Intake Total 90 ml


 


Output Total 449 ml


 


Balance -359 ml


 


 


 


Intake Oral 90 ml


 


Output Urine Total 449 ml











PHYSICAL EXAM


Physical Exam


General:   No acute distress,cachectic


HEENT:  OM dry  , On O2 by NC 


Neck supple  


Lungs:   CTA , decreased BS bases 


Heart:  Regular rate , Normal S1, Normal S2, No murmurs


Abdomen:  Soft, No tenderness


Extremities: 2-3 + EDEMA ( 2/2 severe hypoalbuminemia)


Skin:  No rash  


Neuro:  grossly normal 


 - Gardner +





DIAGNOSIS/ASSESSMENT


Assessment & Plan


CAYETANO - Pre-renal 2/2 diarrhea, Poor PO intake  


Renal function  improved - probably at his baseline  , No labs this am , UOP 

declined 


CT - Cyst vs Hydronephrosis Rt kidney per Radiologist 


UA unremarkable except for hyaline casts 


Supportive care, IVF , Monitor, Avoid Nephrotoxins  





Hypokalemia- 


Replace  as needed 





Hypocalcemia- Corrected for Glucose was normal  





CKD stage 3 -Baseline Renal function per [facility] records 1.4-1.7,





Pleural Effusion- Rt large  


S/P Thoracentesis 1.5 LTS 10/18  


  


Anasarca - 2/2 Severe protein malnutrition / Severe HypoAlbuminemia


On TPN now  





  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





 Weakness/anorexia/cachexia





 Chronic anemia





 DM2





 Hx of antrectomy w/ Stalin-en-Y and PUD





Discussed with RN





COMMENT/RELEVANT DATA


Meds





Current Medications








 Medications


  (Trade)  Dose


 Ordered  Sig/Leonidas  Start Time


 Stop Time Status Last Admin


Dose Admin


 


 Albumin Human  100 ml @ 


 100 mls/hr  1X  ONCE  10/18/19 09:15


 10/18/19 10:14 DC 10/18/19 10:37


100 MLS/HR


 


 Amino Acids/


 Glycerin/


 Electrolytes  1,000 ml @ 


 100 mls/hr  Q10H  10/20/19 11:00


 10/21/19 22:00  10/20/19 11:08


100 MLS/HR


 


 Aspirin


  (Children'S


 Aspirin)  81 mg  DAILYWBKFT  10/18/19 08:00


 10/18/19 09:29 DC 10/18/19 08:43


81 MG


 


 Aspirin


  (Ecotrin)  81 mg  DAILYWBKFT  10/19/19 08:00


    10/20/19 08:35


81 MG


 


 Chlorhexidine


 Gluconate


  (Peridex)  15 ml  BID  10/19/19 21:00


   Cancel  





 


 Citalopram


 Hydrobromide


  (CeleXA)  20 mg  DAILY  10/18/19 09:00


    10/20/19 08:36


20 MG


 


 Dopamine HCl/


 Dextrose  250 ml @ 


 10.121 mls/


 hr  CONT  PRN  10/17/19 20:00


    10/17/19 20:32


13 MLS/HR


 


 Enoxaparin Sodium


  (Lovenox 30mg


 Syringe)  30 mg  Q24H  10/17/19 21:00


    10/19/19 21:18


30 MG


 


 Enoxaparin Sodium


  (Lovenox 40mg


 Syringe)  40 mg  Q12HR  10/19/19 21:00


   UNV  





 


 Famotidine


  (Pepcid Vial)  20 mg  QHS  10/19/19 21:00


   Cancel  





 


 Fentanyl Citrate


  (Fentanyl 2ml


 Vial)  50 mcg  PRN Q1HR  PRN  10/19/19 17:30


   Cancel  





 


 Furosemide


  (Lasix)  20 mg  1X  ONCE  10/18/19 10:15


 10/18/19 10:16 DC 10/18/19 11:42


20 MG


 


 Info


  (Tpn Per


 Pharmacy)  1 each  PRN DAILY  PRN  10/20/19 10:45


     





 


 Lactobacillus


 Rhamnosus


  (Culturelle)  1 cap  BID  10/18/19 21:00


    10/20/19 08:35


1 CAP


 


 Levofloxacin/


 Dextrose  150 ml @ 


 100 mls/hr  1X  ONCE  10/17/19 16:45


 10/17/19 18:14 DC 10/17/19 18:17


100 MLS/HR


 


 Linezolid/Dextrose  300 ml @ 


 300 mls/hr  Q12HR  10/17/19 21:00


 10/20/19 08:56 DC 10/20/19 08:36


300 MLS/HR


 


 Midazolam HCl  100 ml @ 5


 mls/hr  CONT  PRN  10/19/19 17:30


   Cancel  





 


 Morphine Sulfate


  (Morphine


 Sulfate)  4 mg  PRN Q1HR  PRN  10/19/19 17:30


   Cancel  





 


 Norepinephrine


 Bitartrate  250 ml @ 


 12.97 mls/


 hr  CONT  PRN  10/17/19 21:15


    10/19/19 23:40


12.97 MLS/HR


 


 Ondansetron HCl


  (Zofran)  4 mg  PRN Q6HRS  PRN  10/17/19 21:30


    10/18/19 15:42


4 MG


 


 Pantoprazole


 Sodium


  (PROTONIX VIAL


 for IV PUSH)  40 mg  BIDAC  10/18/19 16:30


    10/20/19 08:36


40 MG


 


 Pantoprazole


 Sodium


  (Protonix)  40 mg  BIDAC  10/18/19 07:30


 10/18/19 13:49 DC 10/18/19 08:43


40 MG


 


 Piperacillin Sod/


 Tazobactam Sod


 3.375 gm/Sodium


 Chloride  50 ml @ 


 100 mls/hr  Q8HRS  10/17/19 22:00


    10/20/19 05:59


100 MLS/HR


 


 Potassium


 Chloride/Water  100 ml @ 


 100 mls/hr  Q1H  10/18/19 12:00


 10/18/19 15:59 DC 10/18/19 14:53


100 MLS/HR


 


 Propofol  100 ml @ 


 1.061 mls/


 hr  CONT  PRN  10/19/19 17:30


   Cancel  





 


 Sodium Chloride  500 ml @ 


 500 mls/hr  1X  ONCE  10/17/19 21:15


 10/17/19 22:14 DC 10/17/19 21:40


500 MLS/HR


 


 Vancomycin HCl  250 ml @ 


 250 mls/hr  1X  ONCE  10/17/19 17:00


 10/17/19 17:59 DC 10/17/19 17:36


250 MLS/HR








Lab





Laboratory Tests








Test


 10/20/19


09:15


 


Mixed Venous Blood pH  


 


Mixed Venous Blood PCO2  mmHg 


 


Mixed Venous Blood PO2 < 42 mmHg 


 


Mixed Venous Blood HCO3  mmol/L 


 


Mixed Venous Blood Base Excess  mmol/L 


 


Mixed Venous Blood O2


Saturation 73 % 











Results


All relevant outside records, renal labs, imaging studies, telemetry/EKG's were 

reviewed.











JOSH HANLEY MD                Oct 20, 2019 11:38

## 2019-10-20 NOTE — PDOC
Infectious Disease Note


Subjective:


Subjective


pt is doing ok


no f/c/n/v


occ loose bm


d/w rn


still on pressors





ROS:


ROS


Negative otherwise.





Vital Signs:


Vital Signs





Vital Signs








  Date Time  Temp Pulse Resp B/P (MAP) Pulse Ox O2 Delivery O2 Flow Rate FiO2


 


10/20/19 06:00  76  95/50 (65) 98 Room Air  


 


10/20/19 04:00 98.1       





 98.1       


 


10/20/19 02:00       1.0 


 


10/19/19 20:00   20     











Physical Exam:


PHYSICAL EXAM


GENERAL:  Alert, oriented gentleman, not in any distress.


HEENT:  Both pupils are round and reacting.  No conjunctival lesion, no lesion


in the mouth.


NECK:  Supple, RT IJ looks ok


LUNGS:  Decreased breath sounds.


HEART:  S1, S2 regular.  No gallop or murmur.


ABDOMEN:  Soft, nontender, no organomegaly.


EXTREMITIES:  edema +


NEUROLOGIC:  Other than poor hearing, the patient does move all the extremities.


 There is no other focal deficit.





Medications:


Inpatient Meds:





Current Medications








 Medications


  (Trade)  Dose


 Ordered  Sig/Leonidas  Start Time


 Stop Time Status Last Admin


Dose Admin


 


 Albumin Human  100 ml @ 


 100 mls/hr  1X  ONCE  10/18/19 09:15


 10/18/19 10:14 DC 10/18/19 10:37


100 MLS/HR


 


 Aspirin


  (Children'S


 Aspirin)  81 mg  DAILYWBKFT  10/18/19 08:00


 10/18/19 09:29 DC 10/18/19 08:43


81 MG


 


 Aspirin


  (Ecotrin)  81 mg  DAILYWBKFT  10/19/19 08:00


    10/19/19 09:09


81 MG


 


 Chlorhexidine


 Gluconate


  (Peridex)  15 ml  BID  10/19/19 21:00


   Cancel  





 


 Citalopram


 Hydrobromide


  (CeleXA)  20 mg  DAILY  10/18/19 09:00


    10/19/19 09:09


20 MG


 


 Dopamine HCl/


 Dextrose  250 ml @ 


 10.121 mls/


 hr  CONT  PRN  10/17/19 20:00


    10/17/19 20:32


13 MLS/HR


 


 Enoxaparin Sodium


  (Lovenox 30mg


 Syringe)  30 mg  Q24H  10/17/19 21:00


    10/19/19 21:18


30 MG


 


 Enoxaparin Sodium


  (Lovenox 40mg


 Syringe)  40 mg  Q12HR  10/19/19 21:00


   UNV  





 


 Famotidine


  (Pepcid Vial)  20 mg  QHS  10/19/19 21:00


   Cancel  





 


 Fentanyl Citrate


  (Fentanyl 2ml


 Vial)  50 mcg  PRN Q1HR  PRN  10/19/19 17:30


   Cancel  





 


 Furosemide


  (Lasix)  20 mg  1X  ONCE  10/18/19 10:15


 10/18/19 10:16 DC 10/18/19 11:42


20 MG


 


 Lactobacillus


 Rhamnosus


  (Culturelle)  1 cap  BID  10/18/19 21:00


    10/19/19 21:18


1 CAP


 


 Levofloxacin/


 Dextrose  150 ml @ 


 100 mls/hr  1X  ONCE  10/17/19 16:45


 10/17/19 18:14 DC 10/17/19 18:17


100 MLS/HR


 


 Linezolid/Dextrose  300 ml @ 


 300 mls/hr  Q12HR  10/17/19 21:00


    10/19/19 21:18


300 MLS/HR


 


 Midazolam HCl  100 ml @ 5


 mls/hr  CONT  PRN  10/19/19 17:30


   Cancel  





 


 Morphine Sulfate


  (Morphine


 Sulfate)  4 mg  PRN Q1HR  PRN  10/19/19 17:30


   Cancel  





 


 Norepinephrine


 Bitartrate  250 ml @ 


 12.97 mls/


 hr  CONT  PRN  10/17/19 21:15


    10/19/19 23:40


12.97 MLS/HR


 


 Ondansetron HCl


  (Zofran)  4 mg  PRN Q6HRS  PRN  10/17/19 21:30


    10/18/19 15:42


4 MG


 


 Pantoprazole


 Sodium


  (PROTONIX VIAL


 for IV PUSH)  40 mg  BIDAC  10/18/19 16:30


    10/19/19 18:01


40 MG


 


 Pantoprazole


 Sodium


  (Protonix)  40 mg  BIDAC  10/18/19 07:30


 10/18/19 13:49 DC 10/18/19 08:43


40 MG


 


 Piperacillin Sod/


 Tazobactam Sod


 3.375 gm/Sodium


 Chloride  50 ml @ 


 100 mls/hr  Q8HRS  10/17/19 22:00


    10/20/19 05:59


100 MLS/HR


 


 Potassium


 Chloride/Water  100 ml @ 


 100 mls/hr  Q1H  10/18/19 12:00


 10/18/19 15:59 DC 10/18/19 14:53


100 MLS/HR


 


 Propofol  100 ml @ 


 1.061 mls/


 hr  CONT  PRN  10/19/19 17:30


   Cancel  





 


 Sodium Chloride  500 ml @ 


 500 mls/hr  1X  ONCE  10/17/19 21:15


 10/17/19 22:14 DC 10/17/19 21:40


500 MLS/HR


 


 Vancomycin HCl  250 ml @ 


 250 mls/hr  1X  ONCE  10/17/19 17:00


 10/17/19 17:59 DC 10/17/19 17:36


250 MLS/HR











Labs:


Micro


cult neg





Objective:


Assessment:





1.  Lactic acidosis,multifactorial,resolved


2.  Hypotension secondary to dehydration.


3.  History of congestive heart failure.


4.  History of renal insufficiency.


5.  Large pleural effusion.s/p thoracocentesis 


6.  Coronary artery disease.


7.  Malnutrition with albumin 1.4.


8.  Elevated liver function tests.


9. Thrombocytopenia





Plan:


Plan of Care


continue  Zosyn 


FIONA Zyvox


f/u c/s and labs in am


D/W TORIN RUIZ MD           Oct 20, 2019 08:34

## 2019-10-20 NOTE — NUR
Pharmacy TPN Dosing Note



S: ANNE MACKAY is a 87 year old M Currently receiving Central Continuous TPN started 
10/20/19



B:Pertinent PMH: 

NPO/MALNOURISHED

Height: 5 feet, 6 inches

Weight: 70.887770 kg



Current diet: NPO 



LABS:

Sodium:    138 

Potassium: 3.6 

Chloride:  108 

Calcium:   6.8 

Corrected Calcium: 8.96 

Magnesium: 1.5 

CO2:       24 

SCr:       1.8 

Glucose:   120 

Albumin:   1.3 

AST:       69 

ALT:       28 



TPN FORMULA:

TPN TYPE:  Central Continuous

AMINO ACIDS:         60 gm

DEXTROSE:            195 gm

LIPIDS:              20 gm

SODIUM CHLORIDE:     90 mEq

SODIUM ACETATE:      mEq

SODIUM PHOSPHATE:    mmol

POTASSIUM CHLORIDE:  mEq

POTASSIUM ACETATE:   50 mEq

POTASSIUM PHOSPHATE: 20 mmol

MAGNESIUM:           15 mEq

CALCIUM:             10 mEq

INSULIN:             units

MULTIPLE VITAMIN:    10 ml

TRACE ELEMENTS:      1 ml(s)



TPN PLAN:  

-Starting TPN, pt not eating, family does not want NG, severe malnutrition

-Will start house formula with the following exceptions

-Cl high, will utilize KAce in place of KCl

-Mag low: 2gm bolus and start w/ 15meq in TPN

-Phos low: KPhos 13.6mmol x2 this afternoon, and start w/ 20mmol in TPN

-Labs in the am





R: Begin TPN as written above.

Will monitor electrolytes, glucose, and tolerance to TPN.



 EVA SINGLETON [facility], 10/20/19 [id number]

## 2019-10-20 NOTE — PDOC
CARDIOLOGY PROGRESS NOTE


SUBJECTIVE:


No acute events overnight. 


Denies any chest pain, dyspnea this morning


Lying flat in bed w/o resp distress.





OBJECTIVE:


Vital Signs/I&O:





                                   Vital Signs








  Date Time  Temp Pulse Resp B/P (MAP) Pulse Ox O2 Delivery O2 Flow Rate FiO2


 


10/20/19 07:00 97.6 74 20 95/60 (72) 98 Room Air 1.0 





 97.6       














                                    I & O   


 


 10/19/19 10/19/19 10/20/19





 15:02 23:02 07:02


 


Intake Total 40 ml 50 ml 20 ml


 


Output Total 113 ml 140 ml 71 ml


 


Balance -73 ml -90 ml -51 ml








Objective:


a/o to self


Normal heart tones. 


clear lungs


soft abd


no edema.





CURRENT MEDICATIONS:


ASA 81mg daily





DIAGNOSTIC TESTING:


Labs reviewed. 


No significant WBC, negative cultures.





ASSESSMENT:


1. Respiratory failure- likely due to acute on chronic decompensated systolic 

and diastolic HF. 


2. Septic shock - resolved, etiology unclear. Cannot rule out component of 

cardiac shock - mixed shock.





PLAN:


1. Will check CVP again, bolus fluid as needed and monitor UOP. 


2. Check SVO2. 


3. Given lower BP's, hold off any further aggressive cardiovascular therapy. 





Supportive care for now.











KRIS CORNELL MD       Oct 20, 2019 09:00

## 2019-10-20 NOTE — PDOC
PROGRESS NOTES


Chief Complaint


Chief Complaint


sepsis, with hypotension,


N/v, diarrhea and H/o dysphagia/esophageal dysmotility


chronic systolic CHF pleural effusions, anasarca,  from CAD


acute renal failure


severe malnutrition


chronic anemia, coagulopathy, 


transaminitis, 


deaf 


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


 








History of Present Illness


History of Present Illness


still on levephed - will taper


he is not eating,    consider NG tube,   family said he has refused,   would 

refuse now,    poor PO intake,  will start PPN


 


no event


vitals better


family here,  plan discussed, consult GI, 


mult other consults following





Vitals


Vitals





Vital Signs








  Date Time  Temp Pulse Resp B/P (MAP) Pulse Ox O2 Delivery O2 Flow Rate FiO2


 


10/20/19 10:00  78 18 90/57 (68) 97 Room Air 1.0 


 


10/20/19 08:00 97.6       





 97.6       











Physical Exam


Physical Exam


GENERAL:  Alert, oriented gentleman, not in any distress.


HEENT:  Both pupils are round and reacting.  No conjunctival lesion, no lesion


in the mouth.


NECK:  Supple, RT IJ looks ok


LUNGS:  Decreased breath sounds.


HEART:  S1, S2 regular.  No gallop or murmur.


ABDOMEN:  Soft, nontender, no organomegaly.


EXTREMITIES:  edema +


NEUROLOGIC:  Other than poor hearing, the patient does move all the extremities.


 There is no other focal deficit.


General:  Alert, Cooperative, No acute distress, Other (cachectic)


Heart:  Regular rate (SR), Normal S1, Normal S2, No murmurs


Lungs:  Crackles, Other (dul at bases)


Abdomen:  Soft, No tenderness


Extremities:  No cyanosis, Other (anasarca)


Skin:  No breakdown, No significant lesion, Other (pale)





Labs


LABS





Laboratory Tests








Test


 10/20/19


09:15


 


Mixed Venous Blood pH  


 


Mixed Venous Blood PCO2  mmHg 


 


Mixed Venous Blood PO2 < 42 mmHg 


 


Mixed Venous Blood HCO3  mmol/L 


 


Mixed Venous Blood Base Excess  mmol/L 


 


Mixed Venous Blood O2


Saturation 73 % 














Review of Systems


Review of Systems


no n.v.d


he says he feels





Assessment and Plan


Assessmemt and Plan


Problems


Medical Problems:


(1) Anemia


Status: Acute  





(2) CHF (congestive heart failure)


Status: Acute  





(3) Diarrhea


Status: Acute  





(4) Elevated liver function tests


Status: Acute  





(5) Hypoalbuminemia


Status: Acute  





(6) Hypocalcemia


Status: Acute  





(7) Pleural effusion


Status: Acute  





(8) Renal insufficiency


Status: Acute  





(9) Severe sepsis


Status: Acute  





(10) Tachycardia


Status: Acute  











Comment


Review of Relevant


I have reviewed the following items milana (where applicable) has been applied.


Labs





Laboratory Tests








Test


 10/18/19


14:00 10/18/19


17:34 10/19/19


05:35 10/20/19


09:15


 


Body Fluid Source Pleural    


 


Body Fluid Color Yellow    


 


Body Fluid Clarity Clear    


 


Body Fluid Nucleated Cells


 16 /cmm (Not


Established) 


 


 





 


Body Fluid Mononuclear WBCs


(%) 100 % 


 


 


 





 


Body Fluid Polymorphonuclear


Cells 0 % 


 


 


 





 


Body Fluid Total RBCs Counted


 4 /cmm (Not


Established) 


 


 





 


Body Fluid Glucose 171 mg/dL (.)    


 


Body Fluid Total Protein 1.0 g/dL (.)    


 


Body Fluid Lactate


Dehydrogenase 72 IU/L (.) 


 


 


 





 


Glucose (Fingerstick)


 


 122 mg/dL


(70-99) 


 





 


White Blood Count


 


 


 4.9 x10^3/uL


(4.0-11.0) 





 


Red Blood Count


 


 


 3.05 x10^6/uL


(4.30-5.70) 





 


Hemoglobin


 


 


 9.4 g/dL


(13.0-17.5) 





 


Hematocrit


 


 


 27.7 %


(39.0-53.0) 





 


Mean Corpuscular Volume   91 fL ()  


 


Mean Corpuscular Hemoglobin   31 pg (25-35)  


 


Mean Corpuscular Hemoglobin


Concent 


 


 34 g/dL


(31-37) 





 


Red Cell Distribution Width


 


 


 15.3 %


(11.5-14.5) 





 


Platelet Count


 


 


 114 x10^3/uL


(140-400) 





 


Neutrophils (%) (Auto)   76 % (31-73)  


 


Lymphocytes (%) (Auto)   17 % (24-48)  


 


Monocytes (%) (Auto)   6 % (0-9)  


 


Eosinophils (%) (Auto)   0 % (0-3)  


 


Basophils (%) (Auto)   1 % (0-3)  


 


Neutrophils # (Auto)


 


 


 3.7 x10^3/uL


(1.8-7.7) 





 


Lymphocytes # (Auto)


 


 


 0.8 x10^3/uL


(1.0-4.8) 





 


Monocytes # (Auto)


 


 


 0.3 x10^3/uL


(0.0-1.1) 





 


Eosinophils # (Auto)


 


 


 0.0 x10^3/uL


(0.0-0.7) 





 


Basophils # (Auto)


 


 


 0.0 x10^3/uL


(0.0-0.2) 





 


Sodium Level


 


 


 138 mmol/L


(136-145) 





 


Potassium Level


 


 


 3.6 mmol/L


(3.5-5.1) 





 


Chloride Level


 


 


 108 mmol/L


() 





 


Carbon Dioxide Level


 


 


 24 mmol/L


(21-32) 





 


Anion Gap   6 (6-14)  


 


Blood Urea Nitrogen


 


 


 18 mg/dL


(8-26) 





 


Creatinine


 


 


 1.8 mg/dL


(0.7-1.3) 





 


Estimated GFR


(Cockcroft-Gault) 


 


 35.9 


 





 


BUN/Creatinine Ratio   10 (6-20)  


 


Glucose Level


 


 


 120 mg/dL


(70-99) 





 


Calcium Level


 


 


 6.8 mg/dL


(8.5-10.1) 





 


Total Bilirubin


 


 


 1.0 mg/dL


(0.2-1.0) 





 


Aspartate Amino Transf


(AST/SGOT) 


 


 69 U/L (15-37) 


 





 


Alanine Aminotransferase


(ALT/SGPT) 


 


 28 U/L (16-63) 


 





 


Alkaline Phosphatase


 


 


 137 U/L


() 





 


Total Protein


 


 


 4.3 g/dL


(6.4-8.2) 





 


Albumin


 


 


 1.3 g/dL


(3.4-5.0) 





 


Albumin/Globulin Ratio   0.4 (1.0-1.7)  


 


Mixed Venous Blood pH     


 


Mixed Venous Blood PCO2     mmHg 


 


Mixed Venous Blood PO2    < 42 mmHg 


 


Mixed Venous Blood HCO3     mmol/L 


 


Mixed Venous Blood Base Excess     mmol/L 


 


Mixed Venous Blood O2


Saturation 


 


 


 73 % 











Laboratory Tests








Test


 10/20/19


09:15


 


Mixed Venous Blood pH  


 


Mixed Venous Blood PCO2  mmHg 


 


Mixed Venous Blood PO2 < 42 mmHg 


 


Mixed Venous Blood HCO3  mmol/L 


 


Mixed Venous Blood Base Excess  mmol/L 


 


Mixed Venous Blood O2


Saturation 73 % 











Microbiology


10/17/19 Blood Culture - Preliminary, Resulted


           NO GROWTH AFTER 2 DAYS


Medications





Current Medications


Sodium Chloride 1,000 ml @  1,000 mls/hr Q1H IV  Last administered on 10/17/19at

15:55;  Start 10/17/19 at 15:34;  Stop 10/17/19 at 16:33;  Status DC


Ondansetron HCl (Zofran) 4 mg 1X  ONCE IV  Last administered on 10/17/19at 

16:09;  Start 10/17/19 at 16:30;  Stop 10/17/19 at 16:31;  Status DC


Sodium Chloride 1,000 ml @  1,000 mls/hr 1X  ONCE IV  Last administered on 

10/17/19at 17:27;  Start 10/17/19 at 16:45;  Stop 10/17/19 at 17:44;  Status DC


Piperacillin Sod/ Tazobactam Sod 3.375 gm/Sodium Chloride 50 ml @  100 mls/hr 1X

 ONCE IV  Last administered on 10/17/19at 17:27;  Start 10/17/19 at 18:00;  Stop

10/17/19 at 18:29;  Status DC


Vancomycin HCl 250 ml @  250 mls/hr 1X  ONCE IV  Last administered on 10/17/19at

17:36;  Start 10/17/19 at 17:00;  Stop 10/17/19 at 17:59;  Status DC


Levofloxacin/ Dextrose 150 ml @  100 mls/hr 1X  ONCE IV  Last administered on 

10/17/19at 18:17;  Start 10/17/19 at 16:45;  Stop 10/17/19 at 18:14;  Status DC


Sodium Chloride 1,000 ml @  150 mls/hr Q6H40M IV ;  Start 10/17/19 at 17:05;  

Stop 10/17/19 at 21:10;  Status DC


Dopamine HCl/ Dextrose 250 ml @  10.121 mls/ hr CONT  PRN IV SEE I/O RECORD Last

administered on 10/17/19at 20:32;  Start 10/17/19 at 20:00


Piperacillin Sod/ Tazobactam Sod 3.375 gm/Sodium Chloride 50 ml @  100 mls/hr 

Q8HRS IV  Last administered on 10/20/19at 05:59;  Start 10/17/19 at 22:00


Linezolid/Dextrose 300 ml @  300 mls/hr Q12HR IV  Last administered on 

10/20/19at 08:36;  Start 10/17/19 at 21:00;  Stop 10/20/19 at 08:56;  Status DC


Enoxaparin Sodium (Lovenox 30mg Syringe) 30 mg Q24H SQ  Last administered on 

10/19/19at 21:18;  Start 10/17/19 at 21:00


Sodium Chloride 1,000 ml @  75 mls/hr [id number] IV  Last administered on 

10/20/19at 10:16;  Start 10/18/19 at 18:00


Pantoprazole Sodium (Protonix) 40 mg BIDAC PO  Last administered on 10/18/19at 

08:43;  Start 10/18/19 at 07:30;  Stop 10/18/19 at 13:49;  Status DC


Aspirin (Children'S Aspirin) 81 mg DAILYWBKFT PO  Last administered on 

10/18/19at 08:43;  Start 10/18/19 at 08:00;  Stop 10/18/19 at 09:29;  Status DC


Citalopram Hydrobromide (CeleXA) 20 mg DAILY PO  Last administered on 10/20/19at

08:36;  Start 10/18/19 at 09:00


Norepinephrine Bitartrate 250 ml @  12.97 mls/ hr CONT  PRN IV SEE I/O RECORD 

Last administered on 10/19/19at 23:40;  Start 10/17/19 at 21:15


Sodium Chloride 500 ml @  500 mls/hr 1X  ONCE IV  Last administered on 10/17/19a

t 21:40;  Start 10/17/19 at 21:15;  Stop 10/17/19 at 22:14;  Status DC


Ondansetron HCl (Zofran) 4 mg PRN Q6HRS  PRN IVP NAUSEA/VOMITING Last 

administered on 10/18/19at 15:42;  Start 10/17/19 at 21:30


Albumin Human 100 ml @  100 mls/hr 1X  ONCE IV  Last administered on 10/18/19at 

10:37;  Start 10/18/19 at 09:15;  Stop 10/18/19 at 10:14;  Status DC


Furosemide (Lasix) 20 mg 1X  ONCE IVP  Last administered on 10/18/19at 11:42;  

Start 10/18/19 at 10:15;  Stop 10/18/19 at 10:16;  Status DC


Aspirin (Ecotrin) 81 mg DAILYWBKFT PO  Last administered on 10/20/19at 08:35;  

Start 10/19/19 at 08:00


Potassium Chloride/Water 100 ml @  100 mls/hr Q1H IV  Last administered on 

10/18/19at 14:53;  Start 10/18/19 at 12:00;  Stop 10/18/19 at 15:59;  Status DC


Lactobacillus Rhamnosus (Culturelle) 1 cap BID PO  Last administered on 

10/20/19at 08:35;  Start 10/18/19 at 21:00


Pantoprazole Sodium (PROTONIX VIAL for IV PUSH) 40 mg BIDAC IVP  Last 

administered on 10/20/19at 08:36;  Start 10/18/19 at 16:30


Fentanyl Citrate 30 ml @ 0 mls/hr CONT  PRN IV SEE PROTOCOL;  Start 10/19/19 at 

17:30;  Status Cancel


Fentanyl Citrate (Fentanyl 2ml Vial) 25 mcg PRN Q1HR  PRN IV SEE COMMENTS;  

Start 10/19/19 at 17:30;  Status Cancel


Fentanyl Citrate (Fentanyl 2ml Vial) 50 mcg PRN Q1HR  PRN IV SEE COMMENTS;  

Start 10/19/19 at 17:30;  Status Cancel


Chlorhexidine Gluconate (Peridex) 15 ml BID MM ;  Start 10/19/19 at 21:00;  

Status Cancel


Famotidine (Pepcid Vial) 20 mg QHS IVP ;  Start 10/19/19 at 21:00;  Status 

Cancel


Morphine Sulfate (Morphine Sulfate) 2 mg PRN Q1HR  PRN IV SEE COMMENTS.;  Start 

10/19/19 at 17:30;  Status Cancel


Morphine Sulfate (Morphine Sulfate) 4 mg PRN Q1HR  PRN IV SEE COMMENTS.;  Start 

10/19/19 at 17:30;  Status Cancel


Enoxaparin Sodium (Lovenox 40mg Syringe) 40 mg Q12HR SQ ;  Start 10/19/19 at 

21:00;  Status UNV


Midazolam HCl 100 ml @ 5 mls/hr CONT  PRN IV SEE I/O RECORD;  Start 10/19/19 at 

17:30;  Status Cancel


Propofol 100 ml @  1.061 mls/ hr CONT  PRN IV SEE I/O RECORD;  Start 10/19/19 at

17:30;  Status Cancel





Active Scripts


Active


Aspirin Ec (Aspirin) 81 Mg Tablet. 81 Mg PO DAILYWBKFT 30 Days


Reported


Protonix (Pantoprazole Sodium) 20 Mg Tablet. 40 Mg PO BID


Escitalopram Oxalate 10 Mg Tablet 10 Mg PO DAILY


Vitals/I & O





Vital Sign - Last 24 Hours








 10/19/19 10/19/19 10/19/19 10/19/19





 11:00 12:00 12:00 13:00


 


Temp 97.5  97.4 97.4





 97.5  97.4 97.4


 


Pulse 72  73 73


 


Resp 18  18 


 


B/P (MAP) 97/60 (72)  92/62 (72) 92/58 (69)


 


Pulse Ox 99  99 100


 


O2 Delivery Nasal Cannula Nasal Cannula Nasal Cannula Nasal Cannula


 


O2 Flow Rate 2.0 1.0 1.0 1.0


 


    





    





 10/19/19 10/19/19 10/19/19 10/19/19





 14:00 15:00 16:00 16:00


 


Temp 97.4 97.4 97.4 





 97.4 97.4 97.4 


 


Pulse 72 74 68 


 


Resp 16  18 


 


B/P (MAP) 80/60 (67) 91/56 (68) 93/52 (66) 


 


Pulse Ox 100 100 100 


 


O2 Delivery Nasal Cannula Nasal Cannula Nasal Cannula Nasal Cannula


 


O2 Flow Rate 1.0 1.0 1.0 1.0


 


    





    





 10/19/19 10/19/19 10/19/19 10/19/19





 17:00 18:00 19:00 20:00


 


Temp 97.4 97.4  97.9





 97.4 97.4  97.9


 


Pulse 66 66 74 78


 


Resp 18 18  20


 


B/P (MAP) 81/46 (58) 84/51 (62) 77/45 (56) 92/57 (69)


 


Pulse Ox 100 100 98 98


 


O2 Delivery Nasal Cannula Nasal Cannula Nasal Cannula Nasal Cannula


 


O2 Flow Rate 1.0 1.0 1.0 1.0


 


    





    





 10/19/19 10/19/19 10/19/19 10/19/19





 20:01 21:00 22:00 23:00


 


Pulse  76 80 80


 


B/P (MAP)  97/54 (68) 90/48 (62) 87/50 (62)


 


Pulse Ox  99 99 99


 


O2 Delivery Nasal Cannula Nasal Cannula Nasal Cannula Nasal Cannula


 


O2 Flow Rate 1.0 1.0 1.0 1.0





 10/20/19 10/20/19 10/20/19 10/20/19





 00:01 00:01 01:00 02:00


 


Temp  98.3  





  98.3  


 


Pulse  80 76 76


 


B/P (MAP)  87/53 (64) 93/49 (64) 83/58 (66)


 


Pulse Ox  99 99 97


 


O2 Delivery Nasal Cannula Nasal Cannula Nasal Cannula Nasal Cannula


 


O2 Flow Rate 1.0 1.0 1.0 1.0


 


    





    





 10/20/19 10/20/19 10/20/19 10/20/19





 03:00 04:00 04:00 05:00


 


Temp   98.1 





   98.1 


 


Pulse 74  74 78


 


B/P (MAP) 96/45 (62)  95/53 (67) 98/52 (67)


 


Pulse Ox 98  97 98


 


O2 Delivery Room Air Room Air Room Air Room Air


 


    





    





 10/20/19 10/20/19 10/20/19 10/20/19





 06:00 07:00 08:00 08:00


 


Temp  97.6 97.6 





  97.6 97.6 


 


Pulse 76 74 76 


 


Resp  20 18 


 


B/P (MAP) 95/50 (65) 95/60 (72) 94/55 (68) 


 


Pulse Ox 98 98 97 


 


O2 Delivery Room Air Room Air Room Air Room Air


 


O2 Flow Rate  1.0 1.0 1.0


 


    





    





 10/20/19 10/20/19  





 09:00 10:00  


 


Pulse 78 78  


 


Resp 20 18  


 


B/P (MAP) 85/45 (58) 90/57 (68)  


 


Pulse Ox 97 97  


 


O2 Delivery Room Air Room Air  


 


O2 Flow Rate 1.0 1.0  














Intake and Output   


 


 10/19/19 10/19/19 10/20/19





 15:00 23:00 07:00


 


Intake Total 40 ml 50 ml 20 ml


 


Output Total 113 ml 140 ml 71 ml


 


Balance -73 ml -90 ml -51 ml











Nutrition Consultation


Dietary Evaluation:


Recommendations by RD:  Increase Calorie Intake, PPN/TPN


Comments:  


REC TPN per followin g dextrose, 60 g AA, 20 g lipids





REC advance diet as able pending SLP recommendations, goal diet ADA  


w/textures and liquids per SLP


Expected Outcomes/Goals:  


Nutrition support + PO intake to meet >75% est needs





Malnutrition Findings:


Body Fat Depletion (Non Severe:  Mild Depletion


Weight Status:  Appropriate











MAL HAMILTON MD                 Oct 20, 2019 10:36

## 2019-10-21 VITALS — SYSTOLIC BLOOD PRESSURE: 108 MMHG | DIASTOLIC BLOOD PRESSURE: 61 MMHG

## 2019-10-21 VITALS — SYSTOLIC BLOOD PRESSURE: 114 MMHG | DIASTOLIC BLOOD PRESSURE: 64 MMHG

## 2019-10-21 VITALS — SYSTOLIC BLOOD PRESSURE: 96 MMHG | DIASTOLIC BLOOD PRESSURE: 51 MMHG

## 2019-10-21 VITALS — SYSTOLIC BLOOD PRESSURE: 84 MMHG | DIASTOLIC BLOOD PRESSURE: 56 MMHG

## 2019-10-21 VITALS — DIASTOLIC BLOOD PRESSURE: 65 MMHG | SYSTOLIC BLOOD PRESSURE: 108 MMHG

## 2019-10-21 VITALS — DIASTOLIC BLOOD PRESSURE: 57 MMHG | SYSTOLIC BLOOD PRESSURE: 94 MMHG

## 2019-10-21 VITALS — SYSTOLIC BLOOD PRESSURE: 112 MMHG | DIASTOLIC BLOOD PRESSURE: 66 MMHG

## 2019-10-21 VITALS — DIASTOLIC BLOOD PRESSURE: 62 MMHG | SYSTOLIC BLOOD PRESSURE: 106 MMHG

## 2019-10-21 VITALS — SYSTOLIC BLOOD PRESSURE: 109 MMHG | DIASTOLIC BLOOD PRESSURE: 67 MMHG

## 2019-10-21 VITALS — DIASTOLIC BLOOD PRESSURE: 50 MMHG | SYSTOLIC BLOOD PRESSURE: 85 MMHG

## 2019-10-21 VITALS — SYSTOLIC BLOOD PRESSURE: 100 MMHG | DIASTOLIC BLOOD PRESSURE: 61 MMHG

## 2019-10-21 VITALS — DIASTOLIC BLOOD PRESSURE: 61 MMHG | SYSTOLIC BLOOD PRESSURE: 108 MMHG

## 2019-10-21 VITALS — SYSTOLIC BLOOD PRESSURE: 106 MMHG | DIASTOLIC BLOOD PRESSURE: 65 MMHG

## 2019-10-21 VITALS — SYSTOLIC BLOOD PRESSURE: 110 MMHG | DIASTOLIC BLOOD PRESSURE: 68 MMHG

## 2019-10-21 VITALS — SYSTOLIC BLOOD PRESSURE: 113 MMHG | DIASTOLIC BLOOD PRESSURE: 62 MMHG

## 2019-10-21 VITALS — SYSTOLIC BLOOD PRESSURE: 94 MMHG | DIASTOLIC BLOOD PRESSURE: 53 MMHG

## 2019-10-21 VITALS — DIASTOLIC BLOOD PRESSURE: 52 MMHG | SYSTOLIC BLOOD PRESSURE: 95 MMHG

## 2019-10-21 VITALS — DIASTOLIC BLOOD PRESSURE: 59 MMHG | SYSTOLIC BLOOD PRESSURE: 103 MMHG

## 2019-10-21 VITALS — DIASTOLIC BLOOD PRESSURE: 68 MMHG | SYSTOLIC BLOOD PRESSURE: 110 MMHG

## 2019-10-21 VITALS — SYSTOLIC BLOOD PRESSURE: 94 MMHG | DIASTOLIC BLOOD PRESSURE: 49 MMHG

## 2019-10-21 LAB
ANION GAP SERPL CALC-SCNC: 9 MMOL/L (ref 6–14)
BUN SERPL-MCNC: 14 MG/DL (ref 8–26)
CALCIUM SERPL-MCNC: 6.6 MG/DL (ref 8.5–10.1)
CHLORIDE SERPL-SCNC: 109 MMOL/L (ref 98–107)
CO2 SERPL-SCNC: 21 MMOL/L (ref 21–32)
CREAT SERPL-MCNC: 1.6 MG/DL (ref 0.7–1.3)
ERYTHROCYTE [DISTWIDTH] IN BLOOD BY AUTOMATED COUNT: 15.4 % (ref 11.5–14.5)
GFR SERPLBLD BASED ON 1.73 SQ M-ARVRAT: 41.1 ML/MIN
GLUCOSE SERPL-MCNC: 208 MG/DL (ref 70–99)
HCT VFR BLD CALC: 25.4 % (ref 39–53)
HGB BLD-MCNC: 8.3 G/DL (ref 13–17.5)
MAGNESIUM SERPL-MCNC: 2 MG/DL (ref 1.8–2.4)
MCH RBC QN AUTO: 30 PG (ref 25–35)
MCHC RBC AUTO-ENTMCNC: 33 G/DL (ref 31–37)
MCV RBC AUTO: 93 FL (ref 79–100)
PHOSPHATE SERPL-MCNC: 3.1 MG/DL (ref 2.6–4.7)
PLATELET # BLD AUTO: 80 X10^3/UL (ref 140–400)
POTASSIUM SERPL-SCNC: 3.4 MMOL/L (ref 3.5–5.1)
RBC # BLD AUTO: 2.74 X10^6/UL (ref 4.3–5.7)
SODIUM SERPL-SCNC: 139 MMOL/L (ref 136–145)
WBC # BLD AUTO: 2.7 X10^3/UL (ref 4–11)

## 2019-10-21 PROCEDURE — 0W993ZZ DRAINAGE OF RIGHT PLEURAL CAVITY, PERCUTANEOUS APPROACH: ICD-10-PCS | Performed by: RADIOLOGY

## 2019-10-21 RX ADMIN — Medication PRN EACH: at 12:56

## 2019-10-21 RX ADMIN — Medication PRN EACH: at 13:57

## 2019-10-21 RX ADMIN — Medication PRN EACH: at 12:51

## 2019-10-21 RX ADMIN — CEFTRIAXONE SODIUM SCH GM: 2 INJECTION, POWDER, FOR SOLUTION INTRAMUSCULAR; INTRAVENOUS at 09:50

## 2019-10-21 RX ADMIN — CITALOPRAM HYDROBROMIDE SCH MG: 20 TABLET ORAL at 09:49

## 2019-10-21 RX ADMIN — GLYCERIN, ISOLEUCINE, LEUCINE, LYSINE, METHIONINE, PHENYLALANINE, THREONINE, TRYPTOPHAN, VALINE, ALANINE, GLYCINE, ARGININE, HISTIDINE, PROLINE, SERINE, CYSTEINE, SODIUM ACETATE, MAGNESIUM ACETATE, CALCIUM ACETATE, SODIUM CHLORIDE, POTASSIUM CHLORIDE, PHOSPHORIC ACID, AND POTASSIUM METABISULFITE SCH MLS/HR
3; .21; .27; .22; .16; .17; .12; .046; .2; .21; .42; .29; .085; .34; .18; .014; .2; .054; .026; .12; .15; .041 INJECTION INTRAVENOUS at 07:00

## 2019-10-21 RX ADMIN — ASPIRIN SCH MG: 81 TABLET, COATED ORAL at 09:49

## 2019-10-21 RX ADMIN — Medication SCH CAP: at 09:49

## 2019-10-21 RX ADMIN — BACITRACIN SCH MLS/HR: 5000 INJECTION, POWDER, FOR SOLUTION INTRAMUSCULAR at 09:50

## 2019-10-21 RX ADMIN — PIPERACILLIN SODIUM AND TAZOBACTAM SODIUM SCH MLS/HR: 3; .375 INJECTION, POWDER, LYOPHILIZED, FOR SOLUTION INTRAVENOUS at 05:18

## 2019-10-21 RX ADMIN — Medication SCH CAP: at 23:11

## 2019-10-21 RX ADMIN — SUCRALFATE SCH GM: 1 SUSPENSION ORAL at 16:30

## 2019-10-21 RX ADMIN — ENOXAPARIN SODIUM SCH MG: 100 INJECTION SUBCUTANEOUS at 23:13

## 2019-10-21 NOTE — PDOC
PROGRESS NOTES


Chief Complaint


Chief Complaint


sepsis, with hypotension, - improved to resolved, 


N/v, diarrhea and H/o dysphagia/esophageal dysmotility


chronic systolic CHF pleural effusions, anasarca,  from CAD


acute renal failure


severe malnutrition


chronic anemia, coagulopathy, 


transaminitis, 


deaf 


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





History of Present Illness


History of Present Illness


can try out of icu


he is not eating,   started TPN


he has refused NG tube feeds, 


consider palliative care, discussed with CV consult 


 


no event


vitals better 


mult other consults following





Vitals


Vitals





Vital Signs








  Date Time  Temp Pulse Resp B/P (MAP) Pulse Ox O2 Delivery O2 Flow Rate FiO2


 


10/21/19 13:00  64 12 110/68 (82) 92 Room Air  


 


10/21/19 07:00 97.7       





 97.7       


 


10/21/19 06:02       1.0 











Physical Exam


Physical Exam


GENERAL:  Alert, oriented gentleman, not in any distress.


HEENT:  Both pupils are round and reacting.  No conjunctival lesion, no lesion


in the mouth.


NECK:  Supple, RT IJ looks ok


LUNGS:  Decreased breath sounds.


HEART:  S1, S2 regular.  No gallop or murmur.


ABDOMEN:  Soft, nontender, no organomegaly.


EXTREMITIES:  edema +


NEUROLOGIC:  Other than poor hearing, the patient does move all the extremities.


 There is no other focal deficit.


General:  Alert, Cooperative, No acute distress


Heart:  Regular rate (SR), Normal S1, Normal S2, No murmurs


Lungs:  Crackles, Other


Abdomen:  Soft, No tenderness


Extremities:  No cyanosis, Other (anasarca)


Skin:  No breakdown, No significant lesion, Other (pale)





Labs


LABS





Laboratory Tests








Test


 10/21/19


05:40


 


White Blood Count


 2.7 x10^3/uL


(4.0-11.0)


 


Red Blood Count


 2.74 x10^6/uL


(4.30-5.70)


 


Hemoglobin


 8.3 g/dL


(13.0-17.5)


 


Hematocrit


 25.4 %


(39.0-53.0)


 


Mean Corpuscular Volume 93 fL () 


 


Mean Corpuscular Hemoglobin 30 pg (25-35) 


 


Mean Corpuscular Hemoglobin


Concent 33 g/dL


(31-37)


 


Red Cell Distribution Width


 15.4 %


(11.5-14.5)


 


Platelet Count


 80 x10^3/uL


(140-400)


 


Sodium Level


 139 mmol/L


(136-145)


 


Potassium Level


 3.4 mmol/L


(3.5-5.1)


 


Chloride Level


 109 mmol/L


()


 


Carbon Dioxide Level


 21 mmol/L


(21-32)


 


Anion Gap 9 (6-14) 


 


Blood Urea Nitrogen


 14 mg/dL


(8-26)


 


Creatinine


 1.6 mg/dL


(0.7-1.3)


 


Estimated GFR


(Cockcroft-Gault) 41.1 





 


Glucose Level


 208 mg/dL


(70-99)


 


Calcium Level


 6.6 mg/dL


(8.5-10.1)


 


Phosphorus Level


 3.1 mg/dL


(2.6-4.7)


 


Magnesium Level


 2.0 mg/dL


(1.8-2.4)











Assessment and Plan


Assessmemt and Plan


Problems


Medical Problems:


(1) Anemia


Status: Acute  





(2) CHF (congestive heart failure)


Status: Acute  





(3) Diarrhea


Status: Acute  





(4) Elevated liver function tests


Status: Acute  





(5) Hypoalbuminemia


Status: Acute  





(6) Hypocalcemia


Status: Acute  





(7) Pleural effusion


Status: Acute  





(8) Renal insufficiency


Status: Acute  





(9) Severe sepsis


Status: Acute  





(10) Tachycardia


Status: Acute  











Comment


Review of Relevant


I have reviewed the following items milana (where applicable) has been applied.


Labs





Laboratory Tests








Test


 10/19/19


19:00 10/20/19


09:15 10/20/19


11:45 10/21/19


05:40


 


Clostridium difficile Toxin B


Gene Negative


(Negative) 


 


 





 


Mixed Venous Blood pH     


 


Mixed Venous Blood PCO2   mmHg   


 


Mixed Venous Blood PO2  < 42 mmHg   


 


Mixed Venous Blood HCO3   mmol/L   


 


Mixed Venous Blood Base Excess   mmol/L   


 


Mixed Venous Blood O2


Saturation 


 73 % 


 


 





 


Phosphorus Level


 


 


 2.2 mg/dL


(2.6-4.7) 3.1 mg/dL


(2.6-4.7)


 


Magnesium Level


 


 


 1.5 mg/dL


(1.8-2.4) 2.0 mg/dL


(1.8-2.4)


 


White Blood Count


 


 


 


 2.7 x10^3/uL


(4.0-11.0)


 


Red Blood Count


 


 


 


 2.74 x10^6/uL


(4.30-5.70)


 


Hemoglobin


 


 


 


 8.3 g/dL


(13.0-17.5)


 


Hematocrit


 


 


 


 25.4 %


(39.0-53.0)


 


Mean Corpuscular Volume    93 fL () 


 


Mean Corpuscular Hemoglobin    30 pg (25-35) 


 


Mean Corpuscular Hemoglobin


Concent 


 


 


 33 g/dL


(31-37)


 


Red Cell Distribution Width


 


 


 


 15.4 %


(11.5-14.5)


 


Platelet Count


 


 


 


 80 x10^3/uL


(140-400)


 


Sodium Level


 


 


 


 139 mmol/L


(136-145)


 


Potassium Level


 


 


 


 3.4 mmol/L


(3.5-5.1)


 


Chloride Level


 


 


 


 109 mmol/L


()


 


Carbon Dioxide Level


 


 


 


 21 mmol/L


(21-32)


 


Anion Gap    9 (6-14) 


 


Blood Urea Nitrogen


 


 


 


 14 mg/dL


(8-26)


 


Creatinine


 


 


 


 1.6 mg/dL


(0.7-1.3)


 


Estimated GFR


(Cockcroft-Gault) 


 


 


 41.1 





 


Glucose Level


 


 


 


 208 mg/dL


(70-99)


 


Calcium Level


 


 


 


 6.6 mg/dL


(8.5-10.1)








Laboratory Tests








Test


 10/21/19


05:40


 


White Blood Count


 2.7 x10^3/uL


(4.0-11.0)


 


Red Blood Count


 2.74 x10^6/uL


(4.30-5.70)


 


Hemoglobin


 8.3 g/dL


(13.0-17.5)


 


Hematocrit


 25.4 %


(39.0-53.0)


 


Mean Corpuscular Volume 93 fL () 


 


Mean Corpuscular Hemoglobin 30 pg (25-35) 


 


Mean Corpuscular Hemoglobin


Concent 33 g/dL


(31-37)


 


Red Cell Distribution Width


 15.4 %


(11.5-14.5)


 


Platelet Count


 80 x10^3/uL


(140-400)


 


Sodium Level


 139 mmol/L


(136-145)


 


Potassium Level


 3.4 mmol/L


(3.5-5.1)


 


Chloride Level


 109 mmol/L


()


 


Carbon Dioxide Level


 21 mmol/L


(21-32)


 


Anion Gap 9 (6-14) 


 


Blood Urea Nitrogen


 14 mg/dL


(8-26)


 


Creatinine


 1.6 mg/dL


(0.7-1.3)


 


Estimated GFR


(Cockcroft-Gault) 41.1 





 


Glucose Level


 208 mg/dL


(70-99)


 


Calcium Level


 6.6 mg/dL


(8.5-10.1)


 


Phosphorus Level


 3.1 mg/dL


(2.6-4.7)


 


Magnesium Level


 2.0 mg/dL


(1.8-2.4)








Microbiology


10/17/19 Blood Culture - Preliminary, Resulted


           NO GROWTH AFTER 3 DAYS


Medications





Current Medications


Sodium Chloride 1,000 ml @  1,000 mls/hr Q1H IV  Last administered on 10/17/19at

15:55;  Start 10/17/19 at 15:34;  Stop 10/17/19 at 16:33;  Status DC


Ondansetron HCl (Zofran) 4 mg 1X  ONCE IV  Last administered on 10/17/19at 

16:09;  Start 10/17/19 at 16:30;  Stop 10/17/19 at 16:31;  Status DC


Sodium Chloride 1,000 ml @  1,000 mls/hr 1X  ONCE IV  Last administered on 1

at 17:27;  Start 10/17/19 at 16:45;  Stop 10/17/19 at 17:44;  Status DC


Piperacillin Sod/ Tazobactam Sod 3.375 gm/Sodium Chloride 50 ml @  100 mls/hr 1X

 ONCE IV  Last administered on 10/17/19at 17:27;  Start 10/17/19 at 18:00;  Stop

10/17/19 at 18:29;  Status DC


Vancomycin HCl 250 ml @  250 mls/hr 1X  ONCE IV  Last administered on 10/17/19at

17:36;  Start 10/17/19 at 17:00;  Stop 10/17/19 at 17:59;  Status DC


Levofloxacin/ Dextrose 150 ml @  100 mls/hr 1X  ONCE IV  Last administered on 

10/17/19at 18:17;  Start 10/17/19 at 16:45;  Stop 10/17/19 at 18:14;  Status DC


Sodium Chloride 1,000 ml @  150 mls/hr Q6H40M IV ;  Start 10/17/19 at 17:05;  

Stop 10/17/19 at 21:10;  Status DC


Dopamine HCl/ Dextrose 250 ml @  10.121 mls/ hr CONT  PRN IV SEE I/O RECORD Last

administered on 10/17/19at 20:32;  Start 10/17/19 at 20:00


Piperacillin Sod/ Tazobactam Sod 3.375 gm/Sodium Chloride 50 ml @  100 mls/hr 

Q8HRS IV  Last administered on 10/21/19at 05:18;  Start 10/17/19 at 22:00;  Stop

10/21/19 at 08:16;  Status DC


Linezolid/Dextrose 300 ml @  300 mls/hr Q12HR IV  Last administered on 

10/20/19at 08:36;  Start 10/17/19 at 21:00;  Stop 10/20/19 at 08:56;  Status DC


Enoxaparin Sodium (Lovenox 30mg Syringe) 30 mg Q24H SQ  Last administered on 

10/20/19at 22:01;  Start 10/17/19 at 21:00


Sodium Chloride 1,000 ml @  75 mls/hr [id number] IV  Last administered on 

10/20/19at 10:16;  Start 10/18/19 at 18:00;  Stop 10/20/19 at 18:17;  Status DC


Pantoprazole Sodium (Protonix) 40 mg BIDAC PO  Last administered on 10/18/19at 

08:43;  Start 10/18/19 at 07:30;  Stop 10/18/19 at 13:49;  Status DC


Aspirin (Children'S Aspirin) 81 mg DAILYWBKFT PO  Last administered on 10

/18/19at 08:43;  Start 10/18/19 at 08:00;  Stop 10/18/19 at 09:29;  Status DC


Citalopram Hydrobromide (CeleXA) 20 mg DAILY PO  Last administered on 10/21/19at

09:49;  Start 10/18/19 at 09:00


Norepinephrine Bitartrate 250 ml @  12.97 mls/ hr CONT  PRN IV SEE I/O RECORD 

Last administered on 10/19/19at 23:40;  Start 10/17/19 at 21:15


Sodium Chloride 500 ml @  500 mls/hr 1X  ONCE IV  Last administered on 

10/17/19at 21:40;  Start 10/17/19 at 21:15;  Stop 10/17/19 at 22:14;  Status DC


Ondansetron HCl (Zofran) 4 mg PRN Q6HRS  PRN IVP NAUSEA/VOMITING Last 

administered on 10/18/19at 15:42;  Start 10/17/19 at 21:30


Albumin Human 100 ml @  100 mls/hr 1X  ONCE IV  Last administered on 10/18/19at 

10:37;  Start 10/18/19 at 09:15;  Stop 10/18/19 at 10:14;  Status DC


Furosemide (Lasix) 20 mg 1X  ONCE IVP  Last administered on 10/18/19at 11:42;  

Start 10/18/19 at 10:15;  Stop 10/18/19 at 10:16;  Status DC


Aspirin (Ecotrin) 81 mg DAILYWBKFT PO  Last administered on 10/21/19at 09:49;  

Start 10/19/19 at 08:00


Potassium Chloride/Water 100 ml @  100 mls/hr Q1H IV  Last administered on 

10/18/19at 14:53;  Start 10/18/19 at 12:00;  Stop 10/18/19 at 15:59;  Status DC


Lactobacillus Rhamnosus (Culturelle) 1 cap BID PO  Last administered on 

10/21/19at 09:49;  Start 10/18/19 at 21:00


Pantoprazole Sodium (PROTONIX VIAL for IV PUSH) 40 mg BIDAC IVP  Last 

administered on 10/20/19at 16:47;  Start 10/18/19 at 16:30;  Stop 10/21/19 at 

09:42;  Status DC


Fentanyl Citrate 30 ml @ 0 mls/hr CONT  PRN IV SEE PROTOCOL;  Start 10/19/19 at 

17:30;  Status Cancel


Fentanyl Citrate (Fentanyl 2ml Vial) 25 mcg PRN Q1HR  PRN IV SEE COMMENTS;  

Start 10/19/19 at 17:30;  Status Cancel


Fentanyl Citrate (Fentanyl 2ml Vial) 50 mcg PRN Q1HR  PRN IV SEE COMMENTS;  

Start 10/19/19 at 17:30;  Status Cancel


Chlorhexidine Gluconate (Peridex) 15 ml BID MM ;  Start 10/19/19 at 21:00;  

Status Cancel


Famotidine (Pepcid Vial) 20 mg QHS IVP ;  Start 10/19/19 at 21:00;  Status 

Cancel


Morphine Sulfate (Morphine Sulfate) 2 mg PRN Q1HR  PRN IV SEE COMMENTS.;  Start 

10/19/19 at 17:30;  Status Cancel


Morphine Sulfate (Morphine Sulfate) 4 mg PRN Q1HR  PRN IV SEE COMMENTS.;  Start 

10/19/19 at 17:30;  Status Cancel


Enoxaparin Sodium (Lovenox 40mg Syringe) 40 mg Q12HR SQ ;  Start 10/19/19 at 

21:00;  Status UNV


Midazolam HCl 100 ml @ 5 mls/hr CONT  PRN IV SEE I/O RECORD;  Start 10/19/19 at 

17:30;  Status Cancel


Propofol 100 ml @  1.061 mls/ hr CONT  PRN IV SEE I/O RECORD;  Start 10/19/19 at

17:30;  Status Cancel


Info (Tpn Per Pharmacy) 1 each PRN DAILY  PRN MC SEE COMMENTS Last administered 

on 10/21/19at 13:57;  Start 10/20/19 at 10:45


Amino Acids/ Glycerin/ Electrolytes 1,000 ml @  100 mls/hr Q10H IV  Last 

administered on 10/20/19at 11:08;  Start 10/20/19 at 11:00;  Stop 10/21/19 at 

09:00;  Status DC


Potassium Phosphate 13.6 mmol/Dextrose 104.5333 ml @  52.267 m... Q2H IV  Last 

administered on 10/20/19at 15:49;  Start 10/20/19 at 14:00;  Stop 10/20/19 at 

17:59;  Status DC


Magnesium Sulfate 50 ml @ 25 mls/hr 1X  ONCE IV  Last administered on 10/20/19at

14:41;  Start 10/20/19 at 14:00;  Stop 10/20/19 at 15:59;  Status DC


Sodium Chloride 90 meq/Potassium Acetate 50 meq/ Potassium Phosphate 20 mmol/ 

Magnesium Sulfate 15 meq/Calcium Gluconate 10 meq/ Multivitamins 10 ml/Chromium/

Copper/Manganese/ Seleni/Zn 1 ml/ Total Parenteral Nutrition/Amino Acids/

Dextrose/ Fat Emulsion Intravenous 1,512 ml @  63 mls/hr TPN  CONT IV  Last 

administered on 10/20/19at 22:02;  Start 10/20/19 at 22:00;  Stop 10/21/19 at 

13:04;  Status DC


Albumin Human 250 ml @  62.5 mls/hr 1X  ONCE IV  Last administered on 10/20/19at

14:30;  Start 10/20/19 at 14:30;  Stop 10/20/19 at 18:29;  Status DC


Sodium Chloride 1,000 ml @  100 mls/hr Q10H IV  Last administered on 10/21/19at 

09:50;  Start 10/20/19 at 18:15


Ceftriaxone Sodium (Rocephin) 2 gm Q24H IVP  Last administered on 10/21/19at 0

9:50;  Start 10/21/19 at 09:00


Pantoprazole Sodium (Protonix) 40 mg DAILYAC PO ;  Start 10/22/19 at 07:30


Sucralfate (Carafate) 1 gm BIDAC PO ;  Start 10/21/19 at 16:30


Sodium Chloride 90 meq/Potassium Acetate 70 meq/ Potassium Phosphate 15 mmol/ 

Magnesium Sulfate 12 meq/Calcium Gluconate 13 meq/ Multivitamins 10 ml/Chromium/

Copper/Manganese/ Seleni/Zn 1 ml/ Total Parenteral Nutrition/Amino 

Acids/Dextrose/ Fat Emulsion Intravenous 1,512 ml @  63 mls/hr TPN  CONT IV ;  

Start 10/21/19 at 22:00;  Stop 10/21/19 at 13:55;  Status DC


Sodium Chloride 90 meq/Potassium Acetate 70 meq/ Potassium Phosphate 15 mmol/ 

Magnesium Sulfate 12 meq/Calcium Gluconate 13 meq/ Multivitamins 10 ml/Chromium/

Copper/Manganese/ Seleni/Zn 1 ml/ Total Parenteral Nutrition/Amino Acids/D

extrose/ Fat Emulsion Intravenous 1,512 ml @  63 mls/hr TPN  CONT IV ;  Start 

10/21/19 at 22:00;  Stop 10/22/19 at 21:59





Active Scripts


Active


Aspirin Ec (Aspirin) 81 Mg Tablet. 81 Mg PO DAILYWBKFT 30 Days


Reported


Protonix (Pantoprazole Sodium) 20 Mg Tablet. 40 Mg PO BID


Escitalopram Oxalate 10 Mg Tablet 10 Mg PO DAILY


Vitals/I & O





Vital Sign - Last 24 Hours








 10/20/19 10/20/19 10/20/19 10/20/19





 16:00 16:00 17:00 18:07


 


Pulse 67  70 68


 


Resp 18  18 18


 


B/P (MAP) 88/51 (63)  94/53 (67) 90/51 (64)


 


Pulse Ox 100  100 100


 


O2 Delivery Room Air Room Air Room Air Room Air


 


O2 Flow Rate 1.0 1.0 1.0 1.0





 10/20/19 10/20/19 10/20/19 10/20/19





 19:17 19:18 20:07 21:00


 


Temp 97.5   





 97.5   


 


Pulse 69  66 65


 


Resp 18   


 


B/P (MAP) 94/55 (68)  84/46 (59) 84/47 (59)


 


Pulse Ox 94   


 


O2 Delivery Nasal Cannula Nasal Cannula  


 


O2 Flow Rate 1.0 1.0  


 


    





    





 10/20/19 10/20/19 10/21/19 10/21/19





 22:00 23:14 00:02 00:04


 


Temp    97.5





    97.5


 


Pulse 67 69  67


 


Resp    18


 


B/P (MAP) 87/51 (63) 89/49 (62)  96/51 (66)


 


Pulse Ox    96


 


O2 Delivery   Nasal Cannula Nasal Cannula


 


O2 Flow Rate   1.0 1.0


 


    





    





 10/21/19 10/21/19 10/21/19 10/21/19





 02:02 03:02 04:00 04:00


 


Pulse 69 69 71 


 


Resp 20 18 18 


 


B/P (MAP) 94/53 (67) 84/56 (65) 94/49 (64) 


 


Pulse Ox 94 98 94 


 


O2 Delivery Nasal Cannula Nasal Cannula Nasal Cannula Nasal Cannula


 


O2 Flow Rate 1.0 1.0 1.0 1.0





 10/21/19 10/21/19 10/21/19 10/21/19





 05:15 06:02 07:00 08:00


 


Temp  97.4 97.7 





  97.4 97.7 


 


Pulse 69 63 62 66


 


Resp 20 18 12 12


 


B/P (MAP) 85/50 (62) 94/57 (69) 106/62 (77) 106/65 (79)


 


Pulse Ox 94 96 96 96


 


O2 Delivery Nasal Cannula Nasal Cannula Room Air Room Air


 


O2 Flow Rate 1.0 1.0  


 


    





    





 10/21/19 10/21/19 10/21/19 10/21/19





 08:00 09:05 10:00 11:00


 


Pulse  64 67 66


 


Resp  12 12 12


 


B/P (MAP)  113/62 (79) 95/52 (66) 112/66 (81)


 


Pulse Ox  95 92 94


 


O2 Delivery Room Air Room Air Room Air Room Air





 10/21/19 10/21/19 10/21/19 





 12:00 12:00 13:00 


 


Pulse 67  64 


 


Resp 16  12 


 


B/P (MAP) 100/61 (74)  110/68 (82) 


 


Pulse Ox 95  92 


 


O2 Delivery Room Air Room Air Room Air 














Intake and Output   


 


 10/20/19 10/20/19 10/21/19





 15:00 23:00 07:00


 


Intake Total 1275 ml 425 ml 1704 ml


 


Output Total 133 ml 135 ml 180 ml


 


Balance 1142 ml 290 ml 1524 ml











Nutrition Consultation


Dietary Evaluation:


Recommendations by RD:  Increase Calorie Intake, PPN/TPN


Comments:  


REC TPN per followin g dextrose, 60 g AA, 20 g lipids





REC continue w/dysphagia I/thin liquid diet as ordered per SLP, encourage  


oral supplement use when pt willing


Expected Outcomes/Goals:  


Nutrition support + PO intake to meet >75% est needs - met, goal ongoing





Malnutrition Findings:


Body Fat Depletion (Non Severe:  Mild Depletion


Weight Status:  Appropriate











MAL HAMILTON MD                 Oct 21, 2019 15:26

## 2019-10-21 NOTE — PDOC
EVA DELUCA APRN 10/21/19 1119:


CARDIO Progress Notes


Date and Time


Date of Service


10/21/19


Time of Evaluation


1115





Subjective


Subjective:  No shortness of breath





Vitals


Vitals





Vital Signs








  Date Time  Temp Pulse Resp B/P (MAP) Pulse Ox O2 Delivery O2 Flow Rate FiO2


 


10/21/19 10:00  67 12 95/52 (66) 92 Room Air  


 


10/21/19 07:00 97.7       





 97.7       


 


10/21/19 06:02       1.0 








Weight


Weight [ ]





Input and Output


Intake and Output











Intake and Output 


 


 10/21/19





 07:00


 


Intake Total 3404 ml


 


Output Total 448 ml


 


Balance 2956 ml


 


 


 


Intake Oral 10 ml


 


IV Total 1704 ml


 


Blood Product IV Normal Saline Flush 665 ml


 


Other 1025 ml


 


Output Urine Total 448 ml











Laboratory


Labs





Laboratory Tests








Test


 10/20/19


11:45 10/21/19


05:40


 


Phosphorus Level


 2.2 mg/dL


(2.6-4.7) 3.1 mg/dL


(2.6-4.7)


 


Magnesium Level


 1.5 mg/dL


(1.8-2.4) 2.0 mg/dL


(1.8-2.4)


 


White Blood Count


 


 2.7 x10^3/uL


(4.0-11.0)


 


Red Blood Count


 


 2.74 x10^6/uL


(4.30-5.70)


 


Hemoglobin


 


 8.3 g/dL


(13.0-17.5)


 


Hematocrit


 


 25.4 %


(39.0-53.0)


 


Mean Corpuscular Volume  93 fL () 


 


Mean Corpuscular Hemoglobin  30 pg (25-35) 


 


Mean Corpuscular Hemoglobin


Concent 


 33 g/dL


(31-37)


 


Red Cell Distribution Width


 


 15.4 %


(11.5-14.5)


 


Platelet Count


 


 80 x10^3/uL


(140-400)


 


Sodium Level


 


 139 mmol/L


(136-145)


 


Potassium Level


 


 3.4 mmol/L


(3.5-5.1)


 


Chloride Level


 


 109 mmol/L


()


 


Carbon Dioxide Level


 


 21 mmol/L


(21-32)


 


Anion Gap  9 (6-14) 


 


Blood Urea Nitrogen


 


 14 mg/dL


(8-26)


 


Creatinine


 


 1.6 mg/dL


(0.7-1.3)


 


Estimated GFR


(Cockcroft-Gault) 


 41.1 





 


Glucose Level


 


 208 mg/dL


(70-99)


 


Calcium Level


 


 6.6 mg/dL


(8.5-10.1)











Microbiology


Micro





Microbiology


10/17/19 Blood Culture - Preliminary, Resulted


           NO GROWTH AFTER 3 DAYS





Physical Exam


HEENT:  Neck Supple W Full Motion


Chest:  Symmetric


LUNGS:  Other (diminished bases )


Heart:  S1S2, RRR


Abdomen:  Soft N/T


Extremities:  Other (trace bilateral LE edema )


Neurology:  alert, follow commands





Assessment


Assessment


1.  Acute respiratory failure, pleural effusion; s/p thoracentesis 


2.  Acute on chronic systolic HF


3.  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


4.  Shock; off pressor support, but BP remains low end


5.  CAD: past CABG. Known 3VD with severe diffuse disease of the RCA. 


6.  Weakness/anorexia/cachexia


7.  Protein calorie malnutrition, anasarca


8.  FTT; refusing to eat. on TPN


9.  Chronic anemia


10.  CAYETANO on CKD; better


11.  Hypokalemia, hypomagnesemia 


12.  Diabetes, II








Recommendations





Continue ASA


HF regimen when BP consistently adequate 


Supportive care


Consider palliative care 

















KRIS CORNELL MD 10/21/19 2154:


CARDIO Progress Notes


Plan


Plan


Pt. seen and examined. AGree with above NP note. 


He is not eating, appears very weak. 


Needs palliative care/hospice evaluation


Supportive care from CV standpoint











EVA DELUCA           Oct 21, 2019 11:19


KRIS CORNELL MD       Oct 21, 2019 21:54

## 2019-10-21 NOTE — RAD
Ultrasound-guided right-sided thoracentesis 10/21/2019 6:44 AM



Indication:  RT PLEURAL EFFUSION

  

Procedure: Informed consent was obtained. A timeout procedure was performed.

Sonographic evaluation of the right chest was performed demonstrating moderate

pleural effusion. The  right posterior chest was prepped and draped in sterile

fashion. 1% lidocaine without epinephrine was administered for local

anesthesia. Real-time ultrasonographic guidance was used in passing a 5 [employer]

[employer] catheter into the  right pleural space. 1.5 L of serosanguineous pleural

fluid was removed. Samples of fluid were sent to the lab for further

evaluation per ordering physician request.   The catheter was removed and

pressure held to achieve hemostasis. A sterile dressing was applied. No

immediate complications were identified. The patient tolerated the procedure

well. 





Impression:   Right sided ultrasound-guided thoracentesis

## 2019-10-21 NOTE — PDOC
SURGICAL PROGRESS NOTE


Subjective


Pt without new c/o, appears comfortable


Vital Signs





Vital Signs








  Date Time  Temp Pulse Resp B/P (MAP) Pulse Ox O2 Delivery O2 Flow Rate FiO2


 


10/21/19 07:00 97.7 62 12 106/62 (77) 96 Room Air  





 97.7       


 


10/21/19 06:02       1.0 








I&O











Intake and Output 


 


 10/21/19





 07:00


 


Intake Total 3404 ml


 


Output Total 448 ml


 


Balance 2956 ml


 


 


 


Intake Oral 10 ml


 


IV Total 1704 ml


 


Blood Product IV Normal Saline Flush 665 ml


 


Other 1025 ml


 


Output Urine Total 448 ml








General:  Alert, Cooperative, No acute distress


Abdomen:  Soft, No tenderness


Labs





Laboratory Tests








Test


 10/19/19


19:00 10/20/19


09:15 10/20/19


11:45 10/21/19


05:40


 


Clostridium difficile Toxin B


Gene Negative


(Negative) 


 


 





 


Mixed Venous Blood pH     


 


Mixed Venous Blood PCO2   mmHg   


 


Mixed Venous Blood PO2  < 42 mmHg   


 


Mixed Venous Blood HCO3   mmol/L   


 


Mixed Venous Blood Base Excess   mmol/L   


 


Mixed Venous Blood O2


Saturation 


 73 % 


 


 





 


Phosphorus Level


 


 


 2.2 mg/dL


(2.6-4.7) 3.1 mg/dL


(2.6-4.7)


 


Magnesium Level


 


 


 1.5 mg/dL


(1.8-2.4) 2.0 mg/dL


(1.8-2.4)


 


White Blood Count


 


 


 


 2.7 x10^3/uL


(4.0-11.0)


 


Red Blood Count


 


 


 


 2.74 x10^6/uL


(4.30-5.70)


 


Hemoglobin


 


 


 


 8.3 g/dL


(13.0-17.5)


 


Hematocrit


 


 


 


 25.4 %


(39.0-53.0)


 


Mean Corpuscular Volume    93 fL () 


 


Mean Corpuscular Hemoglobin    30 pg (25-35) 


 


Mean Corpuscular Hemoglobin


Concent 


 


 


 33 g/dL


(31-37)


 


Red Cell Distribution Width


 


 


 


 15.4 %


(11.5-14.5)


 


Platelet Count


 


 


 


 80 x10^3/uL


(140-400)


 


Sodium Level


 


 


 


 139 mmol/L


(136-145)


 


Potassium Level


 


 


 


 3.4 mmol/L


(3.5-5.1)


 


Chloride Level


 


 


 


 109 mmol/L


()


 


Carbon Dioxide Level


 


 


 


 21 mmol/L


(21-32)


 


Anion Gap    9 (6-14) 


 


Blood Urea Nitrogen


 


 


 


 14 mg/dL


(8-26)


 


Creatinine


 


 


 


 1.6 mg/dL


(0.7-1.3)


 


Estimated GFR


(Cockcroft-Gault) 


 


 


 41.1 





 


Glucose Level


 


 


 


 208 mg/dL


(70-99)


 


Calcium Level


 


 


 


 6.6 mg/dL


(8.5-10.1)








Laboratory Tests








Test


 10/20/19


09:15 10/20/19


11:45 10/21/19


05:40


 


Mixed Venous Blood pH    


 


Mixed Venous Blood PCO2  mmHg   


 


Mixed Venous Blood PO2 < 42 mmHg   


 


Mixed Venous Blood HCO3  mmol/L   


 


Mixed Venous Blood Base Excess  mmol/L   


 


Mixed Venous Blood O2


Saturation 73 % 


 


 





 


Phosphorus Level


 


 2.2 mg/dL


(2.6-4.7) 3.1 mg/dL


(2.6-4.7)


 


Magnesium Level


 


 1.5 mg/dL


(1.8-2.4) 2.0 mg/dL


(1.8-2.4)


 


White Blood Count


 


 


 2.7 x10^3/uL


(4.0-11.0)


 


Red Blood Count


 


 


 2.74 x10^6/uL


(4.30-5.70)


 


Hemoglobin


 


 


 8.3 g/dL


(13.0-17.5)


 


Hematocrit


 


 


 25.4 %


(39.0-53.0)


 


Mean Corpuscular Volume   93 fL () 


 


Mean Corpuscular Hemoglobin   30 pg (25-35) 


 


Mean Corpuscular Hemoglobin


Concent 


 


 33 g/dL


(31-37)


 


Red Cell Distribution Width


 


 


 15.4 %


(11.5-14.5)


 


Platelet Count


 


 


 80 x10^3/uL


(140-400)


 


Sodium Level


 


 


 139 mmol/L


(136-145)


 


Potassium Level


 


 


 3.4 mmol/L


(3.5-5.1)


 


Chloride Level


 


 


 109 mmol/L


()


 


Carbon Dioxide Level


 


 


 21 mmol/L


(21-32)


 


Anion Gap   9 (6-14) 


 


Blood Urea Nitrogen


 


 


 14 mg/dL


(8-26)


 


Creatinine


 


 


 1.6 mg/dL


(0.7-1.3)


 


Estimated GFR


(Cockcroft-Gault) 


 


 41.1 





 


Glucose Level


 


 


 208 mg/dL


(70-99)


 


Calcium Level


 


 


 6.6 mg/dL


(8.5-10.1)








Problem List


Problems


Medical Problems:


(1) Anemia


Status: Acute  





(2) CHF (congestive heart failure)


Status: Acute  





(3) Diarrhea


Status: Acute  





(4) Elevated liver function tests


Status: Acute  





(5) Hypoalbuminemia


Status: Acute  





(6) Hypocalcemia


Status: Acute  





(7) Pleural effusion


Status: Acute  





(8) Renal insufficiency


Status: Acute  





(9) Severe sepsis


Status: Acute  





(10) Tachycardia


Status: Acute  








Assessment/Plan


dehydration


appears gradual improvement


no surgical plans











NIRALI BECKER MD             Oct 21, 2019 08:31

## 2019-10-21 NOTE — PATHOLOGY
Note

[facility] Accession Number: [id number]

   TESTS               RESULT  FLAG  UNITS    REF RANGE  LAB

------------------------------------------------------------

   Clinician Provided Cytology Information

   No. of containers..01 Other (Miscellaneous)

Source:                                                   01

   RT PLEURAL FLUID

DIAGNOSIS:                                                02

   RT PLEURAL FLUID

   NEGATIVE FOR MALIGNANT CELLS.

   MESOTHELIAL CELLS ARE PRESENT.

   THIS INTERPRETATION INCLUDES EVALUATION OF A CELL BLOCK.

Signed out by:                                            02

   Rivas Sheridan MD, Pathologist

   NPI- [id number]

Performed by:                                             01

   Sherron Covington Cytotechnologist ([facility])

Gross description:                                        01

   30 ML, YELLOW, CLEAR

   /LCS  12/31/1840  0000 Local



------------------------------------------------------------

    FLAG LEGEND:

    L-Low Normal,H-High Normal,LL-Alert Low,HH-Alert High

    <-Panic Low,>-Panic High,A-Abnormal,AA-Critical Abnormal

------------------------------------------------------------



Performed at:

01 [facility]

   [address]

   [place]  [address]

   Cosme Tomlinson MD, Phone: [phone]

02 [facility]

   [address] [place]  [address]

   Rivas Sheridan MD, Phone: [phone]

Specimen Comment: A courtesy copy of this report has been sent to

Specimen Comment: [phone], [phone], [phone].

Specimen Comment: Report sent to ,DR TILLMAN / DR BARNHATR

Specimen Comment: A duplicate report has been generated due to demographic updates.

***Performed at:  01

   [facility]

   [address], [place], [place]  [id number]

   MD Cosme Tomlinson MD Phone:  [phone]

## 2019-10-21 NOTE — PDOC
Infectious Disease Note


Subjective:


Subjective


pt is doing ok


no f/c/n/v


occ loose bm


d/w rn


still on pressors





ROS:


ROS


Negative otherwise.





Vital Signs:


Vital Signs





Vital Signs








  Date Time  Temp Pulse Resp B/P (MAP) Pulse Ox O2 Delivery O2 Flow Rate FiO2


 


10/21/19 07:00 97.7 62 12 106/62 (77) 96 Room Air  





 97.7       


 


10/21/19 06:02       1.0 











Physical Exam:


PHYSICAL EXAM


GENERAL:  Alert, oriented gentleman, not in any distress.


HEENT:  Both pupils are round and reacting.  No conjunctival lesion, no lesion


in the mouth.


NECK:  Supple, RT IJ looks ok


LUNGS:  Decreased breath sounds.


HEART:  S1, S2 regular.  No gallop or murmur.


ABDOMEN:  Soft, nontender, no organomegaly.


EXTREMITIES:  edema +


NEUROLOGIC:  Other than poor hearing, the patient does move all the extremities.


 There is no other focal deficit.





Medications:


Inpatient Meds:





Current Medications








 Medications


  (Trade)  Dose


 Ordered  Sig/Leonidas  Start Time


 Stop Time Status Last Admin


Dose Admin


 


 Albumin Human  250 ml @ 


 62.5 mls/hr  1X  ONCE  10/20/19 14:30


 10/20/19 18:29 DC 10/20/19 14:30


62.5 MLS/HR


 


 Amino Acids/


 Glycerin/


 Electrolytes  1,000 ml @ 


 100 mls/hr  Q10H  10/20/19 11:00


 10/21/19 22:00  10/20/19 11:08


100 MLS/HR


 


 Aspirin


  (Children'S


 Aspirin)  81 mg  DAILYWBKFT  10/18/19 08:00


 10/18/19 09:29 DC 10/18/19 08:43


81 MG


 


 Aspirin


  (Ecotrin)  81 mg  DAILYWBKFT  10/19/19 08:00


    10/20/19 08:35


81 MG


 


 Chlorhexidine


 Gluconate


  (Peridex)  15 ml  BID  10/19/19 21:00


   Cancel  





 


 Citalopram


 Hydrobromide


  (CeleXA)  20 mg  DAILY  10/18/19 09:00


    10/20/19 08:36


20 MG


 


 Dopamine HCl/


 Dextrose  250 ml @ 


 10.121 mls/


 hr  CONT  PRN  10/17/19 20:00


    10/17/19 20:32


13 MLS/HR


 


 Enoxaparin Sodium


  (Lovenox 30mg


 Syringe)  30 mg  Q24H  10/17/19 21:00


    10/20/19 22:01


30 MG


 


 Enoxaparin Sodium


  (Lovenox 40mg


 Syringe)  40 mg  Q12HR  10/19/19 21:00


   UNV  





 


 Famotidine


  (Pepcid Vial)  20 mg  QHS  10/19/19 21:00


   Cancel  





 


 Fentanyl Citrate


  (Fentanyl 2ml


 Vial)  50 mcg  PRN Q1HR  PRN  10/19/19 17:30


   Cancel  





 


 Furosemide


  (Lasix)  20 mg  1X  ONCE  10/18/19 10:15


 10/18/19 10:16 DC 10/18/19 11:42


20 MG


 


 Info


  (Tpn Per


 Pharmacy)  1 each  PRN DAILY  PRN  10/20/19 10:45


    10/20/19 13:29


1 EACH


 


 Lactobacillus


 Rhamnosus


  (Culturelle)  1 cap  BID  10/18/19 21:00


    10/20/19 22:00


1 CAP


 


 Levofloxacin/


 Dextrose  150 ml @ 


 100 mls/hr  1X  ONCE  10/17/19 16:45


 10/17/19 18:14 DC 10/17/19 18:17


100 MLS/HR


 


 Linezolid/Dextrose  300 ml @ 


 300 mls/hr  Q12HR  10/17/19 21:00


 10/20/19 08:56 DC 10/20/19 08:36


300 MLS/HR


 


 Magnesium Sulfate  50 ml @ 25


 mls/hr  1X  ONCE  10/20/19 14:00


 10/20/19 15:59 DC 10/20/19 14:41


25 MLS/HR


 


 Midazolam HCl  100 ml @ 5


 mls/hr  CONT  PRN  10/19/19 17:30


   Cancel  





 


 Morphine Sulfate


  (Morphine


 Sulfate)  4 mg  PRN Q1HR  PRN  10/19/19 17:30


   Cancel  





 


 Norepinephrine


 Bitartrate  250 ml @ 


 12.97 mls/


 hr  CONT  PRN  10/17/19 21:15


    10/19/19 23:40


12.97 MLS/HR


 


 Ondansetron HCl


  (Zofran)  4 mg  PRN Q6HRS  PRN  10/17/19 21:30


    10/18/19 15:42


4 MG


 


 Pantoprazole


 Sodium


  (PROTONIX VIAL


 for IV PUSH)  40 mg  BIDAC  10/18/19 16:30


    10/20/19 16:47


40 MG


 


 Pantoprazole


 Sodium


  (Protonix)  40 mg  BIDAC  10/18/19 07:30


 10/18/19 13:49 DC 10/18/19 08:43


40 MG


 


 Piperacillin Sod/


 Tazobactam Sod


 3.375 gm/Sodium


 Chloride  50 ml @ 


 100 mls/hr  Q8HRS  10/17/19 22:00


    10/21/19 05:18


100 MLS/HR


 


 Potassium


 Chloride/Water  100 ml @ 


 100 mls/hr  Q1H  10/18/19 12:00


 10/18/19 15:59 DC 10/18/19 14:53


100 MLS/HR


 


 Potassium


 Phosphate 13.6


 mmol/Dextrose  104.5333


 ml @ 


 52.267 m...  Q2H  10/20/19 14:00


 10/20/19 17:59 DC 10/20/19 15:49


52.267 MLS/HR


 


 Propofol  100 ml @ 


 1.061 mls/


 hr  CONT  PRN  10/19/19 17:30


   Cancel  





 


 Sodium Chloride  1,000 ml @ 


 100 mls/hr  Q10H  10/20/19 18:15


    10/20/19 22:01


100 MLS/HR


 


 Sodium Chloride


 90 meq/Potassium


 Acetate 50 meq/


 Potassium


 Phosphate 20 mmol/


 Magnesium Sulfate


 15 meq/Calcium


 Gluconate 10 meq/


 Multivitamins 10


 ml/Chromium/


 Copper/Manganese/


 Seleni/Zn 1 ml/


 Total Parenteral


 Nutrition/Amino


 Acids/Dextrose/


 Fat Emulsion


 Intravenous  1,512 ml @ 


 63 mls/hr  TPN  CONT  10/20/19 22:00


 10/21/19 21:59  10/20/19 22:02


63 MLS/HR


 


 Vancomycin HCl  250 ml @ 


 250 mls/hr  1X  ONCE  10/17/19 17:00


 10/17/19 17:59 DC 10/17/19 17:36


250 MLS/HR











Labs:


Lab





Laboratory Tests








Test


 10/20/19


09:15 10/20/19


11:45 10/21/19


05:40


 


Mixed Venous Blood pH    


 


Mixed Venous Blood PCO2  mmHg   


 


Mixed Venous Blood PO2 < 42 mmHg   


 


Mixed Venous Blood HCO3  mmol/L   


 


Mixed Venous Blood Base Excess  mmol/L   


 


Mixed Venous Blood O2


Saturation 73 % 


 


 





 


Phosphorus Level


 


 2.2 mg/dL


(2.6-4.7) 3.1 mg/dL


(2.6-4.7)


 


Magnesium Level


 


 1.5 mg/dL


(1.8-2.4) 2.0 mg/dL


(1.8-2.4)


 


White Blood Count


 


 


 2.7 x10^3/uL


(4.0-11.0)


 


Red Blood Count


 


 


 2.74 x10^6/uL


(4.30-5.70)


 


Hemoglobin


 


 


 8.3 g/dL


(13.0-17.5)


 


Hematocrit


 


 


 25.4 %


(39.0-53.0)


 


Mean Corpuscular Volume   93 fL () 


 


Mean Corpuscular Hemoglobin   30 pg (25-35) 


 


Mean Corpuscular Hemoglobin


Concent 


 


 33 g/dL


(31-37)


 


Red Cell Distribution Width


 


 


 15.4 %


(11.5-14.5)


 


Platelet Count


 


 


 80 x10^3/uL


(140-400)


 


Sodium Level


 


 


 139 mmol/L


(136-145)


 


Potassium Level


 


 


 3.4 mmol/L


(3.5-5.1)


 


Chloride Level


 


 


 109 mmol/L


()


 


Carbon Dioxide Level


 


 


 21 mmol/L


(21-32)


 


Anion Gap   9 (6-14) 


 


Blood Urea Nitrogen


 


 


 14 mg/dL


(8-26)


 


Creatinine


 


 


 1.6 mg/dL


(0.7-1.3)


 


Estimated GFR


(Cockcroft-Gault) 


 


 41.1 





 


Glucose Level


 


 


 208 mg/dL


(70-99)


 


Calcium Level


 


 


 6.6 mg/dL


(8.5-10.1)








Micro


cult neg





Objective:


Assessment:





1.  Lactic acidosis,multifactorial,resolved


2.  Hypotension secondary to dehydration.


3.  History of congestive heart failure.


4.  History of renal insufficiency.


5.  Large pleural effusion.s/p thoracocentesis 


6.  Coronary artery disease.


7.  Malnutrition with albumin 1.4.


8.  Elevated liver function tests.


9. Thrombocytopenia





Plan:


Plan of Care


start empiric ceftriaxone


DC  Zosyn due to low plt


off Zyvox


f/u c/s and labs in am


D/W TORIN RUIZ MD           Oct 21, 2019 08:16

## 2019-10-21 NOTE — PDOC
PULMONARY PROGRESS NOTES


Subjective


OFF PRESSORS


NO RESP DISTRESS


Vitals





Vital Signs








  Date Time  Temp Pulse Resp B/P (MAP) Pulse Ox O2 Delivery O2 Flow Rate FiO2


 


10/21/19 09:05  64 12 113/62 (79) 95 Room Air  


 


10/21/19 07:00 97.7       





 97.7       


 


10/21/19 06:02       1.0 








ROS:  No Nausea


General:  Alert


Lungs:  Crackles, Other


Cardiovascular:  S1, S2


Abdomen:  Soft, Non-tender, Other (no mass)


Neuro Exam:  Alert


Skin:  Warm


Labs





Laboratory Tests








Test


 10/19/19


19:00 10/20/19


09:15 10/20/19


11:45 10/21/19


05:40


 


Clostridium difficile Toxin B


Gene Negative


(Negative) 


 


 





 


Mixed Venous Blood pH     


 


Mixed Venous Blood PCO2   mmHg   


 


Mixed Venous Blood PO2  < 42 mmHg   


 


Mixed Venous Blood HCO3   mmol/L   


 


Mixed Venous Blood Base Excess   mmol/L   


 


Mixed Venous Blood O2


Saturation 


 73 % 


 


 





 


Phosphorus Level


 


 


 2.2 mg/dL


(2.6-4.7) 3.1 mg/dL


(2.6-4.7)


 


Magnesium Level


 


 


 1.5 mg/dL


(1.8-2.4) 2.0 mg/dL


(1.8-2.4)


 


White Blood Count


 


 


 


 2.7 x10^3/uL


(4.0-11.0)


 


Red Blood Count


 


 


 


 2.74 x10^6/uL


(4.30-5.70)


 


Hemoglobin


 


 


 


 8.3 g/dL


(13.0-17.5)


 


Hematocrit


 


 


 


 25.4 %


(39.0-53.0)


 


Mean Corpuscular Volume    93 fL () 


 


Mean Corpuscular Hemoglobin    30 pg (25-35) 


 


Mean Corpuscular Hemoglobin


Concent 


 


 


 33 g/dL


(31-37)


 


Red Cell Distribution Width


 


 


 


 15.4 %


(11.5-14.5)


 


Platelet Count


 


 


 


 80 x10^3/uL


(140-400)


 


Sodium Level


 


 


 


 139 mmol/L


(136-145)


 


Potassium Level


 


 


 


 3.4 mmol/L


(3.5-5.1)


 


Chloride Level


 


 


 


 109 mmol/L


()


 


Carbon Dioxide Level


 


 


 


 21 mmol/L


(21-32)


 


Anion Gap    9 (6-14) 


 


Blood Urea Nitrogen


 


 


 


 14 mg/dL


(8-26)


 


Creatinine


 


 


 


 1.6 mg/dL


(0.7-1.3)


 


Estimated GFR


(Cockcroft-Gault) 


 


 


 41.1 





 


Glucose Level


 


 


 


 208 mg/dL


(70-99)


 


Calcium Level


 


 


 


 6.6 mg/dL


(8.5-10.1)








Laboratory Tests








Test


 10/20/19


09:15 10/20/19


11:45 10/21/19


05:40


 


Mixed Venous Blood pH    


 


Mixed Venous Blood PCO2  mmHg   


 


Mixed Venous Blood PO2 < 42 mmHg   


 


Mixed Venous Blood HCO3  mmol/L   


 


Mixed Venous Blood Base Excess  mmol/L   


 


Mixed Venous Blood O2


Saturation 73 % 


 


 





 


Phosphorus Level


 


 2.2 mg/dL


(2.6-4.7) 3.1 mg/dL


(2.6-4.7)


 


Magnesium Level


 


 1.5 mg/dL


(1.8-2.4) 2.0 mg/dL


(1.8-2.4)


 


White Blood Count


 


 


 2.7 x10^3/uL


(4.0-11.0)


 


Red Blood Count


 


 


 2.74 x10^6/uL


(4.30-5.70)


 


Hemoglobin


 


 


 8.3 g/dL


(13.0-17.5)


 


Hematocrit


 


 


 25.4 %


(39.0-53.0)


 


Mean Corpuscular Volume   93 fL () 


 


Mean Corpuscular Hemoglobin   30 pg (25-35) 


 


Mean Corpuscular Hemoglobin


Concent 


 


 33 g/dL


(31-37)


 


Red Cell Distribution Width


 


 


 15.4 %


(11.5-14.5)


 


Platelet Count


 


 


 80 x10^3/uL


(140-400)


 


Sodium Level


 


 


 139 mmol/L


(136-145)


 


Potassium Level


 


 


 3.4 mmol/L


(3.5-5.1)


 


Chloride Level


 


 


 109 mmol/L


()


 


Carbon Dioxide Level


 


 


 21 mmol/L


(21-32)


 


Anion Gap   9 (6-14) 


 


Blood Urea Nitrogen


 


 


 14 mg/dL


(8-26)


 


Creatinine


 


 


 1.6 mg/dL


(0.7-1.3)


 


Estimated GFR


(Cockcroft-Gault) 


 


 41.1 





 


Glucose Level


 


 


 208 mg/dL


(70-99)


 


Calcium Level


 


 


 6.6 mg/dL


(8.5-10.1)








Medications





Active Scripts








 Medications  Dose


 Route/Sig


 Max Daily Dose Days Date Category


 


 Aspirin Ec


  (Aspirin) 81 Mg


 Tablet.dr  81 Mg


 PO DAILYWBKFT


  30 6/8/18 Rx


 


 Protonix


  (Pantoprazole


 Sodium) 20 Mg


 Tablet.dr  40 Mg


 PO BID


   3/1/18 Reported


 


 Escitalopram


 Oxalate 10 Mg


 Tablet  10 Mg


 PO DAILY


   11/30/17 Reported











Impression


.


IMPRESSION:


1.  Large right-sided effusion, s/p thoracentesis, transudate, due to chf.


2.  Septic/cardiogenic shock.


3.  Elevated liver chemistries.


4.  Acute-on-chronic congestive heart failure, systolic.


5.  Renal insufficiency.


6.  Protein malnutrition, present upon admission.








echo


The systolic function is mildly to moderately impaired. The Ejection Fraction is

40-45%.


There is global hypokinesis of the left ventricle. Septal motion suggestive of 

conduction defect.





Plan


.


TRANSFER OUT OF ICU


OFF PRESSORS


1.  s/p thoracentesis. transudate, fu cxs  cytology


2.  Continue IV antibiotics per Infectious Disease service.


3.  Follow Cardiology input.


4.  keep I<O, monitor k, cr


5.  02 titration to keep sat 92%











ALICE TILLMAN MD              Oct 21, 2019 09:13

## 2019-10-21 NOTE — PDOC
SUBJECTIVE


ROS


Refusing to eat,  On TPN





OBJECTIVE


Vital Signs





Vital Signs








  Date Time  Temp Pulse Resp B/P (MAP) Pulse Ox O2 Delivery O2 Flow Rate FiO2


 


10/21/19 10:00  67 12 95/52 (66) 92 Room Air  


 


10/21/19 07:00 97.7       





 97.7       


 


10/21/19 06:02       1.0 








I & 0











Intake and Output 


 


 10/21/19





 07:00


 


Intake Total 3404 ml


 


Output Total 448 ml


 


Balance 2956 ml


 


 


 


Intake Oral 10 ml


 


IV Total 1704 ml


 


Blood Product IV Normal Saline Flush 665 ml


 


Other 1025 ml


 


Output Urine Total 448 ml











PHYSICAL EXAM


Physical Exam


General:   No acute distress,cachectic


HEENT:  OM dry  , On O2 by NC 


Neck supple  


Lungs:   CTA , decreased BS bases 


Heart:  Regular rate , Normal S1, Normal S2, No murmurs


Abdomen:  Soft, No tenderness


Extremities: 2-3 + EDEMA ( 2/2 severe hypoalbuminemia)


Skin:  No rash  


Neuro:  grossly normal 


 - Gardner +





DIAGNOSIS/ASSESSMENT


Assessment & Plan


CAYETANO - Pre-renal 2/2 diarrhea, Poor PO intake  


Renal function  improved - probably at his baseline 


CT - Cyst vs Hydronephrosis Rt kidney per Radiologist 


UA unremarkable except for hyaline casts 


Supportive care, IVF , Monitor, Avoid Nephrotoxins  





Hypokalemia- 


Replace  as needed , adjust in  TPN  





Hypocalcemia-  mildly  low  correction for  Albumin  


Adjust in TPN 





CKD stage 3 -Baseline Renal function per [facility] records 1.4-1.7,





Pleural Effusion- Rt large  


S/P Thoracentesis 1.5 LTS 10/18  


  


Anasarca - 2/2 Severe protein malnutrition / Severe HypoAlbuminemia


On TPN now  





  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





 Weakness/anorexia/cachexia





 Chronic anemia





 DM2





 Hx of antrectomy w/ Stalin-en-Y and PUD





Discussed with RN





COMMENT/RELEVANT DATA


Meds





Current Medications








 Medications


  (Trade)  Dose


 Ordered  Sig/Leonidas  Start Time


 Stop Time Status Last Admin


Dose Admin


 


 Albumin Human  250 ml @ 


 62.5 mls/hr  1X  ONCE  10/20/19 14:30


 10/20/19 18:29 DC 10/20/19 14:30


62.5 MLS/HR


 


 Amino Acids/


 Glycerin/


 Electrolytes  1,000 ml @ 


 100 mls/hr  Q10H  10/20/19 11:00


 10/21/19 09:00 DC 10/20/19 11:08


100 MLS/HR


 


 Aspirin


  (Children'S


 Aspirin)  81 mg  DAILYWBKFT  10/18/19 08:00


 10/18/19 09:29 DC 10/18/19 08:43


81 MG


 


 Aspirin


  (Ecotrin)  81 mg  DAILYWBKFT  10/19/19 08:00


    10/21/19 09:49


81 MG


 


 Ceftriaxone Sodium


  (Rocephin)  2 gm  Q24H  10/21/19 09:00


    10/21/19 09:50


2 GM


 


 Chlorhexidine


 Gluconate


  (Peridex)  15 ml  BID  10/19/19 21:00


   Cancel  





 


 Citalopram


 Hydrobromide


  (CeleXA)  20 mg  DAILY  10/18/19 09:00


    10/21/19 09:49


20 MG


 


 Dopamine HCl/


 Dextrose  250 ml @ 


 10.121 mls/


 hr  CONT  PRN  10/17/19 20:00


    10/17/19 20:32


13 MLS/HR


 


 Enoxaparin Sodium


  (Lovenox 30mg


 Syringe)  30 mg  Q24H  10/17/19 21:00


    10/20/19 22:01


30 MG


 


 Enoxaparin Sodium


  (Lovenox 40mg


 Syringe)  40 mg  Q12HR  10/19/19 21:00


   UNV  





 


 Famotidine


  (Pepcid Vial)  20 mg  QHS  10/19/19 21:00


   Cancel  





 


 Fentanyl Citrate


  (Fentanyl 2ml


 Vial)  50 mcg  PRN Q1HR  PRN  10/19/19 17:30


   Cancel  





 


 Furosemide


  (Lasix)  20 mg  1X  ONCE  10/18/19 10:15


 10/18/19 10:16 DC 10/18/19 11:42


20 MG


 


 Info


  (Tpn Per


 Pharmacy)  1 each  PRN DAILY  PRN  10/20/19 10:45


    10/20/19 13:29


1 EACH


 


 Lactobacillus


 Rhamnosus


  (Culturelle)  1 cap  BID  10/18/19 21:00


    10/21/19 09:49


1 CAP


 


 Levofloxacin/


 Dextrose  150 ml @ 


 100 mls/hr  1X  ONCE  10/17/19 16:45


 10/17/19 18:14 DC 10/17/19 18:17


100 MLS/HR


 


 Linezolid/Dextrose  300 ml @ 


 300 mls/hr  Q12HR  10/17/19 21:00


 10/20/19 08:56 DC 10/20/19 08:36


300 MLS/HR


 


 Magnesium Sulfate  50 ml @ 25


 mls/hr  1X  ONCE  10/20/19 14:00


 10/20/19 15:59 DC 10/20/19 14:41


25 MLS/HR


 


 Midazolam HCl  100 ml @ 5


 mls/hr  CONT  PRN  10/19/19 17:30


   Cancel  





 


 Morphine Sulfate


  (Morphine


 Sulfate)  4 mg  PRN Q1HR  PRN  10/19/19 17:30


   Cancel  





 


 Norepinephrine


 Bitartrate  250 ml @ 


 12.97 mls/


 hr  CONT  PRN  10/17/19 21:15


    10/19/19 23:40


12.97 MLS/HR


 


 Ondansetron HCl


  (Zofran)  4 mg  PRN Q6HRS  PRN  10/17/19 21:30


    10/18/19 15:42


4 MG


 


 Pantoprazole


 Sodium


  (PROTONIX VIAL


 for IV PUSH)  40 mg  BIDAC  10/18/19 16:30


 10/21/19 09:42 DC 10/20/19 16:47


40 MG


 


 Pantoprazole


 Sodium


  (Protonix)  40 mg  DAILYAC  10/22/19 07:30


     





 


 Piperacillin Sod/


 Tazobactam Sod


 3.375 gm/Sodium


 Chloride  50 ml @ 


 100 mls/hr  Q8HRS  10/17/19 22:00


 10/21/19 08:16 DC 10/21/19 05:18


100 MLS/HR


 


 Potassium


 Chloride/Water  100 ml @ 


 100 mls/hr  Q1H  10/18/19 12:00


 10/18/19 15:59 DC 10/18/19 14:53


100 MLS/HR


 


 Potassium


 Phosphate 13.6


 mmol/Dextrose  104.5333


 ml @ 


 52.267 m...  Q2H  10/20/19 14:00


 10/20/19 17:59 DC 10/20/19 15:49


52.267 MLS/HR


 


 Propofol  100 ml @ 


 1.061 mls/


 hr  CONT  PRN  10/19/19 17:30


   Cancel  





 


 Sodium Chloride  1,000 ml @ 


 100 mls/hr  Q10H  10/20/19 18:15


    10/21/19 09:50


100 MLS/HR


 


 Sodium Chloride


 90 meq/Potassium


 Acetate 50 meq/


 Potassium


 Phosphate 20 mmol/


 Magnesium Sulfate


 15 meq/Calcium


 Gluconate 10 meq/


 Multivitamins 10


 ml/Chromium/


 Copper/Manganese/


 Seleni/Zn 1 ml/


 Total Parenteral


 Nutrition/Amino


 Acids/Dextrose/


 Fat Emulsion


 Intravenous  1,512 ml @ 


 63 mls/hr  TPN  CONT  10/20/19 22:00


 10/21/19 21:59  10/20/19 22:02


63 MLS/HR


 


 Sucralfate


  (Carafate)  1 gm  BIDAC  10/21/19 16:30


     





 


 Vancomycin HCl  250 ml @ 


 250 mls/hr  1X  ONCE  10/17/19 17:00


 10/17/19 17:59 DC 10/17/19 17:36


250 MLS/HR








Lab





Laboratory Tests








Test


 10/20/19


11:45 10/21/19


05:40


 


Phosphorus Level


 2.2 mg/dL


(2.6-4.7) 3.1 mg/dL


(2.6-4.7)


 


Magnesium Level


 1.5 mg/dL


(1.8-2.4) 2.0 mg/dL


(1.8-2.4)


 


White Blood Count


 


 2.7 x10^3/uL


(4.0-11.0)


 


Red Blood Count


 


 2.74 x10^6/uL


(4.30-5.70)


 


Hemoglobin


 


 8.3 g/dL


(13.0-17.5)


 


Hematocrit


 


 25.4 %


(39.0-53.0)


 


Mean Corpuscular Volume  93 fL () 


 


Mean Corpuscular Hemoglobin  30 pg (25-35) 


 


Mean Corpuscular Hemoglobin


Concent 


 33 g/dL


(31-37)


 


Red Cell Distribution Width


 


 15.4 %


(11.5-14.5)


 


Platelet Count


 


 80 x10^3/uL


(140-400)


 


Sodium Level


 


 139 mmol/L


(136-145)


 


Potassium Level


 


 3.4 mmol/L


(3.5-5.1)


 


Chloride Level


 


 109 mmol/L


()


 


Carbon Dioxide Level


 


 21 mmol/L


(21-32)


 


Anion Gap  9 (6-14) 


 


Blood Urea Nitrogen


 


 14 mg/dL


(8-26)


 


Creatinine


 


 1.6 mg/dL


(0.7-1.3)


 


Estimated GFR


(Cockcroft-Gault) 


 41.1 





 


Glucose Level


 


 208 mg/dL


(70-99)


 


Calcium Level


 


 6.6 mg/dL


(8.5-10.1)








Results


All relevant outside records, renal labs, imaging studies, telemetry/EKG's were 

reviewed.











JOSH HANLEY MD                Oct 21, 2019 10:49

## 2019-10-21 NOTE — PDOC2
PALLIATIVE CARE


Palliative Care Note


Palliative Care


Consult requested by Dr. Ken to address goals of care.


Medical Assessment per medical record; 


sepsis, with hypotension, - improved to resolved, 


N/v, diarrhea and H/o dysphagia/esophageal dysmotility


chronic systolic CHF pleural effusions, anasarca,  from CAD


acute renal failure


severe malnutrition


chronic anemia, coagulopathy, 


transaminitis, 


deaf 


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





Spoke with family. 


Code Status;  DNR/DNI





Family shares patient likely will not want to participate in therapy,.  Family 

notes significant decline past few months. 





Plan:  Family meeting tomorrow at 1500.











RUSTY MALDONADO                  Oct 21, 2019 17:28

## 2019-10-21 NOTE — NUR
SS following up with discharge planning. PT/OT recommended skilled nursing unit. SS 
contacted pt's family to discuss discharge planning and skilled nursing unit. Pt's family 
reported that pt has been to [facility], [phone]; fax [phone], in the past 
and would like referral phoned and faxed to [facility]. SS phoned and faxed referral. 
SS will await acceptance decision and insurance determination and will proceed accordingly 
with discharge planning.

## 2019-10-21 NOTE — PDOC
Subjective:


Subjective:


Denies pain, doesn't want to eat right now.





Objective:


Objective:


Per nurse - refusing to eat "because he doesn't want to," had 3 stools 

yesterday.


On TPN, pressors.


Vital Signs:





                                   Vital Signs








  Date Time  Temp Pulse Resp B/P (MAP) Pulse Ox O2 Delivery O2 Flow Rate FiO2


 


10/21/19 09:05  64 12 113/62 (79) 95 Room Air  


 


10/21/19 07:00 97.7       





 97.7       


 


10/21/19 06:02       1.0 








Labs:





Laboratory Tests








Test


 10/20/19


11:45 10/21/19


05:40


 


Phosphorus Level 2.2 mg/dL  3.1 mg/dL 


 


Magnesium Level 1.5 mg/dL  2.0 mg/dL 


 


White Blood Count  2.7 x10^3/uL 


 


Red Blood Count  2.74 x10^6/uL 


 


Hemoglobin  8.3 g/dL 


 


Hematocrit  25.4 % 


 


Mean Corpuscular Volume  93 fL 


 


Mean Corpuscular Hemoglobin  30 pg 


 


Mean Corpuscular Hemoglobin


Concent 


 33 g/dL 





 


Red Cell Distribution Width  15.4 % 


 


Platelet Count  80 x10^3/uL 


 


Sodium Level  139 mmol/L 


 


Potassium Level  3.4 mmol/L 


 


Chloride Level  109 mmol/L 


 


Carbon Dioxide Level  21 mmol/L 


 


Anion Gap  9 


 


Blood Urea Nitrogen  14 mg/dL 


 


Creatinine  1.6 mg/dL 


 


Estimated GFR


(Cockcroft-Gault) 


 41.1 





 


Glucose Level  208 mg/dL 


 


Calcium Level  6.6 mg/dL 





  BLOOD CULTURE  Preliminary  


        NO GROWTH AFTER 3 DAYS


Imaging:


SLP Bedside Swallow Eval


Bedside swallow eval completed. Pt known from prior adm 9/2017. Videoswallow 

completed at that time w/finding of non-functional mastication d/t lmtd 

dentition. Oropharyngeal swallow was functional w/rec for essentially puree diet

w/thin liquids. Currently, wife present in ICU w/pt. She reported intake of lmtd

items at home including soup and sweet tea.


IMPRESSIONS: Functional swallow for modified diet. Current results generally c/w

prior video of 9/2017. However, pt does appear weaker at present and 

nutritionally deficient. Despite this, pt demos good laryngeal function. No 

overt s/s aspiration w/puree, thin and honey thick liquids per current eval. 

Would con't modified diet d/t lmtd dentition and f/u for any additional needs. 


RECOMMENDATIONS: Dysphagia I diet w/thin liquids. Precautions posted. Try meds 1

at a time w/sips but crush in puree if difficulty. Diet orders entered. DW RNs 

Rafat and Amaury, as well as pt and wife. 





CXR 10/18


IMPRESSION: Right-sided pleural effusion is much smaller in size after 

thoracentesis. No pneumothorax is seen. There is significant improved aeration 

of the right lower lung zone. Small left-sided pleural effusion and associated 

consolidative left lung base infiltrate is still evident and is unchanged.





PE:





GEN: NAD, alone in room


LUNGS: room air


HEART: RRR


ABD: S/ND/NT


NEURO/PSYCH: was asleep - very [place]





A/P:


N/v, diarrhea - resolved/improved, C Diff neg and stool culture pending


Dysphagia - diet ordered per SLP, pt refusing to eat and on TPN - h/o esophageal

dysmotility


Hypotension, pleural effusions, anasarca - s/p thoracentesis


Lactic acidosis, CAYETANO (better), hypoalbuminemia, ACD, thrombocytopenia


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


Cardiomyopathy, CAD, DM, CHF, [place]





--


Refusing to eat w/ h/o esophageal dysmotility.  On TPN.


Can pursue esophagram/UGI at some point - still on pressors - will review timing

w/ Dr. Chiu.


Would palliative discussion be helpful?


PO PPI, also add Carafate.











PREM HERNANDEZ         Oct 21, 2019 09:45

## 2019-10-21 NOTE — NUR
Pharmacy TPN Dosing Note



S: ANNE MACKAY is a 87 year old M Currently receiving Central Continuous TPN started 
10/20/19



B:Pertinent PMH: 

NPO/MALNOURISHED

Height: 5 feet, 6 inches

Weight: 73.215623 kg



Current diet: NPO 



LABS:

Sodium:    139 

Potassium: 3.4 

Chloride:  109 

Calcium:   6.6 

Corrected Calcium: 8.76 

Magnesium: 2.0 

CO2:       24 

SCr:       1.6 

Glucose:   208 

Albumin:   1.3 

AST:       69 

ALT:       28 



TPN FORMULA:

TPN TYPE:  Central Continuous

AMINO ACIDS:         60 gm

DEXTROSE:            225 gm

LIPIDS:              20 gm

SODIUM CHLORIDE:     90 mEq

SODIUM ACETATE:      mEq

SODIUM PHOSPHATE:    mmol

POTASSIUM CHLORIDE:  mEq

POTASSIUM ACETATE:   70 mEq

POTASSIUM PHOSPHATE: 15 mmol

MAGNESIUM:           12 mEq

CALCIUM:             13 mEq

INSULIN:             units

MULTIPLE VITAMIN:    10 ml

TRACE ELEMENTS:      1 ml(s)



TPN PLAN:  

-increase kacetate to 70 meq, and gala gluc to 13 meq, decrease kphos to 15

mmand magso4 to 12 meq.

-Labs in the am





R: Continue TPN at 63 ml/hr

Will monitor electrolytes, glucose, and tolerance to TPN.



 TONI NUNEZ [facility], 10/21/19 2695

## 2019-10-22 VITALS — DIASTOLIC BLOOD PRESSURE: 65 MMHG | SYSTOLIC BLOOD PRESSURE: 118 MMHG

## 2019-10-22 VITALS — SYSTOLIC BLOOD PRESSURE: 125 MMHG

## 2019-10-22 VITALS — DIASTOLIC BLOOD PRESSURE: 58 MMHG | SYSTOLIC BLOOD PRESSURE: 138 MMHG

## 2019-10-22 VITALS — DIASTOLIC BLOOD PRESSURE: 64 MMHG | SYSTOLIC BLOOD PRESSURE: 129 MMHG

## 2019-10-22 VITALS — DIASTOLIC BLOOD PRESSURE: 56 MMHG | SYSTOLIC BLOOD PRESSURE: 98 MMHG

## 2019-10-22 VITALS — DIASTOLIC BLOOD PRESSURE: 64 MMHG | SYSTOLIC BLOOD PRESSURE: 113 MMHG

## 2019-10-22 LAB
ANION GAP SERPL CALC-SCNC: 9 MMOL/L (ref 6–14)
BUN SERPL-MCNC: 14 MG/DL (ref 8–26)
CALCIUM SERPL-MCNC: 7.1 MG/DL (ref 8.5–10.1)
CHLORIDE SERPL-SCNC: 111 MMOL/L (ref 98–107)
CO2 SERPL-SCNC: 20 MMOL/L (ref 21–32)
CREAT SERPL-MCNC: 1.5 MG/DL (ref 0.7–1.3)
GFR SERPLBLD BASED ON 1.73 SQ M-ARVRAT: 44.3 ML/MIN
GLUCOSE SERPL-MCNC: 244 MG/DL (ref 70–99)
MAGNESIUM SERPL-MCNC: 2.1 MG/DL (ref 1.8–2.4)
PHOSPHATE SERPL-MCNC: 2.7 MG/DL (ref 2.6–4.7)
POTASSIUM SERPL-SCNC: 3.3 MMOL/L (ref 3.5–5.1)
SODIUM SERPL-SCNC: 140 MMOL/L (ref 136–145)

## 2019-10-22 RX ADMIN — SUCRALFATE SCH GM: 1 SUSPENSION ORAL at 07:30

## 2019-10-22 RX ADMIN — CEFTRIAXONE SODIUM SCH GM: 2 INJECTION, POWDER, FOR SOLUTION INTRAMUSCULAR; INTRAVENOUS at 09:03

## 2019-10-22 RX ADMIN — Medication SCH CAP: at 08:05

## 2019-10-22 RX ADMIN — Medication PRN EACH: at 13:31

## 2019-10-22 RX ADMIN — ASPIRIN SCH MG: 81 TABLET, COATED ORAL at 08:00

## 2019-10-22 RX ADMIN — BACITRACIN SCH MLS/HR: 5000 INJECTION, POWDER, FOR SOLUTION INTRAMUSCULAR at 22:22

## 2019-10-22 RX ADMIN — SUCRALFATE SCH GM: 1 SUSPENSION ORAL at 10:37

## 2019-10-22 RX ADMIN — BACITRACIN SCH MLS/HR: 5000 INJECTION, POWDER, FOR SOLUTION INTRAMUSCULAR at 01:14

## 2019-10-22 RX ADMIN — CITALOPRAM HYDROBROMIDE SCH MG: 20 TABLET ORAL at 08:05

## 2019-10-22 RX ADMIN — BACITRACIN SCH MLS/HR: 5000 INJECTION, POWDER, FOR SOLUTION INTRAMUSCULAR at 09:09

## 2019-10-22 NOTE — NUR
SW following for discharge planning, pt is a transfer from ICU. Pt is from home with spouse, 
had home health services. Referral had been sent to [facility] (however fax was not 
received). Palliative care meeting scheduled for today. SW will continue to follow for 
discharge planning.

## 2019-10-22 NOTE — PDOC
PULMONARY PROGRESS NOTES


Subjective





NO RESP DISTRESS


Vitals





Vital Signs








  Date Time  Temp Pulse Resp B/P (MAP) Pulse Ox O2 Delivery O2 Flow Rate FiO2


 


10/22/19 07:00 97.0 83 18 98/56 (70) 96 Room Air  





 97.0       








ROS:  No Nausea, No Chest Pain, No Increase Cough


General:  Alert


Lungs:  Crackles, Other


Cardiovascular:  S1, S2


Abdomen:  Soft, Non-tender, Other (no mass)


Neuro Exam:  Alert


Skin:  Warm


Labs





Laboratory Tests








Test


 10/20/19


11:45 10/21/19


05:40 10/21/19


21:01 10/22/19


05:35


 


Phosphorus Level


 2.2 mg/dL


(2.6-4.7) 3.1 mg/dL


(2.6-4.7) 


 2.7 mg/dL


(2.6-4.7)


 


Magnesium Level


 1.5 mg/dL


(1.8-2.4) 2.0 mg/dL


(1.8-2.4) 


 2.1 mg/dL


(1.8-2.4)


 


White Blood Count


 


 2.7 x10^3/uL


(4.0-11.0) 


 





 


Red Blood Count


 


 2.74 x10^6/uL


(4.30-5.70) 


 





 


Hemoglobin


 


 8.3 g/dL


(13.0-17.5) 


 





 


Hematocrit


 


 25.4 %


(39.0-53.0) 


 





 


Mean Corpuscular Volume  93 fL ()   


 


Mean Corpuscular Hemoglobin  30 pg (25-35)   


 


Mean Corpuscular Hemoglobin


Concent 


 33 g/dL


(31-37) 


 





 


Red Cell Distribution Width


 


 15.4 %


(11.5-14.5) 


 





 


Platelet Count


 


 80 x10^3/uL


(140-400) 


 





 


Sodium Level


 


 139 mmol/L


(136-145) 


 140 mmol/L


(136-145)


 


Potassium Level


 


 3.4 mmol/L


(3.5-5.1) 


 3.3 mmol/L


(3.5-5.1)


 


Chloride Level


 


 109 mmol/L


() 


 111 mmol/L


()


 


Carbon Dioxide Level


 


 21 mmol/L


(21-32) 


 20 mmol/L


(21-32)


 


Anion Gap  9 (6-14)   9 (6-14) 


 


Blood Urea Nitrogen


 


 14 mg/dL


(8-26) 


 14 mg/dL


(8-26)


 


Creatinine


 


 1.6 mg/dL


(0.7-1.3) 


 1.5 mg/dL


(0.7-1.3)


 


Estimated GFR


(Cockcroft-Gault) 


 41.1 


 


 44.3 





 


Glucose Level


 


 208 mg/dL


(70-99) 


 244 mg/dL


(70-99)


 


Calcium Level


 


 6.6 mg/dL


(8.5-10.1) 


 7.1 mg/dL


(8.5-10.1)


 


Glucose (Fingerstick)


 


 


 209 mg/dL


(70-99) 





 


Test


 10/22/19


07:27 


 


 





 


Glucose (Fingerstick)


 213 mg/dL


(70-99) 


 


 











Laboratory Tests








Test


 10/21/19


21:01 10/22/19


05:35 10/22/19


07:27


 


Glucose (Fingerstick)


 209 mg/dL


(70-99) 


 213 mg/dL


(70-99)


 


Sodium Level


 


 140 mmol/L


(136-145) 





 


Potassium Level


 


 3.3 mmol/L


(3.5-5.1) 





 


Chloride Level


 


 111 mmol/L


() 





 


Carbon Dioxide Level


 


 20 mmol/L


(21-32) 





 


Anion Gap  9 (6-14)  


 


Blood Urea Nitrogen


 


 14 mg/dL


(8-26) 





 


Creatinine


 


 1.5 mg/dL


(0.7-1.3) 





 


Estimated GFR


(Cockcroft-Gault) 


 44.3 


 





 


Glucose Level


 


 244 mg/dL


(70-99) 





 


Calcium Level


 


 7.1 mg/dL


(8.5-10.1) 





 


Phosphorus Level


 


 2.7 mg/dL


(2.6-4.7) 





 


Magnesium Level


 


 2.1 mg/dL


(1.8-2.4) 











Medications





Active Scripts








 Medications  Dose


 Route/Sig


 Max Daily Dose Days Date Category


 


 Aspirin Ec


  (Aspirin) 81 Mg


 Tablet.dr  81 Mg


 PO DAILYWBKFT


  30 6/8/18 Rx


 


 Protonix


  (Pantoprazole


 Sodium) 20 Mg


 Tablet.dr  40 Mg


 PO BID


   3/1/18 Reported


 


 Escitalopram


 Oxalate 10 Mg


 Tablet  10 Mg


 PO DAILY


   11/30/17 Reported











Impression


.


IMPRESSION:


1.  Large right-sided effusion, s/p thoracentesis, transudate, due to chf.


2.  Septic/cardiogenic shock.


3.  Elevated liver chemistries.


4.  Acute-on-chronic congestive heart failure, systolic.


5.  Renal insufficiency.


6.  Protein malnutrition, present upon admission.








echo


The systolic function is mildly to moderately impaired. The Ejection Fraction is

40-45%.


There is global hypokinesis of the left ventricle. Septal motion suggestive of 

conduction defect.





Plan


.


FAMILY MEETING TODAY


WILL CONTINUE SUPPORT


D/W WIFE AT BEDSIDE


ANTIBX PER ALICE COTE MD              Oct 22, 2019 09:36

## 2019-10-22 NOTE — PDOC
SURGICAL PROGRESS NOTE


Subjective


sleeping this am


no acute distress


family present


plans for palliative meeting today


Vital Signs





Vital Signs








  Date Time  Temp Pulse Resp B/P (MAP) Pulse Ox O2 Delivery O2 Flow Rate FiO2


 


10/22/19 07:00 97.0 83 18 98/56 (70) 96 Room Air  





 97.0       








I&O











Intake and Output 


 


 10/22/19





 07:00


 


Intake Total 1941 ml


 


Output Total 560 ml


 


Balance 1381 ml


 


 


 


Intake Oral 70 ml


 


IV Total 1871 ml


 


Output Urine Total 560 ml








General:  Cooperative, No acute distress


Abdomen:  Soft, No tenderness


Labs





Laboratory Tests








Test


 10/20/19


11:45 10/21/19


05:40 10/21/19


21:01 10/22/19


05:35


 


Phosphorus Level


 2.2 mg/dL


(2.6-4.7) 3.1 mg/dL


(2.6-4.7) 


 2.7 mg/dL


(2.6-4.7)


 


Magnesium Level


 1.5 mg/dL


(1.8-2.4) 2.0 mg/dL


(1.8-2.4) 


 2.1 mg/dL


(1.8-2.4)


 


White Blood Count


 


 2.7 x10^3/uL


(4.0-11.0) 


 





 


Red Blood Count


 


 2.74 x10^6/uL


(4.30-5.70) 


 





 


Hemoglobin


 


 8.3 g/dL


(13.0-17.5) 


 





 


Hematocrit


 


 25.4 %


(39.0-53.0) 


 





 


Mean Corpuscular Volume  93 fL ()   


 


Mean Corpuscular Hemoglobin  30 pg (25-35)   


 


Mean Corpuscular Hemoglobin


Concent 


 33 g/dL


(31-37) 


 





 


Red Cell Distribution Width


 


 15.4 %


(11.5-14.5) 


 





 


Platelet Count


 


 80 x10^3/uL


(140-400) 


 





 


Sodium Level


 


 139 mmol/L


(136-145) 


 140 mmol/L


(136-145)


 


Potassium Level


 


 3.4 mmol/L


(3.5-5.1) 


 3.3 mmol/L


(3.5-5.1)


 


Chloride Level


 


 109 mmol/L


() 


 111 mmol/L


()


 


Carbon Dioxide Level


 


 21 mmol/L


(21-32) 


 20 mmol/L


(21-32)


 


Anion Gap  9 (6-14)   9 (6-14) 


 


Blood Urea Nitrogen


 


 14 mg/dL


(8-26) 


 14 mg/dL


(8-26)


 


Creatinine


 


 1.6 mg/dL


(0.7-1.3) 


 1.5 mg/dL


(0.7-1.3)


 


Estimated GFR


(Cockcroft-Gault) 


 41.1 


 


 44.3 





 


Glucose Level


 


 208 mg/dL


(70-99) 


 244 mg/dL


(70-99)


 


Calcium Level


 


 6.6 mg/dL


(8.5-10.1) 


 7.1 mg/dL


(8.5-10.1)


 


Glucose (Fingerstick)


 


 


 209 mg/dL


(70-99) 





 


Test


 10/22/19


07:27 


 


 





 


Glucose (Fingerstick)


 213 mg/dL


(70-99) 


 


 











Laboratory Tests








Test


 10/21/19


21:01 10/22/19


05:35 10/22/19


07:27


 


Glucose (Fingerstick)


 209 mg/dL


(70-99) 


 213 mg/dL


(70-99)


 


Sodium Level


 


 140 mmol/L


(136-145) 





 


Potassium Level


 


 3.3 mmol/L


(3.5-5.1) 





 


Chloride Level


 


 111 mmol/L


() 





 


Carbon Dioxide Level


 


 20 mmol/L


(21-32) 





 


Anion Gap  9 (6-14)  


 


Blood Urea Nitrogen


 


 14 mg/dL


(8-26) 





 


Creatinine


 


 1.5 mg/dL


(0.7-1.3) 





 


Estimated GFR


(Cockcroft-Gault) 


 44.3 


 





 


Glucose Level


 


 244 mg/dL


(70-99) 





 


Calcium Level


 


 7.1 mg/dL


(8.5-10.1) 





 


Phosphorus Level


 


 2.7 mg/dL


(2.6-4.7) 





 


Magnesium Level


 


 2.1 mg/dL


(1.8-2.4) 











Problem List


Problems


Medical Problems:


(1) Anemia


Status: Acute  





(2) CHF (congestive heart failure)


Status: Acute  





(3) Diarrhea


Status: Acute  





(4) Elevated liver function tests


Status: Acute  





(5) Hypoalbuminemia


Status: Acute  





(6) Hypocalcemia


Status: Acute  





(7) Pleural effusion


Status: Acute  





(8) Renal insufficiency


Status: Acute  





(9) Severe sepsis


Status: Acute  





(10) Tachycardia


Status: Acute  








Assessment/Plan


no surgical plans


await palliative decisions











GIN NINO            Oct 22, 2019 09:23

## 2019-10-22 NOTE — NUR
Pharmacy TPN Dosing Note



S: ANNE MACKAY is a 87 year old M Currently receiving Central Continuous TPN started 
10/20/19



B:Pertinent PMH: 

NPO/MALNOURISHED

Height: 5 feet, 6 inches

Weight: 73.517406 kg



Current diet: NPO 



LABS:

Sodium:    140 

Potassium: 3.3 

Chloride:  111 

Calcium:   7.1 

Corrected Calcium: 9.26 

Magnesium: 2.1 

CO2:       20 

SCr:       1.5 

Glucose:   254 

Albumin:   1.3 

AST:       69 

ALT:       28 



TPN FORMULA:

TPN TYPE:  Central Continuous

AMINO ACIDS:         60 gm

DEXTROSE:            225 gm

LIPIDS:              20 gm

SODIUM CHLORIDE:     90 mEq

POTASSIUM ACETATE:   90 mEq

POTASSIUM PHOSPHATE: 15 mmol

MAGNESIUM:           12 mEq

CALCIUM:             13 mEq

MULTIPLE VITAMIN:    10 ml

TRACE ELEMENTS:      1 ml(s)



TPN PLAN:  

-increase kacetate to 90 meq.

-Labs in the am





R: Change TPN as outlined above.

Will monitor electrolytes, glucose, and tolerance to TPN.



 Jenaro Paulino, [facility], 10/22/19 [id number]

## 2019-10-22 NOTE — PDOC
Infectious Disease Note


Subjective:


Subjective


pt tx out of icu yesterday


He cont to appear weak


opens eyes


says is " OK"


no f/c/n/v/abdo pain


d/w rn





ROS:


ROS


limited but as above





Vital Signs:


Vital Signs





Vital Signs








  Date Time  Temp Pulse Resp B/P (MAP) Pulse Ox O2 Delivery O2 Flow Rate FiO2


 


10/22/19 07:00 97.0 83 18 98/56 (70) 96 Room Air  





 97.0       











Physical Exam:


PHYSICAL EXAM


GENERAL:  Alert, oriented gentleman, not in any distress.


HEENT:  Both pupils are round and reacting.  No conjunctival lesion, no lesion


in the mouth.


NECK:  Supple, RT IJ looks ok


LUNGS:  Decreased breath sounds.


HEART:  S1, S2 regular.  No gallop or murmur.


ABDOMEN:  Soft, nontender, mildly distended


EXTREMITIES:  edema +


NEUROLOGIC:  Other than poor hearing, the patient does move all the extremities.


 There is no other focal deficit.





Medications:


Inpatient Meds:





Current Medications








 Medications


  (Trade)  Dose


 Ordered  Sig/Leonidas  Start Time


 Stop Time Status Last Admin


Dose Admin


 


 Albumin Human  250 ml @ 


 62.5 mls/hr  1X  ONCE  10/20/19 14:30


 10/20/19 18:29 DC 10/20/19 14:30


62.5 MLS/HR


 


 Amino Acids/


 Glycerin/


 Electrolytes  1,000 ml @ 


 100 mls/hr  Q10H  10/20/19 11:00


 10/21/19 09:00 DC 10/20/19 11:08


100 MLS/HR


 


 Aspirin


  (Children'S


 Aspirin)  81 mg  DAILYWBKFT  10/18/19 08:00


 10/18/19 09:29 DC 10/18/19 08:43


81 MG


 


 Aspirin


  (Ecotrin)  81 mg  DAILYWBKFT  10/19/19 08:00


    10/21/19 09:49


81 MG


 


 Ceftriaxone Sodium


  (Rocephin)  2 gm  Q24H  10/21/19 09:00


    10/21/19 09:50


2 GM


 


 Chlorhexidine


 Gluconate


  (Peridex)  15 ml  BID  10/19/19 21:00


   Cancel  





 


 Citalopram


 Hydrobromide


  (CeleXA)  20 mg  DAILY  10/18/19 09:00


    10/21/19 09:49


20 MG


 


 Dopamine HCl/


 Dextrose  250 ml @ 


 10.121 mls/


 hr  CONT  PRN  10/17/19 20:00


    10/17/19 20:32


13 MLS/HR


 


 Enoxaparin Sodium


  (Lovenox 30mg


 Syringe)  30 mg  Q24H  10/17/19 21:00


    10/21/19 23:13


30 MG


 


 Enoxaparin Sodium


  (Lovenox 40mg


 Syringe)  40 mg  Q12HR  10/19/19 21:00


   UNV  





 


 Famotidine


  (Pepcid Vial)  20 mg  QHS  10/19/19 21:00


   Cancel  





 


 Fentanyl Citrate


  (Fentanyl 2ml


 Vial)  50 mcg  PRN Q1HR  PRN  10/19/19 17:30


   Cancel  





 


 Furosemide


  (Lasix)  20 mg  1X  ONCE  10/18/19 10:15


 10/18/19 10:16 DC 10/18/19 11:42


20 MG


 


 Info


  (Tpn Per


 Pharmacy)  1 each  PRN DAILY  PRN  10/20/19 10:45


    10/21/19 13:57


1 EACH


 


 Lactobacillus


 Rhamnosus


  (Culturelle)  1 cap  BID  10/18/19 21:00


    10/21/19 23:11


1 CAP


 


 Levofloxacin/


 Dextrose  150 ml @ 


 100 mls/hr  1X  ONCE  10/17/19 16:45


 10/17/19 18:14 DC 10/17/19 18:17


100 MLS/HR


 


 Linezolid/Dextrose  300 ml @ 


 300 mls/hr  Q12HR  10/17/19 21:00


 10/20/19 08:56 DC 10/20/19 08:36


300 MLS/HR


 


 Magnesium Sulfate  50 ml @ 25


 mls/hr  1X  ONCE  10/20/19 14:00


 10/20/19 15:59 DC 10/20/19 14:41


25 MLS/HR


 


 Midazolam HCl  100 ml @ 5


 mls/hr  CONT  PRN  10/19/19 17:30


   Cancel  





 


 Morphine Sulfate


  (Morphine


 Sulfate)  4 mg  PRN Q1HR  PRN  10/19/19 17:30


   Cancel  





 


 Norepinephrine


 Bitartrate  250 ml @ 


 12.97 mls/


 hr  CONT  PRN  10/17/19 21:15


    10/19/19 23:40


12.97 MLS/HR


 


 Ondansetron HCl


  (Zofran)  4 mg  PRN Q6HRS  PRN  10/17/19 21:30


    10/18/19 15:42


4 MG


 


 Pantoprazole


 Sodium


  (PROTONIX VIAL


 for IV PUSH)  40 mg  BIDAC  10/18/19 16:30


 10/21/19 09:42 DC 10/20/19 16:47


40 MG


 


 Pantoprazole


 Sodium


  (Protonix)  40 mg  DAILYAC  10/22/19 07:30


     





 


 Piperacillin Sod/


 Tazobactam Sod


 3.375 gm/Sodium


 Chloride  50 ml @ 


 100 mls/hr  Q8HRS  10/17/19 22:00


 10/21/19 08:16 DC 10/21/19 05:18


100 MLS/HR


 


 Potassium


 Chloride/Water  100 ml @ 


 100 mls/hr  Q1H  10/18/19 12:00


 10/18/19 15:59 DC 10/18/19 14:53


100 MLS/HR


 


 Potassium


 Phosphate 13.6


 mmol/Dextrose  104.5333


 ml @ 


 52.267 m...  Q2H  10/20/19 14:00


 10/20/19 17:59 DC 10/20/19 15:49


52.267 MLS/HR


 


 Propofol  100 ml @ 


 1.061 mls/


 hr  CONT  PRN  10/19/19 17:30


   Cancel  





 


 Sodium Chloride


 90 meq/Potassium


 Acetate 50 meq/


 Potassium


 Phosphate 20 mmol/


 Magnesium Sulfate


 15 meq/Calcium


 Gluconate 10 meq/


 Multivitamins 10


 ml/Chromium/


 Copper/Manganese/


 Seleni/Zn 1 ml/


 Total Parenteral


 Nutrition/Amino


 Acids/Dextrose/


 Fat Emulsion


 Intravenous  1,512 ml @ 


 63 mls/hr  TPN  CONT  10/20/19 22:00


 10/21/19 13:04 DC 10/20/19 22:02


63 MLS/HR


 


 Sodium Chloride


 90 meq/Potassium


 Acetate 70 meq/


 Potassium


 Phosphate 15 mmol/


 Magnesium Sulfate


 12 meq/Calcium


 Gluconate 13 meq/


 Multivitamins 10


 ml/Chromium/


 Copper/Manganese/


 Seleni/Zn 1 ml/


 Total Parenteral


 Nutrition/Amino


 Acids/Dextrose/


 Fat Emulsion


 Intravenous  1,512 ml @ 


 63 mls/hr  TPN  CONT  10/21/19 22:00


 10/22/19 21:59  10/21/19 23:13


63 MLS/HR


 


 Sucralfate


  (Carafate)  1 gm  BIDAC  10/21/19 16:30


     





 


 Vancomycin HCl  250 ml @ 


 250 mls/hr  1X  ONCE  10/17/19 17:00


 10/17/19 17:59 DC 10/17/19 17:36


250 MLS/HR











Labs:


Lab





Laboratory Tests








Test


 10/21/19


21:01 10/22/19


05:35 10/22/19


07:27


 


Glucose (Fingerstick)


 209 mg/dL


(70-99) 


 213 mg/dL


(70-99)


 


Sodium Level


 


 140 mmol/L


(136-145) 





 


Potassium Level


 


 3.3 mmol/L


(3.5-5.1) 





 


Chloride Level


 


 111 mmol/L


() 





 


Carbon Dioxide Level


 


 20 mmol/L


(21-32) 





 


Anion Gap  9 (6-14)  


 


Blood Urea Nitrogen


 


 14 mg/dL


(8-26) 





 


Creatinine


 


 1.5 mg/dL


(0.7-1.3) 





 


Estimated GFR


(Cockcroft-Gault) 


 44.3 


 





 


Glucose Level


 


 244 mg/dL


(70-99) 





 


Calcium Level


 


 7.1 mg/dL


(8.5-10.1) 





 


Phosphorus Level


 


 2.7 mg/dL


(2.6-4.7) 





 


Magnesium Level


 


 2.1 mg/dL


(1.8-2.4) 











Micro


cult neg





Objective:


Assessment:





1.  Lactic acidosis,multifactorial,resolved


2.  Hypotension secondary to dehydration.resolved


3.  History of congestive heart failure.


4.  History of renal insufficiency.


5.  Large pleural effusion.s/p thoracocentesis ,transudate


6.  Coronary artery disease.


7.  Malnutrition with albumin 1.4.


8.  Elevated liver function tests.


9. Thrombocytopenia





Plan:


Plan of Care


cont ceftriaxone


add empiric flagyl


off zyvox 


dc zosyn due to thrombocytopenia


f/u c/s and labs in am


awaiting palliative care meeting this pm


GI following


D/W family at bedside


D/W TORIN RUIZ MD           Oct 22, 2019 08:21

## 2019-10-22 NOTE — PDOC2
PALLIATIVE CARE


Palliative Care Note


Palliative Care


Patient resting. 


Met with wife Nitza; daughters Purvi and Milagro; sons Nasim, Jimmy, Kaushik 

unable to attend but are aware of illness.


Reviewed medical condition; Sepsis--resolved, A/RF improved.  malnutrition, 

Chronic anemia, Thoracentesis 1.5L; EF 30%; refusing feeding tube, dysphagia 

chronic--now more acute; more weak today 


Family shared a significant decline in last 2 weeks. In July patient was 

walking, up visiting at family reunion, eating; 





Per family patient did not want to come to hospital, refused feeding tube, has 

refused AICD in past.





Discussed options for care.  Continue current treatment plan vs going to [facility] to 

try to get stronger vs comfort care.  


Family requests comfort care.  Discussed Hospice support. 


Will stop medications not benefiting for comfort; no TPN; follow speech recom

mendation for diet. 





No preference in Hospice Agency.  Family understands comfort will be goal, 

bringing enough care to the home so that he can die peacefully with dignity at 

home. 





Social; worked at [employer], maintenance; 


Rosie;  [employer] very important part of his life.  Reads the Bible daily.  





Confirmed DNR/DNI;  Outside the Hospital DNR/DNI form signed. 





DME:  Bed with rails.





Continue Gardner catheter





Spoke with Dorinda ORTIZ who will assist with discharge plans.


Plan:  Home with hospice when discharged. 


DNR/DNI.











RUSTY MALDONADO                  Oct 22, 2019 16:07

## 2019-10-22 NOTE — PDOC
SUBJECTIVE


ROS


Transferred out of ICU, Refusing to eat,  On TPN    


Same





OBJECTIVE


Vital Signs





Vital Signs








  Date Time  Temp Pulse Resp B/P (MAP) Pulse Ox O2 Delivery O2 Flow Rate FiO2


 


10/22/19 07:00 97.0 83 18 98/56 (70) 96 Room Air  





 97.0       








I & 0











Intake and Output 


 


 10/22/19





 06:59


 


Intake Total 1941 ml


 


Output Total 590 ml


 


Balance 1351 ml


 


 


 


Intake Oral 70 ml


 


IV Total 1871 ml


 


Output Urine Total 590 ml











PHYSICAL EXAM


Physical Exam


General:   No acute distress,cachectic


HEENT:  OM dry  , On O2 by NC 


Neck supple  


Lungs:   CTA , decreased BS bases 


Heart:  Regular rate , Normal S1, Normal S2, No murmurs


Abdomen:  Soft, No tenderness


Extremities: 2-3 + EDEMA ( 2/2 severe hypoalbuminemia)


Skin:  No rash  


Neuro:  grossly normal 


 - Gardner +





DIAGNOSIS/ASSESSMENT


Assessment & Plan


CAYETANO - Pre-renal 2/2 diarrhea, Poor PO intake  


Renal function  improved -  at his baseline 


CT - Cyst vs Hydronephrosis Rt kidney per Radiologist 


UA unremarkable except for hyaline casts 


Supportive care, Monitor, Avoid Nephrotoxins  





Hypokalemia- 


Replace  as needed , adjust in  TPN  





Hypocalcemia-  mildly  low  correction for  Albumin  


Adjust in TPN 





CKD stage 3 -Baseline Renal function per [facility] records 1.4-1.7,





Pleural Effusion- Rt large  


S/P Thoracentesis 1.5 LTS 10/18  


  


Anasarca - 2/2 Severe protein malnutrition / Severe HypoAlbuminemia


On TPN now  





  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





 Weakness/anorexia/cachexia- Palliative following  





 Chronic anemia





 DM2





 Hx of antrectomy w/ Stalin-en-Y and PUD





DW Daughter and wife at bedside





COMMENT/RELEVANT DATA


Meds





Current Medications








 Medications


  (Trade)  Dose


 Ordered  Sig/Leonidas  Start Time


 Stop Time Status Last Admin


Dose Admin


 


 Acetaminophen


  (Tylenol)  500 mg  PRN Q6HRS  PRN  10/22/19 09:15


     





 


 Albumin Human  250 ml @ 


 62.5 mls/hr  1X  ONCE  10/20/19 14:30


 10/20/19 18:29 DC 10/20/19 14:30


62.5 MLS/HR


 


 Amino Acids/


 Glycerin/


 Electrolytes  1,000 ml @ 


 100 mls/hr  Q10H  10/20/19 11:00


 10/21/19 09:00 DC 10/20/19 11:08


100 MLS/HR


 


 Aspirin


  (Children'S


 Aspirin)  81 mg  DAILYWBKFT  10/18/19 08:00


 10/18/19 09:29 DC 10/18/19 08:43


81 MG


 


 Aspirin


  (Ecotrin)  81 mg  DAILYWBKFT  10/19/19 08:00


    10/21/19 09:49


81 MG


 


 Ceftriaxone Sodium


  (Rocephin)  2 gm  Q24H  10/21/19 09:00


    10/22/19 09:03


2 GM


 


 Chlorhexidine


 Gluconate


  (Peridex)  15 ml  BID  10/19/19 21:00


   Cancel  





 


 Citalopram


 Hydrobromide


  (CeleXA)  20 mg  DAILY  10/18/19 09:00


    10/21/19 09:49


20 MG


 


 Dopamine HCl/


 Dextrose  250 ml @ 


 10.121 mls/


 hr  CONT  PRN  10/17/19 20:00


 10/22/19 09:05 DC 10/17/19 20:32


13 MLS/HR


 


 Enoxaparin Sodium


  (Lovenox 30mg


 Syringe)  30 mg  Q24H  10/17/19 21:00


    10/21/19 23:13


30 MG


 


 Enoxaparin Sodium


  (Lovenox 40mg


 Syringe)  40 mg  Q12HR  10/19/19 21:00


   UNV  





 


 Famotidine


  (Pepcid Vial)  20 mg  QHS  10/19/19 21:00


   Cancel  





 


 Fentanyl Citrate


  (Fentanyl 2ml


 Vial)  50 mcg  PRN Q1HR  PRN  10/19/19 17:30


   Cancel  





 


 Furosemide


  (Lasix)  20 mg  1X  ONCE  10/18/19 10:15


 10/18/19 10:16 DC 10/18/19 11:42


20 MG


 


 Info


  (Tpn Per


 Pharmacy)  1 each  PRN DAILY  PRN  10/20/19 10:45


    10/21/19 13:57


1 EACH


 


 Lactobacillus


 Rhamnosus


  (Culturelle)  1 cap  BID  10/18/19 21:00


    10/21/19 23:11


1 CAP


 


 Levofloxacin/


 Dextrose  150 ml @ 


 100 mls/hr  1X  ONCE  10/17/19 16:45


 10/17/19 18:14 DC 10/17/19 18:17


100 MLS/HR


 


 Linezolid/Dextrose  300 ml @ 


 300 mls/hr  Q12HR  10/17/19 21:00


 10/20/19 08:56 DC 10/20/19 08:36


300 MLS/HR


 


 Magnesium Sulfate  50 ml @ 25


 mls/hr  1X  ONCE  10/20/19 14:00


 10/20/19 15:59 DC 10/20/19 14:41


25 MLS/HR


 


 Midazolam HCl  100 ml @ 5


 mls/hr  CONT  PRN  10/19/19 17:30


   Cancel  





 


 Morphine Sulfate


  (Morphine


 Sulfate)  4 mg  PRN Q1HR  PRN  10/19/19 17:30


   Cancel  





 


 Norepinephrine


 Bitartrate  250 ml @ 


 12.97 mls/


 hr  CONT  PRN  10/17/19 21:15


 10/22/19 09:05 DC 10/19/19 23:40


12.97 MLS/HR


 


 Ondansetron HCl


  (Zofran)  4 mg  PRN Q6HRS  PRN  10/17/19 21:30


    10/18/19 15:42


4 MG


 


 Pantoprazole


 Sodium


  (PROTONIX VIAL


 for IV PUSH)  40 mg  BIDAC  10/18/19 16:30


 10/21/19 09:42 DC 10/20/19 16:47


40 MG


 


 Pantoprazole


 Sodium


  (Protonix)  40 mg  DAILYAC  10/22/19 07:30


     





 


 Piperacillin Sod/


 Tazobactam Sod


 3.375 gm/Sodium


 Chloride  50 ml @ 


 100 mls/hr  Q8HRS  10/17/19 22:00


 10/21/19 08:16 DC 10/21/19 05:18


100 MLS/HR


 


 Potassium


 Chloride/Water  100 ml @ 


 100 mls/hr  Q1H  10/18/19 12:00


 10/18/19 15:59 DC 10/18/19 14:53


100 MLS/HR


 


 Potassium


 Phosphate 13.6


 mmol/Dextrose  104.5333


 ml @ 


 52.267 m...  Q2H  10/20/19 14:00


 10/20/19 17:59 DC 10/20/19 15:49


52.267 MLS/HR


 


 Propofol  100 ml @ 


 1.061 mls/


 hr  CONT  PRN  10/19/19 17:30


   Cancel  





 


 Sodium Chloride


 90 meq/Potassium


 Acetate 50 meq/


 Potassium


 Phosphate 20 mmol/


 Magnesium Sulfate


 15 meq/Calcium


 Gluconate 10 meq/


 Multivitamins 10


 ml/Chromium/


 Copper/Manganese/


 Seleni/Zn 1 ml/


 Total Parenteral


 Nutrition/Amino


 Acids/Dextrose/


 Fat Emulsion


 Intravenous  1,512 ml @ 


 63 mls/hr  TPN  CONT  10/20/19 22:00


 10/21/19 13:04 DC 10/20/19 22:02


63 MLS/HR


 


 Sodium Chloride


 90 meq/Potassium


 Acetate 70 meq/


 Potassium


 Phosphate 15 mmol/


 Magnesium Sulfate


 12 meq/Calcium


 Gluconate 13 meq/


 Multivitamins 10


 ml/Chromium/


 Copper/Manganese/


 Seleni/Zn 1 ml/


 Total Parenteral


 Nutrition/Amino


 Acids/Dextrose/


 Fat Emulsion


 Intravenous  1,512 ml @ 


 63 mls/hr  TPN  CONT  10/21/19 22:00


 10/22/19 21:59  10/21/19 23:13


63 MLS/HR


 


 Sucralfate


  (Carafate)  1 gm  BIDAC  10/21/19 16:30


     





 


 Vancomycin HCl  250 ml @ 


 250 mls/hr  1X  ONCE  10/17/19 17:00


 10/17/19 17:59 DC 10/17/19 17:36


250 MLS/HR








Lab





Laboratory Tests








Test


 10/21/19


21:01 10/22/19


05:35 10/22/19


07:27


 


Glucose (Fingerstick)


 209 mg/dL


(70-99) 


 213 mg/dL


(70-99)


 


Sodium Level


 


 140 mmol/L


(136-145) 





 


Potassium Level


 


 3.3 mmol/L


(3.5-5.1) 





 


Chloride Level


 


 111 mmol/L


() 





 


Carbon Dioxide Level


 


 20 mmol/L


(21-32) 





 


Anion Gap  9 (6-14)  


 


Blood Urea Nitrogen


 


 14 mg/dL


(8-26) 





 


Creatinine


 


 1.5 mg/dL


(0.7-1.3) 





 


Estimated GFR


(Cockcroft-Gault) 


 44.3 


 





 


Glucose Level


 


 244 mg/dL


(70-99) 





 


Calcium Level


 


 7.1 mg/dL


(8.5-10.1) 





 


Phosphorus Level


 


 2.7 mg/dL


(2.6-4.7) 





 


Magnesium Level


 


 2.1 mg/dL


(1.8-2.4) 











Results


All relevant outside records, renal labs, imaging studies, telemetry/EKG's were 

reviewed.











JOSH HANLEY MD                Oct 22, 2019 09:41

## 2019-10-22 NOTE — PDOC
PROGRESS NOTES


Chief Complaint


Chief Complaint


sepsis, with hypotension, - improved to resolved, 


N/v, diarrhea and H/o dysphagia/esophageal dysmotility


chronic systolic CHF pleural effusions, anasarca,  from CAD


acute renal failure


severe malnutrition


chronic anemia, coagulopathy, 


transaminitis, 


deaf 


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


DNR


MOd to severe PCM


encephalopathy





History of Present Illness


History of Present Illness


Transferred out of icu


Lots of co morbids/medical probs - i have reviewed chart


CArds has consulted palliative which is VERY appropriate


Meeting later 3 PM


Wife at bedside


Pt  minimally verbal to me, TPN, poor ambulation


Was walking without assistive device PTA





Informative palliative note i have reviewed





PLAN:


COnt tpn, dnr supportive meds


FAm meet 3 pm





Vitals


Vitals





Vital Signs








  Date Time  Temp Pulse Resp B/P (MAP) Pulse Ox O2 Delivery O2 Flow Rate FiO2


 


10/22/19 11:00 97.5 85 20 111/58 (75) 95 Room Air  





 97.5       











Physical Exam


Physical Exam


GENERAL:  Alert, oriented gentleman, not in any distress.


HEENT:  Both pupils are round and reacting.  No conjunctival lesion, no lesion


in the mouth.


NECK:  Supple, RT IJ looks ok


LUNGS:  Decreased breath sounds.


HEART:  S1, S2 regular.  No gallop or murmur.


ABDOMEN:  Soft, nontender, mildly distended


EXTREMITIES:  edema +


NEUROLOGIC:  Other than poor hearing, the patient does move all the extremities.


 There is no other focal deficit.


General:  Cooperative, No acute distress


Heart:  Regular rate (SR), Normal S1, Normal S2, No murmurs


Lungs:  Crackles, Other


Abdomen:  Soft, No tenderness


Extremities:  No cyanosis, Other (anasarca)


Skin:  No breakdown, No significant lesion, Other (pale)





Labs


LABS





Laboratory Tests








Test


 10/21/19


21:01 10/22/19


05:35 10/22/19


07:27 10/22/19


11:25


 


Glucose (Fingerstick)


 209 mg/dL


(70-99) 


 213 mg/dL


(70-99) 254 mg/dL


(70-99)


 


Sodium Level


 


 140 mmol/L


(136-145) 


 





 


Potassium Level


 


 3.3 mmol/L


(3.5-5.1) 


 





 


Chloride Level


 


 111 mmol/L


() 


 





 


Carbon Dioxide Level


 


 20 mmol/L


(21-32) 


 





 


Anion Gap  9 (6-14)   


 


Blood Urea Nitrogen


 


 14 mg/dL


(8-26) 


 





 


Creatinine


 


 1.5 mg/dL


(0.7-1.3) 


 





 


Estimated GFR


(Cockcroft-Gault) 


 44.3 


 


 





 


Glucose Level


 


 244 mg/dL


(70-99) 


 





 


Calcium Level


 


 7.1 mg/dL


(8.5-10.1) 


 





 


Phosphorus Level


 


 2.7 mg/dL


(2.6-4.7) 


 





 


Magnesium Level


 


 2.1 mg/dL


(1.8-2.4) 


 














Review of Systems


Review of Systems


minimally verbal/cooperative/ very weak





Assessment and Plan


Assessmemt and Plan


Problems


Medical Problems:


(1) Anemia


Status: Acute  





(2) CHF (congestive heart failure)


Status: Acute  





(3) Diarrhea


Status: Acute  





(4) Elevated liver function tests


Status: Acute  





(5) Hypoalbuminemia


Status: Acute  





(6) Hypocalcemia


Status: Acute  





(7) Pleural effusion


Status: Acute  





(8) Renal insufficiency


Status: Acute  





(9) Severe sepsis


Status: Acute  





(10) Tachycardia


Status: Acute  











Comment


Review of Relevant


I have reviewed the following items milana (where applicable) has been applied.


Labs





Laboratory Tests








Test


 10/21/19


05:40 10/21/19


21:01 10/22/19


05:35 10/22/19


07:27


 


White Blood Count


 2.7 x10^3/uL


(4.0-11.0) 


 


 





 


Red Blood Count


 2.74 x10^6/uL


(4.30-5.70) 


 


 





 


Hemoglobin


 8.3 g/dL


(13.0-17.5) 


 


 





 


Hematocrit


 25.4 %


(39.0-53.0) 


 


 





 


Mean Corpuscular Volume 93 fL ()    


 


Mean Corpuscular Hemoglobin 30 pg (25-35)    


 


Mean Corpuscular Hemoglobin


Concent 33 g/dL


(31-37) 


 


 





 


Red Cell Distribution Width


 15.4 %


(11.5-14.5) 


 


 





 


Platelet Count


 80 x10^3/uL


(140-400) 


 


 





 


Sodium Level


 139 mmol/L


(136-145) 


 140 mmol/L


(136-145) 





 


Potassium Level


 3.4 mmol/L


(3.5-5.1) 


 3.3 mmol/L


(3.5-5.1) 





 


Chloride Level


 109 mmol/L


() 


 111 mmol/L


() 





 


Carbon Dioxide Level


 21 mmol/L


(21-32) 


 20 mmol/L


(21-32) 





 


Anion Gap 9 (6-14)   9 (6-14)  


 


Blood Urea Nitrogen


 14 mg/dL


(8-26) 


 14 mg/dL


(8-26) 





 


Creatinine


 1.6 mg/dL


(0.7-1.3) 


 1.5 mg/dL


(0.7-1.3) 





 


Estimated GFR


(Cockcroft-Gault) 41.1 


 


 44.3 


 





 


Glucose Level


 208 mg/dL


(70-99) 


 244 mg/dL


(70-99) 





 


Calcium Level


 6.6 mg/dL


(8.5-10.1) 


 7.1 mg/dL


(8.5-10.1) 





 


Phosphorus Level


 3.1 mg/dL


(2.6-4.7) 


 2.7 mg/dL


(2.6-4.7) 





 


Magnesium Level


 2.0 mg/dL


(1.8-2.4) 


 2.1 mg/dL


(1.8-2.4) 





 


Glucose (Fingerstick)


 


 209 mg/dL


(70-99) 


 213 mg/dL


(70-99)


 


Test


 10/22/19


11:25 


 


 





 


Glucose (Fingerstick)


 254 mg/dL


(70-99) 


 


 











Laboratory Tests








Test


 10/21/19


21:01 10/22/19


05:35 10/22/19


07:27 10/22/19


11:25


 


Glucose (Fingerstick)


 209 mg/dL


(70-99) 


 213 mg/dL


(70-99) 254 mg/dL


(70-99)


 


Sodium Level


 


 140 mmol/L


(136-145) 


 





 


Potassium Level


 


 3.3 mmol/L


(3.5-5.1) 


 





 


Chloride Level


 


 111 mmol/L


() 


 





 


Carbon Dioxide Level


 


 20 mmol/L


(21-32) 


 





 


Anion Gap  9 (6-14)   


 


Blood Urea Nitrogen


 


 14 mg/dL


(8-26) 


 





 


Creatinine


 


 1.5 mg/dL


(0.7-1.3) 


 





 


Estimated GFR


(Cockcroft-Gault) 


 44.3 


 


 





 


Glucose Level


 


 244 mg/dL


(70-99) 


 





 


Calcium Level


 


 7.1 mg/dL


(8.5-10.1) 


 





 


Phosphorus Level


 


 2.7 mg/dL


(2.6-4.7) 


 





 


Magnesium Level


 


 2.1 mg/dL


(1.8-2.4) 


 











Microbiology


10/17/19 Blood Culture - Preliminary, Resulted


           NO GROWTH AFTER 4 DAYS


Medications





Current Medications


Sodium Chloride 1,000 ml @  1,000 mls/hr Q1H IV  Last administered on 10/17/19at

15:55;  Start 10/17/19 at 15:34;  Stop 10/17/19 at 16:33;  Status DC


Ondansetron HCl (Zofran) 4 mg 1X  ONCE IV  Last administered on 10/17/19at 

16:09;  Start 10/17/19 at 16:30;  Stop 10/17/19 at 16:31;  Status DC


Sodium Chloride 1,000 ml @  1,000 mls/hr 1X  ONCE IV  Last administered on 

10/17/19at 17:27;  Start 10/17/19 at 16:45;  Stop 10/17/19 at 17:44;  Status DC


Piperacillin Sod/ Tazobactam Sod 3.375 gm/Sodium Chloride 50 ml @  100 mls/hr 1X

 ONCE IV  Last administered on 10/17/19at 17:27;  Start 10/17/19 at 18:00;  Stop

10/17/19 at 18:29;  Status DC


Vancomycin HCl 250 ml @  250 mls/hr 1X  ONCE IV  Last administered on 10/17/19at

17:36;  Start 10/17/19 at 17:00;  Stop 10/17/19 at 17:59;  Status DC


Levofloxacin/ Dextrose 150 ml @  100 mls/hr 1X  ONCE IV  Last administered on 

10/17/19at 18:17;  Start 10/17/19 at 16:45;  Stop 10/17/19 at 18:14;  Status DC


Sodium Chloride 1,000 ml @  150 mls/hr Q6H40M IV ;  Start 10/17/19 at 17:05;  

Stop 10/17/19 at 21:10;  Status DC


Dopamine HCl/ Dextrose 250 ml @  10.121 mls/ hr CONT  PRN IV SEE I/O RECORD Last

administered on 10/17/19at 20:32;  Start 10/17/19 at 20:00;  Stop 10/22/19 at 

09:05;  Status DC


Piperacillin Sod/ Tazobactam Sod 3.375 gm/Sodium Chloride 50 ml @  100 mls/hr 

Q8HRS IV  Last administered on 10/21/19at 05:18;  Start 10/17/19 at 22:00;  Stop

10/21/19 at 08:16;  Status DC


Linezolid/Dextrose 300 ml @  300 mls/hr Q12HR IV  Last administered on 

10/20/19at 08:36;  Start 10/17/19 at 21:00;  Stop 10/20/19 at 08:56;  Status DC


Enoxaparin Sodium (Lovenox 30mg Syringe) 30 mg Q24H SQ  Last administered on 

10/21/19at 23:13;  Start 10/17/19 at 21:00


Sodium Chloride 1,000 ml @  75 mls/hr [id number] IV  Last administered on 

10/20/19at 10:16;  Start 10/18/19 at 18:00;  Stop 10/20/19 at 18:17;  Status DC


Pantoprazole Sodium (Protonix) 40 mg BIDAC PO  Last administered on 10/18/19at 

08:43;  Start 10/18/19 at 07:30;  Stop 10/18/19 at 13:49;  Status DC


Aspirin (Children'S Aspirin) 81 mg DAILYWBKFT PO  Last administered on 

10/18/19at 08:43;  Start 10/18/19 at 08:00;  Stop 10/18/19 at 09:29;  Status DC


Citalopram Hydrobromide (CeleXA) 20 mg DAILY PO  Last administered on 10/21/19at

09:49;  Start 10/18/19 at 09:00


Norepinephrine Bitartrate 250 ml @  12.97 mls/ hr CONT  PRN IV SEE I/O RECORD 

Last administered on 10/19/19at 23:40;  Start 10/17/19 at 21:15;  Stop 10/22/19 

at 09:05;  Status DC


Sodium Chloride 500 ml @  500 mls/hr 1X  ONCE IV  Last administered on 

10/17/19at 21:40;  Start 10/17/19 at 21:15;  Stop 10/17/19 at 22:14;  Status DC


Ondansetron HCl (Zofran) 4 mg PRN Q6HRS  PRN IVP NAUSEA/VOMITING Last 

administered on 10/18/19at 15:42;  Start 10/17/19 at 21:30


Albumin Human 100 ml @  100 mls/hr 1X  ONCE IV  Last administered on 10/18/19at 

10:37;  Start 10/18/19 at 09:15;  Stop 10/18/19 at 10:14;  Status DC


Furosemide (Lasix) 20 mg 1X  ONCE IVP  Last administered on 10/18/19at 11:42;  

Start 10/18/19 at 10:15;  Stop 10/18/19 at 10:16;  Status DC


Aspirin (Ecotrin) 81 mg DAILYWBKFT PO  Last administered on 10/21/19at 09:49;  

Start 10/19/19 at 08:00


Potassium Chloride/Water 100 ml @  100 mls/hr Q1H IV  Last administered on 

10/18/19at 14:53;  Start 10/18/19 at 12:00;  Stop 10/18/19 at 15:59;  Status DC


Lactobacillus Rhamnosus (Culturelle) 1 cap BID PO  Last administered on 10/

21/19at 23:11;  Start 10/18/19 at 21:00


Pantoprazole Sodium (PROTONIX VIAL for IV PUSH) 40 mg BIDAC IVP  Last 

administered on 10/20/19at 16:47;  Start 10/18/19 at 16:30;  Stop 10/21/19 at 

09:42;  Status DC


Fentanyl Citrate 30 ml @ 0 mls/hr CONT  PRN IV SEE PROTOCOL;  Start 10/19/19 at 

17:30;  Status Cancel


Fentanyl Citrate (Fentanyl 2ml Vial) 25 mcg PRN Q1HR  PRN IV SEE COMMENTS;  

Start 10/19/19 at 17:30;  Status Cancel


Fentanyl Citrate (Fentanyl 2ml Vial) 50 mcg PRN Q1HR  PRN IV SEE COMMENTS;  

Start 10/19/19 at 17:30;  Status Cancel


Chlorhexidine Gluconate (Peridex) 15 ml BID MM ;  Start 10/19/19 at 21:00;  

Status Cancel


Famotidine (Pepcid Vial) 20 mg QHS IVP ;  Start 10/19/19 at 21:00;  Status 

Cancel


Morphine Sulfate (Morphine Sulfate) 2 mg PRN Q1HR  PRN IV SEE COMMENTS.;  Start 

10/19/19 at 17:30;  Status Cancel


Morphine Sulfate (Morphine Sulfate) 4 mg PRN Q1HR  PRN IV SEE COMMENTS.;  Start 

10/19/19 at 17:30;  Status Cancel


Enoxaparin Sodium (Lovenox 40mg Syringe) 40 mg Q12HR SQ ;  Start 10/19/19 at 

21:00;  Status UNV


Midazolam HCl 100 ml @ 5 mls/hr CONT  PRN IV SEE I/O RECORD;  Start 10/19/19 at 

17:30;  Status Cancel


Propofol 100 ml @  1.061 mls/ hr CONT  PRN IV SEE I/O RECORD;  Start 10/19/19 at

17:30;  Status Cancel


Info (Tpn Per Pharmacy) 1 each PRN DAILY  PRN MC SEE COMMENTS Last administered 

on 10/21/19at 13:57;  Start 10/20/19 at 10:45


Amino Acids/ Glycerin/ Electrolytes 1,000 ml @  100 mls/hr Q10H IV  Last 

administered on 10/20/19at 11:08;  Start 10/20/19 at 11:00;  Stop 10/21/19 at 

09:00;  Status DC


Potassium Phosphate 13.6 mmol/Dextrose 104.5333 ml @  52.267 m... Q2H IV  Last 

administered on 10/20/19at 15:49;  Start 10/20/19 at 14:00;  Stop 10/20/19 at 

17:59;  Status DC


Magnesium Sulfate 50 ml @ 25 mls/hr 1X  ONCE IV  Last administered on 10/20/19at

14:41;  Start 10/20/19 at 14:00;  Stop 10/20/19 at 15:59;  Status DC


Sodium Chloride 90 meq/Potassium Acetate 50 meq/ Potassium Phosphate 20 mmol/ 

Magnesium Sulfate 15 meq/Calcium Gluconate 10 meq/ Multivitamins 10 ml/Chromium/

Copper/Manganese/ Seleni/Zn 1 ml/ Total Parenteral Nutrition/Amino 

Acids/Dextrose/ Fat Emulsion Intravenous 1,512 ml @  63 mls/hr TPN  CONT IV  

Last administered on 10/20/19at 22:02;  Start 10/20/19 at 22:00;  Stop 10/21/19 

at 13:04;  Status DC


Albumin Human 250 ml @  62.5 mls/hr 1X  ONCE IV  Last administered on 10/20/19at

14:30;  Start 10/20/19 at 14:30;  Stop 10/20/19 at 18:29;  Status DC


Sodium Chloride 1,000 ml @  100 mls/hr Q10H IV  Last administered on 10/22/19at 

09:09;  Start 10/20/19 at 18:15


Ceftriaxone Sodium (Rocephin) 2 gm Q24H IVP  Last administered on 10/22/19at 

09:03;  Start 10/21/19 at 09:00


Pantoprazole Sodium (Protonix) 40 mg DAILYAC PO ;  Start 10/22/19 at 07:30


Sucralfate (Carafate) 1 gm BIDAC PO ;  Start 10/21/19 at 16:30


Sodium Chloride 90 meq/Potassium Acetate 70 meq/ Potassium Phosphate 15 mmol/ 

Magnesium Sulfate 12 meq/Calcium Gluconate 13 meq/ Multivitamins 10 ml/Chromium/

Copper/Manganese/ Seleni/Zn 1 ml/ Total Parenteral Nutrition/Amino 

Acids/Dextrose/ Fat Emulsion Intravenous 1,512 ml @  63 mls/hr TPN  CONT IV ;  

Start 10/21/19 at 22:00;  Stop 10/21/19 at 13:55;  Status DC


Sodium Chloride 90 meq/Potassium Acetate 70 meq/ Potassium Phosphate 15 mmol/ 

Magnesium Sulfate 12 meq/Calcium Gluconate 13 meq/ Multivitamins 10 ml/Chromium/

Copper/Manganese/ Seleni/Zn 1 ml/ Total Parenteral Nutrition/Amino 

Acids/Dextrose/ Fat Emulsion Intravenous 1,512 ml @  63 mls/hr TPN  CONT IV  

Last administered on 10/21/19at 23:13;  Start 10/21/19 at 22:00;  Stop 10/22/19 

at 21:59


Acetaminophen (Tylenol) 500 mg PRN Q6HRS  PRN PO MILD PAIN / TEMP;  Start 

10/22/19 at 09:15


Metronidazole 100 ml @  100 mls/hr Q12HR IV ;  Start 10/22/19 at 21:00





Active Scripts


Active


Aspirin Ec (Aspirin) 81 Mg Tablet. 81 Mg PO [id number] 30 Days


Reported


Protonix (Pantoprazole Sodium) 20 Mg Tablet. 40 Mg PO BID


Escitalopram Oxalate 10 Mg Tablet 10 Mg PO DAILY


Vitals/I & O





Vital Sign - Last 24 Hours








 10/21/19 10/21/19 10/21/19 10/21/19





 13:00 14:00 15:00 15:53


 


Pulse 64 68 72 64


 


Resp 12 12 12 12


 


B/P (MAP) 110/68 (82) 114/64 (81) 103/59 (74) 110/68 (82)


 


Pulse Ox 92 92 92 92


 


O2 Delivery Room Air Room Air Room Air Room Air





 10/21/19 10/21/19 10/21/19 10/21/19





 16:00 16:00 19:00 20:00


 


Temp  98.6 97.5 





  98.6 97.5 


 


Pulse  76 77 


 


Resp  12 14 


 


B/P (MAP)  108/65 (79) 109/67 (81) 


 


Pulse Ox  92 99 


 


O2 Delivery Room Air Room Air Room Air Room Air


 


    





    





 10/21/19 10/21/19 10/22/19 10/22/19





 22:51 23:00 03:00 07:00


 


Temp 97.5 97.5 98.1 97.0





 97.5 97.5 98.1 97.0


 


Pulse 75 75 72 83


 


Resp 14 14 18 18


 


B/P (MAP) 108/61 (77) 108/61 (77) 118/65 (82) 98/56 (70)


 


Pulse Ox 94 94 99 96


 


O2 Delivery Room Air Room Air Room Air Room Air


 


    





    





 10/22/19   





 11:00   


 


Temp 97.5   





 97.5   


 


Pulse 85   


 


Resp 20   


 


B/P (MAP) 111/58 (75)   


 


Pulse Ox 95   


 


O2 Delivery Room Air   














Intake and Output   


 


 10/21/19 10/21/19 10/22/19





 15:00 23:00 07:00


 


Intake Total  1871 ml 70 ml


 


Output Total 120 ml 140 ml 300 ml


 


Balance -120 ml 1731 ml -230 ml











Nutrition Consultation


Dietary Evaluation:


Recommendations by RD:  Increase Calorie Intake, PPN/TPN


Comments:  


REC TPN per followin g dextrose, 60 g AA, 20 g lipids





REC continue w/dysphagia I/thin liquid diet as ordered per SLP, encourage  


oral supplement use when pt willing


Expected Outcomes/Goals:  


Nutrition support + PO intake to meet >75% est needs - met, goal ongoing





Malnutrition Findings:


Body Fat Depletion (Non Severe:  Mild Depletion


Weight Status:  Appropriate











CAROLINA CLARK MD           Oct 22, 2019 12:45

## 2019-10-22 NOTE — PDOC
Subjective:


Subjective:


Family present - says he has not c/o dysphagia or abd pain - says he's "not 

hungry."


He does not want another feeding tube.


Wonders if he can go home on TPN.


Having trouble talking today.





Objective:


Vital Signs:





                                   Vital Signs








  Date Time  Temp Pulse Resp B/P (MAP) Pulse Ox O2 Delivery O2 Flow Rate FiO2


 


10/22/19 07:00 97.0 83 18 98/56 (70) 96 Room Air  





 97.0       








Labs:





Laboratory Tests








Test


 10/21/19


21:01 10/22/19


05:35 10/22/19


07:27


 


Glucose (Fingerstick) 209 mg/dL   213 mg/dL 


 


Sodium Level  140 mmol/L  


 


Potassium Level  3.3 mmol/L  


 


Chloride Level  111 mmol/L  


 


Carbon Dioxide Level  20 mmol/L  


 


Anion Gap  9  


 


Blood Urea Nitrogen  14 mg/dL  


 


Creatinine  1.5 mg/dL  


 


Estimated GFR


(Cockcroft-Gault) 


 44.3 


 





 


Glucose Level  244 mg/dL  


 


Calcium Level  7.1 mg/dL  


 


Phosphorus Level  2.7 mg/dL  


 


Magnesium Level  2.1 mg/dL  











PE:





GEN: looks ill


LUNGS: room air


ABD: S/ND/NT


NEURO/PSYCH: briefly awakens, attempts to speak





A/P:


Lactic acidosis, hypotension, pleural effusions, hypoalbuminemia


Anorexia, dysphagia - on TPN, h/o esophageal dysmotility


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


Cardiomyopathy, CAD, DM, CHF, [place]





--


Await family meeting.


Hold off on esophagram/UGI for now - await family meeting.











PREM HERNANDEZ         Oct 22, 2019 09:38

## 2019-10-23 VITALS — SYSTOLIC BLOOD PRESSURE: 127 MMHG | DIASTOLIC BLOOD PRESSURE: 69 MMHG

## 2019-10-23 VITALS — DIASTOLIC BLOOD PRESSURE: 61 MMHG | SYSTOLIC BLOOD PRESSURE: 131 MMHG

## 2019-10-23 LAB
ANION GAP SERPL CALC-SCNC: 8 MMOL/L (ref 6–14)
BUN SERPL-MCNC: 15 MG/DL (ref 8–26)
CALCIUM SERPL-MCNC: 7.5 MG/DL (ref 8.5–10.1)
CHLORIDE SERPL-SCNC: 114 MMOL/L (ref 98–107)
CO2 SERPL-SCNC: 21 MMOL/L (ref 21–32)
CREAT SERPL-MCNC: 1.3 MG/DL (ref 0.7–1.3)
GFR SERPLBLD BASED ON 1.73 SQ M-ARVRAT: 52.2 ML/MIN
GLUCOSE SERPL-MCNC: 280 MG/DL (ref 70–99)
MAGNESIUM SERPL-MCNC: 2.1 MG/DL (ref 1.8–2.4)
PHOSPHATE SERPL-MCNC: 2.2 MG/DL (ref 2.6–4.7)
POTASSIUM SERPL-SCNC: 3.5 MMOL/L (ref 3.5–5.1)
SODIUM SERPL-SCNC: 143 MMOL/L (ref 136–145)

## 2019-10-23 NOTE — PDOC
Objective:


Objective:


Reviewed chart and d/w nurse.


Vital Signs:





                                   Vital Signs








  Date Time  Temp Pulse Resp B/P (MAP) Pulse Ox O2 Delivery O2 Flow Rate FiO2


 


10/23/19 07:00 97.6 76 16 131/61 (84) 93 Room Air  





 97.6       








Labs:





Laboratory Tests








Test


 10/22/19


11:25 10/22/19


17:07


 


Glucose (Fingerstick)


 254 mg/dL


(70-99) 247 mg/dL


(70-99)











PE:





GEN: NAD, breakfast tray untouched


NEURO/PSYCH: sleeping, not awakened





A/P:


Anorexia, dysphagia - on TPN, h/o esophageal dysmotility


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


Cardiomyopathy, CAD, DM, CHF, [place]





--


Plans to DC on Hospice - GI will sign off.











PREM HERNANDEZ         Oct 23, 2019 09:52

## 2019-10-23 NOTE — PDOC3
Discharge Summary


Visit Information


Date of Admission:  Oct 17, 2019


Date of Discharge:  Oct 23, 2019


Admitting Diagnosis Comment:


sepsis, with hypotension, - improved to resolved, 


N/v, diarrhea and H/o dysphagia/esophageal dysmotility


chronic systolic CHF pleural effusions, anasarca,  from CAD


acute renal failure


severe malnutrition


chronic anemia, coagulopathy, 


transaminitis, 


deaf 


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


DNR


MOd to severe PCM


encephalopathy


Final Diagnosis


Problems


Medical Problems:


(1) Anemia


Status: Acute  





(2) CHF (congestive heart failure)


Status: Acute  





(3) Diarrhea


Status: Acute  





(4) Elevated liver function tests


Status: Acute  





(5) Hypoalbuminemia


Status: Acute  





(6) Hypocalcemia


Status: Acute  





(7) Pleural effusion


Status: Acute  





(8) Renal insufficiency


Status: Acute  





(9) Severe sepsis


Status: Acute  





(10) Tachycardia


Status: Acute  











Brief Hospital Course


Allergies





                                    Allergies








Coded Allergies Type Severity Reaction Last Updated Verified


 


  No Known Drug Allergies    3/1/18 No








Vital Signs





Vital Signs








  Date Time  Temp Pulse Resp B/P (MAP) Pulse Ox O2 Delivery O2 Flow Rate FiO2


 


10/23/19 07:00 97.6 76 16 131/61 (84) 93 Room Air  





 97.6       








Lab Results





Laboratory Tests








Test


 10/21/19


21:01 10/22/19


05:35 10/22/19


07:27 10/22/19


11:25


 


Glucose (Fingerstick)


 209 mg/dL


(70-99) 


 213 mg/dL


(70-99) 254 mg/dL


(70-99)


 


Sodium Level


 


 140 mmol/L


(136-145) 


 





 


Potassium Level


 


 3.3 mmol/L


(3.5-5.1) 


 





 


Chloride Level


 


 111 mmol/L


() 


 





 


Carbon Dioxide Level


 


 20 mmol/L


(21-32) 


 





 


Anion Gap  9 (6-14)   


 


Blood Urea Nitrogen


 


 14 mg/dL


(8-26) 


 





 


Creatinine


 


 1.5 mg/dL


(0.7-1.3) 


 





 


Estimated GFR


(Cockcroft-Gault) 


 44.3 


 


 





 


Glucose Level


 


 244 mg/dL


(70-99) 


 





 


Calcium Level


 


 7.1 mg/dL


(8.5-10.1) 


 





 


Phosphorus Level


 


 2.7 mg/dL


(2.6-4.7) 


 





 


Magnesium Level


 


 2.1 mg/dL


(1.8-2.4) 


 





 


Test


 10/22/19


17:07 10/23/19


04:10 


 





 


Glucose (Fingerstick)


 247 mg/dL


(70-99) 


 


 





 


Sodium Level


 


 143 mmol/L


(136-145) 


 





 


Potassium Level


 


 3.5 mmol/L


(3.5-5.1) 


 





 


Chloride Level


 


 114 mmol/L


() 


 





 


Carbon Dioxide Level


 


 21 mmol/L


(21-32) 


 





 


Anion Gap  8 (6-14)   


 


Blood Urea Nitrogen


 


 15 mg/dL


(8-26) 


 





 


Creatinine


 


 1.3 mg/dL


(0.7-1.3) 


 





 


Estimated GFR


(Cockcroft-Gault) 


 52.2 


 


 





 


Glucose Level


 


 280 mg/dL


(70-99) 


 





 


Calcium Level


 


 7.5 mg/dL


(8.5-10.1) 


 





 


Phosphorus Level


 


 2.2 mg/dL


(2.6-4.7) 


 





 


Magnesium Level


 


 2.1 mg/dL


(1.8-2.4) 


 











Laboratory Tests








Test


 10/22/19


11:25 10/22/19


17:07 10/23/19


04:10


 


Glucose (Fingerstick)


 254 mg/dL


(70-99) 247 mg/dL


(70-99) 





 


Sodium Level


 


 


 143 mmol/L


(136-145)


 


Potassium Level


 


 


 3.5 mmol/L


(3.5-5.1)


 


Chloride Level


 


 


 114 mmol/L


()


 


Carbon Dioxide Level


 


 


 21 mmol/L


(21-32)


 


Anion Gap   8 (6-14) 


 


Blood Urea Nitrogen


 


 


 15 mg/dL


(8-26)


 


Creatinine


 


 


 1.3 mg/dL


(0.7-1.3)


 


Estimated GFR


(Cockcroft-Gault) 


 


 52.2 





 


Glucose Level


 


 


 280 mg/dL


(70-99)


 


Calcium Level


 


 


 7.5 mg/dL


(8.5-10.1)


 


Phosphorus Level


 


 


 2.2 mg/dL


(2.6-4.7)


 


Magnesium Level


 


 


 2.1 mg/dL


(1.8-2.4)








Brief Hospital Course


Mr. Li  is a 87 old white male who came in for the above (pls refer to H and P

by colleague) LOts of co morbidities, see  above dx - [facility] recommended 

palliative bec NOt better, PAlliative got involved, family decided hospice home,

getting tPN, poor po, dysphagia, minimally verbal pale, bed bound, Hospice 

appropriate, OUTside DNR I have signed, HOme hospice today


pt seen and examined


dw family at bedside


I initiated hospice packet, KEep braden on dc, dc PICC line





dc < 30





Discharge Information


Condition at Discharge:  Comment (hospice)


Disposition/Orders:  D/C to Home w/ Hospice


No Active Prescriptions or Reported Meds











CAROLINA CLARK MD           Oct 23, 2019 09:12

## 2019-10-23 NOTE — NUR
SW following pt. SW spoke with pt's family at bedside, no preference for hospice agency. Pt 
has used [facility] before and family is okay with using [facility]. ANGEL phoned 
and faxed referral to [facility], phone: [phone], fax: [phone]. Jailene from 
[facility] will meet with family around 1030 today. ANGEL discussed DME requested by family. 
Acceptance and admission pending. Will continue to follow. Discussed with RN and Physician.

## 2019-10-23 NOTE — PDOC
PULMONARY PROGRESS NOTES


Subjective





NO RESP DISTRESS


Vitals





Vital Signs








  Date Time  Temp Pulse Resp B/P (MAP) Pulse Ox O2 Delivery O2 Flow Rate FiO2


 


10/23/19 09:00      Room Air 1.0 


 


10/23/19 07:00 97.6 76 16 131/61 (84) 93   





 97.6       








ROS:  No Nausea, No Chest Pain, No Increase Cough


General:  Alert


Lungs:  Crackles, Other


Cardiovascular:  S1, S2


Abdomen:  Soft, Non-tender, Other (no mass)


Neuro Exam:  Alert


Skin:  Warm


Labs





Laboratory Tests








Test


 10/21/19


21:01 10/22/19


05:35 10/22/19


07:27 10/22/19


11:25


 


Glucose (Fingerstick)


 209 mg/dL


(70-99) 


 213 mg/dL


(70-99) 254 mg/dL


(70-99)


 


Sodium Level


 


 140 mmol/L


(136-145) 


 





 


Potassium Level


 


 3.3 mmol/L


(3.5-5.1) 


 





 


Chloride Level


 


 111 mmol/L


() 


 





 


Carbon Dioxide Level


 


 20 mmol/L


(21-32) 


 





 


Anion Gap  9 (6-14)   


 


Blood Urea Nitrogen


 


 14 mg/dL


(8-26) 


 





 


Creatinine


 


 1.5 mg/dL


(0.7-1.3) 


 





 


Estimated GFR


(Cockcroft-Gault) 


 44.3 


 


 





 


Glucose Level


 


 244 mg/dL


(70-99) 


 





 


Calcium Level


 


 7.1 mg/dL


(8.5-10.1) 


 





 


Phosphorus Level


 


 2.7 mg/dL


(2.6-4.7) 


 





 


Magnesium Level


 


 2.1 mg/dL


(1.8-2.4) 


 





 


Test


 10/22/19


17:07 10/23/19


04:10 


 





 


Glucose (Fingerstick)


 247 mg/dL


(70-99) 


 


 





 


Sodium Level


 


 143 mmol/L


(136-145) 


 





 


Potassium Level


 


 3.5 mmol/L


(3.5-5.1) 


 





 


Chloride Level


 


 114 mmol/L


() 


 





 


Carbon Dioxide Level


 


 21 mmol/L


(21-32) 


 





 


Anion Gap  8 (6-14)   


 


Blood Urea Nitrogen


 


 15 mg/dL


(8-26) 


 





 


Creatinine


 


 1.3 mg/dL


(0.7-1.3) 


 





 


Estimated GFR


(Cockcroft-Gault) 


 52.2 


 


 





 


Glucose Level


 


 280 mg/dL


(70-99) 


 





 


Calcium Level


 


 7.5 mg/dL


(8.5-10.1) 


 





 


Phosphorus Level


 


 2.2 mg/dL


(2.6-4.7) 


 





 


Magnesium Level


 


 2.1 mg/dL


(1.8-2.4) 


 











Laboratory Tests








Test


 10/22/19


11:25 10/22/19


17:07 10/23/19


04:10


 


Glucose (Fingerstick)


 254 mg/dL


(70-99) 247 mg/dL


(70-99) 





 


Sodium Level


 


 


 143 mmol/L


(136-145)


 


Potassium Level


 


 


 3.5 mmol/L


(3.5-5.1)


 


Chloride Level


 


 


 114 mmol/L


()


 


Carbon Dioxide Level


 


 


 21 mmol/L


(21-32)


 


Anion Gap   8 (6-14) 


 


Blood Urea Nitrogen


 


 


 15 mg/dL


(8-26)


 


Creatinine


 


 


 1.3 mg/dL


(0.7-1.3)


 


Estimated GFR


(Cockcroft-Gault) 


 


 52.2 





 


Glucose Level


 


 


 280 mg/dL


(70-99)


 


Calcium Level


 


 


 7.5 mg/dL


(8.5-10.1)


 


Phosphorus Level


 


 


 2.2 mg/dL


(2.6-4.7)


 


Magnesium Level


 


 


 2.1 mg/dL


(1.8-2.4)








Medications





Active Scripts








 Medications  Dose


 Route/Sig


 Max Daily Dose Days Date Category


 


 Aspirin Ec


  (Aspirin) 81 Mg


 Tablet.dr  81 Mg


 PO DAILYWBKFT


  30 6/8/18 Rx


 


 Protonix


  (Pantoprazole


 Sodium) 20 Mg


 Tablet.dr  40 Mg


 PO BID


   3/1/18 Reported


 


 Escitalopram


 Oxalate 10 Mg


 Tablet  10 Mg


 PO DAILY


   11/30/17 Reported











Impression


.


IMPRESSION:


1.  Large right-sided effusion, s/p thoracentesis, transudate, due to chf.


2.  Septic/cardiogenic shock.


3.  Elevated liver chemistries.


4.  Acute-on-chronic congestive heart failure, systolic.


5.  Renal insufficiency.


6.  Protein malnutrition, present upon admission.








echo


The systolic function is mildly to moderately impaired. The Ejection Fraction is

40-45%.


There is global hypokinesis of the left ventricle. Septal motion suggestive of 

conduction defect.





Plan


.


D/W PAT


PT TO BE DC ON HOSPICE











ALICE TILLMAN MD              Oct 23, 2019 10:57

## 2019-10-23 NOTE — NUR
SW following pt. [facility] has accepted pt, DME will be delivered by 1500. SW arranged 
transport by 1700 via [facility] and packet on chart. Pt's daughter, Purvi notified and 
agreeable. Discussed with RN.

## 2019-10-23 NOTE — SNU/HH DC
DISCHARGE ORDERS


DISCHARGE INFORMATION:


DISCHARGE DATE:  Oct 23, 2019


FINAL DIAGNOSIS


Problems


Medical Problems:


(1) Anemia


Status: Acute  





(2) CHF (congestive heart failure)


Status: Acute  





(3) Diarrhea


Status: Acute  





(4) Elevated liver function tests


Status: Acute  





(5) Hypoalbuminemia


Status: Acute  





(6) Hypocalcemia


Status: Acute  





(7) Pleural effusion


Status: Acute  





(8) Renal insufficiency


Status: Acute  





(9) Severe sepsis


Status: Acute  





(10) Tachycardia


Status: Acute  








CONDITION ON DISCHARGE:  Guarded





CODE STATUS:


Code Status:  DNR/DNI





SKILLED NURSING:


SNF STAY <30 DAYS:  No





HOSPICE:


HOSPICE:  Yes


HOSPICE EVAL & TREAT:  Yes





LTAC:


ADMIT TO LTAC:  No





POST DISCHARGE ORDERS:


ACTIVITY ORDERS:  Activity as tolerated


WEIGHT BEARING STATUS:  No restrictions


DIET AFTER DISCHARGE:  pleasure feeds





FOLLOW-UP:


PHYSICIAN FOLLOW-UP:  on hospice, keep braden





TREATMENT/EQUIPMENT ORDERS:


ADAPTIVE EQUIPMENT NEEDED:  Walker





DISCHARGE MEDICATIONS:


Home Meds


Discontinued Reported Medications


Pantoprazole Sodium (PROTONIX) 20 Mg Tablet.dr, 40 MG PO BID, TAB


   3/1/18


Escitalopram Oxalate (Escitalopram Oxalate) 10 Mg Tablet, 10 MG PO DAILY


   11/30/17


Discontinued Scripts


Aspirin (ASPIRIN EC) 81 Mg Tablet.dr, 81 MG PO DAILYWBKFT for 30 Days, #30 

TAB.SR


   Prov:DIANA FENTON MD         6/8/18











CAROLINA CLARK MD           Oct 23, 2019 07:30

## 2019-10-23 NOTE — PDOC
Infectious Disease Note


Subjective:


Subjective








ROS:


ROS


unable to obtain





Vital Signs:


Vital Signs





Vital Signs








  Date Time  Temp Pulse Resp B/P (MAP) Pulse Ox O2 Delivery O2 Flow Rate FiO2


 


10/23/19 07:00 97.6 76 16 131/61 (84) 93 Room Air  





 97.6       











Physical Exam:


PHYSICAL EXAM


GENERAL:  sleeping comfortably


HEENT: no icterus


NECK:  Supple 


LUNGS:  Decreased breath sounds.


HEART:  S1, S2  


ABDOMEN:  Soft, nontender,  


EXTREMITIES:  edema +


NEURO   sleeping





Medications:


Inpatient Meds:





Current Medications








 Medications


  (Trade)  Dose


 Ordered  Sig/Leonidas  Start Time


 Stop Time Status Last Admin


Dose Admin


 


 Acetaminophen


  (Tylenol)  500 mg  PRN Q6HRS  PRN  10/22/19 09:15


 10/22/19 17:14 DC  





 


 Albumin Human  250 ml @ 


 62.5 mls/hr  1X  ONCE  10/20/19 14:30


 10/20/19 18:29 DC 10/20/19 14:30


62.5 MLS/HR


 


 Amino Acids/


 Glycerin/


 Electrolytes  1,000 ml @ 


 100 mls/hr  Q10H  10/20/19 11:00


 10/21/19 09:00 DC 10/20/19 11:08


100 MLS/HR


 


 Aspirin


  (Children'S


 Aspirin)  81 mg  DAILYWBKFT  10/18/19 08:00


 10/18/19 09:29 DC 10/18/19 08:43


81 MG


 


 Aspirin


  (Ecotrin)  81 mg  DAILYWBKFT  10/19/19 08:00


 10/22/19 17:14 DC 10/21/19 09:49


81 MG


 


 Ceftriaxone Sodium


  (Rocephin)  2 gm  Q24H  10/21/19 09:00


 10/23/19 07:31 DC 10/22/19 09:03


2 GM


 


 Chlorhexidine


 Gluconate


  (Peridex)  15 ml  BID  10/19/19 21:00


   Cancel  





 


 Citalopram


 Hydrobromide


  (CeleXA)  20 mg  DAILY  10/18/19 09:00


 10/22/19 17:14 DC 10/21/19 09:49


20 MG


 


 Dopamine HCl/


 Dextrose  250 ml @ 


 10.121 mls/


 hr  CONT  PRN  10/17/19 20:00


 10/22/19 09:05 DC 10/17/19 20:32


13 MLS/HR


 


 Enoxaparin Sodium


  (Lovenox 30mg


 Syringe)  30 mg  Q24H  10/17/19 21:00


 10/22/19 17:14 DC 10/21/19 23:13


30 MG


 


 Enoxaparin Sodium


  (Lovenox 40mg


 Syringe)  40 mg  Q12HR  10/19/19 21:00


   UNV  





 


 Famotidine


  (Pepcid Vial)  20 mg  QHS  10/19/19 21:00


   Cancel  





 


 Fentanyl Citrate


  (Fentanyl 2ml


 Vial)  50 mcg  PRN Q1HR  PRN  10/19/19 17:30


   Cancel  





 


 Furosemide


  (Lasix)  20 mg  1X  ONCE  10/18/19 10:15


 10/18/19 10:16 DC 10/18/19 11:42


20 MG


 


 Info


  (Tpn Per


 Pharmacy)  1 each  PRN DAILY  PRN  10/20/19 10:45


 10/22/19 17:14 DC 10/22/19 13:31


1 EACH


 


 Lactobacillus


 Rhamnosus


  (Culturelle)  1 cap  BID  10/18/19 21:00


 10/22/19 17:14 DC 10/21/19 23:11


1 CAP


 


 Levofloxacin/


 Dextrose  150 ml @ 


 100 mls/hr  1X  ONCE  10/17/19 16:45


 10/17/19 18:14 DC 10/17/19 18:17


100 MLS/HR


 


 Linezolid/Dextrose  300 ml @ 


 300 mls/hr  Q12HR  10/17/19 21:00


 10/20/19 08:56 DC 10/20/19 08:36


300 MLS/HR


 


 Lorazepam


  (Ativan Intensol)  2 mg  PRN Q6HRS  PRN  10/23/19 07:30


     





 


 Magnesium Sulfate  50 ml @ 25


 mls/hr  1X  ONCE  10/20/19 14:00


 10/20/19 15:59 DC 10/20/19 14:41


25 MLS/HR


 


 Metronidazole  100 ml @ 


 100 mls/hr  Q12HR  10/22/19 21:00


 10/23/19 07:31 DC 10/22/19 22:22


100 MLS/HR


 


 Midazolam HCl  100 ml @ 5


 mls/hr  CONT  PRN  10/19/19 17:30


   Cancel  





 


 Morphine Sulfate


  (Morphine


 Sulfate)  4 mg  PRN Q1HR  PRN  10/19/19 17:30


   Cancel  





 


 Morphine Sulfate


  (Roxanol Conc)  20 mg  PRN Q3HRS  PRN  10/23/19 07:30


     





 


 Norepinephrine


 Bitartrate  250 ml @ 


 12.97 mls/


 hr  CONT  PRN  10/17/19 21:15


 10/22/19 09:05 DC 10/19/19 23:40


12.97 MLS/HR


 


 Ondansetron HCl


  (Zofran)  4 mg  PRN Q6HRS  PRN  10/17/19 21:30


 10/22/19 17:14 DC 10/18/19 15:42


4 MG


 


 Pantoprazole


 Sodium


  (PROTONIX VIAL


 for IV PUSH)  40 mg  BIDAC  10/18/19 16:30


 10/21/19 09:42 DC 10/20/19 16:47


40 MG


 


 Pantoprazole


 Sodium


  (Protonix)  40 mg  DAILYAC  10/22/19 07:30


 10/22/19 17:14 DC  





 


 Piperacillin Sod/


 Tazobactam Sod


 3.375 gm/Sodium


 Chloride  50 ml @ 


 100 mls/hr  Q8HRS  10/17/19 22:00


 10/21/19 08:16 DC 10/21/19 05:18


100 MLS/HR


 


 Potassium


 Chloride/Water  100 ml @ 


 100 mls/hr  Q1H  10/18/19 12:00


 10/18/19 15:59 DC 10/18/19 14:53


100 MLS/HR


 


 Potassium


 Phosphate 13.6


 mmol/Dextrose  104.5333


 ml @ 


 52.267 m...  Q2H  10/20/19 14:00


 10/20/19 17:59 DC 10/20/19 15:49


52.267 MLS/HR


 


 Propofol  100 ml @ 


 1.061 mls/


 hr  CONT  PRN  10/19/19 17:30


   Cancel  





 


 Sodium Chloride


 90 meq/Potassium


 Acetate 50 meq/


 Potassium


 Phosphate 20 mmol/


 Magnesium Sulfate


 15 meq/Calcium


 Gluconate 10 meq/


 Multivitamins 10


 ml/Chromium/


 Copper/Manganese/


 Seleni/Zn 1 ml/


 Total Parenteral


 Nutrition/Amino


 Acids/Dextrose/


 Fat Emulsion


 Intravenous  1,512 ml @ 


 63 mls/hr  TPN  CONT  10/20/19 22:00


 10/21/19 13:04 DC 10/20/19 22:02


63 MLS/HR


 


 Sodium Chloride


 90 meq/Potassium


 Acetate 70 meq/


 Potassium


 Phosphate 15 mmol/


 Magnesium Sulfate


 12 meq/Calcium


 Gluconate 13 meq/


 Multivitamins 10


 ml/Chromium/


 Copper/Manganese/


 Seleni/Zn 1 ml/


 Total Parenteral


 Nutrition/Amino


 Acids/Dextrose/


 Fat Emulsion


 Intravenous  1,512 ml @ 


 63 mls/hr  TPN  CONT  10/21/19 22:00


 10/22/19 21:59 DC 10/21/19 23:13


63 MLS/HR


 


 Sodium Chloride


 90 meq/Potassium


 Acetate 90 meq/


 Potassium


 Phosphate 15 mmol/


 Magnesium Sulfate


 12 meq/Calcium


 Gluconate 13 meq/


 Multivitamins 10


 ml/Chromium/


 Copper/Manganese/


 Seleni/Zn 1 ml/


 Total Parenteral


 Nutrition/Amino


 Acids/Dextrose/


 Fat Emulsion


 Intravenous  1,512 ml @ 


 63 mls/hr  TPN  CONT  10/22/19 22:00


 10/23/19 21:59  10/22/19 22:21


63 MLS/HR


 


 Sucralfate


  (Carafate)  1 gm  BIDAC  10/21/19 16:30


 10/22/19 17:14 DC  





 


 Vancomycin HCl  250 ml @ 


 250 mls/hr  1X  ONCE  10/17/19 17:00


 10/17/19 17:59 DC 10/17/19 17:36


250 MLS/HR











Labs:


Lab





Laboratory Tests








Test


 10/22/19


11:25 10/22/19


17:07 10/23/19


04:10


 


Glucose (Fingerstick)


 254 mg/dL


(70-99) 247 mg/dL


(70-99) 





 


Sodium Level


 


 


 143 mmol/L


(136-145)


 


Potassium Level


 


 


 3.5 mmol/L


(3.5-5.1)


 


Chloride Level


 


 


 114 mmol/L


()


 


Carbon Dioxide Level


 


 


 21 mmol/L


(21-32)


 


Anion Gap   8 (6-14) 


 


Blood Urea Nitrogen


 


 


 15 mg/dL


(8-26)


 


Creatinine


 


 


 1.3 mg/dL


(0.7-1.3)


 


Estimated GFR


(Cockcroft-Gault) 


 


 52.2 





 


Glucose Level


 


 


 280 mg/dL


(70-99)


 


Calcium Level


 


 


 7.5 mg/dL


(8.5-10.1)


 


Phosphorus Level


 


 


 2.2 mg/dL


(2.6-4.7)


 


Magnesium Level


 


 


 2.1 mg/dL


(1.8-2.4)








Micro


cult neg





Objective:


Assessment:





1.  Lactic acidosis,multifactorial,resolved


2.  Hypotension secondary to dehydration.resolved


3.  History of congestive heart failure.


4.  History of renal insufficiency.


5.  Large pleural effusion.s/p thoracocentesis ,transudate


6.  Coronary artery disease.


7.  Malnutrition with albumin 1.4.


8.  Elevated liver function tests.


9. Thrombocytopenia





Plan:


Plan of Care


 Pt is transitioned to  Home with hospice  


D/W TORIN RUIZ MD           Oct 23, 2019 09:52

## 2019-10-23 NOTE — NUR
Discharge Note: PT DISCHARGE HOME WITH [facility]. PT LEFT FACILITY VIA EMS TRANSPORT 
AT 1810. PT GUARDED UPON DISCHARGE. PT TRIPLE LUMEN CENTRAL LINE REMOVED FROM R EJ WITHOUT 
COMPLICATIONS, BANDAGE APPLIED. PT LEFT WITH ALL PERSONAL BELONGINGS, MURPHY CATH IN PLACE, 
AND DISCHARGE PACKET FOR HOSPICE NURSE. NO CONCERNS VOICED AT THIS TIME. 



ANNE MACKAY [facility]



Discharge instructions and discharge home medications reviewed with Patient and a copy 
given. All questions have been answered and understanding verbalized.

## 2019-10-23 NOTE — PDOC
SUBJECTIVE


ROS


Unchanged





OBJECTIVE


Vital Signs





Vital Signs








  Date Time  Temp Pulse Resp B/P (MAP) Pulse Ox O2 Delivery O2 Flow Rate FiO2


 


10/23/19 07:00 97.6 76 16 131/61 (84) 93 Room Air  





 97.6       








I & 0











Intake and Output 


 


 10/23/19





 07:00


 


Intake Total 0 ml


 


Output Total 650 ml


 


Balance -650 ml


 


 


 


Intake Oral 0 ml


 


Output Urine Total 650 ml











PHYSICAL EXAM


Physical Exam


General:   No acute distress,cachectic


HEENT:  OM dry  , On O2 by NC 


Neck supple  


Lungs:   CTA , decreased BS bases 


Heart:  Regular rate , Normal S1, Normal S2, No murmurs


Abdomen:  Soft, No tenderness


Extremities: 2-3 + EDEMA ( 2/2 severe hypoalbuminemia)


Skin:  No rash  


Neuro:  grossly normal 


 - Gardner +





DIAGNOSIS/ASSESSMENT


Assessment & Plan


CAYETANO - Pre-renal 2/2 diarrhea, Poor PO intake  


Renal function  improving  -  at his baseline 


CT - Cyst vs Hydronephrosis Rt kidney per Radiologist 





Hypokalemia- 


Replace  as needed , adjust in  TPN  





Hypocalcemia-  mildly  low  correction for  Albumin  


Adjust in TPN 





CKD stage 3 -Baseline Renal function per [facility] records 1.4-1.7,





Pleural Effusion- Rt large  


S/P Thoracentesis 1.5 LTS 10/18  


  


Anasarca - 2/2 Severe protein malnutrition / Severe HypoAlbuminemia


On TPN now  





  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





 Weakness/anorexia/cachexia- Palliative following  





 Chronic anemia





 DM2





 Hx of antrectomy w/ Stalin-en-Y and PUD





 Home with hospice when discharged. 





Will sign off





COMMENT/RELEVANT DATA


Meds





Current Medications








 Medications


  (Trade)  Dose


 Ordered  Sig/Leonidas  Start Time


 Stop Time Status Last Admin


Dose Admin


 


 Acetaminophen


  (Tylenol)  500 mg  PRN Q6HRS  PRN  10/22/19 09:15


 10/22/19 17:14 DC  





 


 Albumin Human  250 ml @ 


 62.5 mls/hr  1X  ONCE  10/20/19 14:30


 10/20/19 18:29 DC 10/20/19 14:30


62.5 MLS/HR


 


 Amino Acids/


 Glycerin/


 Electrolytes  1,000 ml @ 


 100 mls/hr  Q10H  10/20/19 11:00


 10/21/19 09:00 DC 10/20/19 11:08


100 MLS/HR


 


 Aspirin


  (Children'S


 Aspirin)  81 mg  [id number]  10/18/19 08:00


 10/18/19 09:29 DC 10/18/19 08:43


81 MG


 


 Aspirin


  (Ecotrin)  81 mg  DAILYWBKFT  10/19/19 08:00


 10/22/19 17:14 DC 10/21/19 09:49


81 MG


 


 Ceftriaxone Sodium


  (Rocephin)  2 gm  Q24H  10/21/19 09:00


 10/23/19 07:31 DC 10/22/19 09:03


2 GM


 


 Chlorhexidine


 Gluconate


  (Peridex)  15 ml  BID  10/19/19 21:00


   Cancel  





 


 Citalopram


 Hydrobromide


  (CeleXA)  20 mg  DAILY  10/18/19 09:00


 10/22/19 17:14 DC 10/21/19 09:49


20 MG


 


 Dopamine HCl/


 Dextrose  250 ml @ 


 10.121 mls/


 hr  CONT  PRN  10/17/19 20:00


 10/22/19 09:05 DC 10/17/19 20:32


13 MLS/HR


 


 Enoxaparin Sodium


  (Lovenox 30mg


 Syringe)  30 mg  Q24H  10/17/19 21:00


 10/22/19 17:14 DC 10/21/19 23:13


30 MG


 


 Enoxaparin Sodium


  (Lovenox 40mg


 Syringe)  40 mg  Q12HR  10/19/19 21:00


   UNV  





 


 Famotidine


  (Pepcid Vial)  20 mg  QHS  10/19/19 21:00


   Cancel  





 


 Fentanyl Citrate


  (Fentanyl 2ml


 Vial)  50 mcg  PRN Q1HR  PRN  10/19/19 17:30


   Cancel  





 


 Furosemide


  (Lasix)  20 mg  1X  ONCE  10/18/19 10:15


 10/18/19 10:16 DC 10/18/19 11:42


20 MG


 


 Info


  (Tpn Per


 Pharmacy)  1 each  PRN DAILY  PRN  10/20/19 10:45


 10/22/19 17:14 DC 10/22/19 13:31


1 EACH


 


 Lactobacillus


 Rhamnosus


  (Culturelle)  1 cap  BID  10/18/19 21:00


 10/22/19 17:14 DC 10/21/19 23:11


1 CAP


 


 Levofloxacin/


 Dextrose  150 ml @ 


 100 mls/hr  1X  ONCE  10/17/19 16:45


 10/17/19 18:14 DC 10/17/19 18:17


100 MLS/HR


 


 Linezolid/Dextrose  300 ml @ 


 300 mls/hr  Q12HR  10/17/19 21:00


 10/20/19 08:56 DC 10/20/19 08:36


300 MLS/HR


 


 Lorazepam


  (Ativan Intensol)  2 mg  PRN Q6HRS  PRN  10/23/19 07:30


     





 


 Magnesium Sulfate  50 ml @ 25


 mls/hr  1X  ONCE  10/20/19 14:00


 10/20/19 15:59 DC 10/20/19 14:41


25 MLS/HR


 


 Metronidazole  100 ml @ 


 100 mls/hr  Q12HR  10/22/19 21:00


 10/23/19 07:31 DC 10/22/19 22:22


100 MLS/HR


 


 Midazolam HCl  100 ml @ 5


 mls/hr  CONT  PRN  10/19/19 17:30


   Cancel  





 


 Morphine Sulfate


  (Morphine


 Sulfate)  4 mg  PRN Q1HR  PRN  10/19/19 17:30


   Cancel  





 


 Morphine Sulfate


  (Roxanol Conc)  20 mg  PRN Q3HRS  PRN  10/23/19 07:30


     





 


 Norepinephrine


 Bitartrate  250 ml @ 


 12.97 mls/


 hr  CONT  PRN  10/17/19 21:15


 10/22/19 09:05 DC 10/19/19 23:40


12.97 MLS/HR


 


 Ondansetron HCl


  (Zofran)  4 mg  PRN Q6HRS  PRN  10/17/19 21:30


 10/22/19 17:14 DC 10/18/19 15:42


4 MG


 


 Pantoprazole


 Sodium


  (PROTONIX VIAL


 for IV PUSH)  40 mg  BIDAC  10/18/19 16:30


 10/21/19 09:42 DC 10/20/19 16:47


40 MG


 


 Pantoprazole


 Sodium


  (Protonix)  40 mg  DAILYAC  10/22/19 07:30


 10/22/19 17:14 DC  





 


 Piperacillin Sod/


 Tazobactam Sod


 3.375 gm/Sodium


 Chloride  50 ml @ 


 100 mls/hr  Q8HRS  10/17/19 22:00


 10/21/19 08:16 DC 10/21/19 05:18


100 MLS/HR


 


 Potassium


 Chloride/Water  100 ml @ 


 100 mls/hr  Q1H  10/18/19 12:00


 10/18/19 15:59 DC 10/18/19 14:53


100 MLS/HR


 


 Potassium


 Phosphate 13.6


 mmol/Dextrose  104.5333


 ml @ 


 52.267 m...  Q2H  10/20/19 14:00


 10/20/19 17:59 DC 10/20/19 15:49


52.267 MLS/HR


 


 Propofol  100 ml @ 


 1.061 mls/


 hr  CONT  PRN  10/19/19 17:30


   Cancel  





 


 Sodium Chloride


 90 meq/Potassium


 Acetate 50 meq/


 Potassium


 Phosphate 20 mmol/


 Magnesium Sulfate


 15 meq/Calcium


 Gluconate 10 meq/


 Multivitamins 10


 ml/Chromium/


 Copper/Manganese/


 Seleni/Zn 1 ml/


 Total Parenteral


 Nutrition/Amino


 Acids/Dextrose/


 Fat Emulsion


 Intravenous  1,512 ml @ 


 63 mls/hr  TPN  CONT  10/20/19 22:00


 10/21/19 13:04 DC 10/20/19 22:02


63 MLS/HR


 


 Sodium Chloride


 90 meq/Potassium


 Acetate 70 meq/


 Potassium


 Phosphate 15 mmol/


 Magnesium Sulfate


 12 meq/Calcium


 Gluconate 13 meq/


 Multivitamins 10


 ml/Chromium/


 Copper/Manganese/


 Seleni/Zn 1 ml/


 Total Parenteral


 Nutrition/Amino


 Acids/Dextrose/


 Fat Emulsion


 Intravenous  1,512 ml @ 


 63 mls/hr  TPN  CONT  10/21/19 22:00


 10/22/19 21:59 DC 10/21/19 23:13


63 MLS/HR


 


 Sodium Chloride


 90 meq/Potassium


 Acetate 90 meq/


 Potassium


 Phosphate 15 mmol/


 Magnesium Sulfate


 12 meq/Calcium


 Gluconate 13 meq/


 Multivitamins 10


 ml/Chromium/


 Copper/Manganese/


 Seleni/Zn 1 ml/


 Total Parenteral


 Nutrition/Amino


 Acids/Dextrose/


 Fat Emulsion


 Intravenous  1,512 ml @ 


 63 mls/hr  TPN  CONT  10/22/19 22:00


 10/23/19 21:59  10/22/19 22:21


63 MLS/HR


 


 Sucralfate


  (Carafate)  1 gm  BIDAC  10/21/19 16:30


 10/22/19 17:14 DC  





 


 Vancomycin HCl  250 ml @ 


 250 mls/hr  1X  ONCE  10/17/19 17:00


 10/17/19 17:59 DC 10/17/19 17:36


250 MLS/HR








Lab





Laboratory Tests








Test


 10/22/19


11:25 10/22/19


17:07 10/23/19


04:10


 


Glucose (Fingerstick)


 254 mg/dL


(70-99) 247 mg/dL


(70-99) 





 


Sodium Level


 


 


 143 mmol/L


(136-145)


 


Potassium Level


 


 


 3.5 mmol/L


(3.5-5.1)


 


Chloride Level


 


 


 114 mmol/L


()


 


Carbon Dioxide Level


 


 


 21 mmol/L


(21-32)


 


Anion Gap   8 (6-14) 


 


Blood Urea Nitrogen


 


 


 15 mg/dL


(8-26)


 


Creatinine


 


 


 1.3 mg/dL


(0.7-1.3)


 


Estimated GFR


(Cockcroft-Gault) 


 


 52.2 





 


Glucose Level


 


 


 280 mg/dL


(70-99)


 


Calcium Level


 


 


 7.5 mg/dL


(8.5-10.1)


 


Phosphorus Level


 


 


 2.2 mg/dL


(2.6-4.7)


 


Magnesium Level


 


 


 2.1 mg/dL


(1.8-2.4)








Results


All relevant outside records, renal labs, imaging studies, telemetry/EKG's were 

reviewed.











JOSH HANLEY MD                Oct 23, 2019 10:00